# Patient Record
Sex: MALE | Race: WHITE | Employment: UNEMPLOYED | URBAN - METROPOLITAN AREA
[De-identification: names, ages, dates, MRNs, and addresses within clinical notes are randomized per-mention and may not be internally consistent; named-entity substitution may affect disease eponyms.]

---

## 2017-01-02 ENCOUNTER — HOSPITAL ENCOUNTER (OUTPATIENT)
Dept: OTHER | Age: 47
Discharge: HOME | End: 2017-01-02
Attending: FAMILY MEDICINE | Admitting: FAMILY MEDICINE

## 2017-01-02 DIAGNOSIS — R29.898 BILATERAL LEG WEAKNESS: ICD-10-CM

## 2017-01-04 DIAGNOSIS — R29.898 BILATERAL LEG WEAKNESS: Primary | ICD-10-CM

## 2017-01-17 ENCOUNTER — HOSPITAL ENCOUNTER (OUTPATIENT)
Dept: MRI IMAGING | Age: 47
Discharge: HOME | End: 2017-01-17
Attending: FAMILY MEDICINE | Admitting: FAMILY MEDICINE

## 2017-01-17 DIAGNOSIS — M62.81 MUSCLE WEAKNESS (GENERALIZED): ICD-10-CM

## 2017-01-17 DIAGNOSIS — R29.898 BILATERAL LEG WEAKNESS: ICD-10-CM

## 2017-01-17 DIAGNOSIS — R29.898 BILATERAL LEG WEAKNESS: Primary | ICD-10-CM

## 2017-02-06 ENCOUNTER — INITIAL CONSULT (OUTPATIENT)
Dept: NEUROLOGY | Age: 47
End: 2017-02-06

## 2017-02-06 VITALS
SYSTOLIC BLOOD PRESSURE: 114 MMHG | DIASTOLIC BLOOD PRESSURE: 77 MMHG | WEIGHT: 143 LBS | HEIGHT: 67 IN | BODY MASS INDEX: 22.44 KG/M2 | HEART RATE: 67 BPM | OXYGEN SATURATION: 98 %

## 2017-02-06 DIAGNOSIS — R20.0 NUMBNESS AND TINGLING OF BOTH LEGS BELOW KNEES: ICD-10-CM

## 2017-02-06 DIAGNOSIS — G62.9 POLYNEUROPATHY: Primary | ICD-10-CM

## 2017-02-06 DIAGNOSIS — F17.200 SMOKING: ICD-10-CM

## 2017-02-06 DIAGNOSIS — R20.2 NUMBNESS AND TINGLING OF BOTH LEGS BELOW KNEES: ICD-10-CM

## 2017-02-06 DIAGNOSIS — R27.0 ATAXIA: ICD-10-CM

## 2017-02-06 PROCEDURE — 99244 OFF/OP CNSLTJ NEW/EST MOD 40: CPT | Performed by: PSYCHIATRY & NEUROLOGY

## 2017-02-13 ENCOUNTER — HOSPITAL ENCOUNTER (OUTPATIENT)
Dept: OTHER | Age: 47
Discharge: HOME | End: 2017-02-13
Attending: PSYCHIATRY & NEUROLOGY | Admitting: PSYCHIATRY & NEUROLOGY

## 2017-02-13 LAB
C-REACTIVE PROTEIN: 1.3 MG/L (ref 0–5.1)
FOLATE: 2.93 NG/ML (ref 4.78–24.2)
RHEUMATOID FACTOR: <10 IU/ML
SEDIMENTATION RATE, ERYTHROCYTE: 5 MM/HR (ref 0–15)
TSH SERPL DL<=0.05 MIU/L-ACNC: 0.73 UIU/ML (ref 0.27–4.2)
VITAMIN B-12: 313 PG/ML (ref 211–911)

## 2017-02-14 LAB
ANA INTERPRETATION: NORMAL
ANTI-NUCLEAR ANTIBODY (ANA): NEGATIVE

## 2017-02-15 LAB
ALBUMIN SERPL-MCNC: 3.6 G/DL (ref 3.1–4.9)
ALPHA-1-GLOBULIN: 0.2 G/DL (ref 0.2–0.4)
ALPHA-2-GLOBULIN: 0.7 G/DL (ref 0.4–1.1)
BETA GLOBULIN: 0.9 G/DL (ref 0.9–1.6)
GAMMA GLOBULIN: 1 G/DL (ref 0.6–1.8)
SPE/IFE INTERPRETATION: NORMAL
TOTAL PROTEIN: 6.5 G/DL (ref 6.4–8.2)

## 2017-03-02 ENCOUNTER — OFFICE VISIT (OUTPATIENT)
Dept: NEUROLOGY | Age: 47
End: 2017-03-02

## 2017-03-02 DIAGNOSIS — R27.0 ATAXIA: ICD-10-CM

## 2017-03-02 DIAGNOSIS — G62.9 POLYNEUROPATHY: Primary | ICD-10-CM

## 2017-03-02 PROCEDURE — 95886 MUSC TEST DONE W/N TEST COMP: CPT | Performed by: PSYCHIATRY & NEUROLOGY

## 2017-03-02 PROCEDURE — 95909 NRV CNDJ TST 5-6 STUDIES: CPT | Performed by: PSYCHIATRY & NEUROLOGY

## 2017-03-15 ENCOUNTER — HOSPITAL ENCOUNTER (OUTPATIENT)
Dept: MRI IMAGING | Age: 47
Discharge: HOME | End: 2017-03-15
Attending: PSYCHIATRY & NEUROLOGY | Admitting: PSYCHIATRY & NEUROLOGY

## 2017-03-15 DIAGNOSIS — R27.0 ATAXIA: ICD-10-CM

## 2017-03-17 ENCOUNTER — TELEPHONE (OUTPATIENT)
Dept: NEUROLOGY | Age: 47
End: 2017-03-17

## 2017-03-17 DIAGNOSIS — R27.0 ATAXIA: ICD-10-CM

## 2017-03-17 DIAGNOSIS — R20.2 NUMBNESS AND TINGLING OF BOTH LEGS BELOW KNEES: Primary | ICD-10-CM

## 2017-03-17 DIAGNOSIS — R20.0 NUMBNESS AND TINGLING OF BOTH LEGS BELOW KNEES: Primary | ICD-10-CM

## 2017-03-31 ENCOUNTER — HOSPITAL ENCOUNTER (OUTPATIENT)
Dept: MRI IMAGING | Age: 47
Discharge: HOME | End: 2017-03-31
Attending: PSYCHIATRY & NEUROLOGY | Admitting: PSYCHIATRY & NEUROLOGY

## 2017-03-31 DIAGNOSIS — R20.0 ANESTHESIA OF SKIN: ICD-10-CM

## 2017-03-31 DIAGNOSIS — R27.0 ATAXIA: ICD-10-CM

## 2017-03-31 DIAGNOSIS — R20.0 NUMBNESS AND TINGLING OF BOTH LEGS BELOW KNEES: ICD-10-CM

## 2017-03-31 DIAGNOSIS — R20.2 NUMBNESS AND TINGLING OF BOTH LEGS BELOW KNEES: ICD-10-CM

## 2017-04-13 ENCOUNTER — TELEPHONE (OUTPATIENT)
Dept: NEUROLOGY | Age: 47
End: 2017-04-13

## 2017-04-25 ENCOUNTER — OFFICE VISIT (OUTPATIENT)
Dept: FAMILY MEDICINE CLINIC | Age: 47
End: 2017-04-25

## 2017-04-25 VITALS
WEIGHT: 146 LBS | BODY MASS INDEX: 22.91 KG/M2 | TEMPERATURE: 98.2 F | RESPIRATION RATE: 16 BRPM | HEART RATE: 84 BPM | HEIGHT: 67 IN | SYSTOLIC BLOOD PRESSURE: 104 MMHG | DIASTOLIC BLOOD PRESSURE: 78 MMHG

## 2017-04-25 DIAGNOSIS — F32.1 MODERATE SINGLE CURRENT EPISODE OF MAJOR DEPRESSIVE DISORDER (HCC): Primary | ICD-10-CM

## 2017-04-25 PROCEDURE — 99214 OFFICE O/P EST MOD 30 MIN: CPT | Performed by: FAMILY MEDICINE

## 2017-04-25 RX ORDER — GABAPENTIN 600 MG/1
600 TABLET ORAL 3 TIMES DAILY
Qty: 90 TABLET | Refills: 5 | Status: SHIPPED | OUTPATIENT
Start: 2017-04-25 | End: 2017-09-11 | Stop reason: SDUPTHER

## 2017-04-25 RX ORDER — LORAZEPAM 0.5 MG/1
0.5 TABLET ORAL EVERY 8 HOURS PRN
Qty: 30 TABLET | Refills: 0 | Status: SHIPPED | OUTPATIENT
Start: 2017-04-25 | End: 2017-05-15 | Stop reason: DRUGHIGH

## 2017-04-25 RX ORDER — DICYCLOMINE HYDROCHLORIDE 10 MG/1
10 CAPSULE ORAL
Qty: 120 CAPSULE | Refills: 3 | Status: SHIPPED | OUTPATIENT
Start: 2017-04-25 | End: 2019-09-16 | Stop reason: SDUPTHER

## 2017-05-15 ENCOUNTER — OFFICE VISIT (OUTPATIENT)
Dept: FAMILY MEDICINE CLINIC | Age: 47
End: 2017-05-15

## 2017-05-15 VITALS
SYSTOLIC BLOOD PRESSURE: 102 MMHG | BODY MASS INDEX: 22.44 KG/M2 | WEIGHT: 143 LBS | OXYGEN SATURATION: 97 % | HEART RATE: 85 BPM | HEIGHT: 67 IN | DIASTOLIC BLOOD PRESSURE: 72 MMHG

## 2017-05-15 DIAGNOSIS — F32.1 MODERATE SINGLE CURRENT EPISODE OF MAJOR DEPRESSIVE DISORDER (HCC): Primary | ICD-10-CM

## 2017-05-15 DIAGNOSIS — R29.898 BILATERAL LEG WEAKNESS: ICD-10-CM

## 2017-05-15 DIAGNOSIS — F17.200 NICOTINE USE DISORDER: ICD-10-CM

## 2017-05-15 PROCEDURE — 99213 OFFICE O/P EST LOW 20 MIN: CPT | Performed by: FAMILY MEDICINE

## 2017-05-15 RX ORDER — NICOTINE 21 MG/24HR
1 PATCH, TRANSDERMAL 24 HOURS TRANSDERMAL EVERY 24 HOURS
Qty: 30 PATCH | Refills: 5 | Status: CANCELLED | OUTPATIENT
Start: 2017-05-15

## 2017-05-15 RX ORDER — NICOTINE 21 MG/24HR
1 PATCH, TRANSDERMAL 24 HOURS TRANSDERMAL EVERY 24 HOURS
Qty: 30 PATCH | Refills: 0 | Status: SHIPPED | OUTPATIENT
Start: 2017-05-15 | End: 2017-09-11 | Stop reason: ALTCHOICE

## 2017-05-15 RX ORDER — NICOTINE 21 MG/24HR
1 PATCH, TRANSDERMAL 24 HOURS TRANSDERMAL EVERY 24 HOURS
COMMUNITY
End: 2017-09-11 | Stop reason: ALTCHOICE

## 2017-05-15 RX ORDER — LORAZEPAM 1 MG/1
1 TABLET ORAL EVERY 8 HOURS PRN
Qty: 30 TABLET | Refills: 2 | Status: SHIPPED | OUTPATIENT
Start: 2017-05-15 | End: 2017-07-10 | Stop reason: SDUPTHER

## 2017-07-10 ENCOUNTER — OFFICE VISIT (OUTPATIENT)
Dept: FAMILY MEDICINE CLINIC | Age: 47
End: 2017-07-10

## 2017-07-10 VITALS
BODY MASS INDEX: 22.55 KG/M2 | HEART RATE: 67 BPM | WEIGHT: 144 LBS | DIASTOLIC BLOOD PRESSURE: 72 MMHG | OXYGEN SATURATION: 98 % | RESPIRATION RATE: 15 BRPM | SYSTOLIC BLOOD PRESSURE: 102 MMHG

## 2017-07-10 DIAGNOSIS — F32.1 MODERATE SINGLE CURRENT EPISODE OF MAJOR DEPRESSIVE DISORDER (HCC): ICD-10-CM

## 2017-07-10 PROCEDURE — 99213 OFFICE O/P EST LOW 20 MIN: CPT | Performed by: FAMILY MEDICINE

## 2017-07-10 RX ORDER — LORAZEPAM 1 MG/1
1 TABLET ORAL EVERY 8 HOURS PRN
Qty: 30 TABLET | Refills: 2 | Status: SHIPPED | OUTPATIENT
Start: 2017-07-10 | End: 2017-09-11 | Stop reason: SDUPTHER

## 2017-07-10 RX ORDER — PAROXETINE HYDROCHLORIDE 40 MG/1
40 TABLET, FILM COATED ORAL NIGHTLY
Qty: 30 TABLET | Refills: 2 | Status: SHIPPED | OUTPATIENT
Start: 2017-07-10 | End: 2017-09-11 | Stop reason: SDUPTHER

## 2017-09-11 ENCOUNTER — OFFICE VISIT (OUTPATIENT)
Dept: FAMILY MEDICINE CLINIC | Age: 47
End: 2017-09-11

## 2017-09-11 VITALS
OXYGEN SATURATION: 93 % | RESPIRATION RATE: 15 BRPM | HEART RATE: 62 BPM | DIASTOLIC BLOOD PRESSURE: 78 MMHG | TEMPERATURE: 97.5 F | BODY MASS INDEX: 22.71 KG/M2 | SYSTOLIC BLOOD PRESSURE: 112 MMHG | WEIGHT: 145 LBS

## 2017-09-11 DIAGNOSIS — Z23 NEED FOR INFLUENZA VACCINATION: Primary | ICD-10-CM

## 2017-09-11 DIAGNOSIS — F32.1 MODERATE SINGLE CURRENT EPISODE OF MAJOR DEPRESSIVE DISORDER (HCC): ICD-10-CM

## 2017-09-11 PROCEDURE — 99213 OFFICE O/P EST LOW 20 MIN: CPT | Performed by: FAMILY MEDICINE

## 2017-09-11 PROCEDURE — 90471 IMMUNIZATION ADMIN: CPT | Performed by: FAMILY MEDICINE

## 2017-09-11 PROCEDURE — 90688 IIV4 VACCINE SPLT 0.5 ML IM: CPT | Performed by: FAMILY MEDICINE

## 2017-09-11 RX ORDER — GABAPENTIN 600 MG/1
600 TABLET ORAL 3 TIMES DAILY
Qty: 90 TABLET | Refills: 5 | Status: SHIPPED | OUTPATIENT
Start: 2017-09-11 | End: 2018-02-26 | Stop reason: SDUPTHER

## 2017-09-11 RX ORDER — PAROXETINE HYDROCHLORIDE 40 MG/1
40 TABLET, FILM COATED ORAL NIGHTLY
Qty: 30 TABLET | Refills: 5 | Status: SHIPPED | OUTPATIENT
Start: 2017-09-11 | End: 2018-02-26 | Stop reason: SDUPTHER

## 2017-09-11 RX ORDER — LORAZEPAM 1 MG/1
1 TABLET ORAL EVERY 8 HOURS PRN
Qty: 30 TABLET | Refills: 5 | Status: SHIPPED | OUTPATIENT
Start: 2017-09-11 | End: 2018-02-26 | Stop reason: SDUPTHER

## 2017-09-11 RX ORDER — ALBUTEROL SULFATE 90 UG/1
2 AEROSOL, METERED RESPIRATORY (INHALATION) 4 TIMES DAILY
Qty: 1 INHALER | Refills: 5 | Status: SHIPPED | OUTPATIENT
Start: 2017-09-11 | End: 2018-02-26 | Stop reason: SDUPTHER

## 2017-10-03 DIAGNOSIS — F32.1 MODERATE SINGLE CURRENT EPISODE OF MAJOR DEPRESSIVE DISORDER (HCC): ICD-10-CM

## 2017-10-03 RX ORDER — PAROXETINE HYDROCHLORIDE 40 MG/1
TABLET, FILM COATED ORAL
Qty: 30 TABLET | Refills: 0 | OUTPATIENT
Start: 2017-10-03

## 2017-12-22 ENCOUNTER — OFFICE VISIT (OUTPATIENT)
Dept: FAMILY MEDICINE CLINIC | Age: 47
End: 2017-12-22

## 2017-12-22 VITALS
BODY MASS INDEX: 23.7 KG/M2 | HEIGHT: 67 IN | DIASTOLIC BLOOD PRESSURE: 60 MMHG | HEART RATE: 61 BPM | WEIGHT: 151 LBS | SYSTOLIC BLOOD PRESSURE: 108 MMHG | OXYGEN SATURATION: 97 %

## 2017-12-22 DIAGNOSIS — Z71.6 ENCOUNTER FOR SMOKING CESSATION COUNSELING: ICD-10-CM

## 2017-12-22 DIAGNOSIS — B07.0 PLANTAR WART OF RIGHT FOOT: Primary | ICD-10-CM

## 2017-12-22 PROCEDURE — 99213 OFFICE O/P EST LOW 20 MIN: CPT | Performed by: FAMILY MEDICINE

## 2017-12-22 PROCEDURE — G8484 FLU IMMUNIZE NO ADMIN: HCPCS | Performed by: FAMILY MEDICINE

## 2017-12-22 PROCEDURE — 11055 PARING/CUTG B9 HYPRKER LES 1: CPT | Performed by: FAMILY MEDICINE

## 2017-12-22 PROCEDURE — G8420 CALC BMI NORM PARAMETERS: HCPCS | Performed by: FAMILY MEDICINE

## 2017-12-22 PROCEDURE — G8427 DOCREV CUR MEDS BY ELIG CLIN: HCPCS | Performed by: FAMILY MEDICINE

## 2017-12-22 PROCEDURE — 4004F PT TOBACCO SCREEN RCVD TLK: CPT | Performed by: FAMILY MEDICINE

## 2017-12-22 RX ORDER — VARENICLINE TARTRATE 1 MG/1
1 TABLET, FILM COATED ORAL 2 TIMES DAILY
Qty: 60 TABLET | Refills: 3 | Status: SHIPPED | OUTPATIENT
Start: 2017-12-22 | End: 2018-02-26 | Stop reason: ALTCHOICE

## 2017-12-22 RX ORDER — VARENICLINE TARTRATE 25 MG
KIT ORAL
Qty: 53 TABLET | Refills: 0 | Status: SHIPPED | OUTPATIENT
Start: 2017-12-22 | End: 2018-02-26 | Stop reason: ALTCHOICE

## 2018-02-26 ENCOUNTER — OFFICE VISIT (OUTPATIENT)
Dept: FAMILY MEDICINE CLINIC | Age: 48
End: 2018-02-26

## 2018-02-26 VITALS
HEART RATE: 78 BPM | WEIGHT: 154 LBS | DIASTOLIC BLOOD PRESSURE: 72 MMHG | SYSTOLIC BLOOD PRESSURE: 102 MMHG | OXYGEN SATURATION: 97 % | BODY MASS INDEX: 24.17 KG/M2 | HEIGHT: 67 IN

## 2018-02-26 DIAGNOSIS — M19.90 ARTHRITIS: Primary | ICD-10-CM

## 2018-02-26 DIAGNOSIS — B07.0 PLANTAR WART OF RIGHT FOOT: ICD-10-CM

## 2018-02-26 DIAGNOSIS — F32.1 MODERATE SINGLE CURRENT EPISODE OF MAJOR DEPRESSIVE DISORDER (HCC): ICD-10-CM

## 2018-02-26 PROCEDURE — G8427 DOCREV CUR MEDS BY ELIG CLIN: HCPCS | Performed by: FAMILY MEDICINE

## 2018-02-26 PROCEDURE — 99213 OFFICE O/P EST LOW 20 MIN: CPT | Performed by: FAMILY MEDICINE

## 2018-02-26 PROCEDURE — G8484 FLU IMMUNIZE NO ADMIN: HCPCS | Performed by: FAMILY MEDICINE

## 2018-02-26 PROCEDURE — G8420 CALC BMI NORM PARAMETERS: HCPCS | Performed by: FAMILY MEDICINE

## 2018-02-26 PROCEDURE — 1036F TOBACCO NON-USER: CPT | Performed by: FAMILY MEDICINE

## 2018-02-26 PROCEDURE — 11055 PARING/CUTG B9 HYPRKER LES 1: CPT | Performed by: FAMILY MEDICINE

## 2018-02-26 RX ORDER — ALBUTEROL SULFATE 90 UG/1
2 AEROSOL, METERED RESPIRATORY (INHALATION) 4 TIMES DAILY
Qty: 1 INHALER | Refills: 5 | Status: SHIPPED | OUTPATIENT
Start: 2018-02-26 | End: 2018-11-07 | Stop reason: DRUGHIGH

## 2018-02-26 RX ORDER — PAROXETINE HYDROCHLORIDE 40 MG/1
40 TABLET, FILM COATED ORAL NIGHTLY
Qty: 30 TABLET | Refills: 5 | Status: SHIPPED | OUTPATIENT
Start: 2018-02-26 | End: 2018-08-20 | Stop reason: SDUPTHER

## 2018-02-26 RX ORDER — LORAZEPAM 1 MG/1
1 TABLET ORAL EVERY 8 HOURS PRN
Qty: 30 TABLET | Refills: 5 | Status: SHIPPED | OUTPATIENT
Start: 2018-02-26 | End: 2018-03-28

## 2018-02-26 RX ORDER — GABAPENTIN 600 MG/1
600 TABLET ORAL 3 TIMES DAILY
Qty: 90 TABLET | Refills: 5 | Status: SHIPPED | OUTPATIENT
Start: 2018-02-26 | End: 2018-08-20 | Stop reason: SDUPTHER

## 2018-02-26 NOTE — PROGRESS NOTES
Subjective:      Patient ID: Genaro Killian is a 56 y.o. male. HPI  Chief Complaint   Patient presents with   Marcos ran 12/21/17;loraz one a day;    Arthritis     uses kami 3 times a day for back andknees pain;    Tingling     takes the gabapentin for this     Depression     controlled;    Nicotine Dependence     hasnt smoked since 1/1/2018-was 3 ppd;     Chief complaint present illness: 78-year-old white male presents unaccompanied for routine follow-up. Feels he is doing really quite well. Is most happy with the fact that he hasn't smoked since January 1. Review of Systems   All other systems reviewed and are negative. Objective:   Physical Exam   Constitutional: He appears well-developed and well-nourished. Neck: Neck supple. Cardiovascular: Normal rate, regular rhythm and normal heart sounds. Pulmonary/Chest: Effort normal and breath sounds normal. He has no wheezes. He has no rales. Musculoskeletal: He exhibits no edema. Skin: Skin is warm. Psychiatric: He has a normal mood and affect. His behavior is normal.   Nursing note and vitals reviewed. /72   Pulse 78   Ht 5' 7\" (1.702 m)   Wt 154 lb (69.9 kg)   SpO2 97%   BMI 24.12 kg/m²     Controlled Substances Monitoring: The Prescription Monitoring Report for this patient was reviewed today. Nichelle Alexandre MD)    Possible medication side effects, risk of tolerance/dependence & alternative treatments discussed., No signs of potential drug abuse or diversion identified. Nichelle Alexandre MD)         Functional status reviewed - continues with improved or maintaining ADL's. Nichelle Alexandre MD)          Assessment:      Mari Toscano was seen today for anxiety, arthritis, tingling, depression and nicotine dependence. Diagnoses and all orders for this visit:    Arthritis    Moderate single current episode of major depressive disorder (HCC)  -     LORazepam (ATIVAN) 1 MG tablet;  Take 1 tablet by

## 2018-05-11 RX ORDER — VARENICLINE TARTRATE 1 MG/1
TABLET, FILM COATED ORAL
Qty: 56 TABLET | Refills: 0 | Status: SHIPPED | OUTPATIENT
Start: 2018-05-11 | End: 2018-07-25

## 2018-07-10 ENCOUNTER — TELEPHONE (OUTPATIENT)
Dept: PULMONOLOGY | Age: 48
End: 2018-07-10

## 2018-07-11 NOTE — TELEPHONE ENCOUNTER
Pt was called and scheduled for 11/7/18.  Pt advised to continue to f/u with PCP until appt with Dr. Migel Brand

## 2018-07-25 ENCOUNTER — HOSPITAL ENCOUNTER (EMERGENCY)
Age: 48
Discharge: HOME | End: 2018-07-26
Attending: EMERGENCY MEDICINE
Payer: MEDICARE

## 2018-07-25 DIAGNOSIS — R19.7 DIARRHEA, UNSPECIFIED TYPE: Primary | ICD-10-CM

## 2018-07-25 LAB
A/G RATIO: 1.5 (ref 1.1–2.2)
ALBUMIN SERPL-MCNC: 4.9 G/DL (ref 3.4–5)
ALP BLD-CCNC: 112 U/L (ref 40–129)
ALT SERPL-CCNC: 13 U/L (ref 10–40)
ANION GAP SERPL CALCULATED.3IONS-SCNC: 14 MMOL/L (ref 3–16)
AST SERPL-CCNC: 17 U/L (ref 15–37)
BASOPHILS ABSOLUTE: 0.1 K/UL (ref 0–0.2)
BASOPHILS RELATIVE PERCENT: 0.6 %
BILIRUB SERPL-MCNC: 1.1 MG/DL (ref 0–1)
BUN BLDV-MCNC: 11 MG/DL (ref 7–20)
C DIFFICILE TOXIN, EIA: NORMAL
CALCIUM SERPL-MCNC: 10.3 MG/DL (ref 8.3–10.6)
CHLORIDE BLD-SCNC: 100 MMOL/L (ref 99–110)
CO2: 28 MMOL/L (ref 21–32)
CREAT SERPL-MCNC: 1.1 MG/DL (ref 0.9–1.3)
EOSINOPHILS ABSOLUTE: 0.8 K/UL (ref 0–0.6)
EOSINOPHILS RELATIVE PERCENT: 7.1 %
GFR AFRICAN AMERICAN: >60
GFR NON-AFRICAN AMERICAN: >60
GLOBULIN: 3.3 G/DL
GLUCOSE BLD-MCNC: 107 MG/DL (ref 70–99)
HCT VFR BLD CALC: 45.4 % (ref 40.5–52.5)
HEMOGLOBIN: 15.4 G/DL (ref 13.5–17.5)
LYMPHOCYTES ABSOLUTE: 2.9 K/UL (ref 1–5.1)
LYMPHOCYTES RELATIVE PERCENT: 24 %
MCH RBC QN AUTO: 29.5 PG (ref 26–34)
MCHC RBC AUTO-ENTMCNC: 34 G/DL (ref 31–36)
MCV RBC AUTO: 86.7 FL (ref 80–100)
MONOCYTES ABSOLUTE: 1 K/UL (ref 0–1.3)
MONOCYTES RELATIVE PERCENT: 8.6 %
NEUTROPHILS ABSOLUTE: 7.2 K/UL (ref 1.7–7.7)
NEUTROPHILS RELATIVE PERCENT: 59.7 %
PDW BLD-RTO: 13.3 % (ref 12.4–15.4)
PLATELET # BLD: 289 K/UL (ref 135–450)
PMV BLD AUTO: 8.3 FL (ref 5–10.5)
POTASSIUM SERPL-SCNC: 4.1 MMOL/L (ref 3.5–5.1)
RBC # BLD: 5.24 M/UL (ref 4.2–5.9)
SODIUM BLD-SCNC: 142 MMOL/L (ref 136–145)
TOTAL PROTEIN: 8.2 G/DL (ref 6.4–8.2)
WBC # BLD: 12 K/UL (ref 4–11)

## 2018-07-25 PROCEDURE — 80053 COMPREHEN METABOLIC PANEL: CPT

## 2018-07-25 PROCEDURE — 99284 EMERGENCY DEPT VISIT MOD MDM: CPT

## 2018-07-25 PROCEDURE — 87324 CLOSTRIDIUM AG IA: CPT

## 2018-07-25 PROCEDURE — 96374 THER/PROPH/DIAG INJ IV PUSH: CPT

## 2018-07-25 PROCEDURE — 85025 COMPLETE CBC W/AUTO DIFF WBC: CPT

## 2018-07-25 PROCEDURE — 96361 HYDRATE IV INFUSION ADD-ON: CPT

## 2018-07-25 PROCEDURE — 36415 COLL VENOUS BLD VENIPUNCTURE: CPT

## 2018-07-25 PROCEDURE — 87449 NOS EACH ORGANISM AG IA: CPT

## 2018-07-25 PROCEDURE — 2580000003 HC RX 258: Performed by: EMERGENCY MEDICINE

## 2018-07-25 PROCEDURE — 6360000002 HC RX W HCPCS: Performed by: EMERGENCY MEDICINE

## 2018-07-25 RX ORDER — LORAZEPAM 1 MG/1
1 TABLET ORAL ONCE
COMMUNITY
End: 2018-08-20 | Stop reason: SDUPTHER

## 2018-07-25 RX ORDER — ONDANSETRON 2 MG/ML
4 INJECTION INTRAMUSCULAR; INTRAVENOUS ONCE
Status: COMPLETED | OUTPATIENT
Start: 2018-07-25 | End: 2018-07-25

## 2018-07-25 RX ORDER — 0.9 % SODIUM CHLORIDE 0.9 %
1000 INTRAVENOUS SOLUTION INTRAVENOUS ONCE
Status: COMPLETED | OUTPATIENT
Start: 2018-07-25 | End: 2018-07-26

## 2018-07-25 RX ADMIN — ONDANSETRON 4 MG: 2 INJECTION INTRAMUSCULAR; INTRAVENOUS at 22:50

## 2018-07-25 RX ADMIN — SODIUM CHLORIDE 1000 ML: 9 INJECTION, SOLUTION INTRAVENOUS at 22:50

## 2018-07-25 ASSESSMENT — ENCOUNTER SYMPTOMS
BLOOD IN STOOL: 0
WHEEZING: 0
BACK PAIN: 0
DIARRHEA: 1
SHORTNESS OF BREATH: 0
NAUSEA: 0
COLOR CHANGE: 0
STRIDOR: 0
FACIAL SWELLING: 0
PHOTOPHOBIA: 0
TROUBLE SWALLOWING: 0
ABDOMINAL PAIN: 0
VOMITING: 0
VOICE CHANGE: 0

## 2018-07-26 VITALS
HEART RATE: 88 BPM | RESPIRATION RATE: 16 BRPM | SYSTOLIC BLOOD PRESSURE: 148 MMHG | BODY MASS INDEX: 25.9 KG/M2 | HEIGHT: 67 IN | DIASTOLIC BLOOD PRESSURE: 73 MMHG | OXYGEN SATURATION: 99 % | TEMPERATURE: 97.9 F | WEIGHT: 165 LBS

## 2018-07-26 NOTE — ED NOTES
PATIENT IV ESTABLISHED AND MEDICATED AS ORDERED. WARM BLANKET GIVEN. WIFE OUT TO THE NURSES STATION WANTING TO KNOW IF THE PATIENT CAN EAT NOW. ADVISED NOT TO GIVE HIM FOOD YET.      Marshal Kirby RN  07/25/18 [id number]

## 2018-07-26 NOTE — ED PROVIDER NOTES
[facility]  eMERGENCY dEPARTMENT eNCOUnter      Pt Name: Wendy Larson  MRN: [id number]  Charlinegfaraseli 1970  Date of evaluation: 7/25/2018  Provider: Niurka Shahid MD    CHIEF COMPLAINT       Chief Complaint   Patient presents with    Diarrhea     pt states he has had diarrhea x2 months         HISTORY OF PRESENT ILLNESS   (Location/Symptom, Timing/Onset, Context/Setting, Quality, Duration, Modifying Factors, Severity)  Note limiting factors. The history is provided by the patient. Diarrhea   Quality:  Watery (no blood per patient)  Severity:  Moderate  Onset quality:  Gradual  Duration:  2 months  Timing:  Constant  Progression:  Unchanged  Relieved by:  None tried  Worsened by:  Nothing  Ineffective treatments:  None tried  Associated symptoms: no abdominal pain, no fever and no vomiting    Risk factors: no recent antibiotic use  Sick contacts: denies any antibiotic use or hospitalizations recently. Nursing Notes were reviewed. REVIEW OF SYSTEMS    (2-9 systems for level 4, 10 or more for level 5)     Review of Systems   Constitutional: Negative for appetite change, fever and unexpected weight change. HENT: Negative for drooling, facial swelling, trouble swallowing and voice change. Eyes: Negative for photophobia and visual disturbance. Respiratory: Negative for shortness of breath, wheezing and stridor. Cardiovascular: Negative for chest pain and palpitations. Gastrointestinal: Positive for diarrhea. Negative for abdominal pain, blood in stool, nausea and vomiting. Genitourinary: Negative for difficulty urinating and dysuria. Musculoskeletal: Negative for back pain, gait problem and neck pain. Skin: Negative for color change and wound. Neurological: Negative for seizures, syncope and speech difficulty. Psychiatric/Behavioral: Negative for self-injury and suicidal ideas.        Except as noted above the remainder of the review of systems was 5)     ED Triage Vitals [07/25/18 2125]   BP Temp Temp Source Pulse Resp SpO2 Height Weight   120/83 97.9 °F (36.6 °C) Oral 81 16 99 % 5' 7\" (1.702 m) 165 lb (74.8 kg)       Physical Exam   Constitutional: He is oriented to person, place, and time. He appears well-developed and well-nourished. No distress. HENT:   Head: Normocephalic and atraumatic. Right Ear: External ear normal.   Left Ear: External ear normal.   Eyes: Conjunctivae and EOM are normal.   Neck: Neck supple. No JVD present. No tracheal deviation present. Cardiovascular: Normal rate and intact distal pulses. Pulmonary/Chest: Effort normal and breath sounds normal. No respiratory distress. He has no wheezes. Abdominal: Soft. Bowel sounds are normal. He exhibits no distension. There is no tenderness (no abdominal tenderness). There is no rebound and no guarding. Musculoskeletal: Normal range of motion. He exhibits no tenderness. Neurological: He is alert and oriented to person, place, and time. No cranial nerve deficit. 5/5 strength in all 4 extremities. Normal gait. No focal neurological symptoms. Skin: Skin is warm and dry. Nursing note and vitals reviewed. DIAGNOSTIC RESULTS       LABS:  Labs Reviewed   CBC WITH AUTO DIFFERENTIAL - Abnormal; Notable for the following:        Result Value    WBC 12.0 (*)     Eosinophils # 0.8 (*)     All other components within normal limits    Narrative:     Performed at:  [facility]. [facility]  [address] [place] [address]. [place], [address]   Phone [phone]   COMPREHENSIVE METABOLIC PANEL - Abnormal; Notable for the following:     Glucose 107 (*)     Total Bilirubin 1.1 (*)     All other components within normal limits    Narrative:     Performed at:  [facility]. [facility]  [address] [place] [address].  [place], [address]   Phone [phone]   C DIFF TOXIN/ANTIGEN       All other labs were within normal range or not returned Jair Albright, [address]  [facility] ([facility]) [address] [place] [address]  [phone]    In 2 days      [place]. [facility]  [address]  [phone]    If symptoms worsen      (Please note that portions of this note were completed with a voice recognition program.  Efforts were made to edit the dictations but occasionally words are mis-transcribed. )    César Ledezma MD (electronically signed)  Attending Emergency Physician           César Ledezma MD  07/25/18 [phone]

## 2018-08-20 ENCOUNTER — OFFICE VISIT (OUTPATIENT)
Dept: FAMILY MEDICINE CLINIC | Age: 48
End: 2018-08-20

## 2018-08-20 VITALS
HEART RATE: 57 BPM | HEIGHT: 67 IN | SYSTOLIC BLOOD PRESSURE: 102 MMHG | DIASTOLIC BLOOD PRESSURE: 70 MMHG | OXYGEN SATURATION: 98 % | BODY MASS INDEX: 24.17 KG/M2 | WEIGHT: 154 LBS

## 2018-08-20 DIAGNOSIS — F32.1 MODERATE SINGLE CURRENT EPISODE OF MAJOR DEPRESSIVE DISORDER (HCC): ICD-10-CM

## 2018-08-20 DIAGNOSIS — R20.2 NUMBNESS AND TINGLING OF BOTH LEGS BELOW KNEES: ICD-10-CM

## 2018-08-20 DIAGNOSIS — R20.0 NUMBNESS AND TINGLING OF BOTH LEGS BELOW KNEES: ICD-10-CM

## 2018-08-20 DIAGNOSIS — K58.0 IRRITABLE BOWEL SYNDROME WITH DIARRHEA: Primary | ICD-10-CM

## 2018-08-20 LAB
C-REACTIVE PROTEIN: 0.8 MG/L (ref 0–5.1)
SEDIMENTATION RATE, ERYTHROCYTE: 5 MM/HR (ref 0–15)

## 2018-08-20 PROCEDURE — G8427 DOCREV CUR MEDS BY ELIG CLIN: HCPCS | Performed by: FAMILY MEDICINE

## 2018-08-20 PROCEDURE — 36415 COLL VENOUS BLD VENIPUNCTURE: CPT | Performed by: FAMILY MEDICINE

## 2018-08-20 PROCEDURE — 1036F TOBACCO NON-USER: CPT | Performed by: FAMILY MEDICINE

## 2018-08-20 PROCEDURE — 99214 OFFICE O/P EST MOD 30 MIN: CPT | Performed by: FAMILY MEDICINE

## 2018-08-20 PROCEDURE — G8420 CALC BMI NORM PARAMETERS: HCPCS | Performed by: FAMILY MEDICINE

## 2018-08-20 RX ORDER — PAROXETINE HYDROCHLORIDE 40 MG/1
40 TABLET, FILM COATED ORAL NIGHTLY
Qty: 90 TABLET | Refills: 1 | Status: SHIPPED | OUTPATIENT
Start: 2018-08-20 | End: 2019-03-08 | Stop reason: SDUPTHER

## 2018-08-20 RX ORDER — LORAZEPAM 1 MG/1
1 TABLET ORAL PRN
Qty: 30 TABLET | Refills: 5 | Status: SHIPPED | OUTPATIENT
Start: 2018-08-20 | End: 2019-02-16

## 2018-08-20 RX ORDER — GABAPENTIN 600 MG/1
600 TABLET ORAL 3 TIMES DAILY
Qty: 270 TABLET | Refills: 1 | Status: SHIPPED | OUTPATIENT
Start: 2018-08-20 | End: 2019-03-08 | Stop reason: SDUPTHER

## 2018-10-18 ENCOUNTER — NURSE ONLY (OUTPATIENT)
Dept: FAMILY MEDICINE CLINIC | Age: 48
End: 2018-10-18

## 2018-10-18 ENCOUNTER — APPOINTMENT (OUTPATIENT)
Dept: CT IMAGING | Age: 48
End: 2018-10-18
Payer: MEDICARE

## 2018-10-18 ENCOUNTER — HOSPITAL ENCOUNTER (EMERGENCY)
Age: 48
Discharge: HOME | End: 2018-10-18
Attending: EMERGENCY MEDICINE
Payer: MEDICARE

## 2018-10-18 ENCOUNTER — APPOINTMENT (OUTPATIENT)
Dept: GENERAL RADIOLOGY | Age: 48
End: 2018-10-18
Payer: MEDICARE

## 2018-10-18 VITALS
DIASTOLIC BLOOD PRESSURE: 85 MMHG | RESPIRATION RATE: 16 BRPM | HEIGHT: 68 IN | WEIGHT: 165 LBS | HEART RATE: 65 BPM | OXYGEN SATURATION: 100 % | BODY MASS INDEX: 25.01 KG/M2 | TEMPERATURE: 97.8 F | SYSTOLIC BLOOD PRESSURE: 146 MMHG

## 2018-10-18 VITALS — HEART RATE: 54 BPM | OXYGEN SATURATION: 95 % | DIASTOLIC BLOOD PRESSURE: 89 MMHG | SYSTOLIC BLOOD PRESSURE: 130 MMHG

## 2018-10-18 DIAGNOSIS — R53.1 GENERAL WEAKNESS: Primary | ICD-10-CM

## 2018-10-18 DIAGNOSIS — J32.9 CHRONIC SINUSITIS, UNSPECIFIED LOCATION: ICD-10-CM

## 2018-10-18 DIAGNOSIS — R00.2 PALPITATIONS: ICD-10-CM

## 2018-10-18 LAB
A/G RATIO: 1.7 (ref 1.1–2.2)
ALBUMIN SERPL-MCNC: 4.5 G/DL (ref 3.4–5)
ALP BLD-CCNC: 72 U/L (ref 40–129)
ALT SERPL-CCNC: 7 U/L (ref 10–40)
ANION GAP SERPL CALCULATED.3IONS-SCNC: 11 MMOL/L (ref 3–16)
AST SERPL-CCNC: 12 U/L (ref 15–37)
BASOPHILS ABSOLUTE: 0.1 K/UL (ref 0–0.2)
BASOPHILS RELATIVE PERCENT: 1 %
BILIRUB SERPL-MCNC: 0.9 MG/DL (ref 0–1)
BUN BLDV-MCNC: 7 MG/DL (ref 7–20)
CALCIUM SERPL-MCNC: 9.5 MG/DL (ref 8.3–10.6)
CHLORIDE BLD-SCNC: 105 MMOL/L (ref 99–110)
CO2: 29 MMOL/L (ref 21–32)
CREAT SERPL-MCNC: 1 MG/DL (ref 0.9–1.3)
EOSINOPHILS ABSOLUTE: 0.4 K/UL (ref 0–0.6)
EOSINOPHILS RELATIVE PERCENT: 4.6 %
GFR AFRICAN AMERICAN: >60
GFR NON-AFRICAN AMERICAN: >60
GLOBULIN: 2.7 G/DL
GLUCOSE BLD-MCNC: 113 MG/DL (ref 70–99)
GLUCOSE BLD-MCNC: 115 MG/DL (ref 70–99)
HCT VFR BLD CALC: 46.5 % (ref 40.5–52.5)
HEMOGLOBIN: 15.6 G/DL (ref 13.5–17.5)
LYMPHOCYTES ABSOLUTE: 2.2 K/UL (ref 1–5.1)
LYMPHOCYTES RELATIVE PERCENT: 26.5 %
MCH RBC QN AUTO: 30 PG (ref 26–34)
MCHC RBC AUTO-ENTMCNC: 33.6 G/DL (ref 31–36)
MCV RBC AUTO: 89.2 FL (ref 80–100)
MONOCYTES ABSOLUTE: 0.6 K/UL (ref 0–1.3)
MONOCYTES RELATIVE PERCENT: 7.4 %
NEUTROPHILS ABSOLUTE: 5 K/UL (ref 1.7–7.7)
NEUTROPHILS RELATIVE PERCENT: 60.5 %
PDW BLD-RTO: 12.9 % (ref 12.4–15.4)
PERFORMED ON: ABNORMAL
PLATELET # BLD: 274 K/UL (ref 135–450)
PMV BLD AUTO: 8.2 FL (ref 5–10.5)
POTASSIUM REFLEX MAGNESIUM: 3.7 MMOL/L (ref 3.5–5.1)
RBC # BLD: 5.21 M/UL (ref 4.2–5.9)
SODIUM BLD-SCNC: 145 MMOL/L (ref 136–145)
TOTAL PROTEIN: 7.2 G/DL (ref 6.4–8.2)
TROPONIN: <0.01 NG/ML
TSH SERPL DL<=0.05 MIU/L-ACNC: 0.66 UIU/ML (ref 0.27–4.2)
WBC # BLD: 8.2 K/UL (ref 4–11)

## 2018-10-18 PROCEDURE — 84443 ASSAY THYROID STIM HORMONE: CPT

## 2018-10-18 PROCEDURE — 36415 COLL VENOUS BLD VENIPUNCTURE: CPT

## 2018-10-18 PROCEDURE — 85025 COMPLETE CBC W/AUTO DIFF WBC: CPT

## 2018-10-18 PROCEDURE — 71045 X-RAY EXAM CHEST 1 VIEW: CPT

## 2018-10-18 PROCEDURE — 70450 CT HEAD/BRAIN W/O DYE: CPT

## 2018-10-18 PROCEDURE — 80053 COMPREHEN METABOLIC PANEL: CPT

## 2018-10-18 PROCEDURE — 93010 ELECTROCARDIOGRAM REPORT: CPT | Performed by: INTERNAL MEDICINE

## 2018-10-18 PROCEDURE — 6370000000 HC RX 637 (ALT 250 FOR IP): Performed by: EMERGENCY MEDICINE

## 2018-10-18 PROCEDURE — 93005 ELECTROCARDIOGRAM TRACING: CPT | Performed by: EMERGENCY MEDICINE

## 2018-10-18 PROCEDURE — 4500000025 HC ED LEVEL 5 PROCEDURE

## 2018-10-18 PROCEDURE — 2580000003 HC RX 258: Performed by: EMERGENCY MEDICINE

## 2018-10-18 PROCEDURE — 99285 EMERGENCY DEPT VISIT HI MDM: CPT

## 2018-10-18 PROCEDURE — 84484 ASSAY OF TROPONIN QUANT: CPT

## 2018-10-18 RX ORDER — FLUTICASONE PROPIONATE 50 MCG
1 SPRAY, SUSPENSION (ML) NASAL DAILY
Qty: 1 BOTTLE | Refills: 3 | Status: SHIPPED | OUTPATIENT
Start: 2018-10-18 | End: 2020-12-21 | Stop reason: SDUPTHER

## 2018-10-18 RX ORDER — PREDNISONE 20 MG/1
40 TABLET ORAL ONCE
Status: COMPLETED | OUTPATIENT
Start: 2018-10-18 | End: 2018-10-18

## 2018-10-18 RX ORDER — PREDNISONE 20 MG/1
20 TABLET ORAL DAILY
Qty: 7 TABLET | Refills: 0 | Status: SHIPPED | OUTPATIENT
Start: 2018-10-19 | End: 2018-10-23 | Stop reason: ALTCHOICE

## 2018-10-18 RX ADMIN — SODIUM CHLORIDE 1000 ML: 9 INJECTION, SOLUTION INTRAVENOUS at 15:22

## 2018-10-18 RX ADMIN — PREDNISONE 40 MG: 20 TABLET ORAL at 17:56

## 2018-10-19 LAB
EKG ATRIAL RATE: 92 BPM
EKG DIAGNOSIS: NORMAL
EKG P AXIS: 40 DEGREES
EKG P-R INTERVAL: 128 MS
EKG Q-T INTERVAL: 368 MS
EKG QRS DURATION: 98 MS
EKG QTC CALCULATION (BAZETT): 455 MS
EKG R AXIS: -45 DEGREES
EKG T AXIS: 44 DEGREES
EKG VENTRICULAR RATE: 92 BPM

## 2018-10-23 ENCOUNTER — APPOINTMENT (OUTPATIENT)
Dept: GENERAL RADIOLOGY | Age: 48
End: 2018-10-23
Payer: MEDICARE

## 2018-10-23 ENCOUNTER — HOSPITAL ENCOUNTER (EMERGENCY)
Age: 48
Discharge: HOME | End: 2018-10-23
Attending: EMERGENCY MEDICINE
Payer: MEDICARE

## 2018-10-23 ENCOUNTER — NURSE ONLY (OUTPATIENT)
Dept: FAMILY MEDICINE CLINIC | Age: 48
End: 2018-10-23

## 2018-10-23 VITALS
OXYGEN SATURATION: 98 % | HEIGHT: 68 IN | DIASTOLIC BLOOD PRESSURE: 101 MMHG | WEIGHT: 165 LBS | SYSTOLIC BLOOD PRESSURE: 126 MMHG | HEART RATE: 85 BPM | RESPIRATION RATE: 18 BRPM | TEMPERATURE: 98.4 F | BODY MASS INDEX: 25.01 KG/M2

## 2018-10-23 VITALS
TEMPERATURE: 97.1 F | SYSTOLIC BLOOD PRESSURE: 129 MMHG | HEART RATE: 84 BPM | OXYGEN SATURATION: 96 % | DIASTOLIC BLOOD PRESSURE: 85 MMHG

## 2018-10-23 DIAGNOSIS — J18.9 PNEUMONIA DUE TO ORGANISM: Primary | ICD-10-CM

## 2018-10-23 LAB
A/G RATIO: 1.8 (ref 1.1–2.2)
ALBUMIN SERPL-MCNC: 4.5 G/DL (ref 3.4–5)
ALP BLD-CCNC: 72 U/L (ref 40–129)
ALT SERPL-CCNC: 10 U/L (ref 10–40)
ANION GAP SERPL CALCULATED.3IONS-SCNC: 9 MMOL/L (ref 3–16)
AST SERPL-CCNC: 13 U/L (ref 15–37)
BASOPHILS ABSOLUTE: 0.1 K/UL (ref 0–0.2)
BASOPHILS RELATIVE PERCENT: 0.9 %
BILIRUB SERPL-MCNC: 1.1 MG/DL (ref 0–1)
BILIRUBIN URINE: NEGATIVE
BLOOD, URINE: NEGATIVE
BUN BLDV-MCNC: 12 MG/DL (ref 7–20)
CALCIUM SERPL-MCNC: 9.4 MG/DL (ref 8.3–10.6)
CHLORIDE BLD-SCNC: 99 MMOL/L (ref 99–110)
CLARITY: CLEAR
CO2: 28 MMOL/L (ref 21–32)
COLOR: YELLOW
CREAT SERPL-MCNC: 0.9 MG/DL (ref 0.9–1.3)
EOSINOPHILS ABSOLUTE: 0 K/UL (ref 0–0.6)
EOSINOPHILS RELATIVE PERCENT: 0.1 %
GFR AFRICAN AMERICAN: >60
GFR NON-AFRICAN AMERICAN: >60
GLOBULIN: 2.5 G/DL
GLUCOSE BLD-MCNC: 168 MG/DL (ref 70–99)
GLUCOSE URINE: NEGATIVE MG/DL
HCT VFR BLD CALC: 45.8 % (ref 40.5–52.5)
HEMOGLOBIN: 15.6 G/DL (ref 13.5–17.5)
KETONES, URINE: NEGATIVE MG/DL
LEUKOCYTE ESTERASE, URINE: NEGATIVE
LYMPHOCYTES ABSOLUTE: 0.8 K/UL (ref 1–5.1)
LYMPHOCYTES RELATIVE PERCENT: 5.9 %
MCH RBC QN AUTO: 30.4 PG (ref 26–34)
MCHC RBC AUTO-ENTMCNC: 34.1 G/DL (ref 31–36)
MCV RBC AUTO: 89.1 FL (ref 80–100)
MICROSCOPIC EXAMINATION: NORMAL
MONOCYTES ABSOLUTE: 0.2 K/UL (ref 0–1.3)
MONOCYTES RELATIVE PERCENT: 1.9 %
NEUTROPHILS ABSOLUTE: 11.6 K/UL (ref 1.7–7.7)
NEUTROPHILS RELATIVE PERCENT: 91.2 %
NITRITE, URINE: NEGATIVE
PDW BLD-RTO: 13.1 % (ref 12.4–15.4)
PH UA: 7.5
PLATELET # BLD: 295 K/UL (ref 135–450)
PMV BLD AUTO: 8.7 FL (ref 5–10.5)
POTASSIUM SERPL-SCNC: 4.2 MMOL/L (ref 3.5–5.1)
PRO-BNP: 32 PG/ML (ref 0–124)
PROTEIN UA: NEGATIVE MG/DL
RBC # BLD: 5.14 M/UL (ref 4.2–5.9)
SODIUM BLD-SCNC: 136 MMOL/L (ref 136–145)
SPECIFIC GRAVITY UA: 1.01
TOTAL PROTEIN: 7 G/DL (ref 6.4–8.2)
TROPONIN: <0.01 NG/ML
URINE REFLEX TO CULTURE: NORMAL
URINE TYPE: NORMAL
UROBILINOGEN, URINE: 0.2 E.U./DL
WBC # BLD: 12.8 K/UL (ref 4–11)

## 2018-10-23 PROCEDURE — 81003 URINALYSIS AUTO W/O SCOPE: CPT

## 2018-10-23 PROCEDURE — 83880 ASSAY OF NATRIURETIC PEPTIDE: CPT

## 2018-10-23 PROCEDURE — 96374 THER/PROPH/DIAG INJ IV PUSH: CPT

## 2018-10-23 PROCEDURE — 6370000000 HC RX 637 (ALT 250 FOR IP): Performed by: PHYSICIAN ASSISTANT

## 2018-10-23 PROCEDURE — 80053 COMPREHEN METABOLIC PANEL: CPT

## 2018-10-23 PROCEDURE — 6360000002 HC RX W HCPCS: Performed by: PHYSICIAN ASSISTANT

## 2018-10-23 PROCEDURE — 85025 COMPLETE CBC W/AUTO DIFF WBC: CPT

## 2018-10-23 PROCEDURE — 71046 X-RAY EXAM CHEST 2 VIEWS: CPT

## 2018-10-23 PROCEDURE — 99285 EMERGENCY DEPT VISIT HI MDM: CPT

## 2018-10-23 PROCEDURE — 93010 ELECTROCARDIOGRAM REPORT: CPT | Performed by: INTERNAL MEDICINE

## 2018-10-23 PROCEDURE — 93005 ELECTROCARDIOGRAM TRACING: CPT | Performed by: PHYSICIAN ASSISTANT

## 2018-10-23 PROCEDURE — 84484 ASSAY OF TROPONIN QUANT: CPT

## 2018-10-23 RX ORDER — DOXYCYCLINE 100 MG/1
100 TABLET ORAL 2 TIMES DAILY
Qty: 20 TABLET | Refills: 0 | Status: SHIPPED | OUTPATIENT
Start: 2018-10-23 | End: 2018-11-02

## 2018-10-23 RX ORDER — METHYLPREDNISOLONE SODIUM SUCCINATE 125 MG/2ML
125 INJECTION, POWDER, LYOPHILIZED, FOR SOLUTION INTRAMUSCULAR; INTRAVENOUS ONCE
Status: COMPLETED | OUTPATIENT
Start: 2018-10-23 | End: 2018-10-23

## 2018-10-23 RX ORDER — PREDNISONE 10 MG/1
TABLET ORAL
Qty: 30 TABLET | Refills: 0 | Status: SHIPPED | OUTPATIENT
Start: 2018-10-23 | End: 2018-11-02

## 2018-10-23 RX ORDER — DOXYCYCLINE HYCLATE 100 MG
100 TABLET ORAL ONCE
Status: COMPLETED | OUTPATIENT
Start: 2018-10-23 | End: 2018-10-23

## 2018-10-23 RX ORDER — IPRATROPIUM BROMIDE AND ALBUTEROL SULFATE 2.5; .5 MG/3ML; MG/3ML
1 SOLUTION RESPIRATORY (INHALATION) ONCE
Status: COMPLETED | OUTPATIENT
Start: 2018-10-23 | End: 2018-10-23

## 2018-10-23 RX ADMIN — IPRATROPIUM BROMIDE AND ALBUTEROL SULFATE 1 AMPULE: .5; 3 SOLUTION RESPIRATORY (INHALATION) at 15:49

## 2018-10-23 RX ADMIN — DOXYCYCLINE HYCLATE 100 MG: 100 TABLET, FILM COATED ORAL at 17:09

## 2018-10-23 RX ADMIN — METHYLPREDNISOLONE SODIUM SUCCINATE 125 MG: 125 INJECTION, POWDER, FOR SOLUTION INTRAMUSCULAR; INTRAVENOUS at 15:49

## 2018-10-23 NOTE — ED PROVIDER NOTES
 Histoplasmosis     Pneumonia     Pulmonary nodule          SURGICAL HISTORY     Past Surgical History:   Procedure Laterality Date    BRONCHOSCOPY  6/05/2013    DIAGNOSTIC CARDIAC CATH LAB PROCEDURE  1998    LYMPH NODE DISSECTION      neck    VASECTOMY           CURRENTMEDICATIONS       Previous Medications    ALBUTEROL SULFATE HFA (PROAIR HFA) 108 (90 BASE) MCG/ACT INHALER    Inhale 2 puffs into the lungs 4 times daily    DICYCLOMINE (BENTYL) 10 MG CAPSULE    Take 1 capsule by mouth 4 times daily (before meals and nightly)    FLUTICASONE (FLONASE) 50 MCG/ACT NASAL SPRAY    1 spray by Each Nare route daily    GABAPENTIN (NEURONTIN) 600 MG TABLET    Take 1 tablet by mouth 3 times daily for 180 days. Ana Siejacobo LORAZEPAM (ATIVAN) 1 MG TABLET    Take 1 tablet by mouth as needed for Anxiety for up to 180 days. Ana Siejacobo     PAROXETINE (PAXIL) 40 MG TABLET    Take 1 tablet by mouth nightly         ALLERGIES     Codeine and Tramadol    FAMILYHISTORY       Family History   Problem Relation Age of Onset    Cancer Mother         ovarian    Heart Failure Mother     Cancer Paternal Uncle         leukemia    Cancer Paternal Grandfather         leukemia    Asthma Neg Hx     Diabetes Neg Hx     Emphysema Neg Hx     Hypertension Neg Hx           SOCIAL HISTORY       Social History     Social History    Marital status:      Spouse name: N/A    Number of children: N/A    Years of education: N/A     Social History Main Topics    Smoking status: Former Smoker     Packs/day: 0.50     Years: 20.00     Types: Cigarettes     Quit date: 9/1/2017    Smokeless tobacco: Never Used      Comment: placed in AVS  04/25/2017    Alcohol use No    Drug use: No    Sexual activity: Yes     Partners: Female     Other Topics Concern    None     Social History Narrative    10/2012 lives by self with 2 dogs;works in [employer]       SCREENINGS             PHYSICAL EXAM    (up to 7 for level 4, 8 or more for level 5)     ED Triage Vitals

## 2018-10-24 ENCOUNTER — OFFICE VISIT (OUTPATIENT)
Dept: FAMILY MEDICINE CLINIC | Age: 48
End: 2018-10-24
Payer: MEDICARE

## 2018-10-24 ENCOUNTER — TELEPHONE (OUTPATIENT)
Dept: FAMILY MEDICINE CLINIC | Age: 48
End: 2018-10-24

## 2018-10-24 VITALS
SYSTOLIC BLOOD PRESSURE: 117 MMHG | OXYGEN SATURATION: 100 % | BODY MASS INDEX: 24.38 KG/M2 | HEART RATE: 86 BPM | WEIGHT: 158 LBS | TEMPERATURE: 97.7 F | DIASTOLIC BLOOD PRESSURE: 73 MMHG

## 2018-10-24 DIAGNOSIS — R07.89 CHEST TIGHTNESS OR PRESSURE: Primary | ICD-10-CM

## 2018-10-24 DIAGNOSIS — R20.0 BILATERAL LEG NUMBNESS: ICD-10-CM

## 2018-10-24 PROCEDURE — G8420 CALC BMI NORM PARAMETERS: HCPCS | Performed by: NURSE PRACTITIONER

## 2018-10-24 PROCEDURE — G8427 DOCREV CUR MEDS BY ELIG CLIN: HCPCS | Performed by: NURSE PRACTITIONER

## 2018-10-24 PROCEDURE — 1036F TOBACCO NON-USER: CPT | Performed by: NURSE PRACTITIONER

## 2018-10-24 PROCEDURE — 99214 OFFICE O/P EST MOD 30 MIN: CPT | Performed by: NURSE PRACTITIONER

## 2018-10-24 PROCEDURE — G8484 FLU IMMUNIZE NO ADMIN: HCPCS | Performed by: NURSE PRACTITIONER

## 2018-10-24 ASSESSMENT — ENCOUNTER SYMPTOMS
SHORTNESS OF BREATH: 1
NAUSEA: 0
WHEEZING: 0
CHEST TIGHTNESS: 1
SORE THROAT: 0
COUGH: 1
SWOLLEN GLANDS: 0

## 2018-10-24 NOTE — PROGRESS NOTES
[facility]  [facility] FAMILY [place] [address]  [address] [address]  Dept: [phone]  Dept Fax: [phone]    Shwetha Schofield is a 56 y.o. male who presents today for hismedical conditions/complaints as noted below. Shwetha Schofield is c/o of Dizziness; Blood Pressure Check; and Follow-Up from Hospital (seen 10/23/18 DX PNE )    Chief Complaint   Patient presents with    Dizziness    Blood Pressure Check    Follow-Up from Hospital     seen 10/23/18 DX PNE      HPI:     Fatigue   This is a new problem. The current episode started more than 1 month ago (When he was diagnosed with histoplasmosis). The problem occurs constantly. The problem has been gradually worsening. Associated symptoms include arthralgias, chest pain (Pain/pressure), coughing, fatigue, numbness (BL legs) and weakness. Pertinent negatives include no congestion, diaphoresis, fever, joint swelling, nausea, neck pain, sore throat or swollen glands. Associated symptoms comments: Shortness of breath. Exacerbated by: Activity. He has tried rest for the symptoms. The treatment provided no relief. Subjective:     Review of Systems   Constitutional: Positive for activity change and fatigue. Negative for diaphoresis, fever and unexpected weight change. HENT: Negative for congestion and sore throat. Respiratory: Positive for cough, chest tightness and shortness of breath. Negative for wheezing. Cardiovascular: Positive for chest pain (Pain/pressure), palpitations and syncope. Negative for leg swelling. Right and left arm pain     Gastrointestinal: Negative for nausea. Musculoskeletal: Positive for arthralgias. Negative for joint swelling and neck pain. Neurological: Positive for weakness, light-headedness and numbness (BL legs). Negative for syncope, facial asymmetry and speech difficulty. Psychiatric/Behavioral: Positive for sleep disturbance. The patient is nervous/anxious.       Objective: elevation  VIII: Hearing: Intact to finger rub bilaterally  IX: Palate elevation is symmetric  XI: Shoulder shrug is intact  XII: Tongue movements are normal  Musculoskeletal: 5/5 in all 4 extremities. Normal tone. Reflexes: Bilateral biceps 2/4, triceps 2/4, brachial radialis 2/4, knee 2/4 and ankle 2/4. Planters: flexor bilaterally. Coordination: no pronator drift, no dysmetria. Finger nose finger testing within normal limits. Sensation: normal to all modalities. Gait/Posture: steady     Skin: Skin is warm, dry and intact. Psychiatric: He has a normal mood and affect. His speech is normal.     Assessment & Plan: The following diagnoses and conditions are stable with no further orders unlessindicated:    1. Chest tightness or pressure    2. Bilateral leg numbness      Saeed Bryan was seen today for dizziness, blood pressure check and follow-up from hospital.    Diagnoses and all orders for this visit:    Chest tightness or pressure  Bilateral leg numbness  -     Gus Escobedo MD    Given his strong family history for early MI, I would like him to see cards for testing/treatment recommendations. I feel he would benefit from stress echo. Consider anxiety as a primary cause of his symptoms as he is having problems at home (relationship issues) as well. Continue paxil/ativan as well for primary benefit of anxiety. Return if symptoms worsen or fail to improve. Patient should call the office immediately with new or ongoing signs or symptoms or worsening, or proceed to the emergency room. If you are onmedications which could impair your senses, you are at risk of weakness, falls, dizziness, or drowsiness. You should be careful during activities which could place you at risk of harm, such as climbing, using stairs,operating machinery, or driving vehicles. If you feel you cannot safely do these activities, you should request others to help you, or avoid the activities altogether.  If you are drowsy

## 2018-10-29 ENCOUNTER — OFFICE VISIT (OUTPATIENT)
Dept: CARDIOLOGY CLINIC | Age: 48
End: 2018-10-29
Payer: MEDICARE

## 2018-10-29 VITALS
DIASTOLIC BLOOD PRESSURE: 72 MMHG | WEIGHT: 155 LBS | HEIGHT: 67 IN | BODY MASS INDEX: 24.33 KG/M2 | SYSTOLIC BLOOD PRESSURE: 110 MMHG | OXYGEN SATURATION: 99 % | HEART RATE: 64 BPM

## 2018-10-29 DIAGNOSIS — R06.02 SHORTNESS OF BREATH: ICD-10-CM

## 2018-10-29 DIAGNOSIS — R42 DIZZINESS: ICD-10-CM

## 2018-10-29 DIAGNOSIS — R07.89 CHEST TIGHTNESS: ICD-10-CM

## 2018-10-29 PROCEDURE — 99244 OFF/OP CNSLTJ NEW/EST MOD 40: CPT | Performed by: INTERNAL MEDICINE

## 2018-10-29 PROCEDURE — G8427 DOCREV CUR MEDS BY ELIG CLIN: HCPCS | Performed by: INTERNAL MEDICINE

## 2018-10-29 PROCEDURE — G8484 FLU IMMUNIZE NO ADMIN: HCPCS | Performed by: INTERNAL MEDICINE

## 2018-10-29 PROCEDURE — G8420 CALC BMI NORM PARAMETERS: HCPCS | Performed by: INTERNAL MEDICINE

## 2018-10-29 NOTE — COMMUNICATION BODY
[address]   Cardiovascular Evaluation     PATIENT: Carlos Rosales  DATE: 10/29/2018  MRN: [id number]  CSN: [id number]  : 1970     Primary Care Doctor: Alma Bobo MD     Reason for evaluation/Chief complaint:   Chest Pain (chest tighness); Dizziness (constantly); Tachycardia; and Other (, pt has fallen and was ent home from work on Friday 10/26/18)        Subjective:     History of present illness on initial date of evaluation:              Carlos Rosales is a 53 y.o. patient who presents for evaluation dizziness and shortness of breath and chest pain. Today he reports he has been struggling with dizziness, lightheadedness, fatigue and weakness. These symptoms have been ongoing for the last 6 months. When he feels dizziness, he gets associated chest pressure, difficulty breathing. He has attempted to use his inhaler which does not improved his symptoms. He recently presented to the emergency room 10/23/2018 for chest tightness, dizziness and near syncope. He was told he had a slight pneumonia. He was given antibiotic and does not feel this is working. He feels better since starting Prednisone. He quit smoking in January. Assessment:  53 y.o. patient with:      Problem List Items Addressed This Visit      Dizziness     Shortness of breath     Chest tightness             Plan:  1. An echocardiogram will be ordered. This will allow review of the patient's left ventricular function and determine any valve abnormalities. The pericardium and other structures will also be evaluated. 2. Carotid doppler to evaluate dizziness. 3. Follow up with Pulmonology as planned. 4. Follow up with me after testing.      This note was scribed in the presence of Jennifer Fish MD,by Ruby Alvarez RN.       I, Dr. Jennifer Fish, personally performed the services described in this documentation, as scribed by the above signed scribe in my presence.  It is both accurate and complete to

## 2018-10-31 LAB
EKG ATRIAL RATE: 73 BPM
EKG DIAGNOSIS: NORMAL
EKG P AXIS: 41 DEGREES
EKG P-R INTERVAL: 124 MS
EKG Q-T INTERVAL: 392 MS
EKG QRS DURATION: 94 MS
EKG QTC CALCULATION (BAZETT): 431 MS
EKG R AXIS: -52 DEGREES
EKG T AXIS: 47 DEGREES
EKG VENTRICULAR RATE: 73 BPM

## 2018-11-01 ENCOUNTER — HOSPITAL ENCOUNTER (OUTPATIENT)
Dept: VASCULAR LAB | Age: 48
Discharge: HOME | End: 2018-11-01
Payer: MEDICARE

## 2018-11-01 ENCOUNTER — TELEPHONE (OUTPATIENT)
Dept: CARDIOLOGY CLINIC | Age: 48
End: 2018-11-01

## 2018-11-01 ENCOUNTER — HOSPITAL ENCOUNTER (OUTPATIENT)
Dept: CARDIOLOGY | Age: 48
Discharge: HOME | End: 2018-11-01
Payer: MEDICARE

## 2018-11-01 DIAGNOSIS — R06.02 SHORTNESS OF BREATH: ICD-10-CM

## 2018-11-01 DIAGNOSIS — R07.89 CHEST TIGHTNESS: ICD-10-CM

## 2018-11-01 DIAGNOSIS — R42 DIZZINESS: ICD-10-CM

## 2018-11-01 LAB
LV EF: 55 %
LVEF MODALITY: NORMAL

## 2018-11-01 PROCEDURE — 93880 EXTRACRANIAL BILAT STUDY: CPT

## 2018-11-01 PROCEDURE — 93306 TTE W/DOPPLER COMPLETE: CPT

## 2018-11-01 NOTE — TELEPHONE ENCOUNTER
----- Message from Katy Danielle MD sent at 11/1/2018 11:08 AM EDT -----  There are minor findings that are not clinically relevant at this time. The overall findings suggest normal results. Please call patient with results.

## 2018-11-02 ENCOUNTER — TELEPHONE (OUTPATIENT)
Dept: CARDIOLOGY CLINIC | Age: 48
End: 2018-11-02

## 2018-11-02 DIAGNOSIS — R06.02 SOB (SHORTNESS OF BREATH): Primary | ICD-10-CM

## 2018-11-02 NOTE — TELEPHONE ENCOUNTER
Spoke with Yulissa Dear, relayed echo results per [facility]. Pt verbalized understanding of results. Pt would like to know what to do next. He states both his legs go out from under him. Please advise.

## 2018-11-02 NOTE — TELEPHONE ENCOUNTER
----- Message from Maurice Seth MD sent at 11/1/2018  5:05 PM EDT -----  The test is normal. Please call the patient and inform them of the normal result.

## 2018-11-05 ENCOUNTER — OFFICE VISIT (OUTPATIENT)
Dept: FAMILY MEDICINE CLINIC | Age: 48
End: 2018-11-05
Payer: MEDICARE

## 2018-11-05 VITALS
HEIGHT: 67 IN | OXYGEN SATURATION: 99 % | WEIGHT: 156 LBS | DIASTOLIC BLOOD PRESSURE: 69 MMHG | HEART RATE: 65 BPM | TEMPERATURE: 97.4 F | BODY MASS INDEX: 24.48 KG/M2 | SYSTOLIC BLOOD PRESSURE: 100 MMHG

## 2018-11-05 DIAGNOSIS — Z23 NEED FOR DIPHTHERIA-TETANUS-PERTUSSIS (TDAP) VACCINE: ICD-10-CM

## 2018-11-05 DIAGNOSIS — Z00.00 ENCOUNTER FOR WELL ADULT EXAM WITHOUT ABNORMAL FINDINGS: Primary | ICD-10-CM

## 2018-11-05 DIAGNOSIS — R73.9 HYPERGLYCEMIA: ICD-10-CM

## 2018-11-05 DIAGNOSIS — Z23 NEED FOR INFLUENZA VACCINATION: ICD-10-CM

## 2018-11-05 PROCEDURE — G8482 FLU IMMUNIZE ORDER/ADMIN: HCPCS | Performed by: FAMILY MEDICINE

## 2018-11-05 PROCEDURE — 90472 IMMUNIZATION ADMIN EACH ADD: CPT | Performed by: FAMILY MEDICINE

## 2018-11-05 PROCEDURE — 90688 IIV4 VACCINE SPLT 0.5 ML IM: CPT | Performed by: FAMILY MEDICINE

## 2018-11-05 PROCEDURE — 90471 IMMUNIZATION ADMIN: CPT | Performed by: FAMILY MEDICINE

## 2018-11-05 PROCEDURE — 99396 PREV VISIT EST AGE 40-64: CPT | Performed by: FAMILY MEDICINE

## 2018-11-05 PROCEDURE — 90715 TDAP VACCINE 7 YRS/> IM: CPT | Performed by: FAMILY MEDICINE

## 2018-11-05 PROCEDURE — 36415 COLL VENOUS BLD VENIPUNCTURE: CPT | Performed by: FAMILY MEDICINE

## 2018-11-05 ASSESSMENT — ENCOUNTER SYMPTOMS
APNEA: 0
GASTROINTESTINAL NEGATIVE: 1
COUGH: 0
CHEST TIGHTNESS: 0
STRIDOR: 0
SHORTNESS OF BREATH: 1
CHOKING: 0
EYES NEGATIVE: 1

## 2018-11-06 LAB
ESTIMATED AVERAGE GLUCOSE: 105.4 MG/DL
HBA1C MFR BLD: 5.3 %

## 2018-11-07 ENCOUNTER — HOSPITAL ENCOUNTER (OUTPATIENT)
Age: 48
Discharge: HOME | End: 2018-11-07
Payer: MEDICARE

## 2018-11-07 ENCOUNTER — TELEPHONE (OUTPATIENT)
Dept: PULMONOLOGY | Age: 48
End: 2018-11-07

## 2018-11-07 ENCOUNTER — OFFICE VISIT (OUTPATIENT)
Dept: PULMONOLOGY | Age: 48
End: 2018-11-07
Payer: MEDICARE

## 2018-11-07 ENCOUNTER — HOSPITAL ENCOUNTER (OUTPATIENT)
Dept: CT IMAGING | Age: 48
Discharge: HOME | End: 2018-11-07
Payer: MEDICARE

## 2018-11-07 VITALS
DIASTOLIC BLOOD PRESSURE: 64 MMHG | SYSTOLIC BLOOD PRESSURE: 112 MMHG | OXYGEN SATURATION: 99 % | WEIGHT: 156 LBS | TEMPERATURE: 98.3 F | BODY MASS INDEX: 24.48 KG/M2 | HEIGHT: 67 IN | RESPIRATION RATE: 18 BRPM | HEART RATE: 82 BPM

## 2018-11-07 DIAGNOSIS — R06.00 DYSPNEA, UNSPECIFIED TYPE: ICD-10-CM

## 2018-11-07 DIAGNOSIS — J43.9 PULMONARY EMPHYSEMA, UNSPECIFIED EMPHYSEMA TYPE (HCC): ICD-10-CM

## 2018-11-07 DIAGNOSIS — R79.89 ELEVATED D-DIMER: ICD-10-CM

## 2018-11-07 DIAGNOSIS — R79.89 ELEVATED D-DIMER: Primary | ICD-10-CM

## 2018-11-07 DIAGNOSIS — Z87.891 PERSONAL HISTORY OF SMOKING: ICD-10-CM

## 2018-11-07 DIAGNOSIS — R06.00 DYSPNEA, UNSPECIFIED TYPE: Primary | ICD-10-CM

## 2018-11-07 LAB — D DIMER: 305 NG/ML DDU (ref 0–229)

## 2018-11-07 PROCEDURE — 1036F TOBACCO NON-USER: CPT | Performed by: INTERNAL MEDICINE

## 2018-11-07 PROCEDURE — 36415 COLL VENOUS BLD VENIPUNCTURE: CPT

## 2018-11-07 PROCEDURE — 6360000004 HC RX CONTRAST MEDICATION: Performed by: INTERNAL MEDICINE

## 2018-11-07 PROCEDURE — 99204 OFFICE O/P NEW MOD 45 MIN: CPT | Performed by: INTERNAL MEDICINE

## 2018-11-07 PROCEDURE — 85379 FIBRIN DEGRADATION QUANT: CPT

## 2018-11-07 PROCEDURE — G8482 FLU IMMUNIZE ORDER/ADMIN: HCPCS | Performed by: INTERNAL MEDICINE

## 2018-11-07 PROCEDURE — 3023F SPIROM DOC REV: CPT | Performed by: INTERNAL MEDICINE

## 2018-11-07 PROCEDURE — 71275 CT ANGIOGRAPHY CHEST: CPT

## 2018-11-07 PROCEDURE — G8420 CALC BMI NORM PARAMETERS: HCPCS | Performed by: INTERNAL MEDICINE

## 2018-11-07 PROCEDURE — G8926 SPIRO NO PERF OR DOC: HCPCS | Performed by: INTERNAL MEDICINE

## 2018-11-07 PROCEDURE — G8427 DOCREV CUR MEDS BY ELIG CLIN: HCPCS | Performed by: INTERNAL MEDICINE

## 2018-11-07 RX ORDER — ALBUTEROL SULFATE 90 UG/1
2 AEROSOL, METERED RESPIRATORY (INHALATION) EVERY 6 HOURS PRN
Qty: 1 INHALER | Refills: 6 | Status: SHIPPED | OUTPATIENT
Start: 2018-11-07 | End: 2019-04-08 | Stop reason: SDUPTHER

## 2018-11-07 RX ADMIN — IOPAMIDOL 75 ML: 755 INJECTION, SOLUTION INTRAVENOUS at 16:49

## 2018-11-13 ENCOUNTER — TELEPHONE (OUTPATIENT)
Dept: PULMONOLOGY | Age: 48
End: 2018-11-13

## 2018-11-13 ENCOUNTER — OFFICE VISIT (OUTPATIENT)
Dept: PULMONOLOGY | Age: 48
End: 2018-11-13
Payer: MEDICARE

## 2018-11-13 ENCOUNTER — HOSPITAL ENCOUNTER (OUTPATIENT)
Dept: PULMONOLOGY | Age: 48
Discharge: HOME | End: 2018-11-13
Payer: MEDICARE

## 2018-11-13 ENCOUNTER — HOSPITAL ENCOUNTER (INPATIENT)
Age: 48
LOS: 1 days | Discharge: HOME | DRG: 192 | End: 2018-11-14
Attending: INTERNAL MEDICINE | Admitting: INTERNAL MEDICINE
Payer: MEDICARE

## 2018-11-13 ENCOUNTER — APPOINTMENT (OUTPATIENT)
Dept: GENERAL RADIOLOGY | Age: 48
DRG: 192 | End: 2018-11-13
Attending: INTERNAL MEDICINE
Payer: MEDICARE

## 2018-11-13 VITALS
RESPIRATION RATE: 20 BRPM | TEMPERATURE: 97.5 F | WEIGHT: 161 LBS | BODY MASS INDEX: 25.27 KG/M2 | DIASTOLIC BLOOD PRESSURE: 62 MMHG | SYSTOLIC BLOOD PRESSURE: 102 MMHG | OXYGEN SATURATION: 98 % | HEART RATE: 62 BPM | HEIGHT: 67 IN

## 2018-11-13 DIAGNOSIS — R07.9 CHEST PAIN, UNSPECIFIED TYPE: ICD-10-CM

## 2018-11-13 DIAGNOSIS — J43.9 PULMONARY EMPHYSEMA, UNSPECIFIED EMPHYSEMA TYPE (HCC): ICD-10-CM

## 2018-11-13 DIAGNOSIS — R06.00 DYSPNEA, UNSPECIFIED TYPE: Primary | ICD-10-CM

## 2018-11-13 LAB
ALBUMIN SERPL-MCNC: 4.1 G/DL (ref 3.4–5)
ALP BLD-CCNC: 61 U/L (ref 40–129)
ALT SERPL-CCNC: 11 U/L (ref 10–40)
ANION GAP SERPL CALCULATED.3IONS-SCNC: 10 MMOL/L (ref 3–16)
AST SERPL-CCNC: 11 U/L (ref 15–37)
BILIRUB SERPL-MCNC: 0.8 MG/DL (ref 0–1)
BILIRUBIN DIRECT: <0.2 MG/DL (ref 0–0.3)
BILIRUBIN, INDIRECT: ABNORMAL MG/DL (ref 0–1)
BUN BLDV-MCNC: 8 MG/DL (ref 7–20)
CALCIUM SERPL-MCNC: 9 MG/DL (ref 8.3–10.6)
CHLORIDE BLD-SCNC: 102 MMOL/L (ref 99–110)
CO2: 29 MMOL/L (ref 21–32)
CREAT SERPL-MCNC: 0.9 MG/DL (ref 0.9–1.3)
GFR AFRICAN AMERICAN: >60
GFR NON-AFRICAN AMERICAN: >60
GLUCOSE BLD-MCNC: 125 MG/DL (ref 70–99)
HCT VFR BLD CALC: 45.9 % (ref 40.5–52.5)
HEMOGLOBIN: 15.7 G/DL (ref 13.5–17.5)
INR BLD: 1.03 (ref 0.86–1.14)
MAGNESIUM: 2.2 MG/DL (ref 1.8–2.4)
MCH RBC QN AUTO: 30.2 PG (ref 26–34)
MCHC RBC AUTO-ENTMCNC: 34.2 G/DL (ref 31–36)
MCV RBC AUTO: 88.5 FL (ref 80–100)
PDW BLD-RTO: 13.2 % (ref 12.4–15.4)
PLATELET # BLD: 283 K/UL (ref 135–450)
PMV BLD AUTO: 7.8 FL (ref 5–10.5)
POTASSIUM SERPL-SCNC: 3.7 MMOL/L (ref 3.5–5.1)
PROTHROMBIN TIME: 11.7 SEC (ref 9.8–13)
RBC # BLD: 5.19 M/UL (ref 4.2–5.9)
SODIUM BLD-SCNC: 141 MMOL/L (ref 136–145)
TOTAL PROTEIN: 6.6 G/DL (ref 6.4–8.2)
TROPONIN: <0.01 NG/ML
TROPONIN: <0.01 NG/ML
WBC # BLD: 12.4 K/UL (ref 4–11)

## 2018-11-13 PROCEDURE — 96372 THER/PROPH/DIAG INJ SC/IM: CPT

## 2018-11-13 PROCEDURE — 85610 PROTHROMBIN TIME: CPT

## 2018-11-13 PROCEDURE — 6360000002 HC RX W HCPCS: Performed by: INTERNAL MEDICINE

## 2018-11-13 PROCEDURE — 84484 ASSAY OF TROPONIN QUANT: CPT

## 2018-11-13 PROCEDURE — G8427 DOCREV CUR MEDS BY ELIG CLIN: HCPCS | Performed by: INTERNAL MEDICINE

## 2018-11-13 PROCEDURE — 2060000000 HC ICU INTERMEDIATE R&B

## 2018-11-13 PROCEDURE — G0378 HOSPITAL OBSERVATION PER HR: HCPCS

## 2018-11-13 PROCEDURE — 93005 ELECTROCARDIOGRAM TRACING: CPT | Performed by: INTERNAL MEDICINE

## 2018-11-13 PROCEDURE — 2580000003 HC RX 258: Performed by: INTERNAL MEDICINE

## 2018-11-13 PROCEDURE — G8482 FLU IMMUNIZE ORDER/ADMIN: HCPCS | Performed by: INTERNAL MEDICINE

## 2018-11-13 PROCEDURE — 80076 HEPATIC FUNCTION PANEL: CPT

## 2018-11-13 PROCEDURE — 6370000000 HC RX 637 (ALT 250 FOR IP): Performed by: INTERNAL MEDICINE

## 2018-11-13 PROCEDURE — 85027 COMPLETE CBC AUTOMATED: CPT

## 2018-11-13 PROCEDURE — 93010 ELECTROCARDIOGRAM REPORT: CPT | Performed by: INTERNAL MEDICINE

## 2018-11-13 PROCEDURE — 36415 COLL VENOUS BLD VENIPUNCTURE: CPT

## 2018-11-13 PROCEDURE — G8419 CALC BMI OUT NRM PARAM NOF/U: HCPCS | Performed by: INTERNAL MEDICINE

## 2018-11-13 PROCEDURE — 71045 X-RAY EXAM CHEST 1 VIEW: CPT

## 2018-11-13 PROCEDURE — 3023F SPIROM DOC REV: CPT | Performed by: INTERNAL MEDICINE

## 2018-11-13 PROCEDURE — G8926 SPIRO NO PERF OR DOC: HCPCS | Performed by: INTERNAL MEDICINE

## 2018-11-13 PROCEDURE — 83735 ASSAY OF MAGNESIUM: CPT

## 2018-11-13 PROCEDURE — 1036F TOBACCO NON-USER: CPT | Performed by: INTERNAL MEDICINE

## 2018-11-13 PROCEDURE — G0379 DIRECT REFER HOSPITAL OBSERV: HCPCS

## 2018-11-13 PROCEDURE — 99215 OFFICE O/P EST HI 40 MIN: CPT | Performed by: INTERNAL MEDICINE

## 2018-11-13 PROCEDURE — 80048 BASIC METABOLIC PNL TOTAL CA: CPT

## 2018-11-13 RX ORDER — GABAPENTIN 300 MG/1
600 CAPSULE ORAL 3 TIMES DAILY
Status: DISCONTINUED | OUTPATIENT
Start: 2018-11-13 | End: 2018-11-14 | Stop reason: HOSPADM

## 2018-11-13 RX ORDER — PREDNISONE 10 MG/1
TABLET ORAL
Qty: 30 TABLET | Refills: 0 | Status: ON HOLD | OUTPATIENT
Start: 2018-11-13 | End: 2018-11-14 | Stop reason: HOSPADM

## 2018-11-13 RX ORDER — DICYCLOMINE HYDROCHLORIDE 10 MG/1
10 CAPSULE ORAL
Status: DISCONTINUED | OUTPATIENT
Start: 2018-11-13 | End: 2018-11-14 | Stop reason: HOSPADM

## 2018-11-13 RX ORDER — SODIUM CHLORIDE 0.9 % (FLUSH) 0.9 %
10 SYRINGE (ML) INJECTION EVERY 12 HOURS SCHEDULED
Status: DISCONTINUED | OUTPATIENT
Start: 2018-11-13 | End: 2018-11-14 | Stop reason: HOSPADM

## 2018-11-13 RX ORDER — ALBUTEROL SULFATE 90 UG/1
2 AEROSOL, METERED RESPIRATORY (INHALATION) EVERY 6 HOURS PRN
Status: DISCONTINUED | OUTPATIENT
Start: 2018-11-13 | End: 2018-11-14 | Stop reason: HOSPADM

## 2018-11-13 RX ORDER — PAROXETINE HYDROCHLORIDE 20 MG/1
40 TABLET, FILM COATED ORAL NIGHTLY
Status: DISCONTINUED | OUTPATIENT
Start: 2018-11-13 | End: 2018-11-14 | Stop reason: HOSPADM

## 2018-11-13 RX ORDER — SODIUM CHLORIDE 0.9 % (FLUSH) 0.9 %
10 SYRINGE (ML) INJECTION PRN
Status: DISCONTINUED | OUTPATIENT
Start: 2018-11-13 | End: 2018-11-14 | Stop reason: HOSPADM

## 2018-11-13 RX ORDER — ALBUTEROL SULFATE 2.5 MG/3ML
2.5 SOLUTION RESPIRATORY (INHALATION) ONCE
Status: DISCONTINUED | OUTPATIENT
Start: 2018-11-13 | End: 2018-11-13

## 2018-11-13 RX ORDER — LORAZEPAM 1 MG/1
1 TABLET ORAL DAILY PRN
Status: DISCONTINUED | OUTPATIENT
Start: 2018-11-13 | End: 2018-11-14 | Stop reason: HOSPADM

## 2018-11-13 RX ORDER — LABETALOL HYDROCHLORIDE 5 MG/ML
5 INJECTION, SOLUTION INTRAVENOUS EVERY 4 HOURS PRN
Status: DISCONTINUED | OUTPATIENT
Start: 2018-11-13 | End: 2018-11-14 | Stop reason: HOSPADM

## 2018-11-13 RX ORDER — FLUTICASONE PROPIONATE 50 MCG
1 SPRAY, SUSPENSION (ML) NASAL DAILY
Status: DISCONTINUED | OUTPATIENT
Start: 2018-11-13 | End: 2018-11-14 | Stop reason: HOSPADM

## 2018-11-13 RX ADMIN — SODIUM CHLORIDE, PRESERVATIVE FREE 10 ML: 5 INJECTION INTRAVENOUS at 21:17

## 2018-11-13 RX ADMIN — GABAPENTIN 600 MG: 300 CAPSULE ORAL at 15:35

## 2018-11-13 RX ADMIN — SODIUM CHLORIDE, PRESERVATIVE FREE 10 ML: 5 INJECTION INTRAVENOUS at 15:38

## 2018-11-13 RX ADMIN — PAROXETINE HYDROCHLORIDE 40 MG: 20 TABLET, FILM COATED ORAL at 21:13

## 2018-11-13 RX ADMIN — GABAPENTIN 600 MG: 300 CAPSULE ORAL at 21:13

## 2018-11-13 RX ADMIN — ENOXAPARIN SODIUM 40 MG: 40 INJECTION SUBCUTANEOUS at 15:37

## 2018-11-13 ASSESSMENT — PAIN SCALES - GENERAL
PAINLEVEL_OUTOF10: 5
PAINLEVEL_OUTOF10: 5

## 2018-11-13 ASSESSMENT — PAIN DESCRIPTION - PAIN TYPE: TYPE: ACUTE PAIN

## 2018-11-13 ASSESSMENT — PAIN DESCRIPTION - LOCATION: LOCATION: CHEST

## 2018-11-13 ASSESSMENT — PAIN DESCRIPTION - ORIENTATION: ORIENTATION: MID

## 2018-11-13 ASSESSMENT — PAIN DESCRIPTION - ONSET: ONSET: ON-GOING

## 2018-11-13 ASSESSMENT — PAIN DESCRIPTION - FREQUENCY: FREQUENCY: CONTINUOUS

## 2018-11-13 ASSESSMENT — PAIN DESCRIPTION - DESCRIPTORS: DESCRIPTORS: ACHING

## 2018-11-13 NOTE — PROGRESS NOTES
[facility]                                                                    CHIEF COMPLAINT: Follow up CTPA        HPI:   CT chest reviewed by me and noted below. Results were dicussed with patient and multiple good questions were answered. Feels about the same. At times hypoxia with to 60s/70s and bradycardia/tachycrdia 40s-140s per his pulse oximeter. Feels at times dizzy and lightheaded    Office today saturation 98% with a heart rate of 62  Uses Spiriva daily and Albuterol 2 times/day - does not feel big difference   L sided chest pain for 2 days, on and off, lasts for about 10 min and today is constant. No sputum  Some cough   No smoking   Denies drug abuse         From prior visit:   53 y.o. with SOB for 1 year. Progressive over past 6 months. Dyspnea worse with exertion and better with resting. Able to walk 200-300 feet. Associated with chest pain for 6 months. Not much cough. No sputum. No hemoptysis. Quit 1/2018- smoked for 30 years 2 ppd. Uses 2-3 times/day Albuterol with not much help. 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 Past Medical History:   Diagnosis Date    Arthritis     Chest pain     [facility] 9/2012 sent for records;    Colitis     Histoplasmosis     Pneumonia     Pulmonary nodule        Past Surgical History:        Procedure Laterality Date    BRONCHOSCOPY  6/05/2013    DIAGNOSTIC CARDIAC CATH LAB PROCEDURE  1998    LYMPH NODE DISSECTION      neck    VASECTOMY         Allergies:  is allergic to codeine and tramadol. Social History:    TOBACCO:   reports that he quit smoking about 10 months ago. His smoking use included Cigarettes. He has a 10.00 pack-year smoking history. He has never used smokeless tobacco.  ETOH:   reports that he does not drink alcohol.       Family History:   Family History   Problem Relation Age of Onset    Cancer Mother         ovarian    Heart Failure Mother  Heart Attack Mother 62    Other Father          1948;ashd;per neurop;    Other Brother         pt knows very little    Cancer Paternal Uncle         leukemia    Cancer Paternal Grandfather         leukemia    Heart Attack Maternal Uncle 60        x4     No Known Problems Son     No Known Problems Son     Asthma Neg Hx     Diabetes Neg Hx     Emphysema Neg Hx     Hypertension Neg Hx        Current Medications:    Current Outpatient Prescriptions:     albuterol sulfate HFA (PROAIR HFA) 108 (90 Base) MCG/ACT inhaler, Inhale 2 puffs into the lungs every 6 hours as needed for Wheezing or Shortness of Breath, Disp: 1 Inhaler, Rfl: 6    tiotropium (SPIRIVA RESPIMAT) 2.5 MCG/ACT AERS inhaler, Inhale 2 puffs into the lungs daily, Disp: 1 Inhaler, Rfl: 6    fluticasone (FLONASE) 50 MCG/ACT nasal spray, 1 spray by Each Nare route daily, Disp: 1 Bottle, Rfl: 3    gabapentin (NEURONTIN) 600 MG tablet, Take 1 tablet by mouth 3 times daily for 180 days. ., Disp: 270 tablet, Rfl: 1    PARoxetine (PAXIL) 40 MG tablet, Take 1 tablet by mouth nightly, Disp: 90 tablet, Rfl: 1    LORazepam (ATIVAN) 1 MG tablet, Take 1 tablet by mouth as needed for Anxiety for up to 180 days. ., Disp: 30 tablet, Rfl: 5    dicyclomine (BENTYL) 10 MG capsule, Take 1 capsule by mouth 4 times daily (before meals and nightly), Disp: 120 capsule, Rfl: 3      Objective:   PHYSICAL EXAM:    Blood pressure 102/62, pulse 62, temperature 97.5 °F (36.4 °C), temperature source Oral, resp. rate 20, height 5' 7\" (1.702 m), weight 161 lb (73 kg), SpO2 98 %.' on RA  Gen: No distress. Eyes: PERRL. No sclera icterus. No conjunctival injection. ENT: No discharge. Pharynx clear. Neck: Trachea midline. No obvious mass. Resp: No accessory muscle use. No crackles. No wheezes. No rhonchi. No dullness on percussion. Good air entry. CV: Regular rate. Regular rhythm. No murmur or rub. No edema. GI: Non-tender. Non-distended. No hernia.    Skin: Warm and dry. No nodule on exposed extremities. Lymph: No cervical LAD. No supraclavicular LAD. M/S: No cyanosis. No joint deformity. No clubbing. Neuro: Awake. Alert. Moves all four extremities. Psych: Oriented x 3. No anxiety. DATA reviewed by me:   CTPA 11/7/2018  images reviewed by me and showed:   Pulmonary Arteries: No obvious filling defect identified within the pulmonary  arterial vasculature that meets the criteria for pulmonary embolus. Mediastinum: Visualized thyroid gland grossly unremarkable in appearance. No  axillary, mediastinal, or hilar lymphadenopathy. No dissection flap within  the visualized aorta. No periaortic or mediastinal hemorrhage. Mild  coronary artery disease. No pericardial or pleural effusions. Lungs/pleura: Trachea and proximal central airways appear patent. Mild dependent atelectasis within both lungs. Mild emphysema. 5 mm pulmonary nodule in the right upper lobe on image 69, series 3. Scattered calcified granulomata throughout both lungs. Upper Abdomen: Scattered calcifications throughout the spleen consistent with  old granulomatous disease. Partial visualization of the upper abdomen. Soft Tissues/Bones: Mild diffuse degenerative changes throughout the spine. IMPRESSION:   1. No clear evidence for pulmonary embolus. 2. Mild dependent atelectasis in both lungs. 3. 5 mm pulmonary nodule in the right upper lobe  4. Old granulomatous disease. 5. Mild coronary artery disease. Echo 11/1/2018   Normal left ventricle systolic function with an estimated ejection fraction of 55%. No regional wall motion abnormalities are seen. Normal left ventricular diastolic filling pressure. Mild mitral and tricuspid regurgitation. Systolic pulmonary artery pressure (SPAP) is normal and estimated at 23 mmHg   (right atrial pressure 3 mmHg).          Assessment:       · Dyspnea. Unremarkable CTPA. Doubt of pulmonary origin.   Rule out arrhythmias and ACS  · Intermittent hypoxia, tachycardia and bradycardia   · Left-sided chest pain-not reproducible with palpation   · Pulmonary emphysema-does not appear an exacerbation. No significant improvement with inhalers   · Bilateral pulmonary nodules with mediastinal and hilar adenopathy 5/2013. Decreased in size in RLL PNs and 4R LN with enlargement of GOPAL PN on repeated CT chest 8/16/23013. Stable on CT 05/27/2014. Positive histoplasma M bands and Histoplasma Ab yeast CF (1-64). Itraconazole 1/7/9808-9/6/0738  · Hypermetabolic right hilar lymph nodes. 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 Improved on repeated CT. · Arthritis. 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    · 60 pack year smoking - quit 1/2018     Plan:       · Discussed with cardiology Dr. Mitch Camacho and plan to admit for telemetry and angiogram   · A call was placed to [facility] hospitalist for admission  · PFTs and 6MW post discharge   · Will also consider limited Echo with bubble study   · Supplemental oxygen to maintain SaO2 >92%; wean as tolerated  · Spiriva and Albuterol 2 puffs Q4-6 hrs PRN  · Advised to continue with smoking cessation

## 2018-11-13 NOTE — PROGRESS NOTES
unilaterally = 1    Sputum   ,  ,    Cough: Strong, spontaneous, non-productive = 0    Vital Signs   BP (!) 139/97   Pulse 72   Temp 97.8 °F (36.6 °C) (Oral)   Resp 16   Wt 155 lb 12.8 oz (70.7 kg)   SpO2 97%   BMI 24.40 kg/m²   SPO2 (COPD values may differ): Greater than or equal to 92% on room air = 0    Peak Flow (asthma only): not applicable = 0    RSI: 0-4 = See once and convert to home regimen or discontinue        Plan       Goals: medication delivery, mobilize retained secretions, volume expansion and improve oxygenation    Patient/caregiver was educated on the proper method of use for Respiratory Care Devices:  Yes      Level of patient/caregiver understanding able to:   [] Verbalize understanding   [] Demonstrate understanding       [] Teach back        [] Needs reinforcement       []  No available caregiver               []  Other:     Response to education:  Good     Is patient being placed on Home Treatment Regimen? Yes     Does the patient have everything they need prior to discharge? NA     Comments: Pt and chart reviewed. Plan of Care: Continue as ordered. Electronically signed by Raul Jackson RCP on 11/13/2018 at 4:28 PM    Respiratory Protocol Guidelines     1. Assessment and treatment by Respiratory Therapy will be initiated for medication and therapeutic interventions upon initiation of aerosolized medication. 2. Physician will be contacted for respiratory rate (RR) greater than 35 breaths per minute. Therapy will be held for heart rate (HR) greater than 140 beats per minute, pending direction from physician. 3. Bronchodilators will be administered via Metered Dose Inhaler (MDI) with spacer when the following criteria are met:  a. Alert and cooperative     b. HR < 140 bpm  c. RR < 30 bpm                d. Can demonstrate a 2-3 second inspiratory hold  4.  Bronchodilators will be administered via Hand Held Nebulizer [facility]) to patients when ANY of the following criteria are

## 2018-11-14 ENCOUNTER — APPOINTMENT (OUTPATIENT)
Dept: CARDIAC CATH/INVASIVE PROCEDURES | Age: 48
DRG: 192 | End: 2018-11-14
Attending: INTERNAL MEDICINE
Payer: MEDICARE

## 2018-11-14 VITALS
SYSTOLIC BLOOD PRESSURE: 100 MMHG | DIASTOLIC BLOOD PRESSURE: 68 MMHG | HEART RATE: 73 BPM | RESPIRATION RATE: 18 BRPM | OXYGEN SATURATION: 96 % | TEMPERATURE: 98.3 F | WEIGHT: 155.6 LBS | HEIGHT: 67 IN | BODY MASS INDEX: 24.42 KG/M2

## 2018-11-14 LAB
ANION GAP SERPL CALCULATED.3IONS-SCNC: 10 MMOL/L (ref 3–16)
BUN BLDV-MCNC: 9 MG/DL (ref 7–20)
CALCIUM SERPL-MCNC: 9 MG/DL (ref 8.3–10.6)
CHLORIDE BLD-SCNC: 102 MMOL/L (ref 99–110)
CO2: 28 MMOL/L (ref 21–32)
CREAT SERPL-MCNC: 0.9 MG/DL (ref 0.9–1.3)
GFR AFRICAN AMERICAN: >60
GFR NON-AFRICAN AMERICAN: >60
GLUCOSE BLD-MCNC: 88 MG/DL (ref 70–99)
HCT VFR BLD CALC: 44.9 % (ref 40.5–52.5)
HEMOGLOBIN: 15.3 G/DL (ref 13.5–17.5)
MCH RBC QN AUTO: 30 PG (ref 26–34)
MCHC RBC AUTO-ENTMCNC: 34.2 G/DL (ref 31–36)
MCV RBC AUTO: 87.7 FL (ref 80–100)
PDW BLD-RTO: 13.1 % (ref 12.4–15.4)
PLATELET # BLD: 279 K/UL (ref 135–450)
PMV BLD AUTO: 7.9 FL (ref 5–10.5)
POTASSIUM REFLEX MAGNESIUM: 3.7 MMOL/L (ref 3.5–5.1)
RBC # BLD: 5.12 M/UL (ref 4.2–5.9)
SODIUM BLD-SCNC: 140 MMOL/L (ref 136–145)
TROPONIN: <0.01 NG/ML
WBC # BLD: 14.3 K/UL (ref 4–11)

## 2018-11-14 PROCEDURE — 96372 THER/PROPH/DIAG INJ SC/IM: CPT

## 2018-11-14 PROCEDURE — 97530 THERAPEUTIC ACTIVITIES: CPT

## 2018-11-14 PROCEDURE — 99254 IP/OBS CNSLTJ NEW/EST MOD 60: CPT | Performed by: INTERNAL MEDICINE

## 2018-11-14 PROCEDURE — G8989 SELF CARE D/C STATUS: HCPCS

## 2018-11-14 PROCEDURE — G8979 MOBILITY GOAL STATUS: HCPCS

## 2018-11-14 PROCEDURE — 6360000004 HC RX CONTRAST MEDICATION: Performed by: INTERNAL MEDICINE

## 2018-11-14 PROCEDURE — 93458 L HRT ARTERY/VENTRICLE ANGIO: CPT | Performed by: INTERNAL MEDICINE

## 2018-11-14 PROCEDURE — C1769 GUIDE WIRE: HCPCS

## 2018-11-14 PROCEDURE — G0378 HOSPITAL OBSERVATION PER HR: HCPCS

## 2018-11-14 PROCEDURE — 2500000003 HC RX 250 WO HCPCS

## 2018-11-14 PROCEDURE — G8988 SELF CARE GOAL STATUS: HCPCS

## 2018-11-14 PROCEDURE — 97165 OT EVAL LOW COMPLEX 30 MIN: CPT

## 2018-11-14 PROCEDURE — 2580000003 HC RX 258: Performed by: INTERNAL MEDICINE

## 2018-11-14 PROCEDURE — B2111ZZ FLUOROSCOPY OF MULTIPLE CORONARY ARTERIES USING LOW OSMOLAR CONTRAST: ICD-10-PCS | Performed by: INTERNAL MEDICINE

## 2018-11-14 PROCEDURE — 97116 GAIT TRAINING THERAPY: CPT

## 2018-11-14 PROCEDURE — C1894 INTRO/SHEATH, NON-LASER: HCPCS

## 2018-11-14 PROCEDURE — 80048 BASIC METABOLIC PNL TOTAL CA: CPT

## 2018-11-14 PROCEDURE — 4A023N7 MEASUREMENT OF CARDIAC SAMPLING AND PRESSURE, LEFT HEART, PERCUTANEOUS APPROACH: ICD-10-PCS | Performed by: INTERNAL MEDICINE

## 2018-11-14 PROCEDURE — 85027 COMPLETE CBC AUTOMATED: CPT

## 2018-11-14 PROCEDURE — 6360000002 HC RX W HCPCS

## 2018-11-14 PROCEDURE — 2580000003 HC RX 258

## 2018-11-14 PROCEDURE — G8978 MOBILITY CURRENT STATUS: HCPCS

## 2018-11-14 PROCEDURE — 99153 MOD SED SAME PHYS/QHP EA: CPT | Performed by: INTERNAL MEDICINE

## 2018-11-14 PROCEDURE — 99152 MOD SED SAME PHYS/QHP 5/>YRS: CPT | Performed by: INTERNAL MEDICINE

## 2018-11-14 PROCEDURE — 97161 PT EVAL LOW COMPLEX 20 MIN: CPT

## 2018-11-14 PROCEDURE — 6370000000 HC RX 637 (ALT 250 FOR IP)

## 2018-11-14 PROCEDURE — 84484 ASSAY OF TROPONIN QUANT: CPT

## 2018-11-14 PROCEDURE — B2151ZZ FLUOROSCOPY OF LEFT HEART USING LOW OSMOLAR CONTRAST: ICD-10-PCS | Performed by: INTERNAL MEDICINE

## 2018-11-14 PROCEDURE — 6360000002 HC RX W HCPCS: Performed by: INTERNAL MEDICINE

## 2018-11-14 PROCEDURE — 6370000000 HC RX 637 (ALT 250 FOR IP): Performed by: INTERNAL MEDICINE

## 2018-11-14 PROCEDURE — G8980 MOBILITY D/C STATUS: HCPCS

## 2018-11-14 PROCEDURE — C1887 CATHETER, GUIDING: HCPCS

## 2018-11-14 PROCEDURE — 36415 COLL VENOUS BLD VENIPUNCTURE: CPT

## 2018-11-14 PROCEDURE — 2709999900 HC NON-CHARGEABLE SUPPLY

## 2018-11-14 PROCEDURE — G8987 SELF CARE CURRENT STATUS: HCPCS

## 2018-11-14 RX ORDER — AMLODIPINE BESYLATE 5 MG/1
2.5 TABLET ORAL DAILY
Status: DISCONTINUED | OUTPATIENT
Start: 2018-11-14 | End: 2018-11-14 | Stop reason: HOSPADM

## 2018-11-14 RX ORDER — SODIUM CHLORIDE 9 MG/ML
1000 INJECTION, SOLUTION INTRAVENOUS CONTINUOUS
Status: DISCONTINUED | OUTPATIENT
Start: 2018-11-14 | End: 2018-11-14

## 2018-11-14 RX ORDER — HYDRALAZINE HYDROCHLORIDE 20 MG/ML
10 INJECTION INTRAMUSCULAR; INTRAVENOUS EVERY 10 MIN PRN
Status: DISCONTINUED | OUTPATIENT
Start: 2018-11-14 | End: 2018-11-14 | Stop reason: HOSPADM

## 2018-11-14 RX ORDER — AMLODIPINE BESYLATE 2.5 MG/1
2.5 TABLET ORAL DAILY
Qty: 30 TABLET | Refills: 3 | Status: SHIPPED | OUTPATIENT
Start: 2018-11-14 | End: 2018-12-03 | Stop reason: SDUPTHER

## 2018-11-14 RX ORDER — ASPIRIN 325 MG
325 TABLET ORAL ONCE
Status: COMPLETED | OUTPATIENT
Start: 2018-11-14 | End: 2018-11-14

## 2018-11-14 RX ADMIN — GABAPENTIN 600 MG: 300 CAPSULE ORAL at 08:58

## 2018-11-14 RX ADMIN — ENOXAPARIN SODIUM 40 MG: 40 INJECTION SUBCUTANEOUS at 08:58

## 2018-11-14 RX ADMIN — SODIUM CHLORIDE 1000 ML: 9 INJECTION, SOLUTION INTRAVENOUS at 10:45

## 2018-11-14 RX ADMIN — LORAZEPAM 1 MG: 1 TABLET ORAL at 08:58

## 2018-11-14 RX ADMIN — FLUTICASONE PROPIONATE 1 SPRAY: 50 SPRAY, METERED NASAL at 08:58

## 2018-11-14 RX ADMIN — IOPAMIDOL 50 ML: 755 INJECTION, SOLUTION INTRAVENOUS at 11:37

## 2018-11-14 RX ADMIN — SODIUM CHLORIDE, PRESERVATIVE FREE 10 ML: 5 INJECTION INTRAVENOUS at 08:58

## 2018-11-14 RX ADMIN — Medication 325 MG: at 10:43

## 2018-11-14 ASSESSMENT — PAIN DESCRIPTION - FREQUENCY: FREQUENCY: CONTINUOUS

## 2018-11-14 ASSESSMENT — PAIN SCALES - GENERAL
PAINLEVEL_OUTOF10: 5
PAINLEVEL_OUTOF10: 5

## 2018-11-14 ASSESSMENT — PAIN DESCRIPTION - PAIN TYPE
TYPE: CHRONIC PAIN
TYPE: CHRONIC PAIN

## 2018-11-14 ASSESSMENT — PAIN DESCRIPTION - DESCRIPTORS: DESCRIPTORS: ACHING

## 2018-11-14 ASSESSMENT — PAIN DESCRIPTION - PROGRESSION: CLINICAL_PROGRESSION: NOT CHANGED

## 2018-11-14 ASSESSMENT — PAIN DESCRIPTION - LOCATION
LOCATION: CHEST
LOCATION: CHEST

## 2018-11-14 ASSESSMENT — PAIN DESCRIPTION - ORIENTATION
ORIENTATION: MID
ORIENTATION: MID

## 2018-11-14 NOTE — PROGRESS NOTES
home       Therapy Time   Individual Concurrent Group Co-treatment   Time In 0814         Time Out 0836         Minutes 22         Timed Code Treatment Minutes: 12 Minutes (10 min eval)       Allison Núñez OTR/L

## 2018-11-14 NOTE — CARE COORDINATION
Chart review completed . Patient is insured through [employer] , post heart cath. He is from home , anticipate no needs for discharge. Home independently. If any barriers to discharge arise please notify discharge planner.

## 2018-11-14 NOTE — DISCHARGE SUMMARY
Hospital Medicine Discharge Summary    Patient ID: Sal Arnold      Patient's PCP: Herson Kevin MD    Admit Date: 11/13/2018     Discharge Date:   11/14/18     Admitting Physician: Elmer Ortega MD     Discharge Physician: Shahrzad Eubanks MD     Discharge Diagnoses: Active Hospital Problems    Diagnosis Date Noted    Chest pain [R07.9] 11/13/2018       The patient was seen and examined on day of discharge and this discharge summary is in conjunction with any daily progress note from day of discharge. Hospital Course: The patient is a 53 Y M with a h/o former smoking and histoplasmosis treated in 2013. He has been having fatigue, dyspnea, chest pain, lightheadedness, and spells of his legs giving out for about 2 years now. Symptoms have been worsening for the last few months, especially in the last couple weeks. The chest pain is left-sided, sharp/stabbing/pressure, constant, no radiation, worse when lying back, not associated with exertion. The collapsing spells used to only happen 1-2x/week, but for the last couple weeks they have been happening about every day. He gets lightheaded for a few seconds beforehand, feels the chest pain and dyspnea, then his legs go weak and he crumples to the ground. Family says he sometimes is mentally groggy for a couple minutes afterward, but has definitely never lost consciousness, and the patient agrees with this. The patient bought a pulse oximeter and has put is on the last few times during these spells, and once his HR was 40, another time is was 140. Once his O2 saturation was in the 50's. The patient says these spells occur at random - he can sometimes be just standing at work, lying in bed at home, or even driving his car. He most recently presented to the ED on 10/23 with these symptoms, was diagnosed with a possible pneumonia, and discharged home.   He followed up with this PCP, again mentioned the chest pain, and was discharge is more than 30 minutes in the examination, evaluation, counseling and review of medications and discharge plan. Signed:    Jyothi Olmos MD   11/14/2018      Thank you Patrica Schuster MD for the opportunity to be involved in this patient's care. If you have any questions or concerns please feel free to contact me at [phone].

## 2018-11-14 NOTE — PROGRESS NOTES
Physical Therapy    Facility/Department: [facility] C4 PCU  Initial Assessment, treatment, DC summary    NAME: Gina Sanchez  : 1970  MRN: [id number]    Date of Service: 2018    Discharge Recommendations:  Home with assist PRN   PT Equipment Recommendations  Equipment Needed: No    Patient Diagnosis(es): There were no encounter diagnoses. has a past medical history of Arthritis; Chest pain; Colitis; COPD (chronic obstructive pulmonary disease) ([address] Histoplasmosis; History of blood transfusion; Hx of blood clots; Pneumonia; and Pulmonary nodule. has a past surgical history that includes Vasectomy; lymph node dissection; Diagnostic Cardiac Cath Lab Procedure (); and bronchoscopy (2013). Restrictions  Restrictions/Precautions  Restrictions/Precautions: General Precautions, Fall Risk  Position Activity Restriction  Other position/activity restrictions: up as tolerated  Vision/Hearing  Vision: Impaired  Vision Exceptions: Wears glasses at all times  Hearing: Within functional limits     Subjective  General  Chart Reviewed: Yes  Patient assessed for rehabilitation services?: Yes  Response To Previous Treatment: Not applicable  Family / Caregiver Present: Yes (wife)  Referring Practitioner: Emanuel  Referral Date : 18  Diagnosis: chest pain  Follows Commands: Within Functional Limits  General Comment  Comments: Pt sitting EOB upon entry, RN cleared pt for therapy  Subjective  Subjective: Pt agreeable to PT  Pain Screening  Patient Currently in Pain: Yes  Pain Assessment  Pain Assessment: 0-10  Pain Level: 5  Pain Type: Chronic pain  Pain Location: Chest  Pain Orientation: Mid  Pain Intervention(s): Ambulation/Increased activity;Repositioned;Distraction; Therapeutic presence  Vital Signs  Patient Currently in Pain: Yes  Pre Treatment Pain Screening  Intervention List: Patient able to continue with treatment    Orientation  Orientation  Overall Orientation Status: Within Normal acute PT servivces at this time. Recommend home with PRN assist at DC from acute setting. Prognosis: Good  Decision Making: Low Complexity  Patient Education: role of PT  Barriers to Learning: no  No Skilled PT: Independent with functional mobility   REQUIRES PT FOLLOW UP: No  Activity Tolerance  Activity Tolerance: Patient Tolerated treatment well         Plan   Plan  Times per week: n/a  Safety Devices  Type of devices:  All fall risk precautions in place, Bed alarm in place, Call light within reach, Nurse notified, Gait belt, Left in bed    G-Code  PT G-Codes  Functional Assessment Tool Used: [facility]  Score: 24  Functional Limitation: Mobility: Walking and moving around  Mobility: Walking and Moving Around Current Status (): 0 percent impaired, limited or restricted  Mobility: Walking and Moving Around Goal Status (): 0 percent impaired, limited or restricted  Mobility: Walking and Moving Around Discharge Status (): 0 percent impaired, limited or restricted    AM-PAC Score  AM-PAC Inpatient Mobility Raw Score : 24  AM-PAC Inpatient T-Scale Score : 61.14  Mobility Inpatient CMS 0-100% Score: 0  Mobility Inpatient CMS G-Code Modifier : [address]          Goals  Short term goals  Time Frame for Short term goals: 11/14/18  Short term goal 1: Pt will ambulate 150 ft and negotiate 3 stairs with LRAD and mod I; goal met 11/14/18  Patient Goals   Patient goals : Chris Ray find out what's going on\"       Therapy Time   Individual Concurrent Group Co-treatment   Time In 0814         Time Out 0836         Minutes 22         Timed Code Treatment Minutes: [address] PT

## 2018-11-14 NOTE — CONSULTS
10 months ago. His smoking use included Cigarettes. He has a 10.00 pack-year smoking history. He has never used smokeless tobacco. He reports that he does not drink alcohol or use drugs. Family History:  No evidence for sudden cardiac death or premature CAD    Medications:  Reviewed and are listed in nursing record. and/or listed below  Outpatient Medications:  Prior to Admission medications    Medication Sig Start Date End Date Taking? Authorizing Provider   albuterol sulfate HFA (PROAIR HFA) 108 (90 Base) MCG/ACT inhaler Inhale 2 puffs into the lungs every 6 hours as needed for Wheezing or Shortness of Breath 11/7/18  Yes Arnav Hinton MD   tiotropium (SPIRIVA RESPIMAT) 2.5 MCG/ACT AERS inhaler Inhale 2 puffs into the lungs daily 11/7/18  Yes Arnav Hinton MD   fluticasone (FLONASE) 50 MCG/ACT nasal spray 1 spray by Each Nare route daily 10/18/18  Yes Dusty Feng DO   gabapentin (NEURONTIN) 600 MG tablet Take 1 tablet by mouth 3 times daily for 180 days. . 8/20/18 2/16/19 Yes Ila Duffy MD   PARoxetine (PAXIL) 40 MG tablet Take 1 tablet by mouth nightly 8/20/18  Yes Ila Duffy MD   LORazepam (ATIVAN) 1 MG tablet Take 1 tablet by mouth as needed for Anxiety for up to 180 days. . 8/20/18 2/16/19 Yes Ila Duffy MD   dicyclomine (BENTYL) 10 MG capsule Take 1 capsule by mouth 4 times daily (before meals and nightly) 4/25/17   Ila Duffy MD       In-patient schedule medications:        Infusion Medications: Allergies:  Codeine and Tramadol     Review of Systems:   All 14 point review of symptoms completed. Pertinent positives identified in the HPI, all other review of symptoms findings as below.      Review of Systems - History obtained from the patient  General ROS: negative for - chills, fever or night sweats  Psychological ROS: negative for - disorientation or hallucinations  Ophthalmic ROS: negative for - dry eyes, eye pain or loss of vision  ENT ROS: negative for - nasal discharge or sore throat  Allergy and Immunology ROS: negative for - hives or itchy/watery eyes  Hematological and Lymphatic ROS: negative for - jaundice or night sweats  Endocrine ROS: negative for - mood swings or temperature intolerance  Breast ROS: deferred  Respiratory ROS: negative for - hemoptysis or stridor  Cardiovascular ROS: negative for - chest pain, dyspnea on exertion or palpitations  Gastrointestinal ROS: no abdominal pain, change in bowel habits, or black or bloody stools  Genito-Urinary ROS: no dysuria, trouble voiding, or hematuria  Musculoskeletal ROS: negative for - gait disturbance, joint pain or joint stiffness  Neurological ROS: negative for - seizures or speech problems  Dermatological ROS: negative for - rash or skin lesion changes      Physical Examination:    /72   Pulse 64   Ht 5' 7\" (1.702 m)   Wt 155 lb (70.3 kg)   SpO2 99%   BMI 24.28 kg/m²   BP (!) 141/90   Pulse 68   Temp 98.3 °F (36.8 °C) (Oral)   Resp 18   Ht 5' 7\" (1.702 m)   Wt 155 lb 9.6 oz (70.6 kg)   SpO2 96%   BMI 24.37 kg/m²    Weight: 155 lb 9.6 oz (70.6 kg)     Wt Readings from Last 3 Encounters:   11/14/18 155 lb 9.6 oz (70.6 kg)   11/13/18 161 lb (73 kg)   11/07/18 156 lb (70.8 kg)       Intake/Output Summary (Last 24 hours) at 11/14/18 1104  Last data filed at 11/14/18 0550   Gross per 24 hour   Intake              240 ml   Output              885 ml   Net             -645 ml       General Appearance:  Alert, cooperative, no distress, appears stated age   Head:  Normocephalic, without obvious abnormality, atraumatic   Eyes:  PERRL, conjunctiva/corneas clear       Nose: Nares normal, no drainage or sinus tenderness   Throat: Lips, mucosa, and tongue normal   Neck: Supple, symmetrical, trachea midline, no adenopathy, thyroid: not enlarged, symmetric, no tenderness/mass/nodules, no carotid bruit or JVD       Lungs:   Diminished to auscultation bilaterally, respirations unlabored   Chest Wall:  No possible need for emergent surgery. 2. Serial troponin levels will be ordered and reviewed  3. The patient has been given instructions on addressing diet, regular exercise, weight control, smoking abstention, medication compliance, and stress minimization. 4. The patient should be treated with these therapies unless contraindicated:   ~asa daily   ~beta-blockers   ~statin therapy   ~ACE/ARB   ~repeat troponin until down trending   ~EKG as clinically needed  5. Keep NPO   6. Pre-cath orders will be placed. All questions and concerns were addressed to the patient/family. Alternatives to my treatment were discussed. The note was completed using EMR. Every effort was made to ensure accuracy; however, inadvertent computerized transcription errors may be present.     Tadeo Munoz MD, [address], [place], [place]  [phone] Meghana Sheridan [facility]  [phone] [facility]  11/14/2018  11:04 AM

## 2018-11-14 NOTE — PRE SEDATION
Brief Pre-Op Note/Sedation Assessment      Rashi Pickard  1970  Cath Pool Rm/NONE  [id number]  11:05 AM    Planned Procedure: Cardiac Catheterization Procedure    Post Procedure Plan: Return to same level of care    Consent: I have discussed with the patient and/or the patient representative the indication, alternatives, and the possible risks and/or complications of the planned procedure and the anesthesia methods. The patient and/or patient representative appear to understand and agree to proceed. Chief Complaint: Chest Pain/Pressure      Indications for the Procedure:   CAD Presentation:  New Onset Angina <= 2 months and Suspected CAD  Anginal Classification within 2 weeks:  CCS IV - Inability to perform any activity without angina or angina at rest, i.e., severe limitation  NYHA Heart Failure Class within 2 weeks: No symptoms      Anti- Anginal Meds within 2 weeks:   ANTI-ANGINAL MEDS: Yes: Aspirin      Stress or Imaging Studies Performed:  None    Vital Signs:  BP (!) 141/90   Pulse 68   Temp 98.3 °F (36.8 °C) (Oral)   Resp 18   Ht 5' 7\" (1.702 m)   Wt 155 lb 9.6 oz (70.6 kg)   SpO2 96%   BMI 24.37 kg/m²     Allergies: Allergies   Allergen Reactions    Codeine Itching    Tramadol      Patient states he doesn't feel right on tramadol.        Past Medical History:  Past Medical History:   Diagnosis Date    Arthritis     Chest pain     [facility] 9/2012 sent for records;    Colitis     COPD (chronic obstructive pulmonary disease) [address] [address]     Histoplasmosis     History of blood transfusion     Hx of blood clots     Pneumonia     Pulmonary nodule          Surgical History:  Past Surgical History:   Procedure Laterality Date    BRONCHOSCOPY  6/05/2013    DIAGNOSTIC CARDIAC CATH LAB PROCEDURE  1998    LYMPH NODE DISSECTION      neck    VASECTOMY           Medications:  Current Facility-Administered Medications   Medication Dose Route Frequency Provider Last Rate Last Dose    0.9 % sodium chloride infusion  1,000 mL Intravenous Continuous Jordi Núñez MD 30 mL/hr at 11/14/18 1045 1,000 mL at 11/14/18 1045    labetalol (NORMODYNE;TRANDATE) injection 5 mg  5 mg Intravenous Q4H PRN Marcelina Leonard MD        sodium chloride flush 0.9 % injection 10 mL  10 mL Intravenous 2 times per day Marcelina Leonard MD   10 mL at 11/14/18 0858    sodium chloride flush 0.9 % injection 10 mL  10 mL Intravenous PRN Marcelina Leonard MD   10 mL at 11/13/18 1538    magnesium hydroxide (MILK OF MAGNESIA) 400 MG/5ML suspension 30 mL  30 mL Oral Daily PRN Marcelina Leonard MD        enoxaparin (LOVENOX) injection 40 mg  40 mg Subcutaneous Daily Marcelina Leonard MD   40 mg at 11/14/18 0858    prochlorperazine (COMPAZINE) injection 10 mg  10 mg Intravenous Q6H PRN Marcelina Leonard MD        albuterol sulfate  (90 Base) MCG/ACT inhaler 2 puff  2 puff Inhalation Q6H PRN Marcelina Leonard MD        dicyclomine (BENTYL) capsule 10 mg  10 mg Oral 4x Daily AC & HS Marcelina Leonard MD        fluticasone (FLONASE) 50 MCG/ACT nasal spray 1 spray  1 spray Each Nare Daily Marcelina Leonard MD   1 spray at 11/14/18 0858    gabapentin (NEURONTIN) capsule 600 mg  600 mg Oral TID Marcelina Leonard MD   600 mg at 11/14/18 0858    LORazepam (ATIVAN) tablet 1 mg  1 mg Oral Daily PRN Marcelina Leonard MD   1 mg at 11/14/18 0858    PARoxetine (PAXIL) tablet 40 mg  40 mg Oral Nightly Marcelina Leonard MD   40 mg at 11/13/18 2113    tiotropium (SPIRIVA) inhalation capsule 18 mcg  18 mcg Inhalation Daily Marcelina Leonard MD               Pre-Sedation:    Pre-Sedation Documentation and Exam:  I have personally completed a history, physical exam & review of systems for this patient (see notes).     Prior History of Anesthesia Complications:   none    Modified Mallampati:  I (soft palate, uvula, fauces, tonsillar pillars visible)    ASA Classification:  Class 2 -- A normal healthy patient with mild systemic disease    Daija Scale: Activity:  2 - Able to move 4 extremities voluntarily on command  Respiration:  2 - Able to breathe deeply and cough freely  Circulation:  2 - BP+/- 20mmHg of normal  Consciousness:  2 - Fully awake  Oxygen Saturation (color):  2 - Able to maintain oxygen saturation >92% on room air    Sedation/Anesthesia Plan:  Guard the patient's safety and welfare. Minimize physical discomfort and pain. Minimize negative psychological responses to treatment by providing sedation and analgesia and maximize the potential amnesia. Patient to meet pre-procedure discharge plan.     Medication Planned:  midazolam intravenously, fentanyl intravenously    Patient is an appropriate candidate for plan of sedation: yes      Electronically signed by Brittney Camacho MD on 11/14/2018 at 11:05 AM

## 2018-11-15 ENCOUNTER — TELEPHONE (OUTPATIENT)
Dept: FAMILY MEDICINE CLINIC | Age: 48
End: 2018-11-15

## 2018-11-15 LAB
EKG ATRIAL RATE: 86 BPM
EKG DIAGNOSIS: NORMAL
EKG P AXIS: 42 DEGREES
EKG P-R INTERVAL: 132 MS
EKG Q-T INTERVAL: 366 MS
EKG QRS DURATION: 102 MS
EKG QTC CALCULATION (BAZETT): 437 MS
EKG R AXIS: -46 DEGREES
EKG T AXIS: 24 DEGREES
EKG VENTRICULAR RATE: 86 BPM

## 2018-11-19 ENCOUNTER — OFFICE VISIT (OUTPATIENT)
Dept: FAMILY MEDICINE CLINIC | Age: 48
End: 2018-11-19
Payer: COMMERCIAL

## 2018-11-19 VITALS
SYSTOLIC BLOOD PRESSURE: 131 MMHG | HEART RATE: 57 BPM | OXYGEN SATURATION: 98 % | WEIGHT: 158 LBS | DIASTOLIC BLOOD PRESSURE: 79 MMHG | BODY MASS INDEX: 24.75 KG/M2

## 2018-11-19 DIAGNOSIS — R55 PRE-SYNCOPE: Primary | ICD-10-CM

## 2018-11-19 PROCEDURE — 1111F DSCHRG MED/CURRENT MED MERGE: CPT | Performed by: FAMILY MEDICINE

## 2018-11-19 PROCEDURE — 99496 TRANSJ CARE MGMT HIGH F2F 7D: CPT | Performed by: FAMILY MEDICINE

## 2018-11-19 NOTE — PROGRESS NOTES
Subjective:      Patient ID: Ceci Patel is a 53 y.o. male.     HPI  Chief Complaint   Patient presents with    Follow-Up from [facility] 11/13/2018 - 11/14/2018          Review of Systems    Objective:   Physical Exam  /79   Pulse 57   Wt 158 lb (71.7 kg)   SpO2 98%   BMI 24.75 kg/m²     Assessment:            Plan:           Halina Palomo MA
mouth nightly             tiotropium (SPIRIVA RESPIMAT) 2.5 MCG/ACT AERS inhaler  Inhale 2 puffs into the lungs daily                   Medications marked \"taking\" at this time  Outpatient Prescriptions Marked as Taking for the 11/19/18 encounter (Office Visit) with Maryellen Gan MD   Medication Sig Dispense Refill    amLODIPine (NORVASC) 2.5 MG tablet Take 1 tablet by mouth daily 30 tablet 3    albuterol sulfate HFA (PROAIR HFA) 108 (90 Base) MCG/ACT inhaler Inhale 2 puffs into the lungs every 6 hours as needed for Wheezing or Shortness of Breath 1 Inhaler 6    tiotropium (SPIRIVA RESPIMAT) 2.5 MCG/ACT AERS inhaler Inhale 2 puffs into the lungs daily 1 Inhaler 6    fluticasone (FLONASE) 50 MCG/ACT nasal spray 1 spray by Each Nare route daily 1 Bottle 3    gabapentin (NEURONTIN) 600 MG tablet Take 1 tablet by mouth 3 times daily for 180 days. . 270 tablet 1    PARoxetine (PAXIL) 40 MG tablet Take 1 tablet by mouth nightly 90 tablet 1    LORazepam (ATIVAN) 1 MG tablet Take 1 tablet by mouth as needed for Anxiety for up to 180 days. . 30 tablet 5    dicyclomine (BENTYL) 10 MG capsule Take 1 capsule by mouth 4 times daily (before meals and nightly) 120 capsule 3        Medications patient taking as of now reconciled against medications ordered at time of hospital discharge: Yes    Chief Complaint   Patient presents with   [address] from [facility] 11/13/2018 - 11/14/2018        HPI    Inpatient course: Discharge summary reviewed- see chart. Interval history/Current status: Has not had another presyncopal episode characterized by lightheadedness followed by legs going weak and falling if can't support self. No loss of consciousness. Occurs 4-5 times per week. Review of Systems    Vitals:    11/19/18 0751   BP: 131/79   Pulse: 57   SpO2: 98%   Weight: 158 lb (71.7 kg)     Body mass index is 24.75 kg/m².    Wt Readings from Last 3 Encounters:   11/19/18 158 lb (71.7 kg)   11/14/18 155 lb 9.6 oz

## 2018-11-20 ENCOUNTER — OFFICE VISIT (OUTPATIENT)
Dept: PULMONOLOGY | Age: 48
End: 2018-11-20
Payer: MEDICARE

## 2018-11-20 VITALS
RESPIRATION RATE: 18 BRPM | SYSTOLIC BLOOD PRESSURE: 110 MMHG | BODY MASS INDEX: 25.15 KG/M2 | HEIGHT: 67 IN | HEART RATE: 74 BPM | OXYGEN SATURATION: 97 % | WEIGHT: 160.2 LBS | TEMPERATURE: 97.7 F | DIASTOLIC BLOOD PRESSURE: 64 MMHG

## 2018-11-20 DIAGNOSIS — R09.02 HYPOXIA: ICD-10-CM

## 2018-11-20 DIAGNOSIS — R06.81 WITNESSED APNEIC SPELLS: ICD-10-CM

## 2018-11-20 DIAGNOSIS — G47.10 HYPERSOMNIA: ICD-10-CM

## 2018-11-20 DIAGNOSIS — R06.00 DYSPNEA, UNSPECIFIED TYPE: Primary | ICD-10-CM

## 2018-11-20 DIAGNOSIS — R06.83 SNORING: ICD-10-CM

## 2018-11-20 PROCEDURE — G8419 CALC BMI OUT NRM PARAM NOF/U: HCPCS | Performed by: INTERNAL MEDICINE

## 2018-11-20 PROCEDURE — G8427 DOCREV CUR MEDS BY ELIG CLIN: HCPCS | Performed by: INTERNAL MEDICINE

## 2018-11-20 PROCEDURE — 1036F TOBACCO NON-USER: CPT | Performed by: INTERNAL MEDICINE

## 2018-11-20 PROCEDURE — G8482 FLU IMMUNIZE ORDER/ADMIN: HCPCS | Performed by: INTERNAL MEDICINE

## 2018-11-20 PROCEDURE — 99214 OFFICE O/P EST MOD 30 MIN: CPT | Performed by: INTERNAL MEDICINE

## 2018-11-20 PROCEDURE — 1111F DSCHRG MED/CURRENT MED MERGE: CPT | Performed by: INTERNAL MEDICINE

## 2018-11-20 RX ORDER — PREDNISONE 10 MG/1
TABLET ORAL
Qty: 30 TABLET | Refills: 0 | Status: SHIPPED | OUTPATIENT
Start: 2018-11-20 | End: 2018-11-30

## 2018-11-20 ASSESSMENT — SLEEP AND FATIGUE QUESTIONNAIRES
HOW LIKELY ARE YOU TO NOD OFF OR FALL ASLEEP WHEN YOU ARE A PASSENGER IN A CAR FOR AN HOUR WITHOUT A BREAK: 3
HOW LIKELY ARE YOU TO NOD OFF OR FALL ASLEEP WHILE SITTING INACTIVE IN A PUBLIC PLACE: 3
HOW LIKELY ARE YOU TO NOD OFF OR FALL ASLEEP WHILE SITTING QUIETLY AFTER LUNCH WITHOUT ALCOHOL: 3
NECK CIRCUMFERENCE (INCHES): 14.5
HOW LIKELY ARE YOU TO NOD OFF OR FALL ASLEEP WHILE LYING DOWN TO REST IN THE AFTERNOON WHEN CIRCUMSTANCES PERMIT: 3
HOW LIKELY ARE YOU TO NOD OFF OR FALL ASLEEP WHILE SITTING AND TALKING TO SOMEONE: 1
HOW LIKELY ARE YOU TO NOD OFF OR FALL ASLEEP WHILE WATCHING TV: 3
HOW LIKELY ARE YOU TO NOD OFF OR FALL ASLEEP IN A CAR, WHILE STOPPED FOR A FEW MINUTES IN TRAFFIC: 0
HOW LIKELY ARE YOU TO NOD OFF OR FALL ASLEEP WHILE SITTING AND READING: 3
ESS TOTAL SCORE: 19

## 2018-11-21 ENCOUNTER — HOSPITAL ENCOUNTER (OUTPATIENT)
Dept: PULMONOLOGY | Age: 48
Discharge: HOME | End: 2018-11-21
Payer: MEDICARE

## 2018-11-21 VITALS — OXYGEN SATURATION: 98 %

## 2018-11-21 PROCEDURE — 95810 POLYSOM 6/> YRS 4/> PARAM: CPT

## 2018-11-21 PROCEDURE — 6360000002 HC RX W HCPCS: Performed by: INTERNAL MEDICINE

## 2018-11-21 PROCEDURE — 94760 N-INVAS EAR/PLS OXIMETRY 1: CPT

## 2018-11-21 PROCEDURE — 94060 EVALUATION OF WHEEZING: CPT

## 2018-11-21 PROCEDURE — 94664 DEMO&/EVAL PT USE INHALER: CPT

## 2018-11-21 PROCEDURE — 94729 DIFFUSING CAPACITY: CPT

## 2018-11-21 PROCEDURE — 94726 PLETHYSMOGRAPHY LUNG VOLUMES: CPT

## 2018-11-21 PROCEDURE — 94640 AIRWAY INHALATION TREATMENT: CPT

## 2018-11-21 RX ORDER — ALBUTEROL SULFATE 2.5 MG/3ML
2.5 SOLUTION RESPIRATORY (INHALATION) ONCE
Status: COMPLETED | OUTPATIENT
Start: 2018-11-21 | End: 2018-11-21

## 2018-11-21 RX ADMIN — ALBUTEROL SULFATE 2.5 MG: 2.5 SOLUTION RESPIRATORY (INHALATION) at 10:28

## 2018-11-30 ENCOUNTER — HOSPITAL ENCOUNTER (OUTPATIENT)
Dept: CARDIOLOGY | Age: 48
Discharge: HOME | End: 2018-11-30
Payer: COMMERCIAL

## 2018-11-30 PROCEDURE — 93308 TTE F-UP OR LMTD: CPT

## 2018-12-03 ENCOUNTER — OFFICE VISIT (OUTPATIENT)
Dept: CARDIOLOGY CLINIC | Age: 48
End: 2018-12-03
Payer: MEDICARE

## 2018-12-03 VITALS
HEIGHT: 67 IN | SYSTOLIC BLOOD PRESSURE: 110 MMHG | OXYGEN SATURATION: 98 % | WEIGHT: 159.4 LBS | BODY MASS INDEX: 25.02 KG/M2 | DIASTOLIC BLOOD PRESSURE: 80 MMHG | HEART RATE: 84 BPM

## 2018-12-03 DIAGNOSIS — R42 DIZZINESS: ICD-10-CM

## 2018-12-03 DIAGNOSIS — R06.02 SHORTNESS OF BREATH: ICD-10-CM

## 2018-12-03 DIAGNOSIS — R07.2 PRECORDIAL PAIN: Primary | ICD-10-CM

## 2018-12-03 PROCEDURE — 99213 OFFICE O/P EST LOW 20 MIN: CPT | Performed by: NURSE PRACTITIONER

## 2018-12-03 RX ORDER — AMLODIPINE BESYLATE 2.5 MG/1
2.5 TABLET ORAL DAILY
Qty: 90 TABLET | Refills: 3 | Status: SHIPPED | OUTPATIENT
Start: 2018-12-03 | End: 2019-03-08 | Stop reason: ALTCHOICE

## 2018-12-03 ASSESSMENT — ENCOUNTER SYMPTOMS
VOMITING: 0
SHORTNESS OF BREATH: 1
DIARRHEA: 0
WHEEZING: 0
NAUSEA: 0
COUGH: 0

## 2018-12-03 NOTE — LETTER
Coronary angiogram 11/14/2018:  PROCEDURE PERFORMED:  1. Selective coronary angiography. 2.  Left heart catheterization. 3.  Left ventriculography. ANGIOGRAPHY FINDINGS:  1. Normal coronary angiography. 2.  Normal left ventricular function. 3.  Normal hemodynamics. The patient is a 43-year-old gentleman. Right radial artery was  evaluated, prepped and draped in sterile fashion. TIG catheter was  utilized after Seldinger technique and a sheath was placed. The left  ventricular end-diastolic pressure was 10, aortic pressure was 100/67  and left ventricular pressure 100/10. No gradient across pullback. Wall motion normal with EF of 60%. Coronary angiography demonstrates a  left main with a large vessel and no disease. It bifurcates into the  large circ and LAD. The left coronary artery is dominant. There is a  large obtuse marginal branch, large LAD and diagonal.  There is no  disease noted in the circ and/or left coronary artery circulation. The  right coronary artery is a small nondominant vessel with no disease. CONCLUSION:  No epicardial coronary artery disease. Normal left  ventricular function. At this point, the patient's symptoms may be attributed to _____. We  will start a low-dose calcium channel blocker to assist with his  possible symptom approach. Alternatively, the patient may not have  ischemic heart disease or coronary artery disease as a cause for the  patient's symptoms and recommend noncardiac evaluation if his symptoms  does not improve after amlodipine utilization. CTPA 11/7/2018  1. No clear evidence for pulmonary embolus. 2. Mild dependent atelectasis in both lungs. 3. 5 mm pulmonary nodule in the right upper lobe.  Please see pulmonary   nodule follow-up guidelines provided below. 4. Old granulomatous disease. 5. Mild coronary artery disease.    The findings were sent to the Radiology Results Communication Center at 5:09

## 2018-12-03 NOTE — PROGRESS NOTES
was 100/67  and left ventricular pressure 100/10. No gradient across pullback. Wall motion normal with EF of 60%. Coronary angiography demonstrates a  left main with a large vessel and no disease. It bifurcates into the  large circ and LAD. The left coronary artery is dominant. There is a  large obtuse marginal branch, large LAD and diagonal.  There is no  disease noted in the circ and/or left coronary artery circulation. The  right coronary artery is a small nondominant vessel with no disease. CONCLUSION:  No epicardial coronary artery disease. Normal left  ventricular function. At this point, the patient's symptoms may be attributed to _____. We  will start a low-dose calcium channel blocker to assist with his  possible symptom approach. Alternatively, the patient may not have  ischemic heart disease or coronary artery disease as a cause for the  patient's symptoms and recommend noncardiac evaluation if his symptoms  does not improve after amlodipine utilization. CTPA 11/7/2018  1. No clear evidence for pulmonary embolus. 2. Mild dependent atelectasis in both lungs. 3. 5 mm pulmonary nodule in the right upper lobe.  Please see pulmonary   nodule follow-up guidelines provided below. 4. Old granulomatous disease. 5. Mild coronary artery disease. The findings were sent to the Radiology Results [id number] at 5:09   pm on 11/7/2018to be communicated to a licensed caregiver. CARDIOLOGY LABS:   CBC: No results for input(s): WBC, HGB, HCT, PLT in the last 72 hours. BMP:No results for input(s): NA, K, CO2, BUN, CREATININE, LABGLOM, GLUCOSE in the last 72 hours. PT/INR: No results for input(s): PROTIME, INR in the last 72 hours. APTT:No results for input(s): APTT in the last 72 hours.   FASTING LIPID PANEL:  Lab Results   Component Value Date    HDL 37 06/18/2016    LDLCALC 90 06/18/2016    TRIG 77 06/18/2016     LIVER PROFILE:No results for input(s): AST, ALT, ALB in the last 72

## 2018-12-03 NOTE — COMMUNICATION BODY
Start Date End Date Taking? Authorizing Provider   amLODIPine (NORVASC) 2.5 MG tablet Take 1 tablet by mouth daily 11/14/18   Elliott Jose MD   albuterol sulfate HFA (PROAIR HFA) 108 (90 Base) MCG/ACT inhaler Inhale 2 puffs into the lungs every 6 hours as needed for Wheezing or Shortness of Breath 11/7/18   Kassi Woodruff, MD   tiotropium (SPIRIVA RESPIMAT) 2.5 MCG/ACT AERS inhaler Inhale 2 puffs into the lungs daily 11/7/18   Kassi Woodruff, MD   fluticasone (FLONASE) 50 MCG/ACT nasal spray 1 spray by Each Nare route daily 10/18/18   Dusty Feng DO   gabapentin (NEURONTIN) 600 MG tablet Take 1 tablet by mouth 3 times daily for 180 days. . 8/20/18 2/16/19  Chante Milligan MD   PARoxetine (PAXIL) 40 MG tablet Take 1 tablet by mouth nightly 8/20/18   Chante Milligan MD   LORazepam (ATIVAN) 1 MG tablet Take 1 tablet by mouth as needed for Anxiety for up to 180 days. . 8/20/18 2/16/19  Chante Milligan MD   dicyclomine (BENTYL) 10 MG capsule Take 1 capsule by mouth 4 times daily (before meals and nightly) 4/25/17   Chante Milligan MD       Allergies:  Codeine and Tramadol    Social History:   reports that he quit smoking about 11 months ago. His smoking use included Cigarettes. He has a 10.00 pack-year smoking history. He has never used smokeless tobacco. He reports that he does not drink alcohol or use drugs. Family History: family history includes Cancer in his mother, paternal grandfather, and paternal uncle; Heart Attack (age of onset: 62) in his mother; Heart Attack (age of onset: 61) in his maternal uncle; Heart Disease in his mother; Heart Failure in his mother; No Known Problems in his son and son; Other in his brother and father. Review of Systems   Review of Systems   Constitutional: Negative for chills, fatigue and fever. Respiratory: Positive for shortness of breath. Negative for cough and wheezing. Cardiovascular: Positive for chest pain. Negative for leg swelling. was 100/67  and left ventricular pressure 100/10. No gradient across pullback. Wall motion normal with EF of 60%. Coronary angiography demonstrates a  left main with a large vessel and no disease. It bifurcates into the  large circ and LAD. The left coronary artery is dominant. There is a  large obtuse marginal branch, large LAD and diagonal.  There is no  disease noted in the circ and/or left coronary artery circulation. The  right coronary artery is a small nondominant vessel with no disease. CONCLUSION:  No epicardial coronary artery disease. Normal left  ventricular function. At this point, the patient's symptoms may be attributed to _____. We  will start a low-dose calcium channel blocker to assist with his  possible symptom approach. Alternatively, the patient may not have  ischemic heart disease or coronary artery disease as a cause for the  patient's symptoms and recommend noncardiac evaluation if his symptoms  does not improve after amlodipine utilization. CTPA 11/7/2018  1. No clear evidence for pulmonary embolus. 2. Mild dependent atelectasis in both lungs. 3. 5 mm pulmonary nodule in the right upper lobe.  Please see pulmonary   nodule follow-up guidelines provided below. 4. Old granulomatous disease. 5. Mild coronary artery disease. The findings were sent to the Radiology Results [id number] at 5:09   pm on 11/7/2018to be communicated to a licensed caregiver. CARDIOLOGY LABS:   CBC: No results for input(s): WBC, HGB, HCT, PLT in the last 72 hours. BMP:No results for input(s): NA, K, CO2, BUN, CREATININE, LABGLOM, GLUCOSE in the last 72 hours. PT/INR: No results for input(s): PROTIME, INR in the last 72 hours. APTT:No results for input(s): APTT in the last 72 hours.   FASTING LIPID PANEL:  Lab Results   Component Value Date    HDL 37 06/18/2016    LDLCALC 90 06/18/2016    TRIG 77 06/18/2016     LIVER PROFILE:No results for input(s): AST, ALT, ALB in the last 72 hours.  BNP:   Lab Results   Component Value Date    PROBNP 32 10/23/2018     Assessment:   Chest pain: unchanged  Dizziness: unchanged  Shortness of breath: unchanged  Pulmonary Emphysema  Pulmonary nodules    Plan:   1. He still has chest tightness, dizziness, and shortness of breath despite normal cardiac testing and antianginal therapy. It is unlikely his symptoms are cardiac. 2. Continue with event monitor from PCP  3. Evaluate non cardiac causes of symptoms  4.  Follow up in 8 weeks or as needed    MICHELLE Glover-CNP  [facility]  [phone]   \

## 2018-12-06 ENCOUNTER — TELEPHONE (OUTPATIENT)
Dept: FAMILY MEDICINE CLINIC | Age: 48
End: 2018-12-06

## 2018-12-06 DIAGNOSIS — K92.1 BLOODY STOOL: Primary | ICD-10-CM

## 2018-12-06 PROBLEM — I73.00 RAYNAUD'S PHENOMENON WITHOUT GANGRENE: Status: ACTIVE | Noted: 2017-10-06

## 2018-12-06 PROBLEM — G82.20 PARAPARESIS OF BOTH LOWER LIMBS (HCC): Status: ACTIVE | Noted: 2017-06-30

## 2018-12-06 PROBLEM — R29.6 FALLS FREQUENTLY: Status: ACTIVE | Noted: 2017-06-30

## 2018-12-06 PROBLEM — I73.9 INTERMITTENT CLAUDICATION (HCC): Status: ACTIVE | Noted: 2017-07-06

## 2018-12-06 PROBLEM — R23.1 SKIN PALLOR: Status: ACTIVE | Noted: 2017-06-30

## 2018-12-06 PROBLEM — R29.2 ABNORMAL REFLEXES: Status: ACTIVE | Noted: 2017-06-30

## 2018-12-06 PROBLEM — R20.8 DYSESTHESIA: Status: ACTIVE | Noted: 2017-06-30

## 2018-12-06 PROBLEM — R20.0 NUMBNESS: Status: ACTIVE | Noted: 2017-06-30

## 2018-12-10 ENCOUNTER — OFFICE VISIT (OUTPATIENT)
Dept: PULMONOLOGY | Age: 48
End: 2018-12-10
Payer: MEDICARE

## 2018-12-10 VITALS
TEMPERATURE: 97.2 F | BODY MASS INDEX: 25.43 KG/M2 | OXYGEN SATURATION: 93 % | WEIGHT: 162 LBS | RESPIRATION RATE: 20 BRPM | HEIGHT: 67 IN | DIASTOLIC BLOOD PRESSURE: 64 MMHG | HEART RATE: 67 BPM | SYSTOLIC BLOOD PRESSURE: 122 MMHG

## 2018-12-10 DIAGNOSIS — J43.9 PULMONARY EMPHYSEMA, UNSPECIFIED EMPHYSEMA TYPE (HCC): ICD-10-CM

## 2018-12-10 DIAGNOSIS — R06.00 DYSPNEA, UNSPECIFIED TYPE: Primary | ICD-10-CM

## 2018-12-10 DIAGNOSIS — R09.02 HYPOXIA: ICD-10-CM

## 2018-12-10 DIAGNOSIS — R91.8 PULMONARY NODULES: ICD-10-CM

## 2018-12-10 DIAGNOSIS — Z87.891 PERSONAL HISTORY OF SMOKING: ICD-10-CM

## 2018-12-10 PROCEDURE — 1111F DSCHRG MED/CURRENT MED MERGE: CPT | Performed by: INTERNAL MEDICINE

## 2018-12-10 PROCEDURE — 99214 OFFICE O/P EST MOD 30 MIN: CPT | Performed by: INTERNAL MEDICINE

## 2018-12-10 PROCEDURE — G8482 FLU IMMUNIZE ORDER/ADMIN: HCPCS | Performed by: INTERNAL MEDICINE

## 2018-12-10 PROCEDURE — 3023F SPIROM DOC REV: CPT | Performed by: INTERNAL MEDICINE

## 2018-12-10 PROCEDURE — 1036F TOBACCO NON-USER: CPT | Performed by: INTERNAL MEDICINE

## 2018-12-10 PROCEDURE — G8427 DOCREV CUR MEDS BY ELIG CLIN: HCPCS | Performed by: INTERNAL MEDICINE

## 2018-12-10 PROCEDURE — G8419 CALC BMI OUT NRM PARAM NOF/U: HCPCS | Performed by: INTERNAL MEDICINE

## 2018-12-10 PROCEDURE — G8926 SPIRO NO PERF OR DOC: HCPCS | Performed by: INTERNAL MEDICINE

## 2018-12-10 ASSESSMENT — SLEEP AND FATIGUE QUESTIONNAIRES
HOW LIKELY ARE YOU TO NOD OFF OR FALL ASLEEP WHILE SITTING INACTIVE IN A PUBLIC PLACE: 2
HOW LIKELY ARE YOU TO NOD OFF OR FALL ASLEEP WHILE LYING DOWN TO REST IN THE AFTERNOON WHEN CIRCUMSTANCES PERMIT: 0
HOW LIKELY ARE YOU TO NOD OFF OR FALL ASLEEP WHILE WATCHING TV: 3
HOW LIKELY ARE YOU TO NOD OFF OR FALL ASLEEP WHILE SITTING QUIETLY AFTER LUNCH WITHOUT ALCOHOL: 2
NECK CIRCUMFERENCE (INCHES): 15
HOW LIKELY ARE YOU TO NOD OFF OR FALL ASLEEP WHILE SITTING AND TALKING TO SOMEONE: 0
HOW LIKELY ARE YOU TO NOD OFF OR FALL ASLEEP IN A CAR, WHILE STOPPED FOR A FEW MINUTES IN TRAFFIC: 0
ESS TOTAL SCORE: 13
HOW LIKELY ARE YOU TO NOD OFF OR FALL ASLEEP WHILE SITTING AND READING: 3
HOW LIKELY ARE YOU TO NOD OFF OR FALL ASLEEP WHEN YOU ARE A PASSENGER IN A CAR FOR AN HOUR WITHOUT A BREAK: 3

## 2018-12-10 NOTE — PROGRESS NOTES
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    · 60 pack year smoking - quit 1/2018     Plan:       · PFTs in 3 months   · Spiriva and Albuterol 2 puffs Q4-6 hrs PRN  · Advised to continue with smoking cessation  · CPET if negative Holter monitor and patient still symptomatic

## 2018-12-17 ENCOUNTER — HOSPITAL ENCOUNTER (OUTPATIENT)
Age: 48
Setting detail: OUTPATIENT SURGERY
Discharge: HOME | End: 2018-12-17
Attending: INTERNAL MEDICINE | Admitting: INTERNAL MEDICINE
Payer: MEDICARE

## 2018-12-17 VITALS
SYSTOLIC BLOOD PRESSURE: 126 MMHG | RESPIRATION RATE: 16 BRPM | OXYGEN SATURATION: 95 % | HEART RATE: 76 BPM | WEIGHT: 163 LBS | DIASTOLIC BLOOD PRESSURE: 87 MMHG | TEMPERATURE: 97.8 F | BODY MASS INDEX: 25.58 KG/M2 | HEIGHT: 67 IN

## 2018-12-17 PROCEDURE — 3609027000 HC COLONOSCOPY: Performed by: INTERNAL MEDICINE

## 2018-12-17 PROCEDURE — 6360000002 HC RX W HCPCS: Performed by: INTERNAL MEDICINE

## 2018-12-17 PROCEDURE — 2709999900 HC NON-CHARGEABLE SUPPLY: Performed by: INTERNAL MEDICINE

## 2018-12-17 PROCEDURE — 99152 MOD SED SAME PHYS/QHP 5/>YRS: CPT | Performed by: INTERNAL MEDICINE

## 2018-12-17 PROCEDURE — 7100000011 HC PHASE II RECOVERY - ADDTL 15 MIN: Performed by: INTERNAL MEDICINE

## 2018-12-17 PROCEDURE — 2580000003 HC RX 258: Performed by: INTERNAL MEDICINE

## 2018-12-17 PROCEDURE — 7100000010 HC PHASE II RECOVERY - FIRST 15 MIN: Performed by: INTERNAL MEDICINE

## 2018-12-17 RX ORDER — FENTANYL CITRATE 50 UG/ML
INJECTION, SOLUTION INTRAMUSCULAR; INTRAVENOUS PRN
Status: DISCONTINUED | OUTPATIENT
Start: 2018-12-17 | End: 2018-12-17 | Stop reason: HOSPADM

## 2018-12-17 RX ORDER — MIDAZOLAM HYDROCHLORIDE 5 MG/ML
INJECTION INTRAMUSCULAR; INTRAVENOUS PRN
Status: DISCONTINUED | OUTPATIENT
Start: 2018-12-17 | End: 2018-12-17 | Stop reason: HOSPADM

## 2018-12-17 RX ORDER — SODIUM CHLORIDE, SODIUM LACTATE, POTASSIUM CHLORIDE, CALCIUM CHLORIDE 600; 310; 30; 20 MG/100ML; MG/100ML; MG/100ML; MG/100ML
INJECTION, SOLUTION INTRAVENOUS CONTINUOUS
Status: DISCONTINUED | OUTPATIENT
Start: 2018-12-17 | End: 2018-12-17 | Stop reason: HOSPADM

## 2018-12-17 RX ADMIN — SODIUM CHLORIDE, POTASSIUM CHLORIDE, SODIUM LACTATE AND CALCIUM CHLORIDE: 600; 310; 30; 20 INJECTION, SOLUTION INTRAVENOUS at 10:52

## 2018-12-17 ASSESSMENT — PAIN - FUNCTIONAL ASSESSMENT: PAIN_FUNCTIONAL_ASSESSMENT: 0-10

## 2018-12-18 ENCOUNTER — HOSPITAL ENCOUNTER (OUTPATIENT)
Dept: SLEEP CENTER | Age: 48
Discharge: HOME | End: 2018-12-20
Payer: MEDICARE

## 2018-12-18 DIAGNOSIS — G47.10 HYPERSOMNIA: ICD-10-CM

## 2018-12-18 DIAGNOSIS — R06.81 WITNESSED APNEIC SPELLS: ICD-10-CM

## 2018-12-18 DIAGNOSIS — R06.83 SNORING: ICD-10-CM

## 2018-12-20 ENCOUNTER — TELEPHONE (OUTPATIENT)
Dept: PULMONOLOGY | Age: 48
End: 2018-12-20

## 2018-12-20 NOTE — H&P
History and Physical / Pre-Sedation Assessment    Patient:  Luz Lakhani   :   1970     Intended Procedure: colonoscopy    HPI: family history of polyps    Nurses notes reviewed and agreed. Medications reviewed  Allergies: Allergies   Allergen Reactions    Codeine Itching    Tramadol      Patient states he doesn't feel right on tramadol. Physical Exam:  Vital Signs: /87   Pulse 76   Temp 97.8 °F (36.6 °C) (Temporal)   Resp 16   Ht 5' 7\" (1.702 m)   Wt 163 lb (73.9 kg)   SpO2 95%   BMI 25.53 kg/m²    Airway: Mallampati: II (soft palate, uvula, fauces visible)  Pulmonary:Normal  Cardiac:Normal  Abdomen:Normal    Pre-Procedure Assessment / Plan:  ASA: Class 2 - A normal healthy patient with mild systemic disease  Level of Sedation Plan: Moderate sedation  Post Procedure plan: Return to same level of care    I assessed the patient and find that the patient is in satisfactory condition to proceed with the planned procedure and sedation plan. I have explained the risk, benefits, and alternatives to the procedure; the patient understands and agrees to proceed.        Deisy Singleton  2018

## 2019-01-21 ENCOUNTER — OFFICE VISIT (OUTPATIENT)
Dept: CARDIOLOGY CLINIC | Age: 49
End: 2019-01-21
Payer: MEDICARE

## 2019-01-21 VITALS
SYSTOLIC BLOOD PRESSURE: 112 MMHG | DIASTOLIC BLOOD PRESSURE: 84 MMHG | WEIGHT: 163 LBS | BODY MASS INDEX: 25.58 KG/M2 | HEIGHT: 67 IN | OXYGEN SATURATION: 98 % | HEART RATE: 76 BPM

## 2019-01-21 DIAGNOSIS — R07.2 PRECORDIAL PAIN: Primary | ICD-10-CM

## 2019-01-21 DIAGNOSIS — R06.02 SHORTNESS OF BREATH: ICD-10-CM

## 2019-01-21 DIAGNOSIS — R91.8 PULMONARY NODULES: ICD-10-CM

## 2019-01-21 DIAGNOSIS — R00.2 PALPITATIONS: ICD-10-CM

## 2019-01-21 PROCEDURE — 1036F TOBACCO NON-USER: CPT | Performed by: NURSE PRACTITIONER

## 2019-01-21 PROCEDURE — 99214 OFFICE O/P EST MOD 30 MIN: CPT | Performed by: NURSE PRACTITIONER

## 2019-01-21 PROCEDURE — G8482 FLU IMMUNIZE ORDER/ADMIN: HCPCS | Performed by: NURSE PRACTITIONER

## 2019-01-21 PROCEDURE — G8427 DOCREV CUR MEDS BY ELIG CLIN: HCPCS | Performed by: NURSE PRACTITIONER

## 2019-01-21 PROCEDURE — G8419 CALC BMI OUT NRM PARAM NOF/U: HCPCS | Performed by: NURSE PRACTITIONER

## 2019-01-21 ASSESSMENT — ENCOUNTER SYMPTOMS
VOMITING: 0
SHORTNESS OF BREATH: 1
DIARRHEA: 0
COUGH: 0
WHEEZING: 0
NAUSEA: 0

## 2019-01-24 ENCOUNTER — TELEPHONE (OUTPATIENT)
Dept: FAMILY MEDICINE CLINIC | Age: 49
End: 2019-01-24

## 2019-01-28 ENCOUNTER — OFFICE VISIT (OUTPATIENT)
Dept: FAMILY MEDICINE CLINIC | Age: 49
End: 2019-01-28
Payer: MEDICARE

## 2019-01-28 VITALS
DIASTOLIC BLOOD PRESSURE: 60 MMHG | WEIGHT: 164 LBS | HEART RATE: 80 BPM | BODY MASS INDEX: 25.74 KG/M2 | SYSTOLIC BLOOD PRESSURE: 100 MMHG | OXYGEN SATURATION: 98 % | HEIGHT: 67 IN

## 2019-01-28 DIAGNOSIS — R00.2 RAPID PALPITATIONS: ICD-10-CM

## 2019-01-28 DIAGNOSIS — R55 PRE-SYNCOPE: Primary | ICD-10-CM

## 2019-01-28 PROCEDURE — 1036F TOBACCO NON-USER: CPT | Performed by: FAMILY MEDICINE

## 2019-01-28 PROCEDURE — G8419 CALC BMI OUT NRM PARAM NOF/U: HCPCS | Performed by: FAMILY MEDICINE

## 2019-01-28 PROCEDURE — G8427 DOCREV CUR MEDS BY ELIG CLIN: HCPCS | Performed by: FAMILY MEDICINE

## 2019-01-28 PROCEDURE — G8482 FLU IMMUNIZE ORDER/ADMIN: HCPCS | Performed by: FAMILY MEDICINE

## 2019-01-28 PROCEDURE — 99213 OFFICE O/P EST LOW 20 MIN: CPT | Performed by: FAMILY MEDICINE

## 2019-01-28 RX ORDER — METOPROLOL SUCCINATE 25 MG/1
25 TABLET, EXTENDED RELEASE ORAL DAILY
Qty: 30 TABLET | Refills: 2 | Status: SHIPPED | OUTPATIENT
Start: 2019-01-28 | End: 2019-03-08 | Stop reason: SDUPTHER

## 2019-01-28 ASSESSMENT — ENCOUNTER SYMPTOMS
BACK PAIN: 1
RESPIRATORY NEGATIVE: 1

## 2019-01-29 ENCOUNTER — OFFICE VISIT (OUTPATIENT)
Dept: FAMILY MEDICINE CLINIC | Age: 49
End: 2019-01-29
Payer: MEDICARE

## 2019-01-29 VITALS
DIASTOLIC BLOOD PRESSURE: 68 MMHG | BODY MASS INDEX: 25.69 KG/M2 | HEART RATE: 68 BPM | SYSTOLIC BLOOD PRESSURE: 104 MMHG | WEIGHT: 164 LBS | OXYGEN SATURATION: 97 %

## 2019-01-29 DIAGNOSIS — R07.9 CHEST PAIN IN ADULT: ICD-10-CM

## 2019-01-29 DIAGNOSIS — M94.0 COSTOCHONDRITIS, ACUTE: Primary | ICD-10-CM

## 2019-01-29 DIAGNOSIS — R07.9 CHEST PAIN AT REST: ICD-10-CM

## 2019-01-29 PROCEDURE — 99214 OFFICE O/P EST MOD 30 MIN: CPT | Performed by: FAMILY MEDICINE

## 2019-01-29 PROCEDURE — G8419 CALC BMI OUT NRM PARAM NOF/U: HCPCS | Performed by: FAMILY MEDICINE

## 2019-01-29 PROCEDURE — 93000 ELECTROCARDIOGRAM COMPLETE: CPT | Performed by: FAMILY MEDICINE

## 2019-01-29 PROCEDURE — G8427 DOCREV CUR MEDS BY ELIG CLIN: HCPCS | Performed by: FAMILY MEDICINE

## 2019-01-29 PROCEDURE — G8482 FLU IMMUNIZE ORDER/ADMIN: HCPCS | Performed by: FAMILY MEDICINE

## 2019-01-29 PROCEDURE — 1036F TOBACCO NON-USER: CPT | Performed by: FAMILY MEDICINE

## 2019-01-29 RX ORDER — NAPROXEN 500 MG/1
500 TABLET ORAL 2 TIMES DAILY WITH MEALS
Qty: 14 TABLET | Refills: 0 | Status: SHIPPED | OUTPATIENT
Start: 2019-01-29 | End: 2019-03-08 | Stop reason: ALTCHOICE

## 2019-01-30 ASSESSMENT — ENCOUNTER SYMPTOMS
GASTROINTESTINAL NEGATIVE: 1
SHORTNESS OF BREATH: 0
WHEEZING: 0
COUGH: 0
CHEST TIGHTNESS: 0

## 2019-03-08 ENCOUNTER — OFFICE VISIT (OUTPATIENT)
Dept: FAMILY MEDICINE CLINIC | Age: 49
End: 2019-03-08
Payer: MEDICARE

## 2019-03-08 VITALS
WEIGHT: 165 LBS | DIASTOLIC BLOOD PRESSURE: 78 MMHG | BODY MASS INDEX: 25.84 KG/M2 | OXYGEN SATURATION: 98 % | SYSTOLIC BLOOD PRESSURE: 113 MMHG | HEART RATE: 88 BPM

## 2019-03-08 DIAGNOSIS — R20.0 NUMBNESS AND TINGLING OF BOTH LEGS BELOW KNEES: ICD-10-CM

## 2019-03-08 DIAGNOSIS — F32.1 MODERATE SINGLE CURRENT EPISODE OF MAJOR DEPRESSIVE DISORDER (HCC): ICD-10-CM

## 2019-03-08 DIAGNOSIS — R20.2 NUMBNESS AND TINGLING OF BOTH LEGS BELOW KNEES: ICD-10-CM

## 2019-03-08 DIAGNOSIS — R00.2 RAPID PALPITATIONS: ICD-10-CM

## 2019-03-08 DIAGNOSIS — R55 PRE-SYNCOPE: ICD-10-CM

## 2019-03-08 DIAGNOSIS — F41.9 CHRONIC ANXIETY: ICD-10-CM

## 2019-03-08 PROCEDURE — G8482 FLU IMMUNIZE ORDER/ADMIN: HCPCS | Performed by: FAMILY MEDICINE

## 2019-03-08 PROCEDURE — G8419 CALC BMI OUT NRM PARAM NOF/U: HCPCS | Performed by: FAMILY MEDICINE

## 2019-03-08 PROCEDURE — 1036F TOBACCO NON-USER: CPT | Performed by: FAMILY MEDICINE

## 2019-03-08 PROCEDURE — 99214 OFFICE O/P EST MOD 30 MIN: CPT | Performed by: FAMILY MEDICINE

## 2019-03-08 PROCEDURE — G8427 DOCREV CUR MEDS BY ELIG CLIN: HCPCS | Performed by: FAMILY MEDICINE

## 2019-03-08 RX ORDER — GABAPENTIN 600 MG/1
600 TABLET ORAL 3 TIMES DAILY
Qty: 270 TABLET | Refills: 1 | Status: SHIPPED | OUTPATIENT
Start: 2019-03-08 | End: 2019-09-16 | Stop reason: SDUPTHER

## 2019-03-08 RX ORDER — PAROXETINE HYDROCHLORIDE 40 MG/1
40 TABLET, FILM COATED ORAL NIGHTLY
Qty: 90 TABLET | Refills: 1 | Status: SHIPPED | OUTPATIENT
Start: 2019-03-08 | End: 2019-09-16 | Stop reason: SDUPTHER

## 2019-03-08 RX ORDER — LORAZEPAM 1 MG/1
1 TABLET ORAL
Qty: 30 TABLET | Refills: 5 | Status: SHIPPED | OUTPATIENT
Start: 2019-03-08 | End: 2019-09-16 | Stop reason: SDUPTHER

## 2019-03-08 RX ORDER — METOPROLOL SUCCINATE 25 MG/1
25 TABLET, EXTENDED RELEASE ORAL DAILY
Qty: 90 TABLET | Refills: 1 | Status: SHIPPED | OUTPATIENT
Start: 2019-03-08 | End: 2019-09-16 | Stop reason: SDUPTHER

## 2019-03-08 ASSESSMENT — PATIENT HEALTH QUESTIONNAIRE - PHQ9
SUM OF ALL RESPONSES TO PHQ9 QUESTIONS 1 & 2: 0
SUM OF ALL RESPONSES TO PHQ QUESTIONS 1-9: 0
SUM OF ALL RESPONSES TO PHQ QUESTIONS 1-9: 0
1. LITTLE INTEREST OR PLEASURE IN DOING THINGS: 0
2. FEELING DOWN, DEPRESSED OR HOPELESS: 0

## 2019-03-08 ASSESSMENT — ENCOUNTER SYMPTOMS: RESPIRATORY NEGATIVE: 1

## 2019-04-02 ENCOUNTER — HOSPITAL ENCOUNTER (EMERGENCY)
Age: 49
Discharge: HOME | End: 2019-04-02
Attending: EMERGENCY MEDICINE
Payer: MEDICARE

## 2019-04-02 ENCOUNTER — APPOINTMENT (OUTPATIENT)
Dept: GENERAL RADIOLOGY | Age: 49
End: 2019-04-02
Payer: MEDICARE

## 2019-04-02 VITALS
OXYGEN SATURATION: 99 % | SYSTOLIC BLOOD PRESSURE: 105 MMHG | TEMPERATURE: 98 F | BODY MASS INDEX: 26.68 KG/M2 | HEIGHT: 67 IN | HEART RATE: 80 BPM | WEIGHT: 170 LBS | DIASTOLIC BLOOD PRESSURE: 69 MMHG

## 2019-04-02 DIAGNOSIS — R42 DIZZINESS: Primary | ICD-10-CM

## 2019-04-02 DIAGNOSIS — R07.89 CHEST TIGHTNESS: ICD-10-CM

## 2019-04-02 LAB
A/G RATIO: 1.7 (ref 1.1–2.2)
ALBUMIN SERPL-MCNC: 4.5 G/DL (ref 3.4–5)
ALP BLD-CCNC: 66 U/L (ref 40–129)
ALT SERPL-CCNC: 10 U/L (ref 10–40)
ANION GAP SERPL CALCULATED.3IONS-SCNC: 10 MMOL/L (ref 3–16)
AST SERPL-CCNC: 12 U/L (ref 15–37)
BASOPHILS ABSOLUTE: 0.1 K/UL (ref 0–0.2)
BASOPHILS RELATIVE PERCENT: 1 %
BILIRUB SERPL-MCNC: 0.8 MG/DL (ref 0–1)
BUN BLDV-MCNC: 6 MG/DL (ref 7–20)
CALCIUM SERPL-MCNC: 9.2 MG/DL (ref 8.3–10.6)
CHLORIDE BLD-SCNC: 105 MMOL/L (ref 99–110)
CO2: 28 MMOL/L (ref 21–32)
CREAT SERPL-MCNC: 1 MG/DL (ref 0.9–1.3)
EKG ATRIAL RATE: 81 BPM
EKG DIAGNOSIS: NORMAL
EKG P AXIS: 48 DEGREES
EKG P-R INTERVAL: 138 MS
EKG Q-T INTERVAL: 376 MS
EKG QRS DURATION: 96 MS
EKG QTC CALCULATION (BAZETT): 436 MS
EKG R AXIS: -43 DEGREES
EKG T AXIS: 28 DEGREES
EKG VENTRICULAR RATE: 81 BPM
EOSINOPHILS ABSOLUTE: 0.5 K/UL (ref 0–0.6)
EOSINOPHILS RELATIVE PERCENT: 5.2 %
GFR AFRICAN AMERICAN: >60
GFR NON-AFRICAN AMERICAN: >60
GLOBULIN: 2.7 G/DL
GLUCOSE BLD-MCNC: 92 MG/DL (ref 70–99)
HCT VFR BLD CALC: 47.2 % (ref 40.5–52.5)
HEMOGLOBIN: 15.9 G/DL (ref 13.5–17.5)
LYMPHOCYTES ABSOLUTE: 1.7 K/UL (ref 1–5.1)
LYMPHOCYTES RELATIVE PERCENT: 16.3 %
MCH RBC QN AUTO: 29.8 PG (ref 26–34)
MCHC RBC AUTO-ENTMCNC: 33.8 G/DL (ref 31–36)
MCV RBC AUTO: 88.2 FL (ref 80–100)
MONOCYTES ABSOLUTE: 0.7 K/UL (ref 0–1.3)
MONOCYTES RELATIVE PERCENT: 6.3 %
NEUTROPHILS ABSOLUTE: 7.4 K/UL (ref 1.7–7.7)
NEUTROPHILS RELATIVE PERCENT: 71.2 %
PDW BLD-RTO: 12.7 % (ref 12.4–15.4)
PLATELET # BLD: 302 K/UL (ref 135–450)
PMV BLD AUTO: 7.8 FL (ref 5–10.5)
POTASSIUM REFLEX MAGNESIUM: 3.7 MMOL/L (ref 3.5–5.1)
RBC # BLD: 5.35 M/UL (ref 4.2–5.9)
SODIUM BLD-SCNC: 143 MMOL/L (ref 136–145)
TOTAL PROTEIN: 7.2 G/DL (ref 6.4–8.2)
TROPONIN: <0.01 NG/ML
WBC # BLD: 10.5 K/UL (ref 4–11)

## 2019-04-02 PROCEDURE — 80053 COMPREHEN METABOLIC PANEL: CPT

## 2019-04-02 PROCEDURE — 96360 HYDRATION IV INFUSION INIT: CPT

## 2019-04-02 PROCEDURE — 93005 ELECTROCARDIOGRAM TRACING: CPT | Performed by: PHYSICIAN ASSISTANT

## 2019-04-02 PROCEDURE — 2580000003 HC RX 258: Performed by: PHYSICIAN ASSISTANT

## 2019-04-02 PROCEDURE — 36415 COLL VENOUS BLD VENIPUNCTURE: CPT

## 2019-04-02 PROCEDURE — 71045 X-RAY EXAM CHEST 1 VIEW: CPT

## 2019-04-02 PROCEDURE — 93010 ELECTROCARDIOGRAM REPORT: CPT | Performed by: INTERNAL MEDICINE

## 2019-04-02 PROCEDURE — 84484 ASSAY OF TROPONIN QUANT: CPT

## 2019-04-02 PROCEDURE — 85025 COMPLETE CBC W/AUTO DIFF WBC: CPT

## 2019-04-02 PROCEDURE — 96361 HYDRATE IV INFUSION ADD-ON: CPT

## 2019-04-02 PROCEDURE — 99284 EMERGENCY DEPT VISIT MOD MDM: CPT

## 2019-04-02 RX ORDER — 0.9 % SODIUM CHLORIDE 0.9 %
1000 INTRAVENOUS SOLUTION INTRAVENOUS ONCE
Status: COMPLETED | OUTPATIENT
Start: 2019-04-02 | End: 2019-04-02

## 2019-04-02 RX ADMIN — SODIUM CHLORIDE 1000 ML: 900 INJECTION, SOLUTION INTRAVENOUS at 16:26

## 2019-04-02 ASSESSMENT — ENCOUNTER SYMPTOMS
VISUAL CHANGE: 0
VOMITING: 0
SHORTNESS OF BREATH: 0

## 2019-04-02 NOTE — ED PROVIDER NOTES
The patient was seen by me in conjunction with a mid-level provider (nurse practitioner or physician assistant). I have personally performed and/or participated in the history, exam and medical decision making and agree with all pertinent clinical information. I have also reviewed and agree with the past medical, family and social history unless otherwise noted. All lab results, EKG tracings, and radiographic studies or interpretations were reviewed by me. I have personally performed a face-to-face diagnostic evaluation on the patient. My findings are as follows:    History: This patient presents emergency pertinent history complaining of some dizziness was spells for over 2 years  He has seen a cardiologist and has had apparently a stress test but an angiogram which were normal he also was seen his family doctor several times has had a normal CAT scan was referred to a neurologist but did not have time yet to see the neurologist.      Exam shows:   Patient Vitals for the past 24 hrs:   BP Temp Temp src Pulse SpO2 Height Weight   04/02/19 1615 105/69 -- -- 80 -- -- --   04/02/19 1536 129/86 98 °F (36.7 °C) Oral 77 99 % 5' 7\" (1.702 m) 170 lb (77.1 kg)     Patient's pupils PERRLA there is no nystagmus no signs of head trauma no carotid bruits heart rate is normal abdomen is totally soft EKG was reviewed normal sinus rhythm  Patient has no other abnormality seen no cranial nerve asymmetry gait was normal  Finger to nose performed adequately      Lab      Radiology      EKG         Emergency Department Course:       At this point I felt the patient had dizzy spells unclear etiology he points more to possibly being, he used the word by legs feel like to become wet noodles but that he can still uses upper extremity I think that he may need to have an MRI done of his lumbosacral spine I did talk to carry about this we will mention this to him and we'll arrange close follow up is primary care doctor since he is kind of overseen much of his dizzy workup over these last 2 years        Voice Recognition Dictation disclaimer:  Please note that portions of this chart were generated using Dragon dictation software. Although every effort was made to ensure the accuracy of this automated transcription, some errors in transcription may have occurred.           Katya Roy MD  04/02/19 [id number]

## 2019-04-02 NOTE — ED PROVIDER NOTES
asymmetry, speech difficulty, weakness, numbness and headaches. Psychiatric/Behavioral: Negative for confusion. All other systems reviewed and are negative. PAST MEDICAL HISTORY   has a past medical history of Arthritis, Chest pain, Colitis, COPD (chronic obstructive pulmonary disease) ([facility] [address] Histoplasmosis, History of blood transfusion, blood clots, Metabolic syndrome X (86/96/0813), Pneumonia, and Pulmonary nodule. PAST SURGICAL HISTORY   has a past surgical history that includes Vasectomy; lymph node dissection; Diagnostic Cardiac Cath Lab Procedure (1998); bronchoscopy (6/05/2013); Colonoscopy (12/17/2018); and Colonoscopy (N/A, 12/17/2018). FAMILY HISTORY  family history includes Cancer in his mother, paternal grandfather, and paternal uncle; Heart Attack (age of onset: 62) in his mother; Heart Attack (age of onset: 61) in his maternal uncle; Heart Disease in his mother; Heart Failure in his mother; No Known Problems in his son and son; Other in his brother and father. SOCIAL HISTORY   reports that he quit smoking about 15 months ago. His smoking use included cigarettes. He has a 10.00 pack-year smoking history. He has never used smokeless tobacco. He reports that he does not drink alcohol or use drugs. HOME MEDICATIONS     Prior to Admission medications    Medication Sig Start Date End Date Taking? Authorizing Provider   gabapentin (NEURONTIN) 600 MG tablet Take 1 tablet by mouth 3 times daily for 180 days. 3/8/19 9/4/19  Elaine Prescott MD   PARoxetine (PAXIL) 40 MG tablet Take 1 tablet by mouth nightly 3/8/19   Elaine Prescott MD   metoprolol succinate (TOPROL XL) 25 MG extended release tablet Take 1 tablet by mouth daily 3/8/19   Elaine Prescott MD   LORazepam (ATIVAN) 1 MG tablet Take 1 tablet by mouth every morning (before breakfast) for 180 days.  3/8/19 9/4/19  Elaine Prescott MD   albuterol sulfate HFA (PROAIR HFA) 108 (90 Base) MCG/ACT inhaler Inhale 2 puffs into has intact finger to nose. Intact sensation. Equal strength 5 out of 5 upper and lower extremities. Patient holds each extremity up off the bed white count of 10. Holds arms are extended and supinated without any pronation or drift. Normal gait, no ataxia. Skin: Skin is warm and dry. Psychiatric: He has a normal mood and affect. His speech is normal and behavior is normal. Thought content normal. Cognition and memory are normal.   Vitals reviewed.   respirations are normal to my exam    Procedures    MDM  Number of Diagnoses or Management Options  Chest tightness:   Dizziness:      Amount and/or Complexity of Data Reviewed  Clinical lab tests: ordered and reviewed  Tests in the radiology section of CPT®: ordered and reviewed  Review and summarize past medical records: yes  Discuss the patient with other providers: yes  Independent visualization of images, tracings, or specimens: yes    Patient Progress  Patient progress: stable    Results for orders placed or performed during the hospital encounter of 04/02/19   CBC Auto Differential   Result Value Ref Range    WBC 10.5 4.0 - 11.0 K/uL    RBC 5.35 4.20 - 5.90 M/uL    Hemoglobin 15.9 13.5 - 17.5 g/dL    Hematocrit 47.2 40.5 - 52.5 %    MCV 88.2 80.0 - 100.0 fL    MCH 29.8 26.0 - 34.0 pg    MCHC 33.8 31.0 - 36.0 g/dL    RDW 12.7 12.4 - 15.4 %    Platelets 380 974 - 695 K/uL    MPV 7.8 5.0 - 10.5 fL    Neutrophils % 71.2 %    Lymphocytes % 16.3 %    Monocytes % 6.3 %    Eosinophils % 5.2 %    Basophils % 1.0 %    Neutrophils # 7.4 1.7 - 7.7 K/uL    Lymphocytes # 1.7 1.0 - 5.1 K/uL    Monocytes # 0.7 0.0 - 1.3 K/uL    Eosinophils # 0.5 0.0 - 0.6 K/uL    Basophils # 0.1 0.0 - 0.2 K/uL   Comprehensive Metabolic Panel w/ Reflex to MG   Result Value Ref Range    Sodium 143 136 - 145 mmol/L    Potassium reflex Magnesium 3.7 3.5 - 5.1 mmol/L    Chloride 105 99 - 110 mmol/L    CO2 28 21 - 32 mmol/L    Anion Gap 10 3 - 16    Glucose 92 70 - 99 mg/dL    BUN 6 (L) 7 - 20 mg/dL    CREATININE 1.0 0.9 - 1.3 mg/dL    GFR Non-African American >60 >60    GFR African American >60 >60    Calcium 9.2 8.3 - 10.6 mg/dL    Total Protein 7.2 6.4 - 8.2 g/dL    Alb 4.5 3.4 - 5.0 g/dL    Albumin/Globulin Ratio 1.7 1.1 - 2.2    Total Bilirubin 0.8 0.0 - 1.0 mg/dL    Alkaline Phosphatase 66 40 - 129 U/L    ALT 10 10 - 40 U/L    AST 12 (L) 15 - 37 U/L    Globulin 2.7 g/dL   Troponin   Result Value Ref Range    Troponin <0.01 <0.01 ng/mL   EKG 12 Lead   Result Value Ref Range    Ventricular Rate 81 BPM    Atrial Rate 81 BPM    P-R Interval 138 ms    QRS Duration 96 ms    Q-T Interval 376 ms    QTc Calculation (Bazett) 436 ms    P Axis 48 degrees    R Axis -43 degrees    T Axis 28 degrees    Diagnosis       Normal sinus rhythmLeft axis deviationIncomplete right bundle branch blockAbnormal ECGNo previous ECGs available     Xr Chest Portable    Result Date: 4/2/2019  EXAMINATION: SINGLE XRAY VIEW OF THE CHEST 4/2/2019 3:55 pm COMPARISON: Chest radiograph November 13, 2018 and priors HISTORY: ORDERING SYSTEM PROVIDED HISTORY: chest pain TECHNOLOGIST PROVIDED HISTORY: Reason for exam:->chest pain Ordering Physician Provided Reason for Exam: DIZZINESS AND LIGHTHEADEDNESS X 2 YEARS, INCREASING TODAY, FELL X 3 TODAY AT HOME, CHEST TIGHTNESS Acuity: Chronic Type of Exam: Ongoing FINDINGS: The patient's known right lower lobe pulmonary nodule is again seen, not as well visualized on today's study due to overlap with the posterior rib. Partially calcified nodule is again seen in the left upper lung. Findings overall similar in appearance dating back to CT from June 2016. No focal acute process identified within the lungs. No pneumothorax or pleural effusion. Cardiac and mediastinal contours are without acute process. No acute osseous abnormality. No acute process.         EKG interpreted by myself and Dr Lyndsey Dugan:    Rhythm: normal sinus   Rate: 81  Axis: left  Ectopy: none  Conduction: right bundle branch

## 2019-04-04 RX ORDER — TIOTROPIUM BROMIDE INHALATION SPRAY 3.12 UG/1
SPRAY, METERED RESPIRATORY (INHALATION)
Qty: 1 INHALER | Refills: 5 | Status: SHIPPED | OUTPATIENT
Start: 2019-04-04 | End: 2020-08-19 | Stop reason: SDUPTHER

## 2019-04-08 ENCOUNTER — OFFICE VISIT (OUTPATIENT)
Dept: PULMONOLOGY | Age: 49
End: 2019-04-08
Payer: MEDICARE

## 2019-04-08 ENCOUNTER — HOSPITAL ENCOUNTER (OUTPATIENT)
Dept: PULMONOLOGY | Age: 49
Discharge: HOME | End: 2019-04-08
Payer: MEDICARE

## 2019-04-08 VITALS
WEIGHT: 160 LBS | DIASTOLIC BLOOD PRESSURE: 66 MMHG | BODY MASS INDEX: 25.11 KG/M2 | HEIGHT: 67 IN | OXYGEN SATURATION: 98 % | RESPIRATION RATE: 20 BRPM | SYSTOLIC BLOOD PRESSURE: 120 MMHG | TEMPERATURE: 97.6 F | HEART RATE: 68 BPM

## 2019-04-08 DIAGNOSIS — J43.9 PULMONARY EMPHYSEMA, UNSPECIFIED EMPHYSEMA TYPE (HCC): ICD-10-CM

## 2019-04-08 DIAGNOSIS — R06.00 DYSPNEA, UNSPECIFIED TYPE: ICD-10-CM

## 2019-04-08 DIAGNOSIS — J43.9 ACUTE EXACERBATION OF EMPHYSEMA (HCC): ICD-10-CM

## 2019-04-08 DIAGNOSIS — R06.00 DYSPNEA, UNSPECIFIED TYPE: Primary | ICD-10-CM

## 2019-04-08 DIAGNOSIS — R09.02 HYPOXIA: ICD-10-CM

## 2019-04-08 DIAGNOSIS — Z87.891 PERSONAL HISTORY OF SMOKING: ICD-10-CM

## 2019-04-08 LAB
DLCO %PRED: 77 %
DLCO PRED: NORMAL ML/MIN/MMHG
DLCO/VA %PRED: NORMAL %
DLCO/VA PRED: NORMAL ML/MIN/MMHG
DLCO/VA: NORMAL ML/MIN/MMHG
DLCO: NORMAL ML/MIN/MMHG
EXPIRATORY TIME-POST: NORMAL SEC
EXPIRATORY TIME: NORMAL SEC
FEF 25-75% %CHNG: NORMAL
FEF 25-75% %PRED-POST: NORMAL %
FEF 25-75% %PRED-PRE: NORMAL L/SEC
FEF 25-75% PRED: NORMAL L/SEC
FEF 25-75%-POST: NORMAL L/SEC
FEF 25-75%-PRE: NORMAL L/SEC
FEV1 %PRED-POST: 78 %
FEV1 %PRED-PRE: 48 %
FEV1 PRED: NORMAL L
FEV1-POST: NORMAL L
FEV1-PRE: NORMAL L
FEV1/FVC %PRED-POST: NORMAL %
FEV1/FVC %PRED-PRE: NORMAL %
FEV1/FVC PRED: NORMAL %
FEV1/FVC-POST: 80 %
FEV1/FVC-PRE: 49 %
FVC %PRED-POST: NORMAL L
FVC %PRED-PRE: NORMAL %
FVC PRED: NORMAL L
FVC-POST: NORMAL L
FVC-PRE: NORMAL L
GAW %PRED: NORMAL %
GAW PRED: NORMAL L/S/CMH2O
GAW: NORMAL L/S/CMH2O
IC %PRED: NORMAL %
IC PRED: NORMAL L
IC: NORMAL L
MEP: NORMAL
MIP: NORMAL
MVV %PRED-PRE: NORMAL %
MVV PRED: NORMAL L/MIN
MVV-PRE: NORMAL L/MIN
PEF %PRED-POST: NORMAL %
PEF %PRED-PRE: NORMAL L/SEC
PEF PRED: NORMAL L/SEC
PEF%CHNG: NORMAL
PEF-POST: NORMAL L/SEC
PEF-PRE: NORMAL L/SEC
RAW %PRED: NORMAL %
RAW PRED: NORMAL CMH2O/L/S
RAW: NORMAL CMH2O/L/S
RV %PRED: NORMAL %
RV PRED: NORMAL L
RV: NORMAL L
SVC %PRED: NORMAL %
SVC PRED: NORMAL L
SVC: NORMAL L
TLC %PRED: 75 %
TLC PRED: NORMAL L
TLC: NORMAL L
VA %PRED: NORMAL %
VA PRED: NORMAL L
VA: NORMAL L
VTG %PRED: NORMAL %
VTG PRED: NORMAL L
VTG: NORMAL L

## 2019-04-08 PROCEDURE — 3023F SPIROM DOC REV: CPT | Performed by: INTERNAL MEDICINE

## 2019-04-08 PROCEDURE — G8427 DOCREV CUR MEDS BY ELIG CLIN: HCPCS | Performed by: INTERNAL MEDICINE

## 2019-04-08 PROCEDURE — 94618 PULMONARY STRESS TESTING: CPT

## 2019-04-08 PROCEDURE — 1036F TOBACCO NON-USER: CPT | Performed by: INTERNAL MEDICINE

## 2019-04-08 PROCEDURE — 94060 EVALUATION OF WHEEZING: CPT

## 2019-04-08 PROCEDURE — 6360000002 HC RX W HCPCS: Performed by: INTERNAL MEDICINE

## 2019-04-08 PROCEDURE — 94726 PLETHYSMOGRAPHY LUNG VOLUMES: CPT

## 2019-04-08 PROCEDURE — 99214 OFFICE O/P EST MOD 30 MIN: CPT | Performed by: INTERNAL MEDICINE

## 2019-04-08 PROCEDURE — G8419 CALC BMI OUT NRM PARAM NOF/U: HCPCS | Performed by: INTERNAL MEDICINE

## 2019-04-08 PROCEDURE — 94640 AIRWAY INHALATION TREATMENT: CPT

## 2019-04-08 PROCEDURE — G8925 SPIR FEV1/FVC>=60% & NO COPD: HCPCS | Performed by: INTERNAL MEDICINE

## 2019-04-08 PROCEDURE — 94729 DIFFUSING CAPACITY: CPT

## 2019-04-08 RX ORDER — ALBUTEROL SULFATE 90 UG/1
2 AEROSOL, METERED RESPIRATORY (INHALATION) EVERY 6 HOURS PRN
Qty: 1 INHALER | Refills: 6 | Status: SHIPPED | OUTPATIENT
Start: 2019-04-08 | End: 2020-08-19 | Stop reason: SDUPTHER

## 2019-04-08 RX ORDER — AZITHROMYCIN 250 MG/1
250 TABLET, FILM COATED ORAL SEE ADMIN INSTRUCTIONS
Qty: 6 TABLET | Refills: 0 | Status: SHIPPED | OUTPATIENT
Start: 2019-04-08 | End: 2019-04-13

## 2019-04-08 RX ORDER — ALBUTEROL SULFATE 2.5 MG/3ML
2.5 SOLUTION RESPIRATORY (INHALATION) ONCE
Status: COMPLETED | OUTPATIENT
Start: 2019-04-08 | End: 2019-04-08

## 2019-04-08 RX ORDER — PREDNISONE 10 MG/1
TABLET ORAL
Qty: 30 TABLET | Refills: 0 | Status: SHIPPED | OUTPATIENT
Start: 2019-04-08 | End: 2019-04-18

## 2019-04-08 RX ORDER — BUDESONIDE AND FORMOTEROL FUMARATE DIHYDRATE 160; 4.5 UG/1; UG/1
2 AEROSOL RESPIRATORY (INHALATION) 2 TIMES DAILY
Qty: 1 INHALER | Refills: 6 | Status: SHIPPED | OUTPATIENT
Start: 2019-04-08 | End: 2019-08-05

## 2019-04-08 RX ADMIN — ALBUTEROL SULFATE 2.5 MG: 2.5 SOLUTION RESPIRATORY (INHALATION) at 10:51

## 2019-04-08 ASSESSMENT — SLEEP AND FATIGUE QUESTIONNAIRES
HOW LIKELY ARE YOU TO NOD OFF OR FALL ASLEEP WHILE SITTING AND READING: 3
HOW LIKELY ARE YOU TO NOD OFF OR FALL ASLEEP WHILE SITTING QUIETLY AFTER LUNCH WITHOUT ALCOHOL: 3
HOW LIKELY ARE YOU TO NOD OFF OR FALL ASLEEP WHILE LYING DOWN TO REST IN THE AFTERNOON WHEN CIRCUMSTANCES PERMIT: 3
HOW LIKELY ARE YOU TO NOD OFF OR FALL ASLEEP WHILE SITTING AND TALKING TO SOMEONE: 2
NECK CIRCUMFERENCE (INCHES): 14.25
HOW LIKELY ARE YOU TO NOD OFF OR FALL ASLEEP WHILE WATCHING TV: 3
HOW LIKELY ARE YOU TO NOD OFF OR FALL ASLEEP IN A CAR, WHILE STOPPED FOR A FEW MINUTES IN TRAFFIC: 2
HOW LIKELY ARE YOU TO NOD OFF OR FALL ASLEEP WHILE SITTING INACTIVE IN A PUBLIC PLACE: 2
HOW LIKELY ARE YOU TO NOD OFF OR FALL ASLEEP WHEN YOU ARE A PASSENGER IN A CAR FOR AN HOUR WITHOUT A BREAK: 3
ESS TOTAL SCORE: 21

## 2019-04-08 ASSESSMENT — PULMONARY FUNCTION TESTS
FEV1/FVC_PRE: 49
FEV1_PERCENT_PREDICTED_PRE: 48
FEV1_PERCENT_PREDICTED_POST: 78
FEV1/FVC_POST: 80

## 2019-04-08 NOTE — PROGRESS NOTES
[facility]                                                                    CHIEF COMPLAINT: follow up PFTs         HPI:   PFTs were reviewed by me and noted. Felt quality of test not optimal  Worse SOB over past 6 weeks. Associate with cough   Prednisone helps a lot   Uses Albuterol 2 times/day with help   Cough with green sputum in am  No hemoptysis   No smoking       Past Medical History:   Diagnosis Date    Arthritis     Chest pain     [facility] 2012 sent for records;    Colitis     COPD (chronic obstructive pulmonary disease) ([facility] [address]     Histoplasmosis     History of blood transfusion     Hx of blood clots     Metabolic syndrome X     Pneumonia     Pulmonary nodule        Past Surgical History:        Procedure Laterality Date    BRONCHOSCOPY  2013    COLONOSCOPY  2018    COLONOSCOPY N/A 2018    COLORECTAL CANCER SCREENING, NOT HIGH RISK performed by Fitz Godwin DO at [facility] [place] CATH LAB PROCEDURE      LYMPH NODE DISSECTION      neck    VASECTOMY         Allergies:  is allergic to codeine and tramadol. Social History:    TOBACCO:   reports that he quit smoking about 15 months ago. His smoking use included cigarettes. He has a 10.00 pack-year smoking history. He has never used smokeless tobacco.  ETOH:   reports that he does not drink alcohol.       Family History:       Problem Relation Age of Onset    Cancer Mother         ovarian    Heart Failure Mother     Heart Attack Mother 62    Heart Disease Mother     Other Father          1948;ashd;per neurop;    Other Brother         pt knows very little    Cancer Paternal Uncle         leukemia    Cancer Paternal Grandfather         leukemia    Heart Attack Maternal Uncle 60        x4     No Known Problems Son     No Known Problems Son     Asthma Neg Hx     Diabetes Neg Hx     Emphysema Neg Hx  Hypertension Neg Hx        Current Medications:    Current Outpatient Medications:     SPIRIVA RESPIMAT 2.5 MCG/ACT AERS inhaler, INHALE TWO (2) PUFFS INTO THE LUNGS DAILY, Disp: 1 Inhaler, Rfl: 5    gabapentin (NEURONTIN) 600 MG tablet, Take 1 tablet by mouth 3 times daily for 180 days. , Disp: 270 tablet, Rfl: 1    PARoxetine (PAXIL) 40 MG tablet, Take 1 tablet by mouth nightly, Disp: 90 tablet, Rfl: 1    metoprolol succinate (TOPROL XL) 25 MG extended release tablet, Take 1 tablet by mouth daily, Disp: 90 tablet, Rfl: 1    LORazepam (ATIVAN) 1 MG tablet, Take 1 tablet by mouth every morning (before breakfast) for 180 days. , Disp: 30 tablet, Rfl: 5    albuterol sulfate HFA (PROAIR HFA) 108 (90 Base) MCG/ACT inhaler, Inhale 2 puffs into the lungs every 6 hours as needed for Wheezing or Shortness of Breath, Disp: 1 Inhaler, Rfl: 6    fluticasone (FLONASE) 50 MCG/ACT nasal spray, 1 spray by Each Nare route daily, Disp: 1 Bottle, Rfl: 3    dicyclomine (BENTYL) 10 MG capsule, Take 1 capsule by mouth 4 times daily (before meals and nightly), Disp: 120 capsule, Rfl: 3      Objective:   PHYSICAL EXAM:    Blood pressure 120/66, pulse 68, temperature 97.6 °F (36.4 °C), temperature source Oral, resp. rate 20, height 5' 7\" (1.702 m), weight 160 lb (72.6 kg), SpO2 98 %.' on RA  Gen: No distress. BMI 25.06  Eyes: PERRL. No sclera icterus. No conjunctival injection. ENT: No discharge. Pharynx clear. Mallampati class IV  Neck: Trachea midline. No obvious mass. Resp: No accessory muscle use. No crackles. Few wheezes. No rhonchi. No dullness on percussion. Good air entry. CV: Regular rate. Regular rhythm. No murmur or rub. No edema. GI: Non-tender. Non-distended. No hernia. Skin: Warm and dry. No nodule on exposed extremities. Lymph: No cervical LAD. No supraclavicular LAD. M/S: No cyanosis. No joint deformity. No clubbing. Neuro: Awake. Alert. Moves all four extremities. Psych: Oriented x 3.  No anxiety. DATA reviewed by me:   PFTs 04/08/2019 FVC 3.61(78%) FEV1 1.75(48%) FEV1/FVC 49% TLC 5.01(75%) DLCO 24.42(77%) 6MW 1000 LO2 94%   PFTs 11/21/2018 FVC 3.63(78%) FEV1 2.91(80%) FEV1/FVC 80% TLC 5.28(79%) DLCO 23.76(75%)    CTPA 11/7/2018    1. No clear evidence for pulmonary embolus. 2. Mild dependent atelectasis in both lungs. 3. 5 mm pulmonary nodule in the right upper lobe  4. Old granulomatous disease. 5. Mild coronary artery disease. CXR 4/2/2019  images reviewed by me and showed: no acute cardiopulmonary disease     Echo 11/1/2018   Normal left ventricle systolic function with an estimated ejection fraction of 55%. No regional wall motion abnormalities are seen. Normal left ventricular diastolic filling pressure. Mild mitral and tricuspid regurgitation. Systolic pulmonary artery pressure (SPAP) is normal and estimated at 23 mmHg   (right atrial pressure 3 mmHg).      6 minute walk today  96% RA rest  98% 1 min exertion on RA  98% 2 min exertion on RA  98% 3 min exertion on RA  98% 4 min exertion on RA   99% 5 min exertion on RA  98% 6 min exertion on RA      Echo 11/30/2018   Limited echo for bubble study to r/o PFO/ASD. Normal left ventricle systolic function with an estimated ejection fraction of 55%. A bubble study was performed and fails to show evidence of shunting.     PSG 11/20/2018 AHI 2.6 and Lowest O2 sat 90% no arrythmias       Assessment:       · Pulmonary emphysema/COPD with acute exacerbation   · Dyspnea. Doubt of pulmonary origin. Unremarkable CTPA. Negative Echo with bubble study. Unremarkable LHC. · Snoring- negative PSG   · Intermittent hypoxia, tachycardia and bradycardia- PSG and 6 minute walk negative for hypoxemia   · Mild restrictive defect -CT does not suggest ILD  · Bilateral pulmonary nodules with mediastinal and hilar adenopathy 5/2013. Decreased in size in RLL PNs and 4R LN with enlargement of GOPAL PN on repeated CT chest 8/16/23013.  Stable on CT

## 2019-08-05 ENCOUNTER — OFFICE VISIT (OUTPATIENT)
Dept: PULMONOLOGY | Age: 49
End: 2019-08-05
Payer: MEDICARE

## 2019-08-05 ENCOUNTER — HOSPITAL ENCOUNTER (OUTPATIENT)
Dept: PULMONOLOGY | Age: 49
Discharge: HOME | End: 2019-08-05
Payer: MEDICARE

## 2019-08-05 VITALS
DIASTOLIC BLOOD PRESSURE: 72 MMHG | OXYGEN SATURATION: 97 % | SYSTOLIC BLOOD PRESSURE: 104 MMHG | HEIGHT: 67 IN | WEIGHT: 157.2 LBS | RESPIRATION RATE: 20 BRPM | BODY MASS INDEX: 24.67 KG/M2 | HEART RATE: 58 BPM

## 2019-08-05 VITALS — OXYGEN SATURATION: 99 %

## 2019-08-05 DIAGNOSIS — J43.9 PULMONARY EMPHYSEMA, UNSPECIFIED EMPHYSEMA TYPE (HCC): ICD-10-CM

## 2019-08-05 DIAGNOSIS — R06.83 SNORING: ICD-10-CM

## 2019-08-05 DIAGNOSIS — J43.9 PULMONARY EMPHYSEMA, UNSPECIFIED EMPHYSEMA TYPE (HCC): Primary | ICD-10-CM

## 2019-08-05 DIAGNOSIS — R06.00 DYSPNEA, UNSPECIFIED TYPE: ICD-10-CM

## 2019-08-05 LAB
DLCO %PRED: 84 %
DLCO PRED: NORMAL ML/MIN/MMHG
DLCO/VA %PRED: NORMAL %
DLCO/VA PRED: NORMAL ML/MIN/MMHG
DLCO/VA: NORMAL ML/MIN/MMHG
DLCO: NORMAL ML/MIN/MMHG
EXPIRATORY TIME-POST: NORMAL SEC
EXPIRATORY TIME: NORMAL SEC
FEF 25-75% %CHNG: NORMAL
FEF 25-75% %PRED-POST: NORMAL %
FEF 25-75% %PRED-PRE: NORMAL L/SEC
FEF 25-75% PRED: NORMAL L/SEC
FEF 25-75%-POST: NORMAL L/SEC
FEF 25-75%-PRE: NORMAL L/SEC
FEV1 %PRED-POST: 83 %
FEV1 %PRED-PRE: 80 %
FEV1 PRED: NORMAL L
FEV1-POST: NORMAL L
FEV1-PRE: NORMAL L
FEV1/FVC %PRED-POST: NORMAL %
FEV1/FVC %PRED-PRE: NORMAL %
FEV1/FVC PRED: NORMAL %
FEV1/FVC-POST: 76 %
FEV1/FVC-PRE: 73 %
FVC %PRED-POST: NORMAL L
FVC %PRED-PRE: NORMAL %
FVC PRED: NORMAL L
FVC-POST: NORMAL L
FVC-PRE: NORMAL L
GAW %PRED: NORMAL %
GAW PRED: NORMAL L/S/CMH2O
GAW: NORMAL L/S/CMH2O
IC %PRED: NORMAL %
IC PRED: NORMAL L
IC: NORMAL L
MEP: NORMAL
MIP: NORMAL
MVV %PRED-PRE: NORMAL %
MVV PRED: NORMAL L/MIN
MVV-PRE: NORMAL L/MIN
PEF %PRED-POST: NORMAL %
PEF %PRED-PRE: NORMAL L/SEC
PEF PRED: NORMAL L/SEC
PEF%CHNG: NORMAL
PEF-POST: NORMAL L/SEC
PEF-PRE: NORMAL L/SEC
RAW %PRED: NORMAL %
RAW PRED: NORMAL CMH2O/L/S
RAW: NORMAL CMH2O/L/S
RV %PRED: NORMAL %
RV PRED: NORMAL L
RV: NORMAL L
SVC %PRED: NORMAL %
SVC PRED: NORMAL L
SVC: NORMAL L
TLC %PRED: 83 %
TLC PRED: NORMAL L
TLC: NORMAL L
VA %PRED: NORMAL %
VA PRED: NORMAL L
VA: NORMAL L
VTG %PRED: NORMAL %
VTG PRED: NORMAL L
VTG: NORMAL L

## 2019-08-05 PROCEDURE — 6360000002 HC RX W HCPCS: Performed by: INTERNAL MEDICINE

## 2019-08-05 PROCEDURE — 94726 PLETHYSMOGRAPHY LUNG VOLUMES: CPT

## 2019-08-05 PROCEDURE — 94760 N-INVAS EAR/PLS OXIMETRY 1: CPT

## 2019-08-05 PROCEDURE — G8925 SPIR FEV1/FVC>=60% & NO COPD: HCPCS | Performed by: INTERNAL MEDICINE

## 2019-08-05 PROCEDURE — 1036F TOBACCO NON-USER: CPT | Performed by: INTERNAL MEDICINE

## 2019-08-05 PROCEDURE — 94729 DIFFUSING CAPACITY: CPT

## 2019-08-05 PROCEDURE — 3023F SPIROM DOC REV: CPT | Performed by: INTERNAL MEDICINE

## 2019-08-05 PROCEDURE — G8427 DOCREV CUR MEDS BY ELIG CLIN: HCPCS | Performed by: INTERNAL MEDICINE

## 2019-08-05 PROCEDURE — 94060 EVALUATION OF WHEEZING: CPT

## 2019-08-05 PROCEDURE — 99213 OFFICE O/P EST LOW 20 MIN: CPT | Performed by: INTERNAL MEDICINE

## 2019-08-05 PROCEDURE — 94640 AIRWAY INHALATION TREATMENT: CPT

## 2019-08-05 PROCEDURE — G8420 CALC BMI NORM PARAMETERS: HCPCS | Performed by: INTERNAL MEDICINE

## 2019-08-05 RX ORDER — ALBUTEROL SULFATE 2.5 MG/3ML
2.5 SOLUTION RESPIRATORY (INHALATION) ONCE
Status: COMPLETED | OUTPATIENT
Start: 2019-08-05 | End: 2019-08-05

## 2019-08-05 RX ADMIN — ALBUTEROL SULFATE 2.5 MG: 2.5 SOLUTION RESPIRATORY (INHALATION) at 09:11

## 2019-08-05 ASSESSMENT — PULMONARY FUNCTION TESTS
FEV1/FVC_PRE: 73
FEV1_PERCENT_PREDICTED_POST: 83
FEV1_PERCENT_PREDICTED_PRE: 80
FEV1/FVC_POST: 76

## 2019-08-05 NOTE — PROGRESS NOTES
[facility]                                                                    CHIEF COMPLAINT: follow up PFTs         HPI:   PFTs were reviewed by me and noted. Results were dicussed with patient and multiple good questions were answered. Breathing is good overall   Some cough with clear sputum  No hemoptysis   No smoking   Uses Albuterol 1 times/day        Past Medical History:   Diagnosis Date    Arthritis     Chest pain     [facility] 2012 sent for records;    Colitis     COPD [facility] obstructive [facility] disease) ([facility] [address]     Histoplasmosis     History of blood transfusion     Hx of blood clots     Metabolic syndrome X     Pneumonia     Pulmonary nodule        Past Surgical History:        Procedure Laterality Date    BRONCHOSCOPY  2013    COLONOSCOPY  2018    COLONOSCOPY N/A 2018    COLORECTAL CANCER SCREENING, NOT HIGH RISK performed by Ela Crews DO at [facility] CATH LAB PROCEDURE      LYMPH NODE DISSECTION      neck    VASECTOMY         Allergies:  is allergic to codeine and tramadol. Social History:    TOBACCO:   reports that he quit smoking about 19 months ago. His smoking use included cigarettes. He has a 10.00 pack-year smoking history. He has never used smokeless tobacco.  ETOH:   reports that he does not drink alcohol.       Family History:       Problem Relation Age of Onset    Cancer Mother         ovarian    Heart Failure Mother     Heart Attack Mother 62    Heart Disease Mother     Other Father          1948;ashd;per neurop;    Other Brother         pt knows very little    Cancer Paternal Uncle         leukemia    Cancer Paternal Grandfather         leukemia    Heart Attack Maternal Uncle 60        x4     No Known Problems Son     No Known Problems Son     Asthma Neg Hx     Diabetes Neg Hx     Emphysema Neg Hx    

## 2019-08-27 ENCOUNTER — OFFICE VISIT (OUTPATIENT)
Dept: FAMILY MEDICINE CLINIC | Age: 49
End: 2019-08-27
Payer: MEDICARE

## 2019-08-27 ENCOUNTER — TELEPHONE (OUTPATIENT)
Dept: FAMILY MEDICINE CLINIC | Age: 49
End: 2019-08-27

## 2019-08-27 VITALS
OXYGEN SATURATION: 97 % | BODY MASS INDEX: 24.48 KG/M2 | WEIGHT: 156 LBS | HEIGHT: 67 IN | DIASTOLIC BLOOD PRESSURE: 62 MMHG | SYSTOLIC BLOOD PRESSURE: 106 MMHG | HEART RATE: 70 BPM

## 2019-08-27 DIAGNOSIS — R79.89 ABNORMAL CBC: Primary | ICD-10-CM

## 2019-08-27 DIAGNOSIS — M25.561 PAIN AND SWELLING OF RIGHT KNEE: ICD-10-CM

## 2019-08-27 DIAGNOSIS — M25.461 PAIN AND SWELLING OF RIGHT KNEE: ICD-10-CM

## 2019-08-27 DIAGNOSIS — M79.661 RIGHT CALF PAIN: Primary | ICD-10-CM

## 2019-08-27 PROCEDURE — G8427 DOCREV CUR MEDS BY ELIG CLIN: HCPCS | Performed by: NURSE PRACTITIONER

## 2019-08-27 PROCEDURE — G8420 CALC BMI NORM PARAMETERS: HCPCS | Performed by: NURSE PRACTITIONER

## 2019-08-27 PROCEDURE — 1036F TOBACCO NON-USER: CPT | Performed by: NURSE PRACTITIONER

## 2019-08-27 PROCEDURE — 99213 OFFICE O/P EST LOW 20 MIN: CPT | Performed by: NURSE PRACTITIONER

## 2019-08-27 ASSESSMENT — ENCOUNTER SYMPTOMS
COUGH: 0
SHORTNESS OF BREATH: 0

## 2019-08-27 NOTE — PROGRESS NOTES
2019     Rohan Wilde (:  1970) is a 53 y.o. male, here for evaluation of the following medical concerns:    Néstor c.o right calf pain and knee swelling, x 2 mo progressively getting worse. Pain ranges from 6/10- \"12/10\", stating has been difficult to sleep and work. Pain is worse with bending, and standing long periods. Pt is is having a numbness feeling at times from his knee down into his toes. Denies warmth, noted some redness. Pt reports fx of DVT/PE and PMH of PE. He has tried rest, Ibuprofen, Bio freeze with no avail. Pt is requesting something for pain today. Review of Systems   Constitutional: Positive for activity change. Negative for appetite change, chills and fever. Respiratory: Negative for cough and shortness of breath. Cardiovascular: Negative for chest pain, palpitations and leg swelling. Musculoskeletal:        Posterior knee pain   Skin:        Mild redness and swelling posterior knee/calf, painful          Prior to Visit Medications    Medication Sig Taking? Authorizing Provider   albuterol sulfate HFA (PROAIR HFA) 108 (90 Base) MCG/ACT inhaler Inhale 2 puffs into the lungs every 6 hours as needed for Wheezing or Shortness of Breath Yes Betsy Saldaña MD   SPIRIVA RESPIMAT 2.5 MCG/ACT AERS inhaler INHALE TWO (2) PUFFS INTO THE LUNGS DAILY Yes Betsy Saldaña MD   gabapentin (NEURONTIN) 600 MG tablet Take 1 tablet by mouth 3 times daily for 180 days. Yes Nakia Goff MD   PARoxetine (PAXIL) 40 MG tablet Take 1 tablet by mouth nightly Yes Nakia Goff MD   metoprolol succinate (TOPROL XL) 25 MG extended release tablet Take 1 tablet by mouth daily Yes Nakia Goff MD   LORazepam (ATIVAN) 1 MG tablet Take 1 tablet by mouth every morning (before breakfast) for 180 days.  Yes Nakia Goff MD   fluticasone [facility]) 50 MCG/ACT nasal spray 1 spray by Each Nare route daily Yes Dusty Feng DO   dicyclomine (BENTYL) 10 MG capsule Take 1 capsule by mouth 4 times daily (before meals and nightly) Yes Fuad Rojo MD        Social History     Tobacco Use    Smoking status: Former Smoker     Packs/day: 0.50     Years: 20.00     Pack years: 10.00     Types: Cigarettes     Last attempt to quit: 2018     Years since quittin.6    Smokeless tobacco: Never Used   Substance Use Topics    Alcohol use: No     Alcohol/week: 1.0 standard drinks     Types: 1 Cans of beer per week        Vitals:    19 1131   BP: 106/62   Pulse: 70   SpO2: 97%   Weight: 156 lb (70.8 kg)   Height: 5' 7\" (1.702 m)     Estimated body mass index is 24.43 kg/m² as calculated from the following:    Height as of this encounter: 5' 7\" (1.702 m). Weight as of this encounter: 156 lb (70.8 kg). Physical Exam   Constitutional: He is oriented to person, place, and time. Vital signs are normal. He appears well-developed and well-nourished. He does not appear ill. HENT:   Head: Normocephalic and atraumatic. Cardiovascular: Normal rate, regular rhythm, normal heart sounds, intact distal pulses and normal pulses. Pulmonary/Chest: Effort normal and breath sounds normal.   Musculoskeletal:        Right knee: He exhibits decreased range of motion, swelling and erythema. Tenderness found. Legs:  Lymphadenopathy:     He has no cervical adenopathy. Neurological: He is alert and oriented to person, place, and time. Skin: Skin is warm and dry. Capillary refill takes less than 2 seconds. Mild redness and swelling posterior knee/calf, painful     Psychiatric: He has a normal mood and affect. His behavior is normal.   Vitals reviewed. ASSESSMENT/PLAN: s/s x two months, however d/t history will work up for DVT, orders as indicated below. If wnl, will refer to [facility] for further evaluation and tx. Will not rx pain meds. I will call with results and POC    1. Right calf pain  - CBC WITH AUTO DIFFERENTIAL; Future  - Creatinine, serum;  Future  - D-dimer,

## 2019-08-28 ENCOUNTER — TELEPHONE (OUTPATIENT)
Dept: FAMILY MEDICINE CLINIC | Age: 49
End: 2019-08-28

## 2019-09-03 ENCOUNTER — NURSE ONLY (OUTPATIENT)
Dept: FAMILY MEDICINE CLINIC | Age: 49
End: 2019-09-03
Payer: MEDICARE

## 2019-09-03 DIAGNOSIS — R79.89 ABNORMAL CBC: ICD-10-CM

## 2019-09-03 LAB
HCT VFR BLD CALC: 47.7 % (ref 40.5–52.5)
HEMOGLOBIN: 16.1 G/DL (ref 13.5–17.5)
MCH RBC QN AUTO: 30.2 PG (ref 26–34)
MCHC RBC AUTO-ENTMCNC: 33.7 G/DL (ref 31–36)
MCV RBC AUTO: 89.5 FL (ref 80–100)
PDW BLD-RTO: 13.6 % (ref 12.4–15.4)
PLATELET # BLD: 280 K/UL (ref 135–450)
PMV BLD AUTO: 8.1 FL (ref 5–10.5)
RBC # BLD: 5.33 M/UL (ref 4.2–5.9)
WBC # BLD: 7.5 K/UL (ref 4–11)

## 2019-09-03 PROCEDURE — 36415 COLL VENOUS BLD VENIPUNCTURE: CPT | Performed by: NURSE PRACTITIONER

## 2019-09-04 ENCOUNTER — OFFICE VISIT (OUTPATIENT)
Dept: ORTHOPEDIC SURGERY | Age: 49
End: 2019-09-04
Payer: MEDICARE

## 2019-09-04 VITALS — BODY MASS INDEX: 24.5 KG/M2 | HEIGHT: 67 IN | WEIGHT: 156.09 LBS

## 2019-09-04 DIAGNOSIS — R52 PAIN: ICD-10-CM

## 2019-09-04 DIAGNOSIS — M23.203 DEGENERATIVE TEAR OF MEDIAL MENISCUS OF RIGHT KNEE: ICD-10-CM

## 2019-09-04 DIAGNOSIS — M25.561 ACUTE PAIN OF RIGHT KNEE: Primary | ICD-10-CM

## 2019-09-04 PROCEDURE — G8428 CUR MEDS NOT DOCUMENT: HCPCS | Performed by: ORTHOPAEDIC SURGERY

## 2019-09-04 PROCEDURE — G8420 CALC BMI NORM PARAMETERS: HCPCS | Performed by: ORTHOPAEDIC SURGERY

## 2019-09-04 PROCEDURE — 20610 DRAIN/INJ JOINT/BURSA W/O US: CPT | Performed by: ORTHOPAEDIC SURGERY

## 2019-09-04 PROCEDURE — 99243 OFF/OP CNSLTJ NEW/EST LOW 30: CPT | Performed by: ORTHOPAEDIC SURGERY

## 2019-09-04 RX ORDER — MELOXICAM 15 MG/1
15 TABLET ORAL DAILY
Qty: 30 TABLET | Refills: 3 | Status: SHIPPED | OUTPATIENT
Start: 2019-09-04 | End: 2020-02-24 | Stop reason: CLARIF

## 2019-09-04 NOTE — PROGRESS NOTES
Site: R knee      BUPIVACAINE/SENSORCAINE  NDC# 9866-5074-07  LOT NUMBER:  [id number]      DEPO 40mg/METHYLPREDNISOLONE/KENALOG    NDC#  6115-0117-92  LOT NUMBER: [id number]

## 2019-09-11 ENCOUNTER — TELEPHONE (OUTPATIENT)
Dept: FAMILY MEDICINE CLINIC | Age: 49
End: 2019-09-11

## 2019-09-11 NOTE — TELEPHONE ENCOUNTER
----- Message from Walt Boo MD sent at 9/11/2019  3:38 PM EDT -----  Regarding: Missed appointments  Fire him if we can may be he is missed 3 appointments now

## 2019-09-16 ENCOUNTER — OFFICE VISIT (OUTPATIENT)
Dept: FAMILY MEDICINE CLINIC | Age: 49
End: 2019-09-16
Payer: MEDICARE

## 2019-09-16 VITALS
TEMPERATURE: 97.4 F | WEIGHT: 153 LBS | DIASTOLIC BLOOD PRESSURE: 84 MMHG | OXYGEN SATURATION: 98 % | SYSTOLIC BLOOD PRESSURE: 118 MMHG | HEART RATE: 82 BPM | BODY MASS INDEX: 23.96 KG/M2

## 2019-09-16 DIAGNOSIS — Z23 NEED FOR INFLUENZA VACCINATION: ICD-10-CM

## 2019-09-16 DIAGNOSIS — F41.9 CHRONIC ANXIETY: Primary | ICD-10-CM

## 2019-09-16 DIAGNOSIS — F32.1 MODERATE SINGLE CURRENT EPISODE OF MAJOR DEPRESSIVE DISORDER (HCC): ICD-10-CM

## 2019-09-16 DIAGNOSIS — R20.0 NUMBNESS AND TINGLING OF BOTH LEGS BELOW KNEES: ICD-10-CM

## 2019-09-16 DIAGNOSIS — R55 PRE-SYNCOPE: ICD-10-CM

## 2019-09-16 DIAGNOSIS — R00.2 RAPID PALPITATIONS: ICD-10-CM

## 2019-09-16 DIAGNOSIS — R20.2 NUMBNESS AND TINGLING OF BOTH LEGS BELOW KNEES: ICD-10-CM

## 2019-09-16 PROCEDURE — 99214 OFFICE O/P EST MOD 30 MIN: CPT | Performed by: FAMILY MEDICINE

## 2019-09-16 PROCEDURE — 90471 IMMUNIZATION ADMIN: CPT | Performed by: FAMILY MEDICINE

## 2019-09-16 PROCEDURE — 90688 IIV4 VACCINE SPLT 0.5 ML IM: CPT | Performed by: FAMILY MEDICINE

## 2019-09-16 PROCEDURE — G8427 DOCREV CUR MEDS BY ELIG CLIN: HCPCS | Performed by: FAMILY MEDICINE

## 2019-09-16 PROCEDURE — G8420 CALC BMI NORM PARAMETERS: HCPCS | Performed by: FAMILY MEDICINE

## 2019-09-16 PROCEDURE — 1036F TOBACCO NON-USER: CPT | Performed by: FAMILY MEDICINE

## 2019-09-16 RX ORDER — LORAZEPAM 1 MG/1
1 TABLET ORAL
Qty: 30 TABLET | Refills: 5 | Status: SHIPPED | OUTPATIENT
Start: 2019-09-16 | End: 2020-03-09 | Stop reason: SDUPTHER

## 2019-09-16 RX ORDER — GABAPENTIN 600 MG/1
600 TABLET ORAL 3 TIMES DAILY
Qty: 270 TABLET | Refills: 1 | Status: SHIPPED | OUTPATIENT
Start: 2019-09-16 | End: 2020-03-09 | Stop reason: SDUPTHER

## 2019-09-16 RX ORDER — METOPROLOL SUCCINATE 25 MG/1
25 TABLET, EXTENDED RELEASE ORAL DAILY
Qty: 90 TABLET | Refills: 1 | Status: SHIPPED | OUTPATIENT
Start: 2019-09-16 | End: 2020-03-09 | Stop reason: SDUPTHER

## 2019-09-16 RX ORDER — DICYCLOMINE HYDROCHLORIDE 10 MG/1
10 CAPSULE ORAL 4 TIMES DAILY PRN
Qty: 90 CAPSULE | Refills: 1 | Status: SHIPPED | OUTPATIENT
Start: 2019-09-16 | End: 2020-03-09 | Stop reason: SDUPTHER

## 2019-09-16 RX ORDER — PAROXETINE HYDROCHLORIDE 40 MG/1
40 TABLET, FILM COATED ORAL NIGHTLY
Qty: 90 TABLET | Refills: 1 | Status: SHIPPED | OUTPATIENT
Start: 2019-09-16 | End: 2020-03-09 | Stop reason: SDUPTHER

## 2019-09-16 ASSESSMENT — ENCOUNTER SYMPTOMS: RESPIRATORY NEGATIVE: 1

## 2019-10-04 PROBLEM — R52 PAIN: Status: RESOLVED | Noted: 2019-09-04 | Resolved: 2019-10-04

## 2019-10-10 ENCOUNTER — OFFICE VISIT (OUTPATIENT)
Dept: ORTHOPEDIC SURGERY | Age: 49
End: 2019-10-10
Payer: MEDICARE

## 2019-10-10 ENCOUNTER — TELEPHONE (OUTPATIENT)
Dept: ORTHOPEDIC SURGERY | Age: 49
End: 2019-10-10

## 2019-10-10 VITALS — WEIGHT: 153 LBS | RESPIRATION RATE: 11 BRPM | HEIGHT: 67 IN | BODY MASS INDEX: 24.01 KG/M2

## 2019-10-10 DIAGNOSIS — M23.203 DEGENERATIVE TEAR OF MEDIAL MENISCUS OF RIGHT KNEE: Primary | ICD-10-CM

## 2019-10-10 PROCEDURE — G8427 DOCREV CUR MEDS BY ELIG CLIN: HCPCS | Performed by: ORTHOPAEDIC SURGERY

## 2019-10-10 PROCEDURE — 99213 OFFICE O/P EST LOW 20 MIN: CPT | Performed by: ORTHOPAEDIC SURGERY

## 2019-10-10 PROCEDURE — G8420 CALC BMI NORM PARAMETERS: HCPCS | Performed by: ORTHOPAEDIC SURGERY

## 2019-10-10 PROCEDURE — 1036F TOBACCO NON-USER: CPT | Performed by: ORTHOPAEDIC SURGERY

## 2019-10-10 PROCEDURE — L1810 KO ELASTIC WITH JOINTS: HCPCS | Performed by: ORTHOPAEDIC SURGERY

## 2019-10-10 PROCEDURE — G8482 FLU IMMUNIZE ORDER/ADMIN: HCPCS | Performed by: ORTHOPAEDIC SURGERY

## 2019-10-22 ENCOUNTER — TELEPHONE (OUTPATIENT)
Dept: ORTHOPEDIC SURGERY | Age: 49
End: 2019-10-22

## 2019-10-28 ENCOUNTER — HOSPITAL ENCOUNTER (OUTPATIENT)
Dept: MRI IMAGING | Age: 49
Discharge: HOME | End: 2019-10-28
Payer: MEDICARE

## 2019-10-28 DIAGNOSIS — M23.203 DEGENERATIVE TEAR OF MEDIAL MENISCUS OF RIGHT KNEE: ICD-10-CM

## 2019-10-28 PROCEDURE — 73721 MRI JNT OF LWR EXTRE W/O DYE: CPT

## 2019-11-14 ENCOUNTER — OFFICE VISIT (OUTPATIENT)
Dept: ORTHOPEDIC SURGERY | Age: 49
End: 2019-11-14
Payer: MEDICARE

## 2019-11-14 VITALS — WEIGHT: 153 LBS | RESPIRATION RATE: 12 BRPM | BODY MASS INDEX: 24.01 KG/M2 | HEIGHT: 67 IN

## 2019-11-14 DIAGNOSIS — M23.203 DEGENERATIVE TEAR OF MEDIAL MENISCUS OF RIGHT KNEE: Primary | ICD-10-CM

## 2019-11-14 PROCEDURE — G8482 FLU IMMUNIZE ORDER/ADMIN: HCPCS | Performed by: ORTHOPAEDIC SURGERY

## 2019-11-14 PROCEDURE — 99213 OFFICE O/P EST LOW 20 MIN: CPT | Performed by: ORTHOPAEDIC SURGERY

## 2019-11-14 PROCEDURE — G8427 DOCREV CUR MEDS BY ELIG CLIN: HCPCS | Performed by: ORTHOPAEDIC SURGERY

## 2019-11-14 PROCEDURE — G8420 CALC BMI NORM PARAMETERS: HCPCS | Performed by: ORTHOPAEDIC SURGERY

## 2019-11-14 PROCEDURE — 1036F TOBACCO NON-USER: CPT | Performed by: ORTHOPAEDIC SURGERY

## 2019-11-27 ENCOUNTER — OFFICE VISIT (OUTPATIENT)
Dept: FAMILY MEDICINE CLINIC | Age: 49
End: 2019-11-27
Payer: MEDICARE

## 2019-11-27 VITALS
RESPIRATION RATE: 14 BRPM | OXYGEN SATURATION: 95 % | WEIGHT: 153 LBS | DIASTOLIC BLOOD PRESSURE: 78 MMHG | HEART RATE: 76 BPM | HEIGHT: 67 IN | TEMPERATURE: 97.3 F | SYSTOLIC BLOOD PRESSURE: 116 MMHG | BODY MASS INDEX: 24.01 KG/M2

## 2019-11-27 DIAGNOSIS — J44.9 CHRONIC OBSTRUCTIVE PULMONARY DISEASE, UNSPECIFIED COPD TYPE (HCC): ICD-10-CM

## 2019-11-27 DIAGNOSIS — M23.203 DEGENERATIVE TEAR OF MEDIAL MENISCUS OF RIGHT KNEE: Primary | ICD-10-CM

## 2019-11-27 PROCEDURE — G8926 SPIRO NO PERF OR DOC: HCPCS | Performed by: FAMILY MEDICINE

## 2019-11-27 PROCEDURE — G8420 CALC BMI NORM PARAMETERS: HCPCS | Performed by: FAMILY MEDICINE

## 2019-11-27 PROCEDURE — 1036F TOBACCO NON-USER: CPT | Performed by: FAMILY MEDICINE

## 2019-11-27 PROCEDURE — G8482 FLU IMMUNIZE ORDER/ADMIN: HCPCS | Performed by: FAMILY MEDICINE

## 2019-11-27 PROCEDURE — G8427 DOCREV CUR MEDS BY ELIG CLIN: HCPCS | Performed by: FAMILY MEDICINE

## 2019-11-27 PROCEDURE — 3023F SPIROM DOC REV: CPT | Performed by: FAMILY MEDICINE

## 2019-11-27 PROCEDURE — 99213 OFFICE O/P EST LOW 20 MIN: CPT | Performed by: FAMILY MEDICINE

## 2019-12-06 ENCOUNTER — TELEPHONE (OUTPATIENT)
Dept: ORTHOPEDIC SURGERY | Age: 49
End: 2019-12-06

## 2019-12-09 ENCOUNTER — ANESTHESIA EVENT (OUTPATIENT)
Dept: OPERATING ROOM | Age: 49
End: 2019-12-09
Payer: MEDICARE

## 2019-12-13 ENCOUNTER — ANESTHESIA (OUTPATIENT)
Dept: OPERATING ROOM | Age: 49
End: 2019-12-13
Payer: MEDICARE

## 2019-12-13 ENCOUNTER — HOSPITAL ENCOUNTER (OUTPATIENT)
Age: 49
Setting detail: OUTPATIENT SURGERY
Discharge: HOME | End: 2019-12-13
Attending: ORTHOPAEDIC SURGERY | Admitting: ORTHOPAEDIC SURGERY
Payer: MEDICARE

## 2019-12-13 VITALS
BODY MASS INDEX: 25.9 KG/M2 | HEIGHT: 67 IN | WEIGHT: 165 LBS | TEMPERATURE: 98.5 F | HEART RATE: 54 BPM | SYSTOLIC BLOOD PRESSURE: 108 MMHG | RESPIRATION RATE: 17 BRPM | DIASTOLIC BLOOD PRESSURE: 79 MMHG | OXYGEN SATURATION: 97 %

## 2019-12-13 VITALS
DIASTOLIC BLOOD PRESSURE: 59 MMHG | RESPIRATION RATE: 6 BRPM | SYSTOLIC BLOOD PRESSURE: 102 MMHG | OXYGEN SATURATION: 100 %

## 2019-12-13 DIAGNOSIS — M23.203 DEGENERATIVE TEAR OF MEDIAL MENISCUS OF RIGHT KNEE: ICD-10-CM

## 2019-12-13 DIAGNOSIS — M25.561 ACUTE PAIN OF RIGHT KNEE: Primary | ICD-10-CM

## 2019-12-13 PROCEDURE — 6360000002 HC RX W HCPCS: Performed by: NURSE ANESTHETIST, CERTIFIED REGISTERED

## 2019-12-13 PROCEDURE — 6360000002 HC RX W HCPCS: Performed by: ORTHOPAEDIC SURGERY

## 2019-12-13 PROCEDURE — 2709999900 HC NON-CHARGEABLE SUPPLY: Performed by: ORTHOPAEDIC SURGERY

## 2019-12-13 PROCEDURE — 2580000003 HC RX 258: Performed by: ANESTHESIOLOGY

## 2019-12-13 PROCEDURE — 6370000000 HC RX 637 (ALT 250 FOR IP)

## 2019-12-13 PROCEDURE — 3700000001 HC ADD 15 MINUTES (ANESTHESIA): Performed by: ORTHOPAEDIC SURGERY

## 2019-12-13 PROCEDURE — 2500000003 HC RX 250 WO HCPCS

## 2019-12-13 PROCEDURE — 7100000011 HC PHASE II RECOVERY - ADDTL 15 MIN: Performed by: ORTHOPAEDIC SURGERY

## 2019-12-13 PROCEDURE — 7100000000 HC PACU RECOVERY - FIRST 15 MIN: Performed by: ORTHOPAEDIC SURGERY

## 2019-12-13 PROCEDURE — 2500000003 HC RX 250 WO HCPCS: Performed by: ANESTHESIOLOGY

## 2019-12-13 PROCEDURE — 7100000001 HC PACU RECOVERY - ADDTL 15 MIN: Performed by: ORTHOPAEDIC SURGERY

## 2019-12-13 PROCEDURE — 3600000004 HC SURGERY LEVEL 4 BASE: Performed by: ORTHOPAEDIC SURGERY

## 2019-12-13 PROCEDURE — 3700000000 HC ANESTHESIA ATTENDED CARE: Performed by: ORTHOPAEDIC SURGERY

## 2019-12-13 PROCEDURE — 2500000003 HC RX 250 WO HCPCS: Performed by: ORTHOPAEDIC SURGERY

## 2019-12-13 PROCEDURE — 3600000014 HC SURGERY LEVEL 4 ADDTL 15MIN: Performed by: ORTHOPAEDIC SURGERY

## 2019-12-13 PROCEDURE — 7100000010 HC PHASE II RECOVERY - FIRST 15 MIN: Performed by: ORTHOPAEDIC SURGERY

## 2019-12-13 RX ORDER — BUPIVACAINE HYDROCHLORIDE 2.5 MG/ML
INJECTION, SOLUTION EPIDURAL; INFILTRATION; INTRACAUDAL
Status: DISCONTINUED
Start: 2019-12-13 | End: 2019-12-13 | Stop reason: HOSPADM

## 2019-12-13 RX ORDER — SODIUM CHLORIDE 0.9 % (FLUSH) 0.9 %
10 SYRINGE (ML) INJECTION PRN
Status: DISCONTINUED | OUTPATIENT
Start: 2019-12-13 | End: 2019-12-13 | Stop reason: HOSPADM

## 2019-12-13 RX ORDER — OXYCODONE HYDROCHLORIDE 5 MG/1
10 TABLET ORAL PRN
Status: DISCONTINUED | OUTPATIENT
Start: 2019-12-13 | End: 2019-12-13 | Stop reason: HOSPADM

## 2019-12-13 RX ORDER — ACETAMINOPHEN 10 MG/ML
1000 INJECTION, SOLUTION INTRAVENOUS ONCE
Status: COMPLETED | OUTPATIENT
Start: 2019-12-13 | End: 2019-12-13

## 2019-12-13 RX ORDER — FENTANYL CITRATE 50 UG/ML
INJECTION, SOLUTION INTRAMUSCULAR; INTRAVENOUS PRN
Status: DISCONTINUED | OUTPATIENT
Start: 2019-12-13 | End: 2019-12-13 | Stop reason: SDUPTHER

## 2019-12-13 RX ORDER — SODIUM CHLORIDE, SODIUM LACTATE, POTASSIUM CHLORIDE, CALCIUM CHLORIDE 600; 310; 30; 20 MG/100ML; MG/100ML; MG/100ML; MG/100ML
INJECTION, SOLUTION INTRAVENOUS CONTINUOUS
Status: DISCONTINUED | OUTPATIENT
Start: 2019-12-13 | End: 2019-12-13 | Stop reason: HOSPADM

## 2019-12-13 RX ORDER — LABETALOL HYDROCHLORIDE 5 MG/ML
5 INJECTION, SOLUTION INTRAVENOUS EVERY 10 MIN PRN
Status: DISCONTINUED | OUTPATIENT
Start: 2019-12-13 | End: 2019-12-13 | Stop reason: HOSPADM

## 2019-12-13 RX ORDER — MORPHINE SULFATE 10 MG/ML
1 INJECTION, SOLUTION INTRAMUSCULAR; INTRAVENOUS EVERY 5 MIN PRN
Status: DISCONTINUED | OUTPATIENT
Start: 2019-12-13 | End: 2019-12-13 | Stop reason: HOSPADM

## 2019-12-13 RX ORDER — LIDOCAINE HYDROCHLORIDE 10 MG/ML
INJECTION, SOLUTION EPIDURAL; INFILTRATION; INTRACAUDAL; PERINEURAL
Status: COMPLETED
Start: 2019-12-13 | End: 2019-12-13

## 2019-12-13 RX ORDER — KETOROLAC TROMETHAMINE 30 MG/ML
INJECTION, SOLUTION INTRAMUSCULAR; INTRAVENOUS PRN
Status: DISCONTINUED | OUTPATIENT
Start: 2019-12-13 | End: 2019-12-13 | Stop reason: SDUPTHER

## 2019-12-13 RX ORDER — METOCLOPRAMIDE HYDROCHLORIDE 5 MG/ML
10 INJECTION INTRAMUSCULAR; INTRAVENOUS
Status: DISCONTINUED | OUTPATIENT
Start: 2019-12-13 | End: 2019-12-13 | Stop reason: HOSPADM

## 2019-12-13 RX ORDER — DEXAMETHASONE SODIUM PHOSPHATE 4 MG/ML
INJECTION, SOLUTION INTRA-ARTICULAR; INTRALESIONAL; INTRAMUSCULAR; INTRAVENOUS; SOFT TISSUE PRN
Status: DISCONTINUED | OUTPATIENT
Start: 2019-12-13 | End: 2019-12-13 | Stop reason: SDUPTHER

## 2019-12-13 RX ORDER — SODIUM CHLORIDE 0.9 % (FLUSH) 0.9 %
10 SYRINGE (ML) INJECTION EVERY 12 HOURS SCHEDULED
Status: DISCONTINUED | OUTPATIENT
Start: 2019-12-13 | End: 2019-12-13 | Stop reason: HOSPADM

## 2019-12-13 RX ORDER — ONDANSETRON 2 MG/ML
INJECTION INTRAMUSCULAR; INTRAVENOUS PRN
Status: DISCONTINUED | OUTPATIENT
Start: 2019-12-13 | End: 2019-12-13 | Stop reason: SDUPTHER

## 2019-12-13 RX ORDER — PROMETHAZINE HYDROCHLORIDE 25 MG/ML
6.25 INJECTION, SOLUTION INTRAMUSCULAR; INTRAVENOUS
Status: DISCONTINUED | OUTPATIENT
Start: 2019-12-13 | End: 2019-12-13 | Stop reason: HOSPADM

## 2019-12-13 RX ORDER — PROPOFOL 10 MG/ML
INJECTION, EMULSION INTRAVENOUS PRN
Status: DISCONTINUED | OUTPATIENT
Start: 2019-12-13 | End: 2019-12-13 | Stop reason: SDUPTHER

## 2019-12-13 RX ORDER — OXYCODONE HYDROCHLORIDE 5 MG/1
5 TABLET ORAL PRN
Status: DISCONTINUED | OUTPATIENT
Start: 2019-12-13 | End: 2019-12-13 | Stop reason: HOSPADM

## 2019-12-13 RX ORDER — HYDRALAZINE HYDROCHLORIDE 20 MG/ML
5 INJECTION INTRAMUSCULAR; INTRAVENOUS EVERY 10 MIN PRN
Status: DISCONTINUED | OUTPATIENT
Start: 2019-12-13 | End: 2019-12-13 | Stop reason: HOSPADM

## 2019-12-13 RX ORDER — DIPHENHYDRAMINE HYDROCHLORIDE 50 MG/ML
12.5 INJECTION INTRAMUSCULAR; INTRAVENOUS
Status: DISCONTINUED | OUTPATIENT
Start: 2019-12-13 | End: 2019-12-13 | Stop reason: HOSPADM

## 2019-12-13 RX ORDER — OXYCODONE HYDROCHLORIDE AND ACETAMINOPHEN 5; 325 MG/1; MG/1
1 TABLET ORAL EVERY 6 HOURS PRN
Qty: 28 TABLET | Refills: 0 | Status: SHIPPED | OUTPATIENT
Start: 2019-12-13 | End: 2019-12-20

## 2019-12-13 RX ORDER — BUPIVACAINE HYDROCHLORIDE 2.5 MG/ML
INJECTION, SOLUTION INFILTRATION; PERINEURAL PRN
Status: DISCONTINUED | OUTPATIENT
Start: 2019-12-13 | End: 2019-12-13 | Stop reason: ALTCHOICE

## 2019-12-13 RX ADMIN — FENTANYL CITRATE 50 MCG: 50 INJECTION INTRAMUSCULAR; INTRAVENOUS at 09:41

## 2019-12-13 RX ADMIN — KETOROLAC TROMETHAMINE 30 MG: 30 INJECTION, SOLUTION INTRAMUSCULAR at 09:37

## 2019-12-13 RX ADMIN — Medication 25 MG: at 08:29

## 2019-12-13 RX ADMIN — PROPOFOL 200 MG: 10 INJECTION, EMULSION INTRAVENOUS at 09:32

## 2019-12-13 RX ADMIN — ONDANSETRON 4 MG: 2 INJECTION, SOLUTION INTRAMUSCULAR; INTRAVENOUS at 09:32

## 2019-12-13 RX ADMIN — FAMOTIDINE 20 MG: 10 INJECTION, SOLUTION INTRAVENOUS at 08:16

## 2019-12-13 RX ADMIN — METOPROLOL TARTRATE 25 MG: 25 TABLET ORAL at 08:29

## 2019-12-13 RX ADMIN — Medication 2 G: at 09:32

## 2019-12-13 RX ADMIN — FENTANYL CITRATE 50 MCG: 50 INJECTION INTRAMUSCULAR; INTRAVENOUS at 09:32

## 2019-12-13 RX ADMIN — DEXAMETHASONE SODIUM PHOSPHATE 10 MG: 4 INJECTION, SOLUTION INTRAMUSCULAR; INTRAVENOUS at 09:32

## 2019-12-13 RX ADMIN — LIDOCAINE HYDROCHLORIDE 0.1 ML: 10 INJECTION, SOLUTION EPIDURAL; INFILTRATION; INTRACAUDAL; PERINEURAL at 08:16

## 2019-12-13 RX ADMIN — ACETAMINOPHEN 1000 MG: 10 INJECTION, SOLUTION INTRAVENOUS at 08:17

## 2019-12-13 RX ADMIN — SODIUM CHLORIDE, POTASSIUM CHLORIDE, SODIUM LACTATE AND CALCIUM CHLORIDE: 600; 310; 30; 20 INJECTION, SOLUTION INTRAVENOUS at 08:16

## 2019-12-13 ASSESSMENT — PULMONARY FUNCTION TESTS
PIF_VALUE: 12
PIF_VALUE: 21
PIF_VALUE: 26
PIF_VALUE: 1
PIF_VALUE: 22
PIF_VALUE: 0
PIF_VALUE: 1
PIF_VALUE: 18
PIF_VALUE: 21
PIF_VALUE: 2
PIF_VALUE: 2
PIF_VALUE: 22
PIF_VALUE: 2
PIF_VALUE: 0
PIF_VALUE: 2
PIF_VALUE: 18
PIF_VALUE: 25
PIF_VALUE: 2
PIF_VALUE: 2
PIF_VALUE: 1
PIF_VALUE: 0
PIF_VALUE: 2
PIF_VALUE: 17
PIF_VALUE: 0

## 2019-12-13 ASSESSMENT — PAIN - FUNCTIONAL ASSESSMENT
PAIN_FUNCTIONAL_ASSESSMENT: PREVENTS OR INTERFERES SOME ACTIVE ACTIVITIES AND ADLS
PAIN_FUNCTIONAL_ASSESSMENT: 0-10

## 2019-12-13 ASSESSMENT — PAIN SCALES - GENERAL
PAINLEVEL_OUTOF10: 0
PAINLEVEL_OUTOF10: 0

## 2019-12-13 ASSESSMENT — PAIN DESCRIPTION - DESCRIPTORS: DESCRIPTORS: ACHING;CONSTANT

## 2019-12-13 ASSESSMENT — ENCOUNTER SYMPTOMS: SHORTNESS OF BREATH: 1

## 2019-12-18 ENCOUNTER — OFFICE VISIT (OUTPATIENT)
Dept: ORTHOPEDIC SURGERY | Age: 49
End: 2019-12-18

## 2019-12-18 VITALS — WEIGHT: 164.9 LBS | HEIGHT: 67 IN | RESPIRATION RATE: 12 BRPM | BODY MASS INDEX: 25.88 KG/M2

## 2019-12-18 DIAGNOSIS — M23.203 DEGENERATIVE TEAR OF MEDIAL MENISCUS OF RIGHT KNEE: Primary | ICD-10-CM

## 2019-12-18 PROCEDURE — 99024 POSTOP FOLLOW-UP VISIT: CPT | Performed by: ORTHOPAEDIC SURGERY

## 2020-01-08 ENCOUNTER — OFFICE VISIT (OUTPATIENT)
Dept: ORTHOPEDIC SURGERY | Age: 50
End: 2020-01-08
Payer: MEDICARE

## 2020-01-08 VITALS — HEIGHT: 67 IN | WEIGHT: 164.9 LBS | BODY MASS INDEX: 25.88 KG/M2 | RESPIRATION RATE: 12 BRPM

## 2020-01-08 PROCEDURE — 99024 POSTOP FOLLOW-UP VISIT: CPT | Performed by: ORTHOPAEDIC SURGERY

## 2020-01-08 PROCEDURE — 20610 DRAIN/INJ JOINT/BURSA W/O US: CPT | Performed by: ORTHOPAEDIC SURGERY

## 2020-01-08 RX ORDER — METHYLPREDNISOLONE ACETATE 40 MG/ML
40 INJECTION, SUSPENSION INTRA-ARTICULAR; INTRALESIONAL; INTRAMUSCULAR; SOFT TISSUE ONCE
Status: COMPLETED | OUTPATIENT
Start: 2020-01-08 | End: 2020-01-08

## 2020-01-08 RX ORDER — MELOXICAM 15 MG/1
15 TABLET ORAL DAILY
Qty: 30 TABLET | Refills: 3 | Status: SHIPPED | OUTPATIENT
Start: 2020-01-08 | End: 2020-08-19 | Stop reason: CLARIF

## 2020-01-08 RX ADMIN — METHYLPREDNISOLONE ACETATE 40 MG: 40 INJECTION, SUSPENSION INTRA-ARTICULAR; INTRALESIONAL; INTRAMUSCULAR; SOFT TISSUE at 14:15

## 2020-01-08 NOTE — PROGRESS NOTES
FOLLOW-UP VISIT      The patient returns for follow-up s/p right knee video arthroscopy with partial medial meniscectomy    Date of Surgery    12/13/2019    Wound Status    Incisions are healing well with no surrounding erythema, and minimal ecchymosis. Exam    No signs of infection DVT and minimal swelling and walking without ambulatory aids. He has had some increased soreness with motion and still seems somewhat inflamed. Plan    Cortisone injection right knee and observation. Also give him some meloxicam.    Follow-up Appointment    4 weeks PRN    Recommendation is for a cortisone injection into the right knee. After informed consent was received from the patient, the right knee was injected with 1 mL(40mg)Depo-Medrol and 4 mL of 0.25% Marcaine  in the syringe from an anterolateral joint line approach, using a 25-gauge needle, under sterile Betadine prep, using ethyl chloride as a topical refrigerant, for a diagnosis of osteoarthritis. The patient appeared to tolerate it well. The patient should return here periodically as needed. Encounter Diagnoses   Name Primary?     Degenerative tear of medial meniscus of right knee Yes    Acute pain of right knee         Orders Placed This Encounter   Procedures    MN ARTHROCENTESIS ASPIR&/INJ MAJOR JT/BURSA W/O US

## 2020-02-24 ENCOUNTER — OFFICE VISIT (OUTPATIENT)
Dept: FAMILY MEDICINE CLINIC | Age: 50
End: 2020-02-24
Payer: MEDICARE

## 2020-02-24 VITALS
SYSTOLIC BLOOD PRESSURE: 132 MMHG | DIASTOLIC BLOOD PRESSURE: 82 MMHG | HEART RATE: 71 BPM | WEIGHT: 163 LBS | HEIGHT: 67 IN | OXYGEN SATURATION: 98 % | BODY MASS INDEX: 25.58 KG/M2 | TEMPERATURE: 99.2 F

## 2020-02-24 PROCEDURE — G8482 FLU IMMUNIZE ORDER/ADMIN: HCPCS | Performed by: FAMILY MEDICINE

## 2020-02-24 PROCEDURE — G8419 CALC BMI OUT NRM PARAM NOF/U: HCPCS | Performed by: FAMILY MEDICINE

## 2020-02-24 PROCEDURE — G8427 DOCREV CUR MEDS BY ELIG CLIN: HCPCS | Performed by: FAMILY MEDICINE

## 2020-02-24 PROCEDURE — 1036F TOBACCO NON-USER: CPT | Performed by: FAMILY MEDICINE

## 2020-02-24 PROCEDURE — 99214 OFFICE O/P EST MOD 30 MIN: CPT | Performed by: FAMILY MEDICINE

## 2020-02-24 RX ORDER — METRONIDAZOLE 500 MG/1
TABLET ORAL
Qty: 4 TABLET | Refills: 0 | Status: SHIPPED | OUTPATIENT
Start: 2020-02-24 | End: 2020-03-09 | Stop reason: ALTCHOICE

## 2020-02-24 NOTE — PROGRESS NOTES
Subjective:      Patient ID: Dulce Felty is a 51 y.o. male. HPI   Chief Complaint   Patient presents with    Exposure to STD     his girlfriend has been dx with trichomonas and HPV he found out today      Chief complaint and present illness: 56-year-old white male presents unaccompanied with concerns about possible STD. Patient has no active symptoms-lesions on the penis, dysuria, discharge. Patient has no prior history of STDs. Patient has been with his current live-in girlfriend for 6 months and they have been sexually active without practicing safe sex. Patient's significant other recently saw the gynecologist and she was diagnosed with trichomonas vaginalis as well as abnormal Pap smear with presence of HPV. This is all by history from the patient. Patient believes his significant other has had no prior history of abnormal Pap smears. And she had not been to the gynecologist in over 5 to 10 years. She did not have any other complaints. Patient's significant other has a history of intravenous drug abuse multiple years ago but is hep C negative according to patient. Patient significant other recently had some type of cervical procedure done consistent with her diagnosis of the eighth abnormal Pap smear with HPV exposure. Discussion held about the difference between safe sex-barriers, and not getting pregnant which is what he answer the question with initially. Both he and his significant other have had surgical procedures to render them infertile    Review of Systems   Constitutional: Negative. Genitourinary: Negative. Musculoskeletal: Negative. Skin: Negative. background/entire past medical,social and family history obtained and reviewed/updated today   Objective:   Physical Exam  Vitals signs and nursing note reviewed. Constitutional:       Appearance: Normal appearance. He is not ill-appearing. Abdominal:      General: Abdomen is flat. Palpations: Abdomen is soft. There is no mass. Genitourinary:     Penis: Normal.       Scrotum/Testes: Normal.   Musculoskeletal:         General: No swelling or tenderness. Lymphadenopathy:      Lower Body: No right inguinal adenopathy. No left inguinal adenopathy. Skin:     General: Skin is warm. Findings: No erythema or rash. Neurological:      Mental Status: He is alert. Mental status is at baseline. Psychiatric:         Mood and Affect: Mood normal.       /82   Pulse 71   Temp 99.2 °F (37.3 °C) (Oral)   Ht 5' 7\" (1.702 m)   Wt 163 lb (73.9 kg)   SpO2 98%   BMI 25.53 kg/m²   >25minutes with patient, all one on one or with accompanying family member re illness, possible etiologies,w/u and treatment with greater than 50% of time in counseling for: Safe sex, STD symptomatology, treatment  Assessment:      Patrice Live was seen today for exposure to std. Diagnoses and all orders for this visit:    Trichomoniasis, urogenital  -     metroNIDAZOLE (FLAGYL) 500 MG tablet; 4 tablets at once  -     C.trachomatis N.gonorrhoeae DNA, Urine    Screening for STD (sexually transmitted disease)  -     Cancel: C.trachomatis N.gonorrhoeae DNA, Urine;  Future  -     Cancel: C.trachomatis N.gonorrhoeae DNA, Urine  -     C.trachomatis N.gonorrhoeae DNA, Urine           Plan:      Patient Instructions   Return if any symptoms of STDs present themselves as discussed             Wyatt Bahena MA

## 2020-02-26 LAB
C. TRACHOMATIS DNA ,URINE: NEGATIVE
N. GONORRHOEAE DNA, URINE: NEGATIVE

## 2020-03-09 ENCOUNTER — OFFICE VISIT (OUTPATIENT)
Dept: FAMILY MEDICINE CLINIC | Age: 50
End: 2020-03-09
Payer: MEDICARE

## 2020-03-09 VITALS
OXYGEN SATURATION: 98 % | WEIGHT: 162 LBS | SYSTOLIC BLOOD PRESSURE: 116 MMHG | DIASTOLIC BLOOD PRESSURE: 80 MMHG | HEART RATE: 52 BPM | HEIGHT: 67 IN | BODY MASS INDEX: 25.43 KG/M2

## 2020-03-09 PROCEDURE — 99214 OFFICE O/P EST MOD 30 MIN: CPT | Performed by: FAMILY MEDICINE

## 2020-03-09 PROCEDURE — G8482 FLU IMMUNIZE ORDER/ADMIN: HCPCS | Performed by: FAMILY MEDICINE

## 2020-03-09 PROCEDURE — 36415 COLL VENOUS BLD VENIPUNCTURE: CPT | Performed by: FAMILY MEDICINE

## 2020-03-09 PROCEDURE — G8419 CALC BMI OUT NRM PARAM NOF/U: HCPCS | Performed by: FAMILY MEDICINE

## 2020-03-09 PROCEDURE — 1036F TOBACCO NON-USER: CPT | Performed by: FAMILY MEDICINE

## 2020-03-09 PROCEDURE — G8427 DOCREV CUR MEDS BY ELIG CLIN: HCPCS | Performed by: FAMILY MEDICINE

## 2020-03-09 RX ORDER — DICYCLOMINE HYDROCHLORIDE 10 MG/1
10 CAPSULE ORAL 4 TIMES DAILY PRN
Qty: 90 CAPSULE | Refills: 1 | Status: SHIPPED | OUTPATIENT
Start: 2020-03-09 | End: 2020-12-21 | Stop reason: SDUPTHER

## 2020-03-09 RX ORDER — GABAPENTIN 600 MG/1
600 TABLET ORAL 3 TIMES DAILY
Qty: 270 TABLET | Refills: 1 | Status: SHIPPED | OUTPATIENT
Start: 2020-03-09 | End: 2020-08-19 | Stop reason: SDUPTHER

## 2020-03-09 RX ORDER — LORAZEPAM 1 MG/1
1 TABLET ORAL
Qty: 30 TABLET | Refills: 5 | Status: SHIPPED | OUTPATIENT
Start: 2020-03-09 | End: 2020-08-19 | Stop reason: SDUPTHER

## 2020-03-09 RX ORDER — PAROXETINE HYDROCHLORIDE 40 MG/1
40 TABLET, FILM COATED ORAL NIGHTLY
Qty: 90 TABLET | Refills: 1 | Status: SHIPPED | OUTPATIENT
Start: 2020-03-09 | End: 2020-08-19 | Stop reason: SDUPTHER

## 2020-03-09 RX ORDER — METOPROLOL SUCCINATE 25 MG/1
25 TABLET, EXTENDED RELEASE ORAL DAILY
Qty: 90 TABLET | Refills: 1 | Status: SHIPPED | OUTPATIENT
Start: 2020-03-09 | End: 2020-08-19 | Stop reason: SDUPTHER

## 2020-03-09 NOTE — PROGRESS NOTES
Subjective:      Patient ID: Kelly Almodovar is a 51 y.o. male. HPI   Chief Complaint   Patient presents with   Fredda Bosch well on the paroxetine    Hypertension-without cardiac, CNS or peripheral vascular complaints. On metoprolol    Anxiety-1 lorazepam a day. No falls or accidents. For reasons that are unclear, unable to get any kind of oarrs document that he has had any prescriptions filled even though he gets it filled at [address] Numbness-under control; uses gabapentin for the aches and pains in his muscles and joints    Other     fam hx of pros can-father at age 71;pat uncle at age 49;pat grandr;noc times 5-over 2yrs;diurnal frequency;weak stream;no post void dribbles;no incont; Chief complaint present illness: 20-year-old white male presents unaccompanied for routine follow-up. Strong family history of prostate cancer-father diagnosed at age 79. Apparently \"bad\". Symptomatic as noted above but a lot of this is been going on for a few years at least    Review of Systems   All other systems reviewed and are negative. background/entire past medical,social and family history obtained and reviewed/updated today   Objective:   Physical Exam  Vitals signs and nursing note reviewed. Constitutional:       Appearance: Normal appearance. He is normal weight. Eyes:      Conjunctiva/sclera: Conjunctivae normal.   Neck:      Vascular: No carotid bruit. Cardiovascular:      Rate and Rhythm: Normal rate and regular rhythm. Heart sounds: No murmur. Pulmonary:      Effort: Pulmonary effort is normal.      Breath sounds: Normal breath sounds. No wheezing, rhonchi or rales. Genitourinary:     Prostate: Normal.   Neurological:      Mental Status: He is alert. /80   Pulse 52   Ht 5' 7\" (1.702 m)   Wt 162 lb (73.5 kg)   SpO2 98%   BMI 25.37 kg/m²       Assessment:      Brendon Clayton was seen today for depression, hypertension, anxiety, numbness and other.     Diagnoses

## 2020-03-10 RX ORDER — IBUPROFEN 800 MG/1
800 TABLET ORAL EVERY 6 HOURS PRN
Qty: 120 TABLET | Refills: 3 | Status: SHIPPED | OUTPATIENT
Start: 2020-03-10 | End: 2020-12-21 | Stop reason: SDUPTHER

## 2020-03-11 LAB
PROSTATE SPECIFIC ANTIGEN FREE: 0.5 UG/L
PROSTATE SPECIFIC ANTIGEN PERCENT FREE: 25 %
PROSTATE SPECIFIC ANTIGEN: 2 UG/L (ref 0–4)

## 2020-03-12 RX ORDER — TAMSULOSIN HYDROCHLORIDE 0.4 MG/1
0.4 CAPSULE ORAL DAILY
Qty: 90 CAPSULE | Refills: 1 | Status: SHIPPED | OUTPATIENT
Start: 2020-03-12 | End: 2020-08-19 | Stop reason: CLARIF

## 2020-03-25 ENCOUNTER — TELEPHONE (OUTPATIENT)
Dept: PULMONOLOGY | Age: 50
End: 2020-03-25

## 2020-03-25 NOTE — TELEPHONE ENCOUNTER
Patient has cancelled appointment based on the CDC recommended COVID-19 pandemic precautions. Pt is scheduled for 6 month appt on 3/30/2020. Appt is cancelled and needs to be r/s to n/a. Attempted to reach pt no answer no machine. 8/5/2019    Assessment:       · Pulmonary emphysema/COPD   · Dyspnea. Doubt of pulmonary origin. Unremarkable CTPA. Negative Echo with bubble study. Unremarkable LHC. Much improved  · Snoring- negative PSG   · Intermittent hypoxia, tachycardia and bradycardia- PSG and 6 minute walk negative for hypoxemia. Improved  · Mild restrictive defect -CT does not suggest ILD. Normalized on repeat   · Bilateral pulmonary nodules with mediastinal and hilar adenopathy 5/2013. Decreased in size in RLL PNs and 4R LN with enlargement of GOPAL PN on repeated CT chest 8/16/23013. Stable on CT 05/27/2014. Positive histoplasma M bands and Histoplasma Ab yeast CF (1-64). Itraconazole 0/4/9086-4/7/3220  · Hypermetabolic right hilar lymph nodes. 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 Improved on repeated CT. · Arthritis. 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    ·   · 60 pack year smoking - quit 1/2018      Plan:       · Trial off Symbicort   · Continue Spiriva and Albuterol 2 puffs Q4-6 hrs PRN  · Patient is up to date with Pneumococcal vaccine  · Advised to get influenza vaccine this year   · Advised to continue with smoking cessation

## 2020-04-14 RX ORDER — BUDESONIDE AND FORMOTEROL FUMARATE DIHYDRATE 160; 4.5 UG/1; UG/1
AEROSOL RESPIRATORY (INHALATION)
Qty: 10.2 G | Refills: 0 | OUTPATIENT
Start: 2020-04-14

## 2020-08-18 RX ORDER — LORAZEPAM 1 MG/1
1 TABLET ORAL
Qty: 30 TABLET | Refills: 0 | OUTPATIENT
Start: 2020-08-18 | End: 2021-02-14

## 2020-08-18 RX ORDER — METOPROLOL SUCCINATE 25 MG/1
25 TABLET, EXTENDED RELEASE ORAL DAILY
Qty: 90 TABLET | Refills: 0 | OUTPATIENT
Start: 2020-08-18

## 2020-08-18 RX ORDER — GABAPENTIN 600 MG/1
600 TABLET ORAL 3 TIMES DAILY
Qty: 270 TABLET | Refills: 0 | OUTPATIENT
Start: 2020-08-18 | End: 2021-02-14

## 2020-08-18 RX ORDER — ALBUTEROL SULFATE 90 UG/1
2 AEROSOL, METERED RESPIRATORY (INHALATION) EVERY 6 HOURS PRN
Qty: 1 INHALER | Refills: 0 | OUTPATIENT
Start: 2020-08-18

## 2020-08-18 RX ORDER — PAROXETINE HYDROCHLORIDE 40 MG/1
40 TABLET, FILM COATED ORAL NIGHTLY
Qty: 90 TABLET | Refills: 0 | OUTPATIENT
Start: 2020-08-18

## 2020-08-18 NOTE — TELEPHONE ENCOUNTER
Proair HFA, Gabapentin 600 MG, Lorazepam 1 MG, Metoprolol succinate 25 MG, Paroxetine 40 MG, [address]      Patient is scheduled with Ez Griffin DNP on 9/17/20, would like to be seen sooner due to numbness/tingling in head. Have patient on wait list and will be watching for openings.

## 2020-08-19 ENCOUNTER — OFFICE VISIT (OUTPATIENT)
Dept: FAMILY MEDICINE CLINIC | Age: 50
End: 2020-08-19
Payer: MEDICARE

## 2020-08-19 VITALS
BODY MASS INDEX: 25.06 KG/M2 | DIASTOLIC BLOOD PRESSURE: 77 MMHG | WEIGHT: 160 LBS | OXYGEN SATURATION: 98 % | HEART RATE: 67 BPM | SYSTOLIC BLOOD PRESSURE: 114 MMHG | TEMPERATURE: 98.8 F

## 2020-08-19 PROBLEM — J43.8 OTHER EMPHYSEMA (HCC): Status: ACTIVE | Noted: 2018-08-19

## 2020-08-19 PROCEDURE — 99214 OFFICE O/P EST MOD 30 MIN: CPT | Performed by: NURSE PRACTITIONER

## 2020-08-19 PROCEDURE — 36415 COLL VENOUS BLD VENIPUNCTURE: CPT | Performed by: NURSE PRACTITIONER

## 2020-08-19 RX ORDER — GABAPENTIN 600 MG/1
600 TABLET ORAL 3 TIMES DAILY
Qty: 270 TABLET | Refills: 1 | Status: SHIPPED | OUTPATIENT
Start: 2020-08-19 | End: 2020-12-21 | Stop reason: SDUPTHER

## 2020-08-19 RX ORDER — METOPROLOL SUCCINATE 25 MG/1
25 TABLET, EXTENDED RELEASE ORAL DAILY
Qty: 90 TABLET | Refills: 1 | Status: SHIPPED | OUTPATIENT
Start: 2020-08-19 | End: 2020-12-21 | Stop reason: SDUPTHER

## 2020-08-19 RX ORDER — PAROXETINE HYDROCHLORIDE 40 MG/1
40 TABLET, FILM COATED ORAL NIGHTLY
Qty: 90 TABLET | Refills: 1 | Status: SHIPPED | OUTPATIENT
Start: 2020-08-19 | End: 2020-12-21 | Stop reason: SDUPTHER

## 2020-08-19 RX ORDER — LORAZEPAM 1 MG/1
1 TABLET ORAL
Qty: 30 TABLET | Refills: 2 | Status: SHIPPED | OUTPATIENT
Start: 2020-08-19 | End: 2020-12-21 | Stop reason: SDUPTHER

## 2020-08-19 RX ORDER — ALBUTEROL SULFATE 90 UG/1
2 AEROSOL, METERED RESPIRATORY (INHALATION) EVERY 6 HOURS PRN
Qty: 1 INHALER | Refills: 6 | Status: SHIPPED | OUTPATIENT
Start: 2020-08-19 | End: 2020-12-21 | Stop reason: SDUPTHER

## 2020-08-19 ASSESSMENT — ENCOUNTER SYMPTOMS
COUGH: 0
CHEST TIGHTNESS: 0
CONSTIPATION: 0
BLOOD IN STOOL: 0
VOMITING: 0
FACIAL SWELLING: 0
NAUSEA: 0
VOICE CHANGE: 0
EYES NEGATIVE: 1
SHORTNESS OF BREATH: 1
DIARRHEA: 0

## 2020-08-19 NOTE — PROGRESS NOTES
8/19/2020    Chief Complaint   Patient presents with    Numbness     needs gabapentin refilled for hand and leg numbness    Shortness of Breath     needs his inhalers refilled     Anxiety     controlled with his paxil and lorazepam     Tingling     in his head and face just started recently        Reji Franks is a 51 y.o. male, presents today for:    HPI   Head Numbness/ Tingling  Occurring <48 hours, when touch your head \"feels odd\". Continues to have dizziness, feels worse than prior. No vision/ hearing changes, nausea/ vomiting. Continues to have issues with abnormal gait where he will walk \"crooked\" especially when he first gets up. Have baseline parasthesias in hands/ feet---dx as Raynauds in the past and is on Gabapentin for this. No trauma/ new activities. Not using any illicit substances, taking Klonopin and Gabapentin as prescribed. Long history of COPD, currently seeing Dr. Tamara Gold, last visit 1 year ago. Last PFTs at that time, did trial of stopping Symbicort. Continuing to use Spiriva and Albuterol inhalers TID. Has noticed increased SOB over the past month with dry mouth especially in the AM. Hx histoplasmosis in 2013 and pulmonary nodule 5 mm in 2018 (due to histo?). Stopped smoking 2018. No family hx of lung cancer but dad/ grandad/ great uncle had prostate cancer. Continues to have intermittent tachycardia. Has seen Cardio in the past- cardiac origin ruled out at that time. Multiple imaging studies done including [facility]. Review of Systems   Constitutional: Positive for activity change and fatigue. Negative for appetite change, chills, diaphoresis, fever and unexpected weight change. HENT: Negative for ear pain, facial swelling, hearing loss, tinnitus and voice change. Eyes: Negative. Respiratory: Positive for shortness of breath. Negative for cough and chest tightness. Cardiovascular: Negative.     Gastrointestinal: Negative for blood in stool, constipation, diarrhea, nausea and vomiting. Genitourinary: Negative. Musculoskeletal: Positive for arthralgias. Chronic   Skin: Negative. Neurological: Positive for dizziness, light-headedness, numbness and headaches. Negative for tremors, syncope, facial asymmetry, speech difficulty and weakness. Hematological: Negative. Psychiatric/Behavioral: Negative for dysphoric mood, self-injury, sleep disturbance and suicidal ideas. Current Outpatient Medications on File Prior to Visit   Medication Sig Dispense Refill    ibuprofen (ADVIL;MOTRIN) 800 MG tablet Take 1 tablet by mouth every 6 hours as needed for Pain 120 tablet 3    dicyclomine (BENTYL) 10 MG capsule Take 1 capsule by mouth 4 times daily as needed (abdominal cramps.) 90 capsule 1    fluticasone (FLONASE) 50 MCG/ACT nasal spray 1 spray by Each Nare route daily 1 Bottle 3     No current facility-administered medications on file prior to visit. Allergies   Allergen Reactions    Codeine Itching    Mobic [Meloxicam]      Irritates stomach    Tramadol      Patient states he doesn't feel right on tramadol.      Past Medical History:   Diagnosis Date    Arthritis     Chest pain     [facility] 9/2012 sent for records;    Colitis     [facility] [address]     Histoplasmosis     History of blood transfusion     Hx of blood clots     Metabolic syndrome X 38/42/9232    Other emphysema ([facility] [address] 8/19/2018    Pneumonia     Pulmonary nodule      Past Surgical History:   Procedure Laterality Date    BRONCHOSCOPY  6/05/2013    COLONOSCOPY  12/17/2018    COLONOSCOPY N/A 12/17/2018    COLORECTAL CANCER SCREENING, NOT HIGH RISK performed by Estephania Murphy DO at [facility] CATH LAB PROCEDURE  1998    KNEE ARTHROSCOPY Right     KNEE ARTHROSCOPY Right 12/13/2019    RIGHT KNEE VIDEO ARTHROSCOPY, MEDIAL MENISCECTOMY performed by Shaista Hale MD at [address] DISSECTION      neck    VASECTOMY        Social History     Tobacco Use    Smoking status: Former Smoker     Packs/day: 0.50     Years: 20.00     Pack years: 10.00     Types: Cigarettes     Last attempt to quit: 2018     Years since quittin.6    Smokeless tobacco: Never Used   Substance Use Topics    Alcohol use: No     Alcohol/week: 1.0 standard drinks     Types: 1 Cans of beer per week      Family History   Problem Relation Age of Onset    Cancer Mother         ovarian    Heart Failure Mother     Heart Attack Mother 62    Heart Disease Mother     Other Father          1948;ashd;per neurop;    Other Brother         pt knows very little    Cancer Paternal Uncle         leukemia    Cancer Paternal Grandfather         leukemia    Heart Attack Maternal Uncle 60        x4     No Known Problems Son     No Known Problems Son     Asthma Neg Hx     Diabetes Neg Hx     Emphysema Neg Hx     Hypertension Neg Hx         Vitals:    20 1357   BP: 114/77   Pulse: 67   Temp: 98.8 °F (37.1 °C)   TempSrc: Temporal   SpO2: 98%   Weight: 160 lb (72.6 kg)     Estimated body mass index is 25.06 kg/m² as calculated from the following:    Height as of 3/9/20: 5' 7\" (1.702 m). Weight as of this encounter: 160 lb (72.6 kg). Physical Exam  Vitals signs reviewed. Constitutional:       Appearance: Normal appearance. HENT:      Head: Normocephalic. Eyes:      Extraocular Movements: Extraocular movements intact. Pupils: Pupils are equal, round, and reactive to light. Neck:      Musculoskeletal: Normal range of motion. No neck rigidity or muscular tenderness. Vascular: No carotid bruit. Cardiovascular:      Rate and Rhythm: Normal rate and regular rhythm. Pulses: Normal pulses. Heart sounds: Normal heart sounds. No murmur. No gallop. Pulmonary:      Effort: Pulmonary effort is normal.      Breath sounds: Normal breath sounds. Abdominal:      General: Abdomen is flat. Palpations: Abdomen is soft. Musculoskeletal: Normal range of motion. Right wrist: Normal.      Left wrist: Normal.      Cervical back: Normal.      Thoracic back: Normal.      Lumbar back: Normal.      Right upper arm: Normal.      Left upper arm: Normal.      Right forearm: Normal.      Left forearm: Normal.      Right upper leg: Normal.      Left upper leg: Normal.      Right lower leg: Normal.      Left lower leg: Normal.   Lymphadenopathy:      Cervical: No cervical adenopathy. Skin:     General: Skin is warm and dry. Capillary Refill: Capillary refill takes less than 2 seconds. Comments: \"decreased sensation\" throughout hair   Neurological:      General: No focal deficit present. Mental Status: He is alert and oriented to person, place, and time. Cranial Nerves: Cranial nerves are intact. Sensory: Sensation is intact. Motor: Motor function is intact. Coordination: Coordination is intact. Gait: Gait is intact. Deep Tendon Reflexes: Reflexes are normal and symmetric. Psychiatric:         Mood and Affect: Mood normal.         Behavior: Behavior normal.         ASSESSMENT/PLAN:  1. Paresthesia  Labs today  Unclear etiology. If s/s do not resolve by next week consider referral to Neuro and imaging  - CBC WITH AUTO DIFFERENTIAL  - COMPREHENSIVE METABOLIC PANEL  - TSH with Reflex  - Urine Drug Screen    2. Incidental pulmonary nodule, > 3mm and < 8mm  Found 2018. Will reorder CT scan today due to worsening SOB, dizziness, hx of smoking  Asked pt to make appt with Pulmonary after CT scan  - CBC WITH AUTO DIFFERENTIAL  - COMPREHENSIVE METABOLIC PANEL    3. Numbness and tingling of both legs below knees  Continue Gabapentin, labs today  - gabapentin (NEURONTIN) 600 MG tablet; Take 1 tablet by mouth 3 times daily for 180 days. Dispense: 270 tablet; Refill: 1  - CBC WITH AUTO DIFFERENTIAL  - COMPREHENSIVE METABOLIC PANEL  - TSH with Reflex    4.  Chronic anxiety  Continue Ativan. Consider adding SNRI for anxiety  - LORazepam (ATIVAN) 1 MG tablet; Take 1 tablet by mouth every morning (before breakfast) for 90 days. Dispense: 30 tablet; Refill: 2  - Urine Drug Screen    5. Pre-syncope  Unclear etiology, has evaluated by Cardio and Pulmonary in the past.  Labs today  - metoprolol succinate (TOPROL XL) 25 MG extended release tablet; Take 1 tablet by mouth daily  Dispense: 90 tablet; Refill: 1  - CBC WITH AUTO DIFFERENTIAL  - COMPREHENSIVE METABOLIC PANEL  - TSH with Reflex    6. Rapid palpitations  Continue Metoprolol today  - metoprolol succinate (TOPROL XL) 25 MG extended release tablet; Take 1 tablet by mouth daily  Dispense: 90 tablet; Refill: 1    7. Moderate single current episode of major depressive disorder (HCC)  Continue Paxil/ Ativan  - PARoxetine (PAXIL) 40 MG tablet; Take 1 tablet by mouth nightly  Dispense: 90 tablet; Refill: 1    8. Shortness of breath  Recently worse, will order CT scan. See above    9. Other emphysema (HCC)  Continue Spiriva and Albuterol for now. Asked pt to make f/u with Pulmonary for further eval, appreciate their assistance  - tiotropium (SPIRIVA RESPIMAT) 2.5 MCG/ACT AERS inhaler; INHALE TWO (2) PUFFS INTO THE LUNGS DAILY  Dispense: 1 Inhaler; Refill: 5  - albuterol sulfate HFA (PROAIR HFA) 108 (90 Base) MCG/ACT inhaler; Inhale 2 puffs into the lungs every 6 hours as needed for Wheezing or Shortness of Breath  Dispense: 1 Inhaler; Refill: 6      Return in about 1 week (around 8/26/2020) for head numbness/ tingling. Patient's questions answered and concerns addressed. Patient agrees to plan of care.           Electronically signed by Cornelious Spurling, APRN on 8/19/2020 at 3:13 PM

## 2020-08-19 NOTE — PROGRESS NOTES
Blood drawn per order. Needle size: 23 g butterfly  Site: L Antecubital.  First attempt successful Yes    Second attempt na    Pressure applied until bleeding stopped. Cotton ball and band aid  applied. Patient informed to call office or return if bleeding reoccurs and unable to stop.     Tubes drawn: 1 purple     2 red      2 white tubes for urine drug screen

## 2020-08-20 ENCOUNTER — OFFICE VISIT (OUTPATIENT)
Dept: ORTHOPEDIC SURGERY | Age: 50
End: 2020-08-20
Payer: MEDICARE

## 2020-08-20 VITALS — WEIGHT: 160 LBS | HEIGHT: 67 IN | BODY MASS INDEX: 25.11 KG/M2 | RESPIRATION RATE: 15 BRPM

## 2020-08-20 PROBLEM — G89.29 CHRONIC PAIN OF RIGHT KNEE: Status: ACTIVE | Noted: 2019-09-04

## 2020-08-20 LAB
A/G RATIO: 1.9 (ref 1.1–2.2)
ALBUMIN SERPL-MCNC: 4.3 G/DL (ref 3.4–5)
ALP BLD-CCNC: 66 U/L (ref 40–129)
ALT SERPL-CCNC: 9 U/L (ref 10–40)
AMPHETAMINE SCREEN, URINE: NORMAL
ANION GAP SERPL CALCULATED.3IONS-SCNC: 8 MMOL/L (ref 3–16)
AST SERPL-CCNC: 15 U/L (ref 15–37)
BARBITURATE SCREEN URINE: NORMAL
BASOPHILS ABSOLUTE: 0.1 K/UL (ref 0–0.2)
BASOPHILS RELATIVE PERCENT: 1.1 %
BENZODIAZEPINE SCREEN, URINE: NORMAL
BILIRUB SERPL-MCNC: 0.8 MG/DL (ref 0–1)
BUN BLDV-MCNC: 14 MG/DL (ref 7–20)
CALCIUM SERPL-MCNC: 9.9 MG/DL (ref 8.3–10.6)
CANNABINOID SCREEN URINE: NORMAL
CHLORIDE BLD-SCNC: 104 MMOL/L (ref 99–110)
CO2: 29 MMOL/L (ref 21–32)
COCAINE METABOLITE SCREEN URINE: NORMAL
CREAT SERPL-MCNC: 1.1 MG/DL (ref 0.9–1.3)
EOSINOPHILS ABSOLUTE: 0.5 K/UL (ref 0–0.6)
EOSINOPHILS RELATIVE PERCENT: 6.4 %
GFR AFRICAN AMERICAN: >60
GFR NON-AFRICAN AMERICAN: >60
GLOBULIN: 2.3 G/DL
GLUCOSE BLD-MCNC: 72 MG/DL (ref 70–99)
HCT VFR BLD CALC: 41.9 % (ref 40.5–52.5)
HEMOGLOBIN: 14.3 G/DL (ref 13.5–17.5)
LYMPHOCYTES ABSOLUTE: 2.3 K/UL (ref 1–5.1)
LYMPHOCYTES RELATIVE PERCENT: 26.8 %
Lab: NORMAL
MCH RBC QN AUTO: 30.2 PG (ref 26–34)
MCHC RBC AUTO-ENTMCNC: 34.1 G/DL (ref 31–36)
MCV RBC AUTO: 88.5 FL (ref 80–100)
METHADONE SCREEN, URINE: NORMAL
MONOCYTES ABSOLUTE: 0.7 K/UL (ref 0–1.3)
MONOCYTES RELATIVE PERCENT: 7.8 %
NEUTROPHILS ABSOLUTE: 5 K/UL (ref 1.7–7.7)
NEUTROPHILS RELATIVE PERCENT: 57.9 %
OPIATE SCREEN URINE: NORMAL
OXYCODONE URINE: NORMAL
PDW BLD-RTO: 12.9 % (ref 12.4–15.4)
PH UA: 6
PHENCYCLIDINE SCREEN URINE: NORMAL
PLATELET # BLD: 283 K/UL (ref 135–450)
PMV BLD AUTO: 9 FL (ref 5–10.5)
POTASSIUM SERPL-SCNC: 4.6 MMOL/L (ref 3.5–5.1)
PROPOXYPHENE SCREEN: NORMAL
RBC # BLD: 4.73 M/UL (ref 4.2–5.9)
SODIUM BLD-SCNC: 141 MMOL/L (ref 136–145)
TOTAL PROTEIN: 6.6 G/DL (ref 6.4–8.2)
TSH REFLEX: 0.79 UIU/ML (ref 0.27–4.2)
WBC # BLD: 8.6 K/UL (ref 4–11)

## 2020-08-20 PROCEDURE — 1036F TOBACCO NON-USER: CPT | Performed by: ORTHOPAEDIC SURGERY

## 2020-08-20 PROCEDURE — G8419 CALC BMI OUT NRM PARAM NOF/U: HCPCS | Performed by: ORTHOPAEDIC SURGERY

## 2020-08-20 PROCEDURE — 99213 OFFICE O/P EST LOW 20 MIN: CPT | Performed by: ORTHOPAEDIC SURGERY

## 2020-08-20 PROCEDURE — 20610 DRAIN/INJ JOINT/BURSA W/O US: CPT | Performed by: ORTHOPAEDIC SURGERY

## 2020-08-20 PROCEDURE — G8428 CUR MEDS NOT DOCUMENT: HCPCS | Performed by: ORTHOPAEDIC SURGERY

## 2020-08-20 RX ORDER — BUPIVACAINE HYDROCHLORIDE 2.5 MG/ML
7 INJECTION, SOLUTION INFILTRATION; PERINEURAL ONCE
Status: COMPLETED | OUTPATIENT
Start: 2020-08-20 | End: 2020-08-20

## 2020-08-20 RX ORDER — IBUPROFEN 800 MG/1
800 TABLET ORAL EVERY 6 HOURS PRN
Qty: 90 TABLET | Refills: 3 | Status: SHIPPED | OUTPATIENT
Start: 2020-08-20 | End: 2020-12-21 | Stop reason: SDUPTHER

## 2020-08-20 RX ORDER — METHYLPREDNISOLONE ACETATE 40 MG/ML
40 INJECTION, SUSPENSION INTRA-ARTICULAR; INTRALESIONAL; INTRAMUSCULAR; SOFT TISSUE ONCE
Status: COMPLETED | OUTPATIENT
Start: 2020-08-20 | End: 2020-08-20

## 2020-08-20 RX ADMIN — BUPIVACAINE HYDROCHLORIDE 17.5 MG: 2.5 INJECTION, SOLUTION INFILTRATION; PERINEURAL at 13:41

## 2020-08-20 RX ADMIN — METHYLPREDNISOLONE ACETATE 40 MG: 40 INJECTION, SUSPENSION INTRA-ARTICULAR; INTRALESIONAL; INTRAMUSCULAR; SOFT TISSUE at 13:41

## 2020-08-20 NOTE — PROGRESS NOTES
KNEE VISIT      HISTORY OF PRESENT ILLNESS    Mercedes Salazar is a 51 y.o. male who presents for reevaluation of his right knee. He had surgery years ago. He says some pain and achiness when he squats down or when he is up on for too long most in the medial side of his knee. He had a shots of cortisone in January which helped substantially also takes Motrin which he is out of at this time which seems to have aggravated his pain also. He has had no interval trauma. ROS    Well-documented patient history form dated 9/4/2019  All other ROS negative except for above. Past Surgical history    Past Surgical History:   Procedure Laterality Date    BRONCHOSCOPY  6/05/2013    COLONOSCOPY  12/17/2018    COLONOSCOPY N/A 12/17/2018    COLORECTAL CANCER SCREENING, NOT HIGH RISK performed [facility] Stephany [facility] DO [facility]     KNEE ARTHROSCOPY Right 12/13/2019    RIGHT KNEE VIDEO ARTHROSCOPY, MEDIAL MENISCECTOMY performed by Zeke Elliott MD at [facility]      neck    VASECTOMY         PAST MEDICAL    Past Medical History:   Diagnosis Date    Arthritis     Chest pain     [facility] 9/2012 sent for records;    Colitis     COPD [facility] obstructive [facility]) [address] [address]     Histoplasmosis     History of blood transfusion     Hx of blood clots     Metabolic syndrome X 70/17/4086    Other emphysema ([facility] [address] 8/19/2018    Pneumonia     Pulmonary nodule        Allergies    Allergies   Allergen Reactions    Codeine Itching    Mobic [Meloxicam]      Irritates stomach    Tramadol      Patient states he doesn't feel right on tramadol.        Meds    Current Outpatient Medications   Medication Sig Dispense Refill    ibuprofen (ADVIL;MOTRIN) 800 MG tablet Take 1 tablet by mouth every 6 hours as needed for Pain 90 tablet 3    gabapentin (NEURONTIN) 600 MG tablet Take 1 tablet by mouth 3 times daily for 180 days. 270 tablet 1    LORazepam (ATIVAN) 1 MG tablet Take 1 tablet by mouth every morning (before breakfast) for 90 days. 30 tablet 2    metoprolol succinate (TOPROL XL) 25 MG extended release tablet Take 1 tablet by mouth daily 90 tablet 1    PARoxetine (PAXIL) 40 MG tablet Take 1 tablet by mouth nightly 90 tablet 1    tiotropium (SPIRIVA RESPIMAT) 2.5 MCG/ACT AERS inhaler INHALE TWO (2) PUFFS INTO THE LUNGS DAILY 1 Inhaler 5    albuterol sulfate HFA (PROAIR HFA) 108 (90 Base) MCG/ACT inhaler Inhale 2 puffs into the lungs every 6 hours as needed for Wheezing or Shortness of Breath 1 Inhaler 6    ibuprofen (ADVIL;MOTRIN) 800 MG tablet Take 1 tablet by mouth every 6 hours as needed for Pain 120 tablet 3    dicyclomine (BENTYL) 10 MG capsule Take 1 capsule by mouth 4 times daily as needed (abdominal cramps.) 90 capsule 1    fluticasone (FLONASE) 50 MCG/ACT nasal spray 1 spray by Each Nare route daily 1 Bottle 3     No current facility-administered medications for this visit.         Social    Social History     Socioeconomic History    Marital status:      Spouse name: Not on file    Number of children: Not on file    Years of education: Not on file    Highest education level: Not on file   Occupational History    Not on file   Social Needs    Financial resource strain: Not on file    Food insecurity     Worry: Not on file     Inability: Not on file    Transportation needs     Medical: Not on file     Non-medical: Not on file   Tobacco Use    Smoking status: Former Smoker     Packs/day: 0.50     Years: 20.00     Pack years: 10.00     Types: Cigarettes     Last attempt to quit: 2018     Years since quittin.6    Smokeless tobacco: Never Used   Substance and Sexual Activity    Alcohol use: No     Alcohol/week: 1.0 standard drinks     Types: 1 Cans of beer per week    Drug use: No    Sexual activity: Yes     Partners: Female   Lifestyle    Physical activity     Days per week: Not on file     Minutes per session: Not on file    Stress: Not on file   Relationships    Social connections     Talks on phone: Not on file     Gets together: Not on file     Attends [employer] service: Not on file     Active member of club or organization: Not on file     Attends meetings of clubs or organizations: Not on file     Relationship status: Not on file    Intimate partner violence     Fear of current or ex partner: Not on file     Emotionally abused: Not on file     Physically abused: Not on file     Forced sexual activity: Not on file   Other Topics Concern    Not on file   Social History Narrative    2018lives by self with 2 dogs;works in [employer]       Family HISTORY    Family History   Problem Relation Age of Onset    Cancer Mother         ovarian    Heart Failure Mother     Heart Attack Mother 62    Heart Disease Mother     Other Father          1948;ashd;per neurop;    Other Brother         pt knows very little    Cancer Paternal Uncle         leukemia    Cancer Paternal Grandfather         leukemia    Heart Attack Maternal Uncle 60        x4     No Known Problems Son     No Known Problems Son     Asthma Neg Hx     Diabetes Neg Hx     Emphysema Neg Hx     Hypertension Neg Hx        PHYSICAL EXAM    Vital Signs:  Resp 15   Ht 5' 7\" (1.702 m)   Wt 160 lb (72.6 kg)   BMI 25.06 kg/m²   General Appearance:  Normal body habitus. Alert and oriented to person, place, and time. Affect:  Normal.   Gait:  Normal. Good balance and coordination. Skin:  Intact. Sensation:  Intact. Strength:  Intact. Reflexes:  Intact. Pulses:  Intact.    Knee Exam:    Effusion: Negative    Range of Motion Right Left   Extension 0 0   Flexion 115 115     Provocative Test Right Left    Positive Negative Positive Negative   Anterior drawer [] [x] [] [x]   Lachman [] [x] [] [x]   Posterior drawer [] [x] [] [x]   Varus testing [] [x] [] [x]   Valgus testing [] [x] [] [x]   Joint line tenderness [x] [] [] [x]     Additional Exam Comments: His neurocirculatory lymphatic exam otherwise is normal symmetric to both lower extremities. He does have discomfort most in the medial compartment of his knee without crepitus or instability or effusion. IMAGING STUDIES    X-rays were not done today    IMPRESSION    Right knee pain status post arthroscopy with medial meniscectomy consistent with early arthritic change    PLAN      1. Conservative care options including physical therapy, NSAIDs, bracing, and activity modification were discussed. 2.  The indications for therapeutic injections were discussed. 3.  The indications for additional imaging studies were discussed. 4.  After considering the various options discussed, the patient elected to pursue a course that includes refills Motrin and a cortisone injection today. He should return periodically if needed. Recommendation is for a cortisone injection into the right knee. After informed consent was received from the patient, the right knee was injected with 1 mL(40mg)Depo-Medrol and 4 mL of 0.25% Marcaine  in the syringe from an anterolateral joint line approach, using a 25-gauge needle, under sterile Betadine prep, using ethyl chloride as a topical refrigerant, for a diagnosis of osteoarthritis. The patient appeared to tolerate it well. The patient should return here periodically as needed. Encounter Diagnoses   Name Primary?     Degenerative tear of medial meniscus of right knee     Chronic pain of right knee Yes        Orders Placed This Encounter   Procedures    RI ARTHROCENTESIS ASPIR&/INJ MAJOR JT/BURSA W/O US

## 2020-08-26 ENCOUNTER — HOSPITAL ENCOUNTER (OUTPATIENT)
Dept: GENERAL RADIOLOGY | Age: 50
Discharge: HOME | End: 2020-08-26
Payer: MEDICARE

## 2020-08-26 ENCOUNTER — OFFICE VISIT (OUTPATIENT)
Dept: FAMILY MEDICINE CLINIC | Age: 50
End: 2020-08-26
Payer: MEDICARE

## 2020-08-26 ENCOUNTER — HOSPITAL ENCOUNTER (OUTPATIENT)
Age: 50
Discharge: HOME | End: 2020-08-26
Payer: MEDICARE

## 2020-08-26 VITALS
OXYGEN SATURATION: 97 % | BODY MASS INDEX: 24.84 KG/M2 | SYSTOLIC BLOOD PRESSURE: 112 MMHG | DIASTOLIC BLOOD PRESSURE: 78 MMHG | WEIGHT: 158.6 LBS | TEMPERATURE: 98.8 F | HEART RATE: 74 BPM

## 2020-08-26 PROCEDURE — 99213 OFFICE O/P EST LOW 20 MIN: CPT | Performed by: NURSE PRACTITIONER

## 2020-08-26 PROCEDURE — 71046 X-RAY EXAM CHEST 2 VIEWS: CPT

## 2020-08-26 RX ORDER — METHYLPREDNISOLONE 4 MG/1
TABLET ORAL
Qty: 1 KIT | Refills: 0 | Status: SHIPPED | OUTPATIENT
Start: 2020-08-26 | End: 2020-09-01

## 2020-08-26 ASSESSMENT — ENCOUNTER SYMPTOMS
RHINORRHEA: 0
CHOKING: 0
VOMITING: 0
SORE THROAT: 0
SPUTUM PRODUCTION: 1
SHORTNESS OF BREATH: 1
ABDOMINAL PAIN: 0
APNEA: 0
COLOR CHANGE: 0
ORTHOPNEA: 0
COUGH: 1
HEMOPTYSIS: 0
SWOLLEN GLANDS: 0
CHEST TIGHTNESS: 0
WHEEZING: 0

## 2020-08-26 NOTE — PROGRESS NOTES
8/26/2020    Chief Complaint   Patient presents with    Numbness     1 wk fu on head numbness and tingling, this is the same as it was the last time he is in     Shortness of Breath     has been using his inhaler more the past few days he thinks it is the hot weather     Dizziness    Fatigue     very tired the past 3 to 4 days        Paramjit Graves is a 51 y.o. male, presents today for:    Head Parasthesia:  Worsened over the past week. Increasing tingling/ numbness for 10 days after eating hamburger in [place]. No vision, hearing, taste changes. Baseline gait abnormalities. No new leg/ arm weakness. Labs unremarkable. Would prefer to be referred to Neurology for w/u. Shortness of Breath   This is a new problem. The current episode started yesterday. The problem occurs constantly. The problem has been gradually worsening. Associated symptoms include sputum production. Pertinent negatives include no abdominal pain, chest pain, claudication, coryza, ear pain, fever, headaches, hemoptysis, leg pain, leg swelling, neck pain, orthopnea, PND, rash, rhinorrhea, sore throat, swollen glands, syncope, vomiting or wheezing. Associated symptoms comments: Green/ clear. He has tried beta agonist inhalers for the symptoms. The treatment provided mild relief. His past medical history is significant for COPD. Dizziness   Associated symptoms include coughing and fatigue. Pertinent negatives include no abdominal pain, chest pain, fever, headaches, neck pain, rash, sore throat, swollen glands or vomiting. Fatigue   Associated symptoms include coughing and fatigue. Pertinent negatives include no abdominal pain, chest pain, fever, headaches, neck pain, rash, sore throat, swollen glands or vomiting. Review of Systems   Constitutional: Positive for fatigue. Negative for fever. HENT: Negative for ear pain, rhinorrhea and sore throat. Eyes: Negative for visual disturbance.    Respiratory: Positive for cough, sputum production and shortness of breath. Negative for apnea, hemoptysis, choking, chest tightness and wheezing. Cardiovascular: Negative for chest pain, orthopnea, claudication, leg swelling, syncope and PND. Gastrointestinal: Negative for abdominal pain and vomiting. Musculoskeletal: Negative for neck pain. Skin: Negative for color change, pallor and rash. Neurological: Positive for dizziness. Negative for headaches. Current Outpatient Medications on File Prior to Visit   Medication Sig Dispense Refill    ibuprofen (ADVIL;MOTRIN) 800 MG tablet Take 1 tablet by mouth every 6 hours as needed for Pain 90 tablet 3    gabapentin (NEURONTIN) 600 MG tablet Take 1 tablet by mouth 3 times daily for 180 days. 270 tablet 1    LORazepam (ATIVAN) 1 MG tablet Take 1 tablet by mouth every morning (before breakfast) for 90 days. 30 tablet 2    metoprolol succinate (TOPROL XL) 25 MG extended release tablet Take 1 tablet by mouth daily 90 tablet 1    PARoxetine (PAXIL) 40 MG tablet Take 1 tablet by mouth nightly 90 tablet 1    tiotropium (SPIRIVA RESPIMAT) 2.5 MCG/ACT AERS inhaler INHALE TWO (2) PUFFS INTO THE LUNGS DAILY 1 Inhaler 5    albuterol sulfate HFA (PROAIR HFA) 108 (90 Base) MCG/ACT inhaler Inhale 2 puffs into the lungs every 6 hours as needed for Wheezing or Shortness of Breath 1 Inhaler 6    ibuprofen (ADVIL;MOTRIN) 800 MG tablet Take 1 tablet by mouth every 6 hours as needed for Pain 120 tablet 3    dicyclomine (BENTYL) 10 MG capsule Take 1 capsule by mouth 4 times daily as needed (abdominal cramps.) 90 capsule 1    fluticasone (FLONASE) 50 MCG/ACT nasal spray 1 spray by Each Nare route daily 1 Bottle 3     No current facility-administered medications on file prior to visit. Allergies   Allergen Reactions    Codeine Itching    Mobic [Meloxicam]      Irritates stomach    Tramadol      Patient states he doesn't feel right on tramadol.      Past Medical History:   Diagnosis Date  Arthritis     Chest pain     [facility] 2012 sent for records;    Colitis     [facility]) ([facility]     Histoplasmosis     History of blood transfusion     Hx of blood clots     Metabolic syndrome X     Other emphysema ([facility] 2018    Pneumonia     Pulmonary nodule      Past Surgical History:   Procedure Laterality Date    BRONCHOSCOPY  2013    COLONOSCOPY  2018    COLONOSCOPY N/A 2018    COLORECTAL CANCER SCREENING, NOT HIGH RISK performed by Keegan Quintanilla DO at [facility] CATH LAB PROCEDURE  1998    KNEE ARTHROSCOPY Right     KNEE ARTHROSCOPY Right 2019    RIGHT KNEE VIDEO ARTHROSCOPY, MEDIAL MENISCECTOMY performed by Ivonne Henderson MD at [facility] 39      neck    VASECTOMY        Social History     Tobacco Use    Smoking status: Former Smoker     Packs/day: 0.50     Years: 20.00     Pack years: 10.00     Types: Cigarettes     Last attempt to quit: 2018     Years since quittin.6    Smokeless tobacco: Never Used   Substance Use Topics    Alcohol use: No     Alcohol/week: 1.0 standard drinks     Types: 1 Cans of beer per week      Family History   Problem Relation Age of Onset    Cancer Mother         ovarian    Heart Failure Mother     Heart Attack Mother 62    Heart Disease Mother     Other Father          1948;ashd;per neurop;    Other Brother         pt knows very little    Cancer Paternal Uncle         leukemia    Cancer Paternal Grandfather         leukemia    Heart Attack Maternal Uncle 60        x4     No Known Problems Son     No Known Problems Son     Asthma Neg Hx     Diabetes Neg Hx     Emphysema Neg Hx     Hypertension Neg Hx         Vitals:    20 1144   BP: 112/78   Pulse: 74   Temp: 98.8 °F (37.1 °C)   TempSrc: Infrared   SpO2: 97%   Weight: 158 lb 9.6 oz (71.9 kg)     Estimated body mass index is 24.84 kg/m² as calculated from the following:    Height as of 8/20/20: 5' 7\" (1.702 m). Weight as of this encounter: 158 lb 9.6 oz (71.9 kg). Physical Exam  Constitutional:       Appearance: Normal appearance. HENT:      Mouth/Throat:      Mouth: Mucous membranes are moist.   Cardiovascular:      Rate and Rhythm: Normal rate and regular rhythm. Pulses: Normal pulses. Heart sounds: Normal heart sounds. Pulmonary:      Effort: Pulmonary effort is normal.      Breath sounds: Normal breath sounds. Abdominal:      Palpations: Abdomen is soft. Skin:     General: Skin is warm. Capillary Refill: Capillary refill takes less than 2 seconds. Neurological:      Mental Status: He is alert. ASSESSMENT/PLAN:  1. COPD with acute exacerbation ([address]  Medrol dose afshan today  CXR ordered, pt will obtain CT scan as well  Asked pt to call on Friday if he is not feeling better for abx  - methylPREDNISolone (MEDROL, AFSHAN,) 4 MG tablet; Take by mouth. Dispense: 1 kit; Refill: 0    2. Paresthesia  Trial Medrol dose afshan. If no improvement will refer to Neuro. Due to lack of significant Neuro changes from last exam, will not order MRI  - methylPREDNISolone (MEDROL, AFSHAN,) 4 MG tablet; Take by mouth. Dispense: 1 kit; Refill: 0      Return if symptoms worsen or fail to improve, for COPD exacerbation/ Paresthesia. Patient's questions answered and concerns addressed. Patient agrees to plan of care.           Electronically signed by MICHELLE Lambert on 8/26/2020 at 12:15 PM

## 2020-08-31 ENCOUNTER — HOSPITAL ENCOUNTER (OUTPATIENT)
Age: 50
Discharge: HOME | End: 2020-08-31
Payer: MEDICARE

## 2020-08-31 ENCOUNTER — HOSPITAL ENCOUNTER (OUTPATIENT)
Dept: CT IMAGING | Age: 50
Discharge: HOME | End: 2020-08-31
Payer: MEDICARE

## 2020-08-31 PROCEDURE — 71250 CT THORAX DX C-: CPT

## 2020-09-28 ENCOUNTER — TELEPHONE (OUTPATIENT)
Dept: PULMONOLOGY | Age: 50
End: 2020-09-28

## 2020-09-28 ENCOUNTER — OFFICE VISIT (OUTPATIENT)
Dept: FAMILY MEDICINE CLINIC | Age: 50
End: 2020-09-28
Payer: MEDICARE

## 2020-09-28 VITALS
DIASTOLIC BLOOD PRESSURE: 70 MMHG | BODY MASS INDEX: 24.75 KG/M2 | OXYGEN SATURATION: 98 % | WEIGHT: 158 LBS | TEMPERATURE: 97.9 F | HEART RATE: 70 BPM | SYSTOLIC BLOOD PRESSURE: 116 MMHG

## 2020-09-28 LAB
BILIRUBIN, POC: NORMAL
BLOOD URINE, POC: NORMAL
CLARITY, POC: CLEAR
COLOR, POC: YELLOW
GLUCOSE URINE, POC: NORMAL
KETONES, POC: NORMAL
LEUKOCYTE EST, POC: NORMAL
NITRITE, POC: NORMAL
PH, POC: 6
PROTEIN, POC: NORMAL
SPECIFIC GRAVITY, POC: 1.02
UROBILINOGEN, POC: 0.2

## 2020-09-28 PROCEDURE — 90471 IMMUNIZATION ADMIN: CPT | Performed by: NURSE PRACTITIONER

## 2020-09-28 PROCEDURE — 99213 OFFICE O/P EST LOW 20 MIN: CPT | Performed by: NURSE PRACTITIONER

## 2020-09-28 PROCEDURE — 90688 IIV4 VACCINE SPLT 0.5 ML IM: CPT | Performed by: NURSE PRACTITIONER

## 2020-09-28 ASSESSMENT — ENCOUNTER SYMPTOMS
WHEEZING: 0
ABDOMINAL PAIN: 0
VOMITING: 0
APNEA: 0
COLOR CHANGE: 0
CHEST TIGHTNESS: 0
CHOKING: 0
SHORTNESS OF BREATH: 1
SORE THROAT: 0
RHINORRHEA: 0
COUGH: 1

## 2020-09-28 NOTE — PROGRESS NOTES
9/28/2020    Chief Complaint   Patient presents with    Numbness     in his head     Tingling     in his head     Dysuria     burning when he urinates and he has not had a sexual relations in 4 months so it is not from that        Kings Dalton is a 51 y.o. male, presents today for:    Head Parasthesias  Appears to be slightly improving. Now having \"ice pick\" sensation over either left or right forehead and go behind the eyes. Will occur maybe 1-2x/ week lasting 2-3 minutes. Unable to see well with current glasses, needs new eye exams. No N/V. Pt does not know if symptoms are precipitated by other causes. No other new symptoms since last visit. Dysuria  X 3 days, no hematuria, frequency, urgency. Urine has distinct smell but that has not changed over the last several months. Pt will drink 1-2 glasses of water/ day otherwise will drink 2-3 cans Sprite/ day. No penile discharge. Last sexual activity 4 months ago with ex-girlfriend without condom. Pt does not know if she has been positive for STIs as he no longer has contact with her. Review of Systems   Constitutional: Positive for fatigue. Negative for fever. HENT: Negative for ear pain, rhinorrhea and sore throat. Eyes: Negative for visual disturbance. Respiratory: Positive for cough and shortness of breath. Negative for apnea, choking, chest tightness and wheezing. Cardiovascular: Negative for chest pain and leg swelling. Gastrointestinal: Negative for abdominal pain and vomiting. Musculoskeletal: Negative for neck pain. Skin: Negative for color change, pallor and rash. Neurological: Positive for dizziness. Negative for headaches.        Current Outpatient Medications on File Prior to Visit   Medication Sig Dispense Refill    ibuprofen (ADVIL;MOTRIN) 800 MG tablet Take 1 tablet by mouth every 6 hours as needed for Pain 90 tablet 3    gabapentin (NEURONTIN) 600 MG tablet Take 1 tablet by mouth 3 times daily for 180 performed by Shaista Hale MD at [facility]      neck    VASECTOMY        Social History     Tobacco Use    Smoking status: Former Smoker     Packs/day: 0.50     Years: 20.00     Pack years: 10.00     Types: Cigarettes     Last attempt to quit: 2018     Years since quittin.7    Smokeless tobacco: Never Used   Substance Use Topics    Alcohol use: No     Alcohol/week: 1.0 standard drinks     Types: 1 Cans of beer per week      Family History   Problem Relation Age of Onset    Cancer Mother         ovarian    Heart Failure Mother     Heart Attack Mother 62    Heart Disease Mother     Other Father          1948;ashd;per neurop;    Other Brother         pt knows very little    Cancer Paternal Uncle         leukemia    Cancer Paternal Grandfather         leukemia    Heart Attack Maternal Uncle 60        x4     No Known Problems Son     No Known Problems Son     Asthma Neg Hx     Diabetes Neg Hx     Emphysema Neg Hx     Hypertension Neg Hx         Vitals:    20 1626   BP: 116/70   Pulse: 70   Temp: 97.9 °F (36.6 °C)   TempSrc: Infrared   SpO2: 98%   Weight: 158 lb (71.7 kg)     Estimated body mass index is 24.75 kg/m² as calculated from the following:    Height as of 20: 5' 7\" (1.702 m). Weight as of this encounter: 158 lb (71.7 kg). Physical Exam  Constitutional:       Appearance: Normal appearance. HENT:      Mouth/Throat:      Mouth: Mucous membranes are moist.   Cardiovascular:      Rate and Rhythm: Normal rate and regular rhythm. Pulses: Normal pulses. Heart sounds: Normal heart sounds. Pulmonary:      Effort: Pulmonary effort is normal.      Breath sounds: Normal breath sounds. Abdominal:      Palpations: Abdomen is soft. Skin:     General: Skin is warm. Capillary Refill: Capillary refill takes less than 2 seconds. Neurological:      Mental Status: He is alert. ASSESSMENT/PLAN:  1.

## 2020-09-28 NOTE — TELEPHONE ENCOUNTER
Within this Telehealth Consent, the terms you and yours refer to the person using the Telehealth Service (Service), or in the case of a use of the Service by or on behalf of a minor, you and yours refer to and include (i) the parent or legal guardian who provides consent to the use of the Service by such minor or uses the Service on behalf of such minor, and (ii) the minor for whom consent is being provided or on whose behalf the Service is being utilized. When using Service, you will be consulting with your health care providers via the use of Telehealth.   Telehealth involves the delivery of healthcare services using electronic communications, information technology or other means between a healthcare provider and a patient who are not in the same physical location. Telehealth may be used for diagnosis, treatment, follow-up and/or patient education, and may include, but is not limited to, one or more of the following:    Electronic transmission of medical records, photo images, personal health information or other data between a patient and a healthcare provider    Interactions between a patient and healthcare provider via audio, video and/or data communications    Use of output data from medical devices, sound and video files    Anticipated Benefits   The use of Telehealth by your Provider(s) through the Service may have the following possible benefits:    Making it easier and more efficient for you to access medical care and treatment for the conditions treated by such Provider(s) utilizing the Service    Allowing you to obtain medical care and treatment by Provider(s) at times that are convenient for you    Enabling you to interact with Provider(s) without the necessity of an in-office appointment     Possible Risks   While the use of Telehealth can provide potential benefits for you, there are also potential risks associated with the use of Telehealth.  These risks include, but may not be limited to the following:    Your Provider(s) may not able to provide medical treatment for your particular condition and you may be required to seek alternative healthcare or emergency care services.  The electronic systems or other security protocols or safeguards used in the Service could fail, causing a breach of privacy of your medical or other information.  Given regulatory requirements in certain jurisdictions, your Provider(s) diagnosis and/or treatment options, especially pertaining to certain prescriptions, may be limited. Acceptance   1. You understand that Services will be provided via Telehealth. This process involves the use of HIPAA compliant and secure, real-time audio-visual interfacing with a qualified and appropriately trained provider located at [facility]. 2. You understand that, under no circumstances, will this session be recorded. 3. You understand that the Provider(s) at [facility] and other clinical participants will be party to the information obtained during the Telehealth session in accordance with best medical practices. 4. You understand that the information obtained during the Telehealth session will be used to help determine the most appropriate treatment options. 5. You understand that You have the right to revoke this consent at any point in time. 6. You understand that Telehealth is voluntary, and that continued treatment is not dependent upon consent. 7. You understand that, in the event of non-consent to Telehealth services and/or technical difficulties, you will obtain services as typically provided in the absence of Telehealth technology. 8. You understand that this consent will be kept in Your medical record. 9. No potential benefits from the use of Telehealth or specific results can be guaranteed. Your condition may not be cured or improved and, in some cases, may get worse.    10. There are limitations in the provision of medical care and treatment via Telehealth and the Service and you may not be able to receive diagnosis and/or treatment through the Service for every condition for which you seek diagnosis and/or treatment. 11. There are potential risks to the use of Telehealth, including but not limited to the risks described in this Telehealth Consent. 12. Your Provider(s) have discussed the use of Telehealth and the Service with you, including the benefits and risks of such and you have provided oral consent to your Provider(s) for the use of Telehealth and the Service. 15. You understand that it is your duty to provide your Provider(s) truthful, accurate and complete information, including all relevant information regarding care that you may have received or may be receiving from other healthcare providers outside of the Service. 14. You understand that each of your Provider(s) may determine in his or sole discretion that your condition is not suitable for diagnosis and/or treatment using the Service, and that you may need to seek medical care and treatment a specialist or other healthcare provider, outside of the Service. 15. You understand that you are fully responsible for payment for all services provided by Provider(s) or through use of the Service and that you may not be able to use third-party insurance. 16. You represent that (a) you have read this Telehealth Consent carefully, (b) you understand the risks and benefits of the Service and the use of Telehealth in the medical care and treatment provided to you by Provider(s) using the Service, and (c) you have the legal capacity and authority to provide this consent for yourself and/or the minor for which you are consenting under applicable federal and state laws, including laws relating to the age of [de-identified] and/or parental/guardian consent.    17. You give your informed consent to the use of Telehealth by Provider(s) using the Service under the terms described in the Terms of Service and this Telehealth Consent. The patient was read the following statement and has consented to the visit as of 9/28/20. The patient has been scheduled for their first telehealth visit on 10/5/2020 with Dr Dilia Puga.

## 2020-10-05 ENCOUNTER — VIRTUAL VISIT (OUTPATIENT)
Dept: PULMONOLOGY | Age: 50
End: 2020-10-05
Payer: MEDICARE

## 2020-10-05 PROCEDURE — 99214 OFFICE O/P EST MOD 30 MIN: CPT | Performed by: INTERNAL MEDICINE

## 2020-10-05 RX ORDER — DOXYCYCLINE HYCLATE 100 MG
100 TABLET ORAL 2 TIMES DAILY
Qty: 10 TABLET | Refills: 0 | Status: SHIPPED | OUTPATIENT
Start: 2020-10-05 | End: 2020-10-10

## 2020-10-05 RX ORDER — PREDNISONE 10 MG/1
TABLET ORAL
Qty: 30 TABLET | Refills: 0 | Status: SHIPPED | OUTPATIENT
Start: 2020-10-05 | End: 2020-10-15

## 2020-10-05 RX ORDER — BUDESONIDE AND FORMOTEROL FUMARATE DIHYDRATE 160; 4.5 UG/1; UG/1
2 AEROSOL RESPIRATORY (INHALATION) 2 TIMES DAILY
Qty: 1 INHALER | Refills: 6 | Status: SHIPPED | OUTPATIENT
Start: 2020-10-05 | End: 2020-12-21 | Stop reason: SDUPTHER

## 2020-10-05 NOTE — PROGRESS NOTES
[facility]                                                              TELEHEALTH EVALUATION: Service performed was Audio/Visual (During [facility]-15 public health emergency) and not a face-to-face visit         CHIEF COMPLAINT: follow up COPD        HPI:   CT chest reviewed by me and noted below. Results were dicussed with patient and multiple good questions were answered. Worse SOB for the past 6 months  Cough with yellow green for 3-4 months   No hemoptysis   + wheezes on and off   Uses Albuterol 4 times/day   Uses Spiriva daily   No smoking   Sat was 95-98 at the doctor last time       Past Medical History:   Diagnosis Date    Arthritis     Chest pain     [facility] 9[facility]2012 sent for records;    Colitis     COPD (chronic obstructive pulmonary disease) ([facility] [address]     Histoplasmosis     History of blood transfusion     Hx of blood clots     Metabolic syndrome X 85/54/1483    Other emphysema ([facility] [address] 8/19/2018    Pneumonia     Pulmonary nodule        Past Surgical History:        Procedure Laterality Date    BRONCHOSCOPY  6/05/2013    COLONOSCOPY  12/17/2018    COLONOSCOPY N/A 12/17/2018    COLORECTAL CANCER SCREENING, NOT HIGH RISK performed by Kelby Espino DO at [facility] ARTHROSCOPY Right     KNEE ARTHROSCOPY Right 12/13/2019    RIGHT KNEE VIDEO ARTHROSCOPY, MEDIAL MENISCECTOMY performed by Mikey Palacio MD at [facility] 39      neck    VASECTOMY         Allergies:  is allergic to codeine; mobic [meloxicam]; and tramadol. Social History:    TOBACCO:   reports that he quit smoking about 2 years ago. His smoking use included cigarettes. He has a 40.00 pack-year smoking history. He has never used smokeless tobacco.  ETOH:   reports no history of alcohol use.       Family History:       Problem Relation Age of Onset    Cancer Mother ovarian    Heart Failure Mother     Heart Attack Mother 62    Heart Disease Mother     Other Father          1948;ashd;per neurop;    Other Brother         pt knows very little    Cancer Paternal Uncle         leukemia    Cancer Paternal Grandfather         leukemia    Heart Attack Maternal Uncle 60        x4     No Known Problems Son     No Known Problems Son     Asthma Neg Hx     Diabetes Neg Hx     Emphysema Neg Hx     Hypertension Neg Hx        Current Medications:    Current Outpatient Medications:     ibuprofen (ADVIL;MOTRIN) 800 MG tablet, Take 1 tablet by mouth every 6 hours as needed for Pain, Disp: 90 tablet, Rfl: 3    gabapentin (NEURONTIN) 600 MG tablet, Take 1 tablet by mouth 3 times daily for 180 days. , Disp: 270 tablet, Rfl: 1    LORazepam (ATIVAN) 1 MG tablet, Take 1 tablet by mouth every morning (before breakfast) for 90 days. , Disp: 30 tablet, Rfl: 2    metoprolol succinate (TOPROL XL) 25 MG extended release tablet, Take 1 tablet by mouth daily, Disp: 90 tablet, Rfl: 1    PARoxetine (PAXIL) 40 MG tablet, Take 1 tablet by mouth nightly, Disp: 90 tablet, Rfl: 1    tiotropium (SPIRIVA RESPIMAT) 2.5 MCG/ACT AERS inhaler, INHALE TWO (2) PUFFS INTO THE LUNGS DAILY, Disp: 1 Inhaler, Rfl: 5    albuterol sulfate HFA (PROAIR HFA) 108 (90 Base) MCG/ACT inhaler, Inhale 2 puffs into the lungs every 6 hours as needed for Wheezing or Shortness of Breath, Disp: 1 Inhaler, Rfl: 6    ibuprofen (ADVIL;MOTRIN) 800 MG tablet, Take 1 tablet by mouth every 6 hours as needed for Pain, Disp: 120 tablet, Rfl: 3    dicyclomine (BENTYL) 10 MG capsule, Take 1 capsule by mouth 4 times daily as needed (abdominal cramps.), Disp: 90 capsule, Rfl: 1    fluticasone (FLONASE) 50 MCG/ACT nasal spray, 1 spray by Each Nare route daily, Disp: 1 Bottle, Rfl: 3      Objective:   PHYSICAL EXAM:    There were no vitals taken for this visit.' on RA  O2 Sat:  HR:  BP:  RR:  Temperature:  Neck size: Not able to Assessment:       · Pulmonary emphysema/COPD with AE  · Tracheobronchitis   · Dyspnea. Doubt of pulmonary origin. Unremarkable CTPA. Negative Echo with bubble study. Unremarkable LHC. Much improved  · Snoring- negative PSG   · Intermittent hypoxia, tachycardia and bradycardia- PSG and 6 minute walk negative for hypoxemia. Improved  · Mild restrictive defect -CT does not suggest ILD. Normalized on repeat   · Bilateral pulmonary nodules with mediastinal and hilar adenopathy 5/2013. Decreased in size in RLL PNs and 4R LN with enlargement of GOPAL PN on repeated CT chest 8/16/23013. Stable on CT 05/27/2014. Positive histoplasma M bands and Histoplasma Ab yeast CF (1-64). Itraconazole 8/7/5473-1/8/7384  · Hypermetabolic right hilar lymph nodes. 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 Improved on repeated CT. · Arthritis. 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 · 60 pack year smoking - quit 1/2018     Plan:       · Prednisone taper  · Doxycycline 100 mg PO BID for 5 days. · Trial of Symbicort 160/4.5 2 puffs BID   · Continue Spiriva and Albuterol 2 puffs Q4-6 hrs PRN  · Patient is up to date with Pneumococcal vaccine  · Advised to get influenza vaccine this year   · Advised to continue with smoking cessation  · Follow up in 3 months with PFTs          Elisabeth Coyle is a 51 y.o. male being evaluated by a Virtual Visit (video visit) encounter to address concerns as mentioned above. A caregiver was present when appropriate. Due to this being a TeleHealth encounter (During [facility]-68 public health emergency), evaluation of the following organ systems was limited: Vitals/Constitutional/EENT/Resp/CV/GI//MS/Neuro/Skin/Heme-Lymph-Imm.   Pursuant to the emergency declaration under the [facility]5 waiver authority and the [employer] and [employer] Act, this Virtual Visit was conducted with

## 2020-12-21 ENCOUNTER — OFFICE VISIT (OUTPATIENT)
Dept: FAMILY MEDICINE CLINIC | Age: 50
End: 2020-12-21
Payer: MEDICARE

## 2020-12-21 VITALS
OXYGEN SATURATION: 95 % | BODY MASS INDEX: 25.43 KG/M2 | HEIGHT: 67 IN | RESPIRATION RATE: 16 BRPM | DIASTOLIC BLOOD PRESSURE: 70 MMHG | TEMPERATURE: 97.9 F | HEART RATE: 86 BPM | WEIGHT: 162 LBS | SYSTOLIC BLOOD PRESSURE: 124 MMHG

## 2020-12-21 PROCEDURE — 90732 PPSV23 VACC 2 YRS+ SUBQ/IM: CPT | Performed by: NURSE PRACTITIONER

## 2020-12-21 PROCEDURE — 99214 OFFICE O/P EST MOD 30 MIN: CPT | Performed by: NURSE PRACTITIONER

## 2020-12-21 PROCEDURE — 90471 IMMUNIZATION ADMIN: CPT | Performed by: NURSE PRACTITIONER

## 2020-12-21 PROCEDURE — G8482 FLU IMMUNIZE ORDER/ADMIN: HCPCS | Performed by: NURSE PRACTITIONER

## 2020-12-21 PROCEDURE — G8926 SPIRO NO PERF OR DOC: HCPCS | Performed by: NURSE PRACTITIONER

## 2020-12-21 PROCEDURE — 3023F SPIROM DOC REV: CPT | Performed by: NURSE PRACTITIONER

## 2020-12-21 PROCEDURE — 1036F TOBACCO NON-USER: CPT | Performed by: NURSE PRACTITIONER

## 2020-12-21 PROCEDURE — G8419 CALC BMI OUT NRM PARAM NOF/U: HCPCS | Performed by: NURSE PRACTITIONER

## 2020-12-21 PROCEDURE — G8427 DOCREV CUR MEDS BY ELIG CLIN: HCPCS | Performed by: NURSE PRACTITIONER

## 2020-12-21 RX ORDER — METOPROLOL SUCCINATE 25 MG/1
25 TABLET, EXTENDED RELEASE ORAL DAILY
Qty: 90 TABLET | Refills: 1 | Status: SHIPPED | OUTPATIENT
Start: 2020-12-21 | End: 2021-04-12 | Stop reason: SDUPTHER

## 2020-12-21 RX ORDER — LORAZEPAM 1 MG/1
1 TABLET ORAL
Qty: 30 TABLET | Refills: 2 | Status: SHIPPED | OUTPATIENT
Start: 2020-12-21 | End: 2021-03-02 | Stop reason: SDUPTHER

## 2020-12-21 RX ORDER — QUETIAPINE FUMARATE 25 MG/1
25 TABLET, FILM COATED ORAL DAILY
Qty: 30 TABLET | Refills: 0 | Status: SHIPPED | OUTPATIENT
Start: 2020-12-21 | End: 2021-01-04 | Stop reason: SDUPTHER

## 2020-12-21 RX ORDER — ALBUTEROL SULFATE 90 UG/1
2 AEROSOL, METERED RESPIRATORY (INHALATION) EVERY 6 HOURS PRN
Qty: 1 INHALER | Refills: 6 | Status: SHIPPED | OUTPATIENT
Start: 2020-12-21 | End: 2021-04-12 | Stop reason: SDUPTHER

## 2020-12-21 RX ORDER — DICYCLOMINE HYDROCHLORIDE 10 MG/1
10 CAPSULE ORAL 4 TIMES DAILY PRN
Qty: 90 CAPSULE | Refills: 1 | Status: SHIPPED | OUTPATIENT
Start: 2020-12-21 | End: 2021-03-15 | Stop reason: SDUPTHER

## 2020-12-21 RX ORDER — IBUPROFEN 800 MG/1
800 TABLET ORAL EVERY 6 HOURS PRN
Qty: 120 TABLET | Refills: 3 | Status: SHIPPED | OUTPATIENT
Start: 2020-12-21 | End: 2021-04-12 | Stop reason: SDUPTHER

## 2020-12-21 RX ORDER — BUDESONIDE AND FORMOTEROL FUMARATE DIHYDRATE 160; 4.5 UG/1; UG/1
2 AEROSOL RESPIRATORY (INHALATION) 2 TIMES DAILY
Qty: 1 INHALER | Refills: 6 | Status: SHIPPED | OUTPATIENT
Start: 2020-12-21 | End: 2021-04-12 | Stop reason: SDUPTHER

## 2020-12-21 RX ORDER — PAROXETINE HYDROCHLORIDE 40 MG/1
40 TABLET, FILM COATED ORAL NIGHTLY
Qty: 90 TABLET | Refills: 1 | Status: SHIPPED | OUTPATIENT
Start: 2020-12-21 | End: 2021-01-04 | Stop reason: ALTCHOICE

## 2020-12-21 RX ORDER — FLUTICASONE PROPIONATE 50 MCG
1 SPRAY, SUSPENSION (ML) NASAL DAILY
Qty: 1 BOTTLE | Refills: 3 | Status: SHIPPED | OUTPATIENT
Start: 2020-12-21 | End: 2021-05-20

## 2020-12-21 RX ORDER — GABAPENTIN 600 MG/1
600 TABLET ORAL 3 TIMES DAILY
Qty: 270 TABLET | Refills: 1 | Status: SHIPPED | OUTPATIENT
Start: 2020-12-21 | End: 2021-07-12 | Stop reason: SDUPTHER

## 2020-12-21 ASSESSMENT — ENCOUNTER SYMPTOMS
CHOKING: 0
APNEA: 0
COLOR CHANGE: 0
WHEEZING: 0
ABDOMINAL PAIN: 0
COUGH: 1
CHEST TIGHTNESS: 0
VOMITING: 0
RHINORRHEA: 0
SHORTNESS OF BREATH: 1
SORE THROAT: 0

## 2020-12-21 NOTE — PROGRESS NOTES
12/21/2020    Chief Complaint   Patient presents with    3 Month Follow-Up    Hypertension       Elisabeth Coyle is a 53 y.o. male, presents today for:    HPI   HTN/ Palpitations/ Presyncopal episode: No CP, SOB, leg swelling, orthopnea, PND, palpitations. Continues to have intermittent dizziness/ presyncopal episodes. Will occur at random times- when sitting, when standing and working on cars. Does not appear to occur with position changes. No LOC during episodes. Tolerating meds well. Not checking BP at home. Not watchingdiet intake. Walking at work for physical activity. Is tolerating meds. Former smoker. Emphysema: Continues to have mild productive cough with green sputum. No increase SOB, chest pain, leg swelling, hemoptysis. Former tobacco user. Due for Pneumovax. Bilateral Leg/ numbness: No change since last visit. Tolerating Gabapentin. Ensures shoes fit well and will do periodic inspection of feet. Anxiety/ Depression: Recently worse. Patti Nagy Reunited with estranged wife about 2 months ago. However, he found her with her ex  and she has been taking money from his bank account. Planning to file divorce. Due to stress has been taking Ativan 1-2x/ day. Does not feel Paxil is helping- has been taking for the last several years. Would be willing to try to new med today. No SI/ HI. Not currently undergoing counseling. Chronic difficulty falling asleep due to anxiety/ stress. Review of Systems   Constitutional: Positive for fatigue. Negative for fever. HENT: Negative for ear pain, rhinorrhea and sore throat. Eyes: Negative for visual disturbance. Respiratory: Positive for cough and shortness of breath. Negative for apnea, choking, chest tightness and wheezing. Cardiovascular: Negative for chest pain and leg swelling. Gastrointestinal: Negative for abdominal pain and vomiting. Musculoskeletal: Negative for neck pain.    Skin: Negative for color change, pallor

## 2020-12-21 NOTE — PATIENT INSTRUCTIONS
Patient Education        quetiapine  Pronunciation:  merary lewis  Brand:  SEROquel, SEROquel XR  What is the most important information I should know about quetiapine? Some people have thoughts about suicide while taking quetiapine. Stay alert to changes in your mood or symptoms. Report any new or worsening symptoms to your doctor. Quetiapine is not approved for use in older adults with dementia-related psychosis. What is quetiapine? Quetiapine is an antipsychotic medicine that is used to treat schizophrenia in adults and children who are at least 15years old. Quetiapine is used to treat bipolar disorder (manic depression) in adults and children who are at least 8years old. Quetiapine is also used together with antidepressant medications to treat major depressive disorder in adults. Quetiapine may also be used for purposes not listed in this medication guide. What should I discuss with my healthcare provider before taking quetiapine? You should not use quetiapine if you are allergic to it. Quetiapine may increase the risk of death in older adults with dementia-related psychosis and is not approved for this use. Quetiapine is not approved for use by anyone younger than 8years old. Tell your doctor if you have ever had:  · liver disease;  · heart problems;  · high or low blood pressure;  · low white blood cell (WBC) counts;  · abnormal thyroid tests or prolactin levels;  · constipation or urination problems;  · an enlarged prostate;  · a seizure;  · glaucoma or cataracts;  · high cholesterol or triglycerides;  · diabetes (in you or a family member); or  · trouble swallowing. Some people have thoughts about suicide while taking quetiapine. Your doctor will need to check your progress at regular visits. Your family or other caregivers should also be alert to changes in your mood or symptoms.   Taking antipsychotic medicine in the last 3 months of pregnancy may cause withdrawal symptoms, breathing problems, feeding problems, fussiness, tremors, and limp or stiff muscles in the . If you get pregnant, tell your doctor right away. Do not stop taking quetiapine without your doctor's advice. This medicine may temporarily affect fertility (your ability to have children) in women. You should not breastfeed while you are using quetiapine. How should I take quetiapine? Follow all directions on your prescription label and read all medication guides or instruction sheets. Use the medicine exactly as directed. High doses or long-term use of quetiapine can cause a serious movement disorder that may not be reversible. The longer you use quetiapine, the more likely you are to develop this disorder, especially if you are an older adult. Symptoms of this disorder include tremors or other uncontrollable muscle movements. You may take Seroquel with or without food. You should take Seroquel XR without food or with a light meal.  Swallow the tablet whole and do not crush, chew, or break it. Quetiapine may cause you to have high blood sugar (hyperglycemia). If you are diabetic, check your blood sugar levels on a regular basis. Drink plenty of liquids while you are taking quetiapine. Blood pressure may need to be checked often in a child or teenager taking quetiapine. You should not stop using quetiapine suddenly. Stopping suddenly may make your condition worse. This medicine may affect a drug-screening urine test and you may have false results. Tell the laboratory staff that you use quetiapine. Store at room temperature away from moisture and heat. What happens if I miss a dose? Take the medicine as soon as you can, but skip the missed dose if it is almost time for your next dose. Do not take two doses at one time. What happens if I overdose? Seek emergency medical attention or call the Poison Help line at 1-781.609.4456.  An overdose of quetiapine can be fatal.  What should I avoid while taking quetiapine? Avoid drinking alcohol. Dangerous side effects could occur. Avoid driving or hazardous activity until you know how this medicine will affect you. Dizziness or drowsiness can cause falls, accidents, or severe injuries. Avoid getting up too fast from a sitting or lying position, or you may feel dizzy. Avoid becoming overheated or dehydrated during exercise and in hot weather. You may be more prone to heat stroke. What are the possible side effects of quetiapine? Get emergency medical help if you have signs of an allergic reaction (hives, difficult breathing, swelling in your face or throat) or a severe skin reaction (fever, sore throat, burning eyes, skin pain, red or purple skin rash with blistering and peeling). Report any new or worsening symptoms to your doctor, such as: mood or behavior changes, anxiety, panic attacks, trouble sleeping, or if you feel impulsive, irritable, agitated, hostile, aggressive, restless, hyperactive (mentally or physically), more depressed, or have thoughts about suicide or hurting yourself. Call your doctor at once if you have:  · uncontrolled muscle movements in your face (chewing, lip smacking, frowning, tongue movement, blinking or eye movement);  · mask-like appearance of the face, trouble swallowing, problems with speech;  · a light-headed feeling, like you might pass out;  · severe constipation;  · painful or difficult urination;  · blurred vision, tunnel vision, eye pain, or seeing halos around lights;  · severe nervous system reaction --very stiff (rigid) muscles, high fever, sweating, confusion, fast or uneven heartbeats, tremors, fainting;  · high blood sugar --increased thirst, increased urination, dry mouth, fruity breath odor; or  · low white blood cell counts --fever, chills, mouth sores, skin sores, sore throat, cough, trouble breathing, feeling light-headed.   Common side effects may include:  · speech problems;  · dizziness, drowsiness, tiredness;  · lack of energy;  · fast heartbeats;  · stuffy nose;  · increased appetite, weight gain;  · upset stomach, vomiting, constipation;  · dry mouth; or  · abnormal liver function tests. This is not a complete list of side effects and others may occur. Call your doctor for medical advice about side effects. You may report side effects to FDA at 8-008-WUU-3117. What other drugs will affect quetiapine? Sometimes it is not safe to use certain medications at the same time. Some drugs can affect your blood levels of other drugs you take, which may increase side effects or make the medications less effective. Quetiapine can cause a serious heart problem. Your risk may be higher if you also use certain other medicines for infections, asthma, heart problems, high blood pressure, depression, mental illness, cancer, malaria, or HIV. Many drugs can affect quetiapine. This includes prescription and over-the-counter medicines, vitamins, and herbal products. Not all possible interactions are listed here. Tell your doctor about all your current medicines and any medicine you start or stop using. Where can I get more information? Your pharmacist can provide more information about quetiapine. Remember, keep this and all other medicines out of the reach of children, never share your medicines with others, and use this medication only for the indication prescribed. Every effort has been made to ensure that the information provided by [address] is accurate, up-to-date, and complete, but no guarantee is made to that effect. Drug information contained herein may be time sensitive. [facility] information has been compiled for use by healthcare practitioners and consumers in the [place] and therefore [facility] does not warrant that uses outside of the [place] are appropriate, unless specifically indicated otherwise.  [facility]'s drug information does not endorse drugs, diagnose patients or recommend therapy. [facility]'s drug information is an informational resource designed to assist licensed healthcare practitioners in caring for their patients and/or to serve consumers viewing this service as a supplement to, and not a substitute for, the expertise, skill, knowledge and judgment of healthcare practitioners. The absence of a warning for a given drug or drug combination in no way should be construed to indicate that the drug or drug combination is safe, effective or appropriate for any given patient. [facility] does not assume any responsibility for any aspect of healthcare administered with the aid of information [facility] provides. The information contained herein is not intended to cover all possible uses, directions, precautions, warnings, drug interactions, allergic reactions, or adverse effects. If you have questions about the drugs you are taking, check with your doctor, nurse or pharmacist.  Copyright 4275-2071 [address] [place] [address] Dr EVANGELISTA. Version: 16.02. Revision date: 2/5/2020. Care instructions adapted under license by [facility] ([facility]). If you have questions about a medical condition or this instruction, always ask your healthcare professional. [place] [address] any warranty or liability for your use of this information.

## 2021-01-04 ENCOUNTER — VIRTUAL VISIT (OUTPATIENT)
Dept: FAMILY MEDICINE CLINIC | Age: 51
End: 2021-01-04
Payer: MEDICARE

## 2021-01-04 DIAGNOSIS — F32.1 MODERATE SINGLE CURRENT EPISODE OF MAJOR DEPRESSIVE DISORDER (HCC): ICD-10-CM

## 2021-01-04 DIAGNOSIS — F41.9 CHRONIC ANXIETY: ICD-10-CM

## 2021-01-04 PROCEDURE — 99213 OFFICE O/P EST LOW 20 MIN: CPT | Performed by: NURSE PRACTITIONER

## 2021-01-04 RX ORDER — QUETIAPINE FUMARATE 25 MG/1
12.5 TABLET, FILM COATED ORAL DAILY
Qty: 15 TABLET | Refills: 0 | Status: SHIPPED | OUTPATIENT
Start: 2021-01-04 | End: 2021-02-17

## 2021-01-04 ASSESSMENT — ENCOUNTER SYMPTOMS
WHEEZING: 0
VOMITING: 0
ABDOMINAL PAIN: 0
CHEST TIGHTNESS: 0
COUGH: 1
COLOR CHANGE: 0
RHINORRHEA: 0
SHORTNESS OF BREATH: 1
SORE THROAT: 0
APNEA: 0
CHOKING: 0

## 2021-01-04 NOTE — PROGRESS NOTES
2021    TELEHEALTH EVALUATION -- Audio/Visual (During [facility]-96 public health emergency)    HPI:    Yasmeen Talamantes (:  1970) has requested an audio/video evaluation for the following concern(s): Seroquel f/u, anxiety    Anxiety/ Depression: Feels Seroquel is helping, but feels he is oversedated. No longer taking Paroxetine, miscommunication regarding plan. Continuing to take Ativan daily. No SI/ HI. Continues to have significant life stress with wife. Planning to file paperwork for divorce soon. Review of Systems   Constitutional: Positive for fatigue. Negative for fever. HENT: Negative for ear pain, rhinorrhea and sore throat. Eyes: Negative for visual disturbance. Respiratory: Positive for cough and shortness of breath. Negative for apnea, choking, chest tightness and wheezing. Cardiovascular: Negative for chest pain and leg swelling. Gastrointestinal: Negative for abdominal pain and vomiting. Musculoskeletal: Negative for neck pain. Skin: Negative for color change, pallor and rash. Neurological: Positive for dizziness. Negative for headaches. Prior to Visit Medications    Medication Sig Taking? Authorizing Provider   QUEtiapine (SEROQUEL) 25 MG tablet Take 0.5 tablets by mouth daily Anxiety/ depression Yes Riley MICHELLE Bess CNP   albuterol sulfate HFA (PROAIR HFA) 108 (90 Base) MCG/ACT inhaler Inhale 2 puffs into the lungs every 6 hours as needed for Wheezing or Shortness of Breath  Riley DION BessN - CNP   budesonide-formoterol (SYMBICORT) 160-4.5 MCG/ACT AERO Inhale 2 puffs into the lungs 2 times daily  Riley DION BessN - CNP   fluticasone (FLONASE) 50 MCG/ACT nasal spray 1 spray by Each Nostril route daily  Riley DION BessN - CNP   gabapentin (NEURONTIN) 600 MG tablet Take 1 tablet by mouth 3 times daily for 180 days.   MICHELLE Patel - ALFREDITO dicyclomine (BENTYL) 10 MG capsule Take 1 capsule by mouth 4 times daily as needed (abdominal cramps.)  MICHELLE Almazan CNP   ibuprofen (ADVIL;MOTRIN) 800 MG tablet Take 1 tablet by mouth every 6 hours as needed for Pain  MICHELLE Almazan CNP   metoprolol succinate (TOPROL XL) 25 MG extended release tablet Take 1 tablet by mouth daily  MICHELLE Almazan CNP   tiotropium (SPIRIVA RESPIMAT) 2.5 MCG/ACT AERS inhaler INHALE TWO (2) PUFFS INTO THE LUNGS DAILY  MICHELLE Almazan CNP   LORazepam (ATIVAN) 1 MG tablet Take 1 tablet by mouth every morning (before breakfast) for 90 days.   MICHELLE Almazan CNP       Social History     Tobacco Use    Smoking status: Former Smoker     Packs/day: 2.00     Years: 20.00     Pack years: 40.00     Types: Cigarettes     Quit date: 1/1/2018     Years since quitting: 3.0    Smokeless tobacco: Never Used   Substance Use Topics    Alcohol use: No     Alcohol/week: 1.0 standard drinks     Types: 1 Cans of beer per week    Drug use: No        Allergies   Allergen Reactions    Codeine Itching    Mobic [Meloxicam]      Irritates stomach    Tramadol      Patient states he doesn't feel right on tramadol.   ,   Past Medical History:   Diagnosis Date    Arthritis     Chest pain     [facility] 9/2012 sent for records;    Colitis     COPD [facility] disease) [facility]     Histoplasmosis     History of blood transfusion     Hx of blood clots     Metabolic syndrome X 84/74/0179    Other emphysema ([facility] 8/19/2018    Pneumonia     Pulmonary nodule    ,   Past Surgical History:   Procedure Laterality Date    BRONCHOSCOPY  6/05/2013    COLONOSCOPY  12/17/2018    COLONOSCOPY N/A 12/17/2018    COLORECTAL CANCER SCREENING, NOT HIGH RISK performed by Naresh Rosa DO at [facility] CATH LAB PROCEDURE  1998    KNEE ARTHROSCOPY Right     KNEE ARTHROSCOPY Right 12/13/2019 RIGHT KNEE VIDEO ARTHROSCOPY, MEDIAL MENISCECTOMY performed by Margarita Dominguez MD at [facility]      neck    VASECTOMY     ,   Social History     Tobacco Use    Smoking status: Former Smoker     Packs/day: 2.00     Years: 20.00     Pack years: 40.00     Types: Cigarettes     Quit date: 2018     Years since quitting: 3.0    Smokeless tobacco: Never Used   Substance Use Topics    Alcohol use: No     Alcohol/week: 1.0 standard drinks     Types: 1 Cans of beer per week    Drug use: No   ,   Family History   Problem Relation Age of Onset    Cancer Mother         ovarian    Heart Failure Mother     Heart Attack Mother 62    Heart Disease Mother     Other Father          1948;ashd;per neurop;    Other Brother         pt knows very little    Cancer Paternal Uncle         leukemia    Cancer Paternal Grandfather         leukemia    Heart Attack Maternal Uncle 60        x4     No Known Problems Son     No Known Problems Son     Asthma Neg Hx     Diabetes Neg Hx     Emphysema Neg Hx     Hypertension Neg Hx    ,   Immunization History   Administered Date(s) Administered    Influenza Vaccine, unspecified formulation 10/23/2016, 2017, 2018, 2019    Influenza, Quadv, IM, (6 mo and older Fluzone, Flulaval, Fluarix and 3 yrs and older Afluria) 2017, 2018, 2019, 2020    Pneumococcal Conjugate 13-valent ([id number]) 10/23/2016    Pneumococcal Polysaccharide ([id number]) 2020    Tdap (Boostrix, Adacel) 2018   ,   Health Maintenance   Topic Date Due    Hepatitis C screen  1970    Lipid screen  2021    DTaP/Tdap/Td vaccine (2 - Td) 2028    Flu vaccine  Completed    Pneumococcal 0-64 years Vaccine  Completed    HIV screen  Completed    Hepatitis A vaccine  Aged Out    Hepatitis B vaccine  Aged Out    Hib vaccine  Aged Out    Meningococcal (ACWY) vaccine  Aged Out       PHYSICAL EXAMINATION: [ INSTRUCTIONS:  \"[x]\" Indicates a positive item  \"[]\" Indicates a negative item  -- DELETE ALL ITEMS NOT EXAMINED]  Vital Signs: (As obtained by patient/caregiver or practitioner observation)    Blood pressure-  Heart rate-    Respiratory rate-    Temperature-  Pulse oximetry-     Constitutional: [x] Appears well-developed and well-nourished [] No apparent distress      [] Abnormal-   Mental status  [x] Alert and awake  [] Oriented to person/place/time []Able to follow commands      Eyes:  EOM    [x]  Normal  [] Abnormal-  Sclera  [x]  Normal  [] Abnormal -         Discharge [x]  None visible  [] Abnormal -    HENT:   [x] Normocephalic, atraumatic. [] Abnormal   [] Mouth/Throat: Mucous membranes are moist.     External Ears [x] Normal  [] Abnormal-     Neck: [x] No visualized mass     Pulmonary/Chest: [x] Respiratory effort normal.  [] No visualized signs of difficulty breathing or respiratory distress        [] Abnormal-      Musculoskeletal:   [x] Normal gait with no signs of ataxia         [] Normal range of motion of neck        [] Abnormal-       Neurological:        [x] No Facial Asymmetry (Cranial nerve 7 motor function) (limited exam to video visit)          [x] No gaze palsy        [] Abnormal-         Skin:        [x] No significant exanthematous lesions or discoloration noted on facial skin         [] Abnormal-            Psychiatric:       [x] Normal Affect [] No Hallucinations        [] Abnormal-     Other pertinent observable physical exam findings-     ASSESSMENT/PLAN:  1. Moderate single current episode of major depressive disorder (HCC)  Decreased Seroquel to 12.5 mg daily due to oversedation  Stop Paxil (miscommunication but pt appears to be doing well without it)  - QUEtiapine (SEROQUEL) 25 MG tablet; Take 0.5 tablets by mouth daily Anxiety/ depression  Dispense: 15 tablet; Refill: 0    2.  Chronic anxiety Continue PRN Ativan, does not need refills today. Discussed we will eventually need to wean but will need to alternative long term med in place.    - QUEtiapine (SEROQUEL) 25 MG tablet; Take 0.5 tablets by mouth daily Anxiety/ depression  Dispense: 15 tablet; Refill: 0      Return in about 4 weeks (around 2/1/2021) for Anxiety/ Depression/ Seroquel. Chioma Sommer is a 53 y.o. male being evaluated by a Virtual Visit (video visit) encounter to address concerns as mentioned above. A caregiver was present when appropriate. Due to this being a TeleHealth encounter (During [facility]-83 public health emergency), evaluation of the following organ systems was limited: Vitals/Constitutional/EENT/Resp/CV/GI//MS/Neuro/Skin/Heme-Lymph-Imm. Pursuant to the emergency declaration under the [address] [place] [address], [address] [place] [address] and the [employer] and [employer] Act, this Virtual Visit was conducted with patient's (and/or legal guardian's) consent, to reduce the patient's risk of exposure to COVID-19 and provide necessary medical care. The patient (and/or legal guardian) has also been advised to contact this office for worsening conditions or problems, and seek emergency medical treatment and/or call 911 if deemed necessary. Patient identification was verified at the start of the visit: Yes    Total time spent on this encounter: 5    Services were provided through a video synchronous discussion virtually to substitute for in-person clinic visit. Patient and provider were located at their individual homes. --MICHELLE Copeland - CNP on 1/4/2021 at 10:53 AM    An electronic signature was used to authenticate this note.

## 2021-01-12 ENCOUNTER — TELEPHONE (OUTPATIENT)
Dept: PULMONOLOGY | Age: 51
End: 2021-01-12

## 2021-01-12 NOTE — TELEPHONE ENCOUNTER
Patient cancelled appointment on 1/25/21 with Dr. Sara Ayers for pft f/u. Reason: pt does not want to get tested for covid. Patient did not reschedule appointment. Appointment rescheduled for wants a CB once the covid testing is lifted. Will keep encounter open to follow up     Last OV 10/5/20      Assessment:       · Pulmonary emphysema/COPD with AE  · Tracheobronchitis   · Dyspnea. Doubt of pulmonary origin. Unremarkable CTPA. Negative Echo with bubble study. Unremarkable LHC. Much improved  · Snoring- negative PSG   · Intermittent hypoxia, tachycardia and bradycardia- PSG and 6 minute walk negative for hypoxemia. Improved  · Mild restrictive defect -CT does not suggest ILD. Normalized on repeat   · Bilateral pulmonary nodules with mediastinal and hilar adenopathy 5/2013. Decreased in size in RLL PNs and 4R LN with enlargement of GOPAL PN on repeated CT chest 8/16/23013. Stable on CT 05/27/2014. Positive histoplasma M bands and Histoplasma Ab yeast CF (1-64). Itraconazole 1/9/7887-6/7/4817  · Hypermetabolic right hilar lymph nodes. 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 Improved on repeated CT. · Arthritis. 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 · 60 pack year smoking - quit 1/2018      Plan:       · Prednisone taper  · Doxycycline 100 mg PO BID for 5 days.   · Trial of Symbicort 160/4.5 2 puffs BID   · Continue Spiriva and Albuterol 2 puffs Q4-6 hrs PRN  · Patient is up to date with Pneumococcal vaccine  · Advised to get influenza vaccine this year   · Advised to continue with smoking cessation  · Follow up in 3 months with PFTs

## 2021-02-01 ENCOUNTER — VIRTUAL VISIT (OUTPATIENT)
Dept: FAMILY MEDICINE CLINIC | Age: 51
End: 2021-02-01
Payer: MEDICARE

## 2021-02-01 DIAGNOSIS — F41.9 CHRONIC ANXIETY: ICD-10-CM

## 2021-02-01 DIAGNOSIS — F32.1 MODERATE SINGLE CURRENT EPISODE OF MAJOR DEPRESSIVE DISORDER (HCC): Primary | ICD-10-CM

## 2021-02-01 PROCEDURE — 99213 OFFICE O/P EST LOW 20 MIN: CPT | Performed by: NURSE PRACTITIONER

## 2021-02-01 ASSESSMENT — ENCOUNTER SYMPTOMS
WHEEZING: 0
RHINORRHEA: 0
CHEST TIGHTNESS: 0
VOMITING: 0
COLOR CHANGE: 0
CHOKING: 0
SHORTNESS OF BREATH: 1
SORE THROAT: 0
ABDOMINAL PAIN: 0
COUGH: 1
APNEA: 0
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
## 2021-02-17 DIAGNOSIS — F32.1 MODERATE SINGLE CURRENT EPISODE OF MAJOR DEPRESSIVE DISORDER (HCC): ICD-10-CM

## 2021-02-17 DIAGNOSIS — F41.9 CHRONIC ANXIETY: ICD-10-CM

## 2021-02-17 RX ORDER — QUETIAPINE FUMARATE 25 MG/1
12.5 TABLET, FILM COATED ORAL DAILY
Qty: 15 TABLET | Refills: 2 | Status: SHIPPED | OUTPATIENT
Start: 2021-02-17 | End: 2021-04-12 | Stop reason: SDUPTHER

## 2021-03-02 ENCOUNTER — TELEPHONE (OUTPATIENT)
Dept: FAMILY MEDICINE CLINIC | Age: 51
End: 2021-03-02

## 2021-03-02 DIAGNOSIS — F41.9 CHRONIC ANXIETY: ICD-10-CM

## 2021-03-02 RX ORDER — LORAZEPAM 1 MG/1
1 TABLET ORAL 3 TIMES DAILY PRN
Qty: 45 TABLET | Refills: 1 | Status: SHIPPED | OUTPATIENT
Start: 2021-03-02 | End: 2021-05-28 | Stop reason: SDUPTHER

## 2021-03-02 NOTE — TELEPHONE ENCOUNTER
He may increase to TID PRN. Prefer for him to still only take once daily but if he needs additional dose that would be acceptable. No change in pill strength. Please express our condolences.

## 2021-03-02 NOTE — TELEPHONE ENCOUNTER
Patient notified. Thankful for our condolences. Will need refill to accommodate for increase.      Lorazepam 1 MG, [address]

## 2021-03-02 NOTE — TELEPHONE ENCOUNTER
Patient calling, just lost his best friend and co-worker this weekend. Is having a really hard time. He wanted to know if it is possible that he increase his lorazepam for a few days to help with his anxiety during this time. Wants to know the safest amount to increase to or if he can just take it more often? Please advise.

## 2021-03-15 RX ORDER — DICYCLOMINE HYDROCHLORIDE 10 MG/1
10 CAPSULE ORAL 4 TIMES DAILY PRN
Qty: 90 CAPSULE | Refills: 1 | Status: SHIPPED | OUTPATIENT
Start: 2021-03-15 | End: 2021-05-20

## 2021-04-12 ENCOUNTER — VIRTUAL VISIT (OUTPATIENT)
Dept: FAMILY MEDICINE CLINIC | Age: 51
End: 2021-04-12
Payer: MEDICARE

## 2021-04-12 DIAGNOSIS — R20.2 NUMBNESS AND TINGLING OF BOTH LEGS BELOW KNEES: ICD-10-CM

## 2021-04-12 DIAGNOSIS — R00.2 RAPID PALPITATIONS: ICD-10-CM

## 2021-04-12 DIAGNOSIS — F41.9 CHRONIC ANXIETY: ICD-10-CM

## 2021-04-12 DIAGNOSIS — R20.0 NUMBNESS AND TINGLING OF BOTH LEGS BELOW KNEES: ICD-10-CM

## 2021-04-12 DIAGNOSIS — J43.8 OTHER EMPHYSEMA (HCC): ICD-10-CM

## 2021-04-12 DIAGNOSIS — R55 PRE-SYNCOPE: ICD-10-CM

## 2021-04-12 DIAGNOSIS — F32.1 MODERATE SINGLE CURRENT EPISODE OF MAJOR DEPRESSIVE DISORDER (HCC): ICD-10-CM

## 2021-04-12 PROCEDURE — G8427 DOCREV CUR MEDS BY ELIG CLIN: HCPCS | Performed by: NURSE PRACTITIONER

## 2021-04-12 PROCEDURE — 99214 OFFICE O/P EST MOD 30 MIN: CPT | Performed by: NURSE PRACTITIONER

## 2021-04-12 RX ORDER — METOPROLOL SUCCINATE 25 MG/1
25 TABLET, EXTENDED RELEASE ORAL DAILY
Qty: 30 TABLET | Refills: 3 | Status: SHIPPED | OUTPATIENT
Start: 2021-04-12 | End: 2021-07-12 | Stop reason: SDUPTHER

## 2021-04-12 RX ORDER — QUETIAPINE FUMARATE 25 MG/1
12.5 TABLET, FILM COATED ORAL DAILY
Qty: 15 TABLET | Refills: 3 | Status: SHIPPED | OUTPATIENT
Start: 2021-04-12 | End: 2021-07-12 | Stop reason: SDUPTHER

## 2021-04-12 RX ORDER — IBUPROFEN 800 MG/1
800 TABLET ORAL EVERY 6 HOURS PRN
Qty: 120 TABLET | Refills: 3 | Status: SHIPPED | OUTPATIENT
Start: 2021-04-12 | End: 2021-09-27

## 2021-04-12 RX ORDER — BUDESONIDE AND FORMOTEROL FUMARATE DIHYDRATE 160; 4.5 UG/1; UG/1
2 AEROSOL RESPIRATORY (INHALATION) 2 TIMES DAILY
Qty: 1 INHALER | Refills: 5 | Status: SHIPPED | OUTPATIENT
Start: 2021-04-12 | End: 2021-09-13 | Stop reason: SDUPTHER

## 2021-04-12 RX ORDER — ALBUTEROL SULFATE 90 UG/1
2 AEROSOL, METERED RESPIRATORY (INHALATION) EVERY 6 HOURS PRN
Qty: 1 INHALER | Refills: 5 | Status: SHIPPED | OUTPATIENT
Start: 2021-04-12 | End: 2021-09-13 | Stop reason: SDUPTHER

## 2021-04-12 ASSESSMENT — ENCOUNTER SYMPTOMS
CHOKING: 0
WHEEZING: 0
VOMITING: 0
ABDOMINAL PAIN: 0
SHORTNESS OF BREATH: 1
RHINORRHEA: 0
COUGH: 1
SORE THROAT: 0
APNEA: 0
CHEST TIGHTNESS: 0
COLOR CHANGE: 0

## 2021-04-12 NOTE — PROGRESS NOTES
Jada Guerrero (:  1970) is a 53 y.o. male,Established patient, here for evaluation of the following chief complaint(s): Anxiety, Depression, and COPD      ASSESSMENT/PLAN:  1. Other emphysema ([facility]  Monitor for now. Concern for COPD flare due to symptoms. If symptoms do not improve within next 2-3 days encouraged pt to come to office for formal eval and steroids/ abx. Continue Spiriva/ Breo/ PRN Albuterol  Encouraged antihistamine use/ nasal steroid  Declining COVID vaccination, PNA vaccination UTD. -     tiotropium (SPIRIVA RESPIMAT) 2.5 MCG/ACT AERS inhaler; INHALE TWO (2) PUFFS INTO THE LUNGS DAILY, Disp-1 Inhaler, R-5Normal  -     budesonide-formoterol (SYMBICORT) 160-4.5 MCG/ACT AERO; Inhale 2 puffs into the lungs 2 times daily, Disp-1 Inhaler, R-5Normal  -     albuterol sulfate HFA (PROAIR HFA) 108 (90 Base) MCG/ACT inhaler; Inhale 2 puffs into the lungs every 6 hours as needed for Wheezing or Shortness of Breath, Disp-1 Inhaler, R-5Normal  2. Moderate single current episode of major depressive disorder ([facility]  Improving today. Continue Seroquel  Prior meds: Paxi  -     QUEtiapine (SEROQUEL) 25 MG tablet; Take 0.5 tablets by mouth daily Anxiety/ depression, Disp-15 tablet, R-3Normal  3. Chronic anxiety  See #2  -     QUEtiapine (SEROQUEL) 25 MG tablet; Take 0.5 tablets by mouth daily Anxiety/ depression, Disp-15 tablet, R-3Normal  4. Pre-syncope  Stable today  Continue Metoprolol  -     metoprolol succinate (TOPROL XL) 25 MG extended release tablet; Take 1 tablet by mouth daily, Disp-30 tablet, R-3Normal  5. Rapid palpitations  Stable today  -     metoprolol succinate (TOPROL XL) 25 MG extended release tablet; Take 1 tablet by mouth daily, Disp-30 tablet, R-3Normal  6. Numbness and tingling of both legs below knees  Continue PRN Ibuprofen  -     ibuprofen (ADVIL;MOTRIN) 800 MG tablet;  Take 1 tablet by mouth every 6 hours as needed for Pain, Disp-120 tablet, R-3Normal      Return in about 3 months (around 7/12/2021), or if symptoms worsen or fail to improve breathing, for Anxiety, Emphysema. SUBJECTIVE/OBJECTIVE:    COPD  Recently having increased SOB, feeling of chest tightness. Attributes to seasonal allergies. Using Spiriva and Breo as prescribed, Albuterol inhaler 2-3x/ day. Prefers to watch for now and not use steroids/ antibiotics. No longer smoking. No recent hospitalizations, hemoptysis. Anxiety/ Depression: Slolwy improving. Recent death of friend causing worsening anxiety- took Ativan TID for a couple of days after obtaining permission, now returned to BID. Continuing to have issue with ex-wife, final divorce is now scheduled in 3 months. No longer groggy in AM after taking Seroquel, feels he could increase up to full dose. No SI/ HI.      Palpitations: Well controlled with Toprol XL. No longer having palpitations. Refusing COVID vaccination    Review of Systems   Constitutional: Positive for fatigue. Negative for fever. HENT: Negative for ear pain, rhinorrhea and sore throat. Eyes: Negative for visual disturbance. Respiratory: Positive for cough and shortness of breath. Negative for apnea, choking, chest tightness and wheezing. Cardiovascular: Negative for chest pain and leg swelling. Gastrointestinal: Negative for abdominal pain and vomiting. Musculoskeletal: Negative for neck pain. Skin: Negative for color change, pallor and rash. Neurological: Negative for headaches. Psychiatric/Behavioral: Positive for dysphoric mood. Negative for decreased concentration, self-injury and sleep disturbance. The patient is nervous/anxious.         Patient-Reported Vitals 10/5/2020   Patient-Reported Weight 163lb   Patient-Reported Height 5ft7in        [INSTRUCTIONS:  \"[x]\" Indicates a positive item  \"[]\" Indicates a negative item  -- DELETE ALL ITEMS NOT EXAMINED]    Constitutional: [x] Appears well-developed and well-nourished [x] No apparent distress      [] Abnormal -     Mental status: [x] Alert and awake  [x] Oriented to person/place/time [x] Able to follow commands    [] Abnormal -     Eyes:   EOM    [x]  Normal    [] Abnormal -   Sclera  [x]  Normal    [] Abnormal -          Discharge [x]  None visible   [] Abnormal -     HENT: [x] Normocephalic, atraumatic  [] Abnormal -   [x] Mouth/Throat: Mucous membranes are moist    External Ears [x] Normal  [] Abnormal -    Neck: [x] No visualized mass [] Abnormal -     Pulmonary/Chest: [x] Respiratory effort normal   [x] No visualized signs of difficulty breathing or respiratory distress        [] Abnormal -      Musculoskeletal:   [x] Normal gait with no signs of ataxia         [x] Normal range of motion of neck        [] Abnormal -     Neurological:        [x] No Facial Asymmetry (Cranial nerve 7 motor function) (limited exam due to video visit)          [x] No gaze palsy        [] Abnormal -          Skin:        [x] No significant exanthematous lesions or discoloration noted on facial skin         [] Abnormal -            Psychiatric:       [x] Normal Affect [] Abnormal -        [x] No Hallucinations    Other pertinent observable physical exam findings:-          On this date 4/12/2021 I have spent 5 minutes reviewing previous notes, test results and face to face (virtual) with the patient discussing the diagnosis and importance of compliance with the treatment plan as well as documenting on the day of the visit. Elle Hutchinson, was evaluated through a synchronous (real-time) audio-video encounter. The patient (or guardian if applicable) is aware that this is a billable service. Verbal consent to proceed has been obtained within the past 12 months. The visit was conducted pursuant to the emergency declaration under the [facility], [address] [place] [address] authority and the [employer] Act.   Patient identification was verified, and a caregiver was present when appropriate. The patient was located in a state where the provider was credentialed to provide care. An electronic signature was used to authenticate this note.     --MICHELLE Julien - CNP

## 2021-05-20 RX ORDER — DICYCLOMINE HYDROCHLORIDE 10 MG/1
10 CAPSULE ORAL 4 TIMES DAILY PRN
Qty: 90 CAPSULE | Refills: 1 | Status: SHIPPED | OUTPATIENT
Start: 2021-05-20

## 2021-05-20 RX ORDER — FLUTICASONE PROPIONATE 50 MCG
1 SPRAY, SUSPENSION (ML) NASAL DAILY
Qty: 16 G | Refills: 5 | Status: SHIPPED | OUTPATIENT
Start: 2021-05-20 | End: 2022-03-07 | Stop reason: SDUPTHER

## 2021-05-27 ENCOUNTER — TELEPHONE (OUTPATIENT)
Dept: FAMILY MEDICINE CLINIC | Age: 51
End: 2021-05-27

## 2021-05-27 ENCOUNTER — NURSE ONLY (OUTPATIENT)
Dept: FAMILY MEDICINE CLINIC | Age: 51
End: 2021-05-27

## 2021-05-27 VITALS
SYSTOLIC BLOOD PRESSURE: 118 MMHG | RESPIRATION RATE: 16 BRPM | OXYGEN SATURATION: 99 % | HEART RATE: 68 BPM | DIASTOLIC BLOOD PRESSURE: 76 MMHG

## 2021-05-27 DIAGNOSIS — Z01.30 BLOOD PRESSURE CHECK: Primary | ICD-10-CM

## 2021-05-27 DIAGNOSIS — R42 DIZZINESS: ICD-10-CM

## 2021-05-27 DIAGNOSIS — F41.9 CHRONIC ANXIETY: ICD-10-CM

## 2021-05-27 NOTE — TELEPHONE ENCOUNTER
Pt stopped in for BP check today. /76      Left upper arm       Sitting       Normal adult cuff  P 68  O2 99%    Pt states that he has been dizzy/lightheaded x 1 week. Began off and on, and is becoming constant/worse. Denies any other sxs. Pt states he is taking all of his medications as prescribed except his LORazepam (ATIVAN) 1 MG tablet, because he has been out of this medication for 2 days. Please Advise.  Thank you

## 2021-05-28 RX ORDER — LORAZEPAM 1 MG/1
1 TABLET ORAL 3 TIMES DAILY PRN
Qty: 45 TABLET | Refills: 0 | Status: SHIPPED | OUTPATIENT
Start: 2021-05-28 | End: 2021-07-12 | Stop reason: SDUPTHER

## 2021-05-28 NOTE — TELEPHONE ENCOUNTER
Is the dizziness similar to his past symptoms? I have refilled his Lorazepam for 1 month. No more without an OV. If the dizziness is worsening we should see him next week please.

## 2021-05-28 NOTE — TELEPHONE ENCOUNTER
Patient states the pain in his chest is bad to the point that it scares him. He said his legs are really weak.   I told him if the symptoms get worse to go to ER but I would put this message back to Deborah Merlos to see what she suggest

## 2021-07-12 ENCOUNTER — OFFICE VISIT (OUTPATIENT)
Dept: FAMILY MEDICINE CLINIC | Age: 51
End: 2021-07-12
Payer: MEDICARE

## 2021-07-12 VITALS
OXYGEN SATURATION: 98 % | HEART RATE: 62 BPM | SYSTOLIC BLOOD PRESSURE: 108 MMHG | DIASTOLIC BLOOD PRESSURE: 72 MMHG | BODY MASS INDEX: 25.53 KG/M2 | TEMPERATURE: 98.6 F | WEIGHT: 163 LBS

## 2021-07-12 DIAGNOSIS — J43.8 OTHER EMPHYSEMA (HCC): ICD-10-CM

## 2021-07-12 DIAGNOSIS — R00.2 RAPID PALPITATIONS: ICD-10-CM

## 2021-07-12 DIAGNOSIS — R20.0 NUMBNESS AND TINGLING OF BOTH LEGS BELOW KNEES: ICD-10-CM

## 2021-07-12 DIAGNOSIS — F41.9 CHRONIC ANXIETY: ICD-10-CM

## 2021-07-12 DIAGNOSIS — R20.2 NUMBNESS AND TINGLING OF BOTH LEGS BELOW KNEES: ICD-10-CM

## 2021-07-12 DIAGNOSIS — R07.9 CHEST PAIN, UNSPECIFIED TYPE: Primary | ICD-10-CM

## 2021-07-12 DIAGNOSIS — F32.1 MODERATE SINGLE CURRENT EPISODE OF MAJOR DEPRESSIVE DISORDER (HCC): ICD-10-CM

## 2021-07-12 LAB
HCT VFR BLD CALC: 45.1 % (ref 40.5–52.5)
HEMOGLOBIN: 15.6 G/DL (ref 13.5–17.5)
MCH RBC QN AUTO: 30.5 PG (ref 26–34)
MCHC RBC AUTO-ENTMCNC: 34.5 G/DL (ref 31–36)
MCV RBC AUTO: 88.3 FL (ref 80–100)
PDW BLD-RTO: 13.7 % (ref 12.4–15.4)
PLATELET # BLD: 315 K/UL (ref 135–450)
PMV BLD AUTO: 8.5 FL (ref 5–10.5)
RBC # BLD: 5.11 M/UL (ref 4.2–5.9)
WBC # BLD: 7.2 K/UL (ref 4–11)

## 2021-07-12 PROCEDURE — 93000 ELECTROCARDIOGRAM COMPLETE: CPT | Performed by: NURSE PRACTITIONER

## 2021-07-12 PROCEDURE — G8926 SPIRO NO PERF OR DOC: HCPCS | Performed by: NURSE PRACTITIONER

## 2021-07-12 PROCEDURE — G8427 DOCREV CUR MEDS BY ELIG CLIN: HCPCS | Performed by: NURSE PRACTITIONER

## 2021-07-12 PROCEDURE — 99214 OFFICE O/P EST MOD 30 MIN: CPT | Performed by: NURSE PRACTITIONER

## 2021-07-12 PROCEDURE — 36415 COLL VENOUS BLD VENIPUNCTURE: CPT | Performed by: NURSE PRACTITIONER

## 2021-07-12 PROCEDURE — 3023F SPIROM DOC REV: CPT | Performed by: NURSE PRACTITIONER

## 2021-07-12 PROCEDURE — 1036F TOBACCO NON-USER: CPT | Performed by: NURSE PRACTITIONER

## 2021-07-12 PROCEDURE — G8419 CALC BMI OUT NRM PARAM NOF/U: HCPCS | Performed by: NURSE PRACTITIONER

## 2021-07-12 RX ORDER — QUETIAPINE FUMARATE 25 MG/1
12.5 TABLET, FILM COATED ORAL DAILY
Qty: 15 TABLET | Refills: 3 | Status: SHIPPED | OUTPATIENT
Start: 2021-07-12 | End: 2021-12-13 | Stop reason: SDUPTHER

## 2021-07-12 RX ORDER — ASPIRIN 81 MG/1
81 TABLET, CHEWABLE ORAL DAILY
Qty: 30 TABLET | Refills: 3 | Status: CANCELLED | OUTPATIENT
Start: 2021-07-12

## 2021-07-12 RX ORDER — DICYCLOMINE HYDROCHLORIDE 10 MG/1
10 CAPSULE ORAL 4 TIMES DAILY PRN
Qty: 90 CAPSULE | Refills: 1 | Status: CANCELLED | OUTPATIENT
Start: 2021-07-12

## 2021-07-12 RX ORDER — METOPROLOL SUCCINATE 25 MG/1
25 TABLET, EXTENDED RELEASE ORAL DAILY
Qty: 30 TABLET | Refills: 3 | Status: SHIPPED | OUTPATIENT
Start: 2021-07-12 | End: 2021-11-12 | Stop reason: SDUPTHER

## 2021-07-12 RX ORDER — LORAZEPAM 1 MG/1
1 TABLET ORAL 3 TIMES DAILY PRN
Qty: 45 TABLET | Refills: 2 | Status: SHIPPED | OUTPATIENT
Start: 2021-07-12 | End: 2021-11-01

## 2021-07-12 RX ORDER — GABAPENTIN 600 MG/1
600 TABLET ORAL 3 TIMES DAILY
Qty: 180 TABLET | Refills: 3 | Status: SHIPPED | OUTPATIENT
Start: 2021-07-12 | End: 2021-12-13 | Stop reason: SDUPTHER

## 2021-07-12 ASSESSMENT — ENCOUNTER SYMPTOMS
SORE THROAT: 0
VOMITING: 0
SHORTNESS OF BREATH: 1
COUGH: 1
RHINORRHEA: 0
CHOKING: 0
WHEEZING: 0
CHEST TIGHTNESS: 0
ABDOMINAL PAIN: 0
APNEA: 0
COLOR CHANGE: 0

## 2021-07-12 NOTE — PROGRESS NOTES
7/12/2021    Chief Complaint   Patient presents with    Depression    Anxiety    Palpitations    Fatigue     states he is tired all the time and if he tries to do anything then he is wore out the next day     Chest Pain     states it is like an ache     [address] is a 53 y.o. male, presents today for:      ASSESSMENT/PLAN:  1. Chest pain, unspecified type  Due to recurrent chest discomfort, family history, and former tobacco use recommended for patient to schedule stress testing. Labs ordered today  ECG SR with left axis deviation, no S-ST changes, similar to prior. Will likely need referral to Cardio after stress testing  Continue Metoprolol/ baby ASA today    - EKG 12 Lead  - CBC  - COMPREHENSIVE METABOLIC PANEL  - NM MYOCARDIAL SPECT REST EXERCISE OR RX; Future  - TSH with Reflex    2. Other emphysema ([facility]  Stable today  Encouraged continued smoking cessation  Continue Albuterol/ Symbicort  Pneumovax UTD, encouraged COVID vaccinations  Last CT Chest done 8/2020 with no substantial interval change in noncalcified pulmonary nodules. 3. Chronic anxiety  Improving today  Continue PRN Lorazepam- will begin to wean next visit  Continue Seroquel  PDMP checked today, needs UDS and Medication Contract next visit. - LORazepam (ATIVAN) 1 MG tablet; Take 1 tablet by mouth 3 times daily as needed for Anxiety for up to 30 days. Dispense: 45 tablet; Refill: 2  - QUEtiapine (SEROQUEL) 25 MG tablet; Take 0.5 tablets by mouth daily Anxiety/ depression  Dispense: 15 tablet; Refill: 3    4. Rapid palpitations  See #1  - metoprolol succinate (TOPROL XL) 25 MG extended release tablet; Take 1 tablet by mouth daily  Dispense: 30 tablet; Refill: 3    5. Moderate single current episode of major depressive disorder ([facility]  See #3  - QUEtiapine (SEROQUEL) 25 MG tablet; Take 0.5 tablets by mouth daily Anxiety/ depression  Dispense: 15 tablet; Refill: 3    6.  Numbness and tingling of both legs below knees  Stable today  Continue Gabapentin  - gabapentin (NEURONTIN) 600 MG tablet; Take 1 tablet by mouth 3 times daily for 180 days. Dispense: 180 tablet; Refill: 3      Return in about 3 weeks (around 8/2/2021) for chest pain. Presenting today to discuss CP needing refills for chronic diseases (Anxiety, Empysema, Depression)    Chest Pain: Onset was 3 months ago. Symptoms have worsened since that time. The patient describes the pain as stabbing and does not radiate. Patient rates pain as a 6/10 in intensity. Associated symptoms are: chest pain, chest pressure/discomfort, dyspnea, exertional chest pressure/discomfort, fatigue and palpitations. No diaphoresis. Aggravating factors are: exercise and walking. Alleviating factors are: rest. Patient's cardiac risk factors are: family history of premature cardiovascular disease, hypertension, male gender and smoking/ tobacco exposure in the past. Patient's risk factors for DVT/PE: none. Previous cardiac testing: echocardiogram, electrocardiogram (ECG) and 2019. Currently taking Metoprolol, baby ASA. Anxiety: Better controlled today with Paxil and Seroquel. Sleeping around 6 hours/ night. Sometimes awakening with chest discomfort. Issue with estranged wife has resolved. Continues to intermittently take Lorazepam.    Emphysema: Continues to have mild productive cough with green sputum. No increase SOB, chest pain, leg swelling, hemoptysis. Former tobacco user. Continues to take Symbicort which is relieving symptoms. Using Albuterol inhaler once evvery other day. Bilateral Leg/ numbness: No change since last visit. Tolerating Gabapentin. Ensures shoes fit well and will do periodic inspection of feet. Review of Systems   Constitutional: Positive for fatigue. Negative for fever. HENT: Negative for ear pain, rhinorrhea and sore throat. Eyes: Negative for visual disturbance. Respiratory: Positive for cough and shortness of breath.  Negative for apnea, choking, chest tightness and wheezing. Cardiovascular: Positive for chest pain. Negative for palpitations and leg swelling. Gastrointestinal: Negative for abdominal pain and vomiting. Musculoskeletal: Negative for neck pain. Skin: Negative for color change, pallor and rash. Neurological: Negative for headaches. Psychiatric/Behavioral: Positive for dysphoric mood. Negative for decreased concentration, self-injury and sleep disturbance. The patient is nervous/anxious. Current Outpatient Medications on File Prior to Visit   Medication Sig Dispense Refill    fluticasone (FLONASE) 50 MCG/ACT nasal spray 1 spray by Each Nostril route daily 16 g 5    dicyclomine (BENTYL) 10 MG capsule Take 1 capsule by mouth 4 times daily as needed (abdominal cramps.) 90 capsule 1    tiotropium (SPIRIVA RESPIMAT) 2.5 MCG/ACT AERS inhaler INHALE TWO (2) PUFFS INTO THE LUNGS DAILY 1 Inhaler 5    ibuprofen (ADVIL;MOTRIN) 800 MG tablet Take 1 tablet by mouth every 6 hours as needed for Pain 120 tablet 3    budesonide-formoterol (SYMBICORT) 160-4.5 MCG/ACT AERO Inhale 2 puffs into the lungs 2 times daily 1 Inhaler 5    albuterol sulfate HFA (PROAIR HFA) 108 (90 Base) MCG/ACT inhaler Inhale 2 puffs into the lungs every 6 hours as needed for Wheezing or Shortness of Breath 1 Inhaler 5     No current facility-administered medications on file prior to visit. Allergies   Allergen Reactions    Codeine Itching    Mobic [Meloxicam]      Irritates stomach    Tramadol      Patient states he doesn't feel right on tramadol.      Past Medical History:   Diagnosis Date    Arthritis     Chest pain     [facility] 9/2012 sent for records;    Colitis     COPD (chronic obstructive pulmonary disease) ([facility] [address] [facility]     Histoplasmosis     History of blood transfusion     Hx of blood clots     Metabolic syndrome X 36/63/5987    Other emphysema ([facility] 8/19/2018    Pneumonia     Pulmonary nodule      Past Surgical History: Procedure Laterality Date    BRONCHOSCOPY  2013    COLONOSCOPY  2018    COLONOSCOPY N/A 2018    COLORECTAL CANCER SCREENING, NOT HIGH RISK performed by Babs Reece DO at [facility] CATH LAB PROCEDURE  1998    KNEE ARTHROSCOPY Right     KNEE ARTHROSCOPY Right 2019    RIGHT KNEE VIDEO ARTHROSCOPY, MEDIAL MENISCECTOMY performed by Charmayne Morita, MD at [facility]      neck    VASECTOMY        Social History     Tobacco Use    Smoking status: Former Smoker     Packs/day: 2.00     Years: 20.00     Pack years: 40.00     Types: Cigarettes     Quit date: 2018     Years since quitting: 3.5    Smokeless tobacco: Never Used   Substance Use Topics    Alcohol use: No     Alcohol/week: 1.0 standard drinks     Types: 1 Cans of beer per week      Family History   Problem Relation Age of Onset    Cancer Mother         ovarian    Heart Failure Mother     Heart Attack Mother 62    Heart Disease Mother     Other Father          1948;ashd;per neurop;    Other Brother         pt knows very little    Cancer Paternal Uncle         leukemia    Cancer Paternal Grandfather         leukemia    Heart Attack Maternal Uncle 60        x4     No Known Problems Son     No Known Problems Son     Asthma Neg Hx     Diabetes Neg Hx     Emphysema Neg Hx     Hypertension Neg Hx         Vitals:    21 0934   BP: 108/72   Pulse: 62   Temp: 98.6 °F (37 °C)   TempSrc: Tympanic   SpO2: 98%   Weight: 163 lb (73.9 kg)     Estimated body mass index is 25.53 kg/m² as calculated from the following:    Height as of 20: 5' 7\" (1.702 m). Weight as of this encounter: 163 lb (73.9 kg). Physical Exam  Constitutional:       Appearance: Normal appearance. HENT:      Mouth/Throat:      Mouth: Mucous membranes are moist.   Cardiovascular:      Rate and Rhythm: Normal rate and regular rhythm. Pulses: Normal pulses. Heart sounds: Normal heart sounds. Pulmonary:      Effort: Pulmonary effort is normal.      Breath sounds: Normal breath sounds. Abdominal:      Palpations: Abdomen is soft. Skin:     General: Skin is warm. Capillary Refill: Capillary refill takes less than 2 seconds. Neurological:      General: No focal deficit present. Mental Status: He is alert and oriented to person, place, and time. Psychiatric:         Mood and Affect: Mood normal.         Behavior: Behavior normal.           Patient's questions answered and concerns addressed. Patient agrees to plan of care.           Electronically signed by MICHELLE Freeman CNP on 7/12/2021 at 8:53 PM

## 2021-07-13 ENCOUNTER — TELEPHONE (OUTPATIENT)
Dept: FAMILY MEDICINE CLINIC | Age: 51
End: 2021-07-13

## 2021-07-13 DIAGNOSIS — Z13.220 SCREENING, LIPID: ICD-10-CM

## 2021-07-13 DIAGNOSIS — Z13.220 SCREENING, LIPID: Primary | ICD-10-CM

## 2021-07-13 LAB
A/G RATIO: 2.1 (ref 1.1–2.2)
ALBUMIN SERPL-MCNC: 4.5 G/DL (ref 3.4–5)
ALP BLD-CCNC: 67 U/L (ref 40–129)
ALT SERPL-CCNC: 11 U/L (ref 10–40)
ANION GAP SERPL CALCULATED.3IONS-SCNC: 9 MMOL/L (ref 3–16)
AST SERPL-CCNC: 14 U/L (ref 15–37)
BILIRUB SERPL-MCNC: 0.8 MG/DL (ref 0–1)
BUN BLDV-MCNC: 13 MG/DL (ref 7–20)
CALCIUM SERPL-MCNC: 9.2 MG/DL (ref 8.3–10.6)
CHLORIDE BLD-SCNC: 103 MMOL/L (ref 99–110)
CHOLESTEROL, TOTAL: 174 MG/DL (ref 0–199)
CO2: 27 MMOL/L (ref 21–32)
CREAT SERPL-MCNC: 1.2 MG/DL (ref 0.9–1.3)
GFR AFRICAN AMERICAN: >60
GFR NON-AFRICAN AMERICAN: >60
GLOBULIN: 2.1 G/DL
GLUCOSE BLD-MCNC: 78 MG/DL (ref 70–99)
HDLC SERPL-MCNC: 39 MG/DL (ref 40–60)
LDL CHOLESTEROL CALCULATED: 118 MG/DL
POTASSIUM SERPL-SCNC: 4.7 MMOL/L (ref 3.5–5.1)
SODIUM BLD-SCNC: 139 MMOL/L (ref 136–145)
TOTAL PROTEIN: 6.6 G/DL (ref 6.4–8.2)
TRIGL SERPL-MCNC: 85 MG/DL (ref 0–150)
TSH REFLEX: 0.53 UIU/ML (ref 0.27–4.2)
VLDLC SERPL CALC-MCNC: 17 MG/DL

## 2021-07-13 NOTE — RESULT ENCOUNTER NOTE
Labs are normal.  What we were hoping to see. Please schedule your stress test to determine potential causes for your chest discomfort. Please call if you have additional questions and/ or concerns. Please keep your next appt. Thank you.

## 2021-08-03 ENCOUNTER — HOSPITAL ENCOUNTER (OUTPATIENT)
Dept: CARDIOLOGY | Age: 51
Discharge: HOME | End: 2021-08-03

## 2021-08-03 DIAGNOSIS — R07.9 CHEST PAIN, UNSPECIFIED TYPE: ICD-10-CM

## 2021-08-03 LAB
LV EF: 61 %
LVEF MODALITY: NORMAL

## 2021-08-03 PROCEDURE — 3430000000 HC RX DIAGNOSTIC RADIOPHARMACEUTICAL: Performed by: NURSE PRACTITIONER

## 2021-08-03 PROCEDURE — 6360000002 HC RX W HCPCS: Performed by: NURSE PRACTITIONER

## 2021-08-03 PROCEDURE — 78452 HT MUSCLE IMAGE SPECT MULT: CPT

## 2021-08-03 PROCEDURE — 93017 CV STRESS TEST TRACING ONLY: CPT

## 2021-08-03 PROCEDURE — A9502 TC99M TETROFOSMIN: HCPCS | Performed by: NURSE PRACTITIONER

## 2021-08-03 RX ADMIN — TETROFOSMIN 32.1 MILLICURIE: 1.38 INJECTION, POWDER, LYOPHILIZED, FOR SOLUTION INTRAVENOUS at 09:05

## 2021-08-03 RX ADMIN — REGADENOSON 0.4 MG: 0.08 INJECTION, SOLUTION INTRAVENOUS at 09:05

## 2021-08-03 RX ADMIN — TETROFOSMIN 10.8 MILLICURIE: 1.38 INJECTION, POWDER, LYOPHILIZED, FOR SOLUTION INTRAVENOUS at 07:59

## 2021-08-04 ENCOUNTER — OFFICE VISIT (OUTPATIENT)
Dept: FAMILY MEDICINE CLINIC | Age: 51
End: 2021-08-04
Payer: MEDICARE

## 2021-08-04 VITALS
HEART RATE: 54 BPM | TEMPERATURE: 97 F | OXYGEN SATURATION: 100 % | WEIGHT: 164 LBS | BODY MASS INDEX: 25.69 KG/M2 | SYSTOLIC BLOOD PRESSURE: 102 MMHG | DIASTOLIC BLOOD PRESSURE: 62 MMHG

## 2021-08-04 DIAGNOSIS — J43.8 OTHER EMPHYSEMA (HCC): Primary | ICD-10-CM

## 2021-08-04 DIAGNOSIS — R07.9 CHEST PAIN, UNSPECIFIED TYPE: ICD-10-CM

## 2021-08-04 PROCEDURE — 99214 OFFICE O/P EST MOD 30 MIN: CPT | Performed by: NURSE PRACTITIONER

## 2021-08-04 PROCEDURE — 3023F SPIROM DOC REV: CPT | Performed by: NURSE PRACTITIONER

## 2021-08-04 PROCEDURE — G8926 SPIRO NO PERF OR DOC: HCPCS | Performed by: NURSE PRACTITIONER

## 2021-08-04 PROCEDURE — G8419 CALC BMI OUT NRM PARAM NOF/U: HCPCS | Performed by: NURSE PRACTITIONER

## 2021-08-04 PROCEDURE — 1036F TOBACCO NON-USER: CPT | Performed by: NURSE PRACTITIONER

## 2021-08-04 PROCEDURE — G8427 DOCREV CUR MEDS BY ELIG CLIN: HCPCS | Performed by: NURSE PRACTITIONER

## 2021-08-04 RX ORDER — OMEPRAZOLE 40 MG/1
40 CAPSULE, DELAYED RELEASE ORAL
Qty: 30 CAPSULE | Refills: 1 | Status: SHIPPED | OUTPATIENT
Start: 2021-08-04 | End: 2021-11-01

## 2021-08-04 ASSESSMENT — ENCOUNTER SYMPTOMS
APNEA: 0
RHINORRHEA: 0
SORE THROAT: 0
SHORTNESS OF BREATH: 1
COUGH: 1
WHEEZING: 0
VOMITING: 0
CHEST TIGHTNESS: 0
ABDOMINAL PAIN: 0
COLOR CHANGE: 0
CHOKING: 0

## 2021-08-04 NOTE — PROGRESS NOTES
8/4/2021    Chief Complaint   Patient presents with    Chest Pain     states this is really bad today, states since he had the stress test yesterday he feels terrible     Extremity Weakness     states his legs are very weak today        Bertha Barbosa is a 53 y.o. male, presents today for:      ASSESSMENT/PLAN:  1. Other emphysema ([address]  Stable today  Encouraged continued smoking cessation  Continue Albuterol/ Symbicort  Pneumovax UTD, encouraged COVID vaccinations  Last CT Chest done 8/2020 with no substantial interval change in noncalcified pulmonary nodules, will reorder today given worsening chest pain  PFT/ 6MW due to chest discomfort  - Full PFT Study With Bronchodilator; Future  - 6 Minute Walk Test; Future    2. Chest pain, unspecified type  Stress Testing Negative  Trial PPI  Decreased Metoprolol to 12.5 to see if chest pain caused by hypotension/ bradycardia  ECG SR with left axis deviation, no S-ST changes, similar to prior. Will likely need referral to Cardio after stress testing  Continue Metoprolol/ baby ASA today  Labs negateive  ? Vasospasm. Encouraged patient to attempt Magnesium 800 mg daily. If no improvement consider Amlodipine  - omeprazole (PRILOSEC) 40 MG delayed release capsule; Take 1 capsule by mouth every morning (before breakfast) Chest pain  Dispense: 30 capsule; Refill: 1    Return in about 4 weeks (around 9/1/2021). Presenting today to discuss CP needing refills for chronic diseases (Anxiety, Empysema, Depression)    Chest Pain Initial HPI    Onset was 3 months ago. Symptoms have worsened since that time. The patient describes the pain as stabbing and does not radiate. Patient rates pain as a 6/10 in intensity. Associated symptoms are: chest pain, chest pressure/discomfort, dyspnea, exertional chest pressure/discomfort, fatigue and palpitations. No diaphoresis. Aggravating factors are: exercise and walking.  Alleviating factors are: rest. Patient's cardiac risk factors are: family history of premature cardiovascular disease, hypertension, male gender and smoking/ tobacco exposure in the past. Patient's risk factors for DVT/PE: none. Previous cardiac testing: echocardiogram, electrocardiogram (ECG) and 2019. Currently taking Metoprolol, baby ASA. Interval Update   Continues to have left epigastric/ discomfort, worse since having Stress testing yesterday. Continues to have mild dyspnea and smoke. No recent CT Scan, PFT, or 6 Min Walk test.            Emphysema: Continues to have mild productive cough with green sputum. No hemoptysis. Former tobacco user. Continues to take Symbicort which is relieving symptoms. Using Albuterol inhaler once evvery other day. Review of Systems   Constitutional: Positive for fatigue. Negative for fever. HENT: Negative for ear pain, rhinorrhea and sore throat. Eyes: Negative for visual disturbance. Respiratory: Positive for cough and shortness of breath. Negative for apnea, choking, chest tightness and wheezing. Cardiovascular: Positive for chest pain. Negative for palpitations and leg swelling. Gastrointestinal: Negative for abdominal pain and vomiting. Musculoskeletal: Negative for neck pain. Skin: Negative for color change, pallor and rash. Neurological: Negative for headaches. Psychiatric/Behavioral: Positive for dysphoric mood. Negative for decreased concentration, self-injury and sleep disturbance. The patient is nervous/anxious. Current Outpatient Medications on File Prior to Visit   Medication Sig Dispense Refill    LORazepam (ATIVAN) 1 MG tablet Take 1 tablet by mouth 3 times daily as needed for Anxiety for up to 30 days.  45 tablet 2    metoprolol succinate (TOPROL XL) 25 MG extended release tablet Take 1 tablet by mouth daily 30 tablet 3    QUEtiapine (SEROQUEL) 25 MG tablet Take 0.5 tablets by mouth daily Anxiety/ depression 15 tablet 3    gabapentin (NEURONTIN) 600 MG tablet Take 1 tablet by addressed. Patient agrees to plan of care.           Electronically signed by MICHELLE Melara CNP on 8/5/2021 at 5:15 PM

## 2021-08-04 NOTE — PATIENT INSTRUCTIONS
Medication Changes:    Decrease Metoprolol to 12.5 mg (half tab daily)  Start Omeprazole daily 1 hour before eating in the AM  Start Magnesium (buy over the counter)- take 800 mg daily    Pulmonary Function Test and 6 min walker test ordered, please get before your Pulmonary appt

## 2021-08-20 ENCOUNTER — TELEPHONE (OUTPATIENT)
Dept: FAMILY MEDICINE CLINIC | Age: 51
End: 2021-08-20

## 2021-08-20 NOTE — TELEPHONE ENCOUNTER
[facility] scheduling called to inform our office that patient is refusing to have the PFT Study because he does not want to have the covid test and he has not vaccinated.  Patient says he is going to go to [facility] [place]

## 2021-08-22 ASSESSMENT — ENCOUNTER SYMPTOMS
SHORTNESS OF BREATH: 1
VOMITING: 0
WHEEZING: 0
COLOR CHANGE: 0
SORE THROAT: 0
ABDOMINAL PAIN: 0
CHOKING: 0
COUGH: 1
RHINORRHEA: 0
APNEA: 0
CHEST TIGHTNESS: 0

## 2021-08-22 NOTE — PROGRESS NOTES
8/30/2021    Chief Complaint   Patient presents with    Chest Pain     states he does not feel any difference        Mikie Faith is a 53 y.o. male, presents today for:      ASSESSMENT/PLAN:  1. Chest pain, unspecified type  Trial Amlodipine. Continue Metoprolol  Will refer to Cardio given unclear etiology for pain. Continue Pulmonary w/u, discussed how to obtain PFT  Stress Testing Negative  No improvement with PPI or decrease in Metoprolol dosing. ECG SR with left axis deviation, no S-ST changes, similar to prior. Continue baby ASA today  Labs negateive  ? Vasospasm. Encouraged patient to attempt Magnesium 800 mg daily. - amLODIPine (NORVASC) 2.5 MG tablet; Take 1 tablet by mouth daily Chest pain  Dispense: 90 tablet; Refill: 0  - [address]    2. Other emphysema ([address]  Stable today  Encouraged continued smoking cessation  Continue Albuterol/ Symbicort  Pneumovax UTD, encouraged COVID vaccinations  Last CT Chest done 8/2020 with no substantial interval change in noncalcified pulmonary nodules, will reorder today given worsening chest pain  PFT/ 6MW due to chest discomfort  Has appt with Pulm (Lilliana Michele) in September, strongly encouraged to obtain PFT    Return in about 3 months (around 11/30/2021). Presenting today to discuss CP needing refills for chronic diseases (Anxiety, Empysema, Depression)    Chest Pain Initial HPI    Onset was 3 months ago. Symptoms have worsened since that time. The patient describes the pain as stabbing and does not radiate. Patient rates pain as a 6/10 in intensity. Associated symptoms are: chest pain, chest pressure/discomfort, dyspnea, exertional chest pressure/discomfort, fatigue and palpitations. No diaphoresis. Aggravating factors are: exercise and walking.  Alleviating factors are: rest. Patient's cardiac risk factors are: family history of premature cardiovascular disease, hypertension, male gender and smoking/ tobacco exposure in the past. Patient's risk factors for DVT/PE: none. Previous cardiac testing: echocardiogram, electrocardiogram (ECG) and 2019. Currently taking Metoprolol, baby ASA. Interval Update   Continues to have left epigastric/ discomfort. No change with recent Toprol Dose change, recent initiation of PPI. Stress Testing is negative. Continues to have mild dyspnea with tobacco use. No recent CT Scan, PFT, or 6 Min Walk test.  Refusing COVID test for lung testing             Emphysema: Continues to have mild productive cough with green sputum. No hemoptysis. Former tobacco user. Continues to take Symbicort which is relieving symptoms. Using Albuterol inhaler once evvery other day. Review of Systems   Constitutional: Positive for fatigue. Negative for fever. HENT: Negative for ear pain, rhinorrhea and sore throat. Eyes: Negative for visual disturbance. Respiratory: Positive for cough and shortness of breath. Negative for apnea, choking, chest tightness and wheezing. Cardiovascular: Positive for chest pain. Negative for palpitations and leg swelling. Gastrointestinal: Negative for abdominal pain and vomiting. Musculoskeletal: Negative for neck pain. Skin: Negative for color change, pallor and rash. Neurological: Negative for headaches. Psychiatric/Behavioral: Positive for dysphoric mood. Negative for decreased concentration, self-injury and sleep disturbance. The patient is nervous/anxious. Current Outpatient Medications on File Prior to Visit   Medication Sig Dispense Refill    omeprazole (PRILOSEC) 40 MG delayed release capsule Take 1 capsule by mouth every morning (before breakfast) Chest pain 30 capsule 1    LORazepam (ATIVAN) 1 MG tablet Take 1 tablet by mouth 3 times daily as needed for Anxiety for up to 30 days.  45 tablet 2    metoprolol succinate (TOPROL XL) 25 MG extended release tablet Take 1 tablet by mouth daily 30 tablet 3    QUEtiapine (SEROQUEL) 25 MG tablet Take 0.5 tablets by mouth daily Anxiety/ depression 15 tablet 3    gabapentin (NEURONTIN) 600 MG tablet Take 1 tablet by mouth 3 times daily for 180 days. 180 tablet 3    fluticasone (FLONASE) 50 MCG/ACT nasal spray 1 spray by Each Nostril route daily 16 g 5    dicyclomine (BENTYL) 10 MG capsule Take 1 capsule by mouth 4 times daily as needed (abdominal cramps.) 90 capsule 1    tiotropium (SPIRIVA RESPIMAT) 2.5 MCG/ACT AERS inhaler INHALE TWO (2) PUFFS INTO THE LUNGS DAILY 1 Inhaler 5    ibuprofen (ADVIL;MOTRIN) 800 MG tablet Take 1 tablet by mouth every 6 hours as needed for Pain 120 tablet 3    budesonide-formoterol (SYMBICORT) 160-4.5 MCG/ACT AERO Inhale 2 puffs into the lungs 2 times daily 1 Inhaler 5    albuterol sulfate HFA (PROAIR HFA) 108 (90 Base) MCG/ACT inhaler Inhale 2 puffs into the lungs every 6 hours as needed for Wheezing or Shortness of Breath 1 Inhaler 5     No current facility-administered medications on file prior to visit. Allergies   Allergen Reactions    Codeine Itching    Mobic [Meloxicam]      Irritates stomach    Tramadol      Patient states he doesn't feel right on tramadol.      Past Medical History:   Diagnosis Date    Arthritis     Chest pain     [facility] 9/2012 sent for records;    Colitis     COPD [facility] disease) [facility] [facility]     Histoplasmosis     History of blood transfusion     Hx of blood clots     Metabolic syndrome X 54/49/7781    Other emphysema ([facility] [address] 8/19/2018    Pneumonia     Pulmonary nodule      Past Surgical History:   Procedure Laterality Date    BRONCHOSCOPY  6/05/2013    COLONOSCOPY  12/17/2018    COLONOSCOPY N/A 12/17/2018    COLORECTAL CANCER SCREENING, NOT HIGH RISK performed by Tamy Abel DO at [facility] CATH LAB PROCEDURE  1998    KNEE ARTHROSCOPY Right     KNEE ARTHROSCOPY Right 12/13/2019    RIGHT KNEE VIDEO ARTHROSCOPY, MEDIAL MENISCECTOMY performed by Madyson Kirby MD at [facility]

## 2021-08-23 NOTE — TELEPHONE ENCOUNTER
Per other telephone note      Hermelinda Rico RA    8/20/21 10:52 AM  Note     [facility] scheduling called to inform our office that patient is refusing to have the PFT Study because he does not want to have the covid test and he has not vaccinated.  Patient says he is going to go to [facility]

## 2021-08-30 ENCOUNTER — OFFICE VISIT (OUTPATIENT)
Dept: FAMILY MEDICINE CLINIC | Age: 51
End: 2021-08-30
Payer: MEDICARE

## 2021-08-30 VITALS
WEIGHT: 162 LBS | HEART RATE: 64 BPM | BODY MASS INDEX: 25.37 KG/M2 | OXYGEN SATURATION: 97 % | SYSTOLIC BLOOD PRESSURE: 108 MMHG | TEMPERATURE: 97.7 F | DIASTOLIC BLOOD PRESSURE: 72 MMHG

## 2021-08-30 DIAGNOSIS — J43.8 OTHER EMPHYSEMA (HCC): ICD-10-CM

## 2021-08-30 DIAGNOSIS — R07.9 CHEST PAIN, UNSPECIFIED TYPE: Primary | ICD-10-CM

## 2021-08-30 PROCEDURE — G8419 CALC BMI OUT NRM PARAM NOF/U: HCPCS | Performed by: NURSE PRACTITIONER

## 2021-08-30 PROCEDURE — 99213 OFFICE O/P EST LOW 20 MIN: CPT | Performed by: NURSE PRACTITIONER

## 2021-08-30 PROCEDURE — G8926 SPIRO NO PERF OR DOC: HCPCS | Performed by: NURSE PRACTITIONER

## 2021-08-30 PROCEDURE — 1036F TOBACCO NON-USER: CPT | Performed by: NURSE PRACTITIONER

## 2021-08-30 PROCEDURE — G8427 DOCREV CUR MEDS BY ELIG CLIN: HCPCS | Performed by: NURSE PRACTITIONER

## 2021-08-30 PROCEDURE — 3023F SPIROM DOC REV: CPT | Performed by: NURSE PRACTITIONER

## 2021-08-30 RX ORDER — AMLODIPINE BESYLATE 2.5 MG/1
2.5 TABLET ORAL DAILY
Qty: 90 TABLET | Refills: 0 | Status: SHIPPED | OUTPATIENT
Start: 2021-08-30 | End: 2021-11-12 | Stop reason: SDUPTHER

## 2021-09-13 ENCOUNTER — OFFICE VISIT (OUTPATIENT)
Dept: PULMONOLOGY | Age: 51
End: 2021-09-13
Payer: MEDICARE

## 2021-09-13 VITALS
OXYGEN SATURATION: 99 % | HEIGHT: 66 IN | DIASTOLIC BLOOD PRESSURE: 62 MMHG | RESPIRATION RATE: 18 BRPM | SYSTOLIC BLOOD PRESSURE: 98 MMHG | WEIGHT: 160 LBS | HEART RATE: 66 BPM | TEMPERATURE: 97.5 F | BODY MASS INDEX: 25.71 KG/M2

## 2021-09-13 DIAGNOSIS — R06.00 DYSPNEA, UNSPECIFIED TYPE: ICD-10-CM

## 2021-09-13 DIAGNOSIS — J44.9 CHRONIC OBSTRUCTIVE PULMONARY DISEASE, UNSPECIFIED COPD TYPE (HCC): ICD-10-CM

## 2021-09-13 DIAGNOSIS — J43.9 PULMONARY EMPHYSEMA, UNSPECIFIED EMPHYSEMA TYPE (HCC): Primary | ICD-10-CM

## 2021-09-13 DIAGNOSIS — R91.8 PULMONARY NODULES: ICD-10-CM

## 2021-09-13 DIAGNOSIS — Z87.891 PERSONAL HISTORY OF TOBACCO USE: ICD-10-CM

## 2021-09-13 PROCEDURE — 99214 OFFICE O/P EST MOD 30 MIN: CPT | Performed by: INTERNAL MEDICINE

## 2021-09-13 PROCEDURE — G8427 DOCREV CUR MEDS BY ELIG CLIN: HCPCS | Performed by: INTERNAL MEDICINE

## 2021-09-13 PROCEDURE — G8419 CALC BMI OUT NRM PARAM NOF/U: HCPCS | Performed by: INTERNAL MEDICINE

## 2021-09-13 PROCEDURE — 1036F TOBACCO NON-USER: CPT | Performed by: INTERNAL MEDICINE

## 2021-09-13 PROCEDURE — 3023F SPIROM DOC REV: CPT | Performed by: INTERNAL MEDICINE

## 2021-09-13 RX ORDER — ALBUTEROL SULFATE 90 UG/1
2 AEROSOL, METERED RESPIRATORY (INHALATION) EVERY 6 HOURS PRN
Qty: 18 G | Refills: 5 | Status: SHIPPED | OUTPATIENT
Start: 2021-09-13 | End: 2022-06-13 | Stop reason: SDUPTHER

## 2021-09-13 RX ORDER — BUDESONIDE AND FORMOTEROL FUMARATE DIHYDRATE 160; 4.5 UG/1; UG/1
2 AEROSOL RESPIRATORY (INHALATION) 2 TIMES DAILY
Qty: 10.2 G | Refills: 5 | Status: SHIPPED | OUTPATIENT
Start: 2021-09-13 | End: 2022-03-07

## 2021-09-13 NOTE — PROGRESS NOTES
[facility]                                                               CHIEF COMPLAINT: follow up COPD         HPI:   Doing okay   Declined Covid test for PFTs to be done   Some cough with clear to green sputum  No hemoptysis   Uses Albuterol 3 times/day   Uses Spiriva daily   Uses Symbicort on and off - out of it now   No smoking       Past Medical History:   Diagnosis Date    Arthritis     Chest pain     [facility] 2012 sent for records;    Colitis     [facility] [address]     Histoplasmosis     History of blood transfusion     Hx of blood clots     Metabolic syndrome X     Other emphysema ([facility] [address] 2018    Pneumonia     Pulmonary nodule        Past Surgical History:        Procedure Laterality Date    BRONCHOSCOPY  2013    COLONOSCOPY  2018    COLONOSCOPY N/A 2018    COLORECTAL CANCER SCREENING, NOT HIGH RISK performed by So Robledo DO at [facility] CATH  [facility] ARTHROSCOPY Right     KNEE ARTHROSCOPY Right 2019    RIGHT KNEE VIDEO ARTHROSCOPY, MEDIAL MENISCECTOMY performed by Tori Ybarra MD at [facility] 39      neck    VASECTOMY         Allergies:  is allergic to codeine, mobic [meloxicam], and tramadol. Social History:    TOBACCO:   reports that he quit smoking about 3 years ago. His smoking use included cigarettes. He has a 40.00 pack-year smoking history. He has never used smokeless tobacco.  ETOH:   reports no history of alcohol use.       Family History:       Problem Relation Age of Onset    Cancer Mother         ovarian    Heart Failure Mother     Heart Attack Mother 62    Heart Disease Mother     Other Father          1948;ashd;per neurop;    Other Brother         pt knows very little    Cancer Paternal Uncle         leukemia    Cancer Paternal Grandfather         leukemia    Heart Attack Maternal Uncle 60        x4     No Known Problems Son     No Known Problems Son     Asthma Neg Hx     Diabetes Neg Hx     Emphysema Neg Hx     Hypertension Neg Hx        Current Medications:    Current Outpatient Medications:     fluticasone (FLONASE) 50 MCG/ACT nasal spray, 1 spray by Each Nostril route daily, Disp: 16 g, Rfl: 5    tiotropium (SPIRIVA RESPIMAT) 2.5 MCG/ACT AERS inhaler, INHALE TWO (2) PUFFS INTO THE LUNGS DAILY, Disp: 1 Inhaler, Rfl: 5    budesonide-formoterol (SYMBICORT) 160-4.5 MCG/ACT AERO, Inhale 2 puffs into the lungs 2 times daily, Disp: 1 Inhaler, Rfl: 5    albuterol sulfate HFA (PROAIR HFA) 108 (90 Base) MCG/ACT inhaler, Inhale 2 puffs into the lungs every 6 hours as needed for Wheezing or Shortness of Breath, Disp: 1 Inhaler, Rfl: 5    amLODIPine (NORVASC) 2.5 MG tablet, Take 1 tablet by mouth daily Chest pain, Disp: 90 tablet, Rfl: 0    omeprazole (PRILOSEC) 40 MG delayed release capsule, Take 1 capsule by mouth every morning (before breakfast) Chest pain, Disp: 30 capsule, Rfl: 1    LORazepam (ATIVAN) 1 MG tablet, Take 1 tablet by mouth 3 times daily as needed for Anxiety for up to 30 days. , Disp: 45 tablet, Rfl: 2    metoprolol succinate (TOPROL XL) 25 MG extended release tablet, Take 1 tablet by mouth daily, Disp: 30 tablet, Rfl: 3    QUEtiapine (SEROQUEL) 25 MG tablet, Take 0.5 tablets by mouth daily Anxiety/ depression, Disp: 15 tablet, Rfl: 3    gabapentin (NEURONTIN) 600 MG tablet, Take 1 tablet by mouth 3 times daily for 180 days. , Disp: 180 tablet, Rfl: 3    dicyclomine (BENTYL) 10 MG capsule, Take 1 capsule by mouth 4 times daily as needed (abdominal cramps.), Disp: 90 capsule, Rfl: 1    ibuprofen (ADVIL;MOTRIN) 800 MG tablet, Take 1 tablet by mouth every 6 hours as needed for Pain, Disp: 120 tablet, Rfl: 3      Objective:   PHYSICAL EXAM:    BP 98/62   Pulse 66   Temp 97.5 °F (36.4 °C)   Resp 18   Ht 5' 6\" (1.676 m)   Wt 160 lb (72.6 kg)   SpO2 99% Comment: With RA  BMI 25.82 kg/m²   Gen: No distress. Eyes: PERRL. No sclera icterus. No conjunctival injection. ENT: No discharge. Pharynx clear. Neck: Trachea midline. No obvious mass. Resp: No accessory muscle use. No crackles. No wheezes. No rhonchi. No dullness on percussion. Good air entry. CV: Regular rate. Regular rhythm. No murmur or rub. No edema. GI: Non-tender. Non-distended. No hernia. Skin: Warm and dry. No nodule on exposed extremities. Lymph: No cervical LAD. No supraclavicular LAD. M/S: No cyanosis. No joint deformity. No clubbing. Neuro: Awake. Alert. Moves all four extremities. Psych: Oriented x 3. No anxiety. DATA reviewed by me:   PFTs 08/05/2019 FVC 4.02(87%) FEV1 2.93(80%) FEV1/FVC 73% TLC 5.54(83%) DLCO 26.83(84%) 6MW 1000 LO2 94%   PFTs 04/08/2019 FVC 3.61(78%) FEV1 1.75(48%) FEV1/FVC 49% TLC 5.01(75%) DLCO 24.42(77%) 6MW 1000 LO2 94%   PFTs 11/21/2018 FVC 3.63(78%) FEV1 2.91(80%) FEV1/FVC 80% TLC 5.28(79%) DLCO 23.76(75%)    CT chest 8/31/2020  Stable pulmonary nodules to exam dated 5/13/2013  Calcified pulmonary nodules, calcified hilar lymph nodes and and calcifications within the spleen      6 minute walk today  96% RA rest  98% 1 min exertion on RA  98% 2 min exertion on RA  98% 3 min exertion on RA  98% 4 min exertion on RA   99% 5 min exertion on RA  98% 6 min exertion on RA      Echo 11/30/2018   Limited echo for bubble study to r/o PFO/ASD. Normal left ventricle systolic function with an estimated ejection fraction of 55%. A bubble study was performed and fails to show evidence of shunting.     PSG 11/20/2018 AHI 2.6 and Lowest O2 sat 90% no arrythmias       Assessment:       · Pulmonary emphysema/COPD   · Dyspnea. Doubt of pulmonary origin. Unremarkable CTPA. Negative Echo with bubble study. Unremarkable LHC.   Improved  · Snoring- negative PSG   · Intermittent hypoxia, tachycardia and bradycardia- PSG and 6 minute walk negative for hypoxemia. Improved  · Mild restrictive defect -CT does not suggest ILD. Normalized on repeat   · Bilateral pulmonary nodules with mediastinal and hilar adenopathy 5/2013. Decreased in size in RLL PNs and 4R LN with enlargement of GOPAL PN on repeated CT chest 8/16/23013. Stable on CT 05/27/2014. Positive histoplasma M bands and Histoplasma Ab yeast CF (1-64). Itraconazole 7/3/9720-4/6/3542  · Hypermetabolic right hilar lymph nodes. 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 Improved on repeated CT. · Arthritis. 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    · 60 pack year smoking - quit 1/2018       Plan:       · Resume Symbicort 160/4.5 2 puffs BID   · Continue Spiriva and Albuterol 2 puffs Q4-6 hrs PRN  · Medications refills today   · Patient is up to date with Pneumococcal vaccine  · Advised to get influenza vaccine this year   · Advised to get his Covid vaccine   · Advised to continue with smoking cessation  · Follow up in 6 months

## 2021-09-27 DIAGNOSIS — R20.0 NUMBNESS AND TINGLING OF BOTH LEGS BELOW KNEES: ICD-10-CM

## 2021-09-27 DIAGNOSIS — R20.2 NUMBNESS AND TINGLING OF BOTH LEGS BELOW KNEES: ICD-10-CM

## 2021-09-27 RX ORDER — IBUPROFEN 800 MG/1
800 TABLET ORAL EVERY 6 HOURS PRN
Qty: 120 TABLET | Refills: 0 | Status: SHIPPED | OUTPATIENT
Start: 2021-09-27 | End: 2021-11-01

## 2021-10-12 ENCOUNTER — APPOINTMENT (OUTPATIENT)
Dept: CT IMAGING | Age: 51
End: 2021-10-12
Payer: MEDICARE

## 2021-10-12 ENCOUNTER — HOSPITAL ENCOUNTER (EMERGENCY)
Age: 51
Discharge: HOME | End: 2021-10-13
Attending: STUDENT IN AN ORGANIZED HEALTH CARE EDUCATION/TRAINING PROGRAM
Payer: MEDICARE

## 2021-10-12 ENCOUNTER — NURSE TRIAGE (OUTPATIENT)
Dept: OTHER | Facility: CLINIC | Age: 51
End: 2021-10-12

## 2021-10-12 ENCOUNTER — APPOINTMENT (OUTPATIENT)
Dept: GENERAL RADIOLOGY | Age: 51
End: 2021-10-12
Payer: MEDICARE

## 2021-10-12 DIAGNOSIS — R07.9 CHEST PAIN, UNSPECIFIED TYPE: Primary | ICD-10-CM

## 2021-10-12 LAB
ANION GAP SERPL CALCULATED.3IONS-SCNC: 9 MMOL/L (ref 3–16)
BASOPHILS ABSOLUTE: 0.1 K/UL (ref 0–0.2)
BASOPHILS RELATIVE PERCENT: 1.2 %
BUN BLDV-MCNC: 7 MG/DL (ref 7–20)
CALCIUM SERPL-MCNC: 9.2 MG/DL (ref 8.3–10.6)
CHLORIDE BLD-SCNC: 104 MMOL/L (ref 99–110)
CO2: 27 MMOL/L (ref 21–32)
CREAT SERPL-MCNC: 1.1 MG/DL (ref 0.9–1.3)
D DIMER: 237 NG/ML DDU (ref 0–229)
EOSINOPHILS ABSOLUTE: 0.8 K/UL (ref 0–0.6)
EOSINOPHILS RELATIVE PERCENT: 9.1 %
GFR AFRICAN AMERICAN: >60
GFR NON-AFRICAN AMERICAN: >60
GLUCOSE BLD-MCNC: 84 MG/DL (ref 70–99)
HCT VFR BLD CALC: 39.8 % (ref 40.5–52.5)
HEMOGLOBIN: 13.9 G/DL (ref 13.5–17.5)
INFLUENZA A: NOT DETECTED
INFLUENZA B: NOT DETECTED
LYMPHOCYTES ABSOLUTE: 2.2 K/UL (ref 1–5.1)
LYMPHOCYTES RELATIVE PERCENT: 24.1 %
MAGNESIUM: 1.8 MG/DL (ref 1.8–2.4)
MCH RBC QN AUTO: 30.6 PG (ref 26–34)
MCHC RBC AUTO-ENTMCNC: 35 G/DL (ref 31–36)
MCV RBC AUTO: 87.5 FL (ref 80–100)
MONOCYTES ABSOLUTE: 0.7 K/UL (ref 0–1.3)
MONOCYTES RELATIVE PERCENT: 7.6 %
NEUTROPHILS ABSOLUTE: 5.3 K/UL (ref 1.7–7.7)
NEUTROPHILS RELATIVE PERCENT: 58 %
PDW BLD-RTO: 13.3 % (ref 12.4–15.4)
PLATELET # BLD: 291 K/UL (ref 135–450)
PMV BLD AUTO: 7.8 FL (ref 5–10.5)
POTASSIUM SERPL-SCNC: 3.7 MMOL/L (ref 3.5–5.1)
RBC # BLD: 4.55 M/UL (ref 4.2–5.9)
SARS-COV-2 RNA, RT PCR: NOT DETECTED
SODIUM BLD-SCNC: 140 MMOL/L (ref 136–145)
TROPONIN: <0.01 NG/ML
TROPONIN: <0.01 NG/ML
WBC # BLD: 9.1 K/UL (ref 4–11)

## 2021-10-12 PROCEDURE — 96375 TX/PRO/DX INJ NEW DRUG ADDON: CPT

## 2021-10-12 PROCEDURE — 85379 FIBRIN DEGRADATION QUANT: CPT

## 2021-10-12 PROCEDURE — 83735 ASSAY OF MAGNESIUM: CPT

## 2021-10-12 PROCEDURE — 96374 THER/PROPH/DIAG INJ IV PUSH: CPT

## 2021-10-12 PROCEDURE — 93005 ELECTROCARDIOGRAM TRACING: CPT | Performed by: STUDENT IN AN ORGANIZED HEALTH CARE EDUCATION/TRAINING PROGRAM

## 2021-10-12 PROCEDURE — 84484 ASSAY OF TROPONIN QUANT: CPT

## 2021-10-12 PROCEDURE — 71260 CT THORAX DX C+: CPT

## 2021-10-12 PROCEDURE — 80048 BASIC METABOLIC PNL TOTAL CA: CPT

## 2021-10-12 PROCEDURE — 99283 EMERGENCY DEPT VISIT LOW MDM: CPT

## 2021-10-12 PROCEDURE — 6360000002 HC RX W HCPCS: Performed by: PHYSICIAN ASSISTANT

## 2021-10-12 PROCEDURE — 87636 SARSCOV2 & INF A&B AMP PRB: CPT

## 2021-10-12 PROCEDURE — 71046 X-RAY EXAM CHEST 2 VIEWS: CPT

## 2021-10-12 PROCEDURE — 6360000004 HC RX CONTRAST MEDICATION: Performed by: PHYSICIAN ASSISTANT

## 2021-10-12 PROCEDURE — 85025 COMPLETE CBC W/AUTO DIFF WBC: CPT

## 2021-10-12 RX ORDER — MORPHINE SULFATE 2 MG/ML
2 INJECTION, SOLUTION INTRAMUSCULAR; INTRAVENOUS EVERY 30 MIN PRN
Status: DISCONTINUED | OUTPATIENT
Start: 2021-10-12 | End: 2021-10-13 | Stop reason: HOSPADM

## 2021-10-12 RX ORDER — ONDANSETRON 2 MG/ML
4 INJECTION INTRAMUSCULAR; INTRAVENOUS EVERY 6 HOURS PRN
Status: DISCONTINUED | OUTPATIENT
Start: 2021-10-12 | End: 2021-10-13 | Stop reason: HOSPADM

## 2021-10-12 RX ADMIN — IOPAMIDOL 85 ML: 755 INJECTION, SOLUTION INTRAVENOUS at 21:11

## 2021-10-12 RX ADMIN — MORPHINE SULFATE 2 MG: 2 INJECTION, SOLUTION INTRAMUSCULAR; INTRAVENOUS at 20:28

## 2021-10-12 RX ADMIN — ONDANSETRON HYDROCHLORIDE 4 MG: 2 INJECTION, SOLUTION INTRAMUSCULAR; INTRAVENOUS at 20:27

## 2021-10-12 ASSESSMENT — ENCOUNTER SYMPTOMS
GASTROINTESTINAL NEGATIVE: 1
SHORTNESS OF BREATH: 1

## 2021-10-12 ASSESSMENT — PAIN SCALES - GENERAL
PAINLEVEL_OUTOF10: 7
PAINLEVEL_OUTOF10: 7

## 2021-10-12 ASSESSMENT — PAIN DESCRIPTION - PAIN TYPE: TYPE: ACUTE PAIN

## 2021-10-12 ASSESSMENT — HEART SCORE: ECG: 0

## 2021-10-12 ASSESSMENT — PAIN DESCRIPTION - LOCATION: LOCATION: CHEST

## 2021-10-12 NOTE — ED PROVIDER NOTES
I independently examined and evaluated Néstor CASTELAN Doug . .    In brief, patient is a 18-year-old male, presenting with 1 week history of lightheadedness, shortness of breath, substernal chest pain. Patient describes his dizziness as intermittent room spinning sensation for the past week, seems to come and go, also complains of some lightheadedness. Denies any cough or congestion, fevers or chills. Denies any other complaints or concerns. Focused exam revealed patient resting comfortably, no acute distress. Heart regular rate and rhythm. Lungs clear to auscultation bilaterally. Abdomen soft, nondistended, nontender. No peripheral edema or calf tenderness.     Labs Reviewed   CBC WITH AUTO DIFFERENTIAL - Abnormal; Notable for the following components:       Result Value    Hematocrit 39.8 (*)     Eosinophils Absolute 0.8 (*)     All other components within normal limits    Narrative:     Performed at:  [facility] [address],  [place], [facility]   Phone [phone]   D-DIMER, QUANTITATIVE - Abnormal; Notable for the following components:    D-Dimer, Quant 237 (*)     All other components within normal limits    Narrative:     Performed at:  [facility] [address],  [place], [facility]   Phone [phone]    Narrative:     Performed at:  [facility] [address],  [place], [facility]   Phone [phone]   BASIC METABOLIC PANEL    Narrative:     Performed at:  [facility] [address],  [place], [facility]   Phone [phone]   TROPONIN    Narrative:     Performed at:  [facility] [address],  [place], [facility]   Phone [phone]   MAGNESIUM    Narrative:     Performed at:  [facility],  [place], [facility]   Phone [phone]   TROPONIN    Narrative:     Performed at:  [facility] - [facility]  [address],  [place], [facility]   Phone [phone]     CT CHEST PULMONARY EMBOLISM W CONTRAST   Final Result   No evidence of pulmonary embolism or acute pulmonary abnormality. Mild bibasilar atelectasis. Emphysema. Sequela of old granulomatous disease. XR CHEST (2 VW)   Final Result   No acute abnormality detected. ED course: Patient is a 30-year-old male, presenting with concerns for 1 week history of chest pain, shortness of breath, dizziness. Patient was seen in conjunction with CLARA Garcia, please refer to her note for initial work-up and treatment. Heart score is 3. CT with PE protocol negative for any acute concerning findings. Hospitalist as well as cardiology were consulted, delta troponin negative. Patient will be set up with cardiology follow-up as an outpatient. He is comfortable agreement with plan of care. All diagnostic, treatment, and disposition decisions were made by myself in conjunction with the advanced practice provider. For all further details of the patient's emergency department visit, please see the advanced practice provider's documentation. 1. Chest pain, unspecified type      Comment: Please note this report has been produced using speech recognition software and may contain errors related to that system including errors in grammar, punctuation, and spelling, as well as words and phrases that may be inappropriate. If there are any questions or concerns please feel free to contact the dictating provider for clarification.     The Ekg interpreted by me shows  sinus bradycardia, rate=54  Axis is   Left axis deviation  QTc is  normal  Incomplete right bundle branch block  ST Segments: nonspecific changes  Sinus bradycardia is new, otherwise similar compared to previous performed 4/2/2019           Jaycee Kraus MD  10/12/21 [id number] Jaycee Kraus MD  10/16/21 [id number]

## 2021-10-12 NOTE — TELEPHONE ENCOUNTER
Received call from Annetta Elias [facility] with Red Flag Complaint. Brief description of triage: Dizzy, sob lightheaded and weakness, legs are weak,     Triage indicates for patient to the ER, agrees with plan     Care advice provided, patient verbalizes understanding; denies any other questions or concerns; instructed to call back for any new or worsening symptoms. Attention Provider: Thank you for allowing me to participate in the care of your patient. The patient was connected to triage in response to information provided to the [facility]. Please do not respond through this encounter as the response is not directed to a shared pool. Reason for Disposition   SEVERE dizziness (e.g., unable to stand, requires support to walk, feels like passing out now)    Answer Assessment - Initial Assessment Questions  1. DESCRIPTION: \"Describe your dizziness. \"      More weakness than dizziness     2. LIGHTHEADED: \"Do you feel lightheaded? \" (e.g., somewhat faint, woozy, weak upon standing)      Yes, weak     3. VERTIGO: \"Do you feel like either you or the room is spinning or tilting? \" (i.e. vertigo)      Some spinning     4. SEVERITY: \"How bad is it? \"  \"Do you feel like you are going to faint? \" \"Can you stand and walk? \"    - MILD - walking normally    - MODERATE - interferes with normal activities (e.g., work, school)     - SEVERE - unable to stand, requires support to walk, feels like passing out now. Mild to severe     5. ONSET:  \"When did the dizziness begin? \"      One week ago     6. AGGRAVATING FACTORS: \"Does anything make it worse? \" (e.g., standing, change in head position)      Standing, trying to work     7. HEART RATE: \"Can you tell me your heart rate? \" \"How many beats in 15 seconds? \"  (Note: not all patients can do this)        Not able     8. CAUSE: \"What do you think is causing the dizziness? \"      Is not sure, has dizziness in the past was heart and lung related     9.  RECURRENT SYMPTOM: \"Have you had dizziness before? \" If so, ask: \"When was the last time? \" \"What happened that time? \"      Yes, was his lungs and heart     10. OTHER SYMPTOMS: \"Do you have any other symptoms? \" (e.g., fever, chest pain, vomiting, diarrhea, bleeding)        Denies     11. PREGNANCY: \"Is there any chance you are pregnant? \" \"When was your last menstrual period? \"        N/a    Protocols used: [id number]

## 2021-10-12 NOTE — ED PROVIDER NOTES
[address] ED  EMERGENCY DEPARTMENT ENCOUNTER        Pt Name: Richy Levin MRN: [id number]  Birthdate 1970  Date of evaluation: 10/12/2021  Provider: Maureen Del Cid PA-C  PCP: MICHELLE Hogue CNP  Note Started: 7:30 PM EDT       I have seen and evaluated this patient with my supervising physician William Lozoya MD.    [facility]       Chief Complaint   Patient presents with    Dizziness     Patient states that since Thursday he has been dizzy.  Shortness of Breath     Seeing doctor for chest pain. Supposed to see cardio at [facility]. HISTORY OF PRESENT ILLNESS   (Location, Timing/Onset, Context/Setting, Quality, Duration, Modifying Factors, Severity, Associated Signs and Symptoms)  Note limiting factors. Chief Complaint: dizziness; SOB; chest pain     Matthew Kellogg Sr. is a 53 y.o. male with a past medical history of metabolic syndrome, emphysema, COPD follows with Dr. Truman Beth with pulmonary, histoplasmosis, tobacco use, history of blood clots brought in today by private vehicle with complaints of shortness of breath and midsternal chest pain x1 week. Also reports dizziness which he describes and a spinning sensation. Onset of symptoms x1 week. Duration of symptoms have been persistent since onset. Context includes mid sternal chest pain and shortness of breath with associated dizziness. Denies any numbness or tingling to extremities. Denies nausea vomiting diarrhea. Denies abdominal pain. Denies urinary symptoms. Denies fevers chills cough or congestion. Denies vision changes such as blurred vision or double vision. No aggravating complaints. No alleviating complaints. Otherwise denies any other complaints at this time. He has not tried anything at home for symptomatic relief. Rates his pain a 7 out of 10. No radiation of pain. Nothing seems to make symptoms better or worse.     Nursing Notes were all reviewed and agreed with or any disagreements were addressed in the HPI. REVIEW OF SYSTEMS    (2-9 systems for level 4, 10 or more for level 5)     Review of Systems   Constitutional: Negative. HENT: Negative. Respiratory: Positive for shortness of breath. Cardiovascular: Positive for chest pain. Gastrointestinal: Negative. Genitourinary: Negative. Musculoskeletal: Negative. Skin: Negative. Neurological: Positive for dizziness. Positives and Pertinent negatives as per HPI. Except as noted above in the ROS, all other systems were reviewed and negative.        PAST MEDICAL HISTORY     Past Medical History:   Diagnosis Date    Arthritis     Chest pain     [facility] 9/2012 sent for records;    Colitis     COPD [facility] obstructive [facility]     Histoplasmosis     History of blood transfusion     Hx of blood clots     Metabolic syndrome X 73/96/7052    Other emphysema ([facility] 8/19/2018    Pneumonia     Pulmonary nodule          SURGICAL HISTORY     Past Surgical History:   Procedure Laterality Date    BRONCHOSCOPY  6/05/2013    COLONOSCOPY  12/17/2018    COLONOSCOPY N/A 12/17/2018    COLORECTAL CANCER SCREENING, NOT HIGH RISK performed by Jorge Laureano DO at [facility] CATH LAB PROCEDURE  1998    KNEE ARTHROSCOPY Right     KNEE ARTHROSCOPY Right 12/13/2019    RIGHT KNEE VIDEO ARTHROSCOPY, MEDIAL MENISCECTOMY performed by Al Solo MD at [address] [place] [address]       Current Discharge Medication List      CONTINUE these medications which have NOT CHANGED    Details   ibuprofen (ADVIL;MOTRIN) 800 MG tablet TAKE 1 TABLET BY MOUTH EVERY 6 HOURS AS NEEDED FOR PAIN  Qty: 120 tablet, Refills: 0    Associated Diagnoses: Numbness and tingling of both legs below knees      tiotropium (SPIRIVA RESPIMAT) 2.5 MCG/ACT AERS inhaler INHALE TWO (2) PUFFS INTO THE LUNGS DAILY  Qty: 4 g, Refills: 5 Associated Diagnoses: Chronic obstructive pulmonary disease, unspecified COPD type ([facility] [address]      budesonide-formoterol (SYMBICORT) 160-4.5 MCG/ACT AERO Inhale 2 puffs into the lungs 2 times daily  Qty: 10.2 g, Refills: 5    Associated Diagnoses: Chronic obstructive pulmonary disease, unspecified COPD type ([facility])      albuterol sulfate HFA (PROAIR HFA) 108 (90 Base) MCG/ACT inhaler Inhale 2 puffs into the lungs every 6 hours as needed for Wheezing or Shortness of Breath  Qty: 18 g, Refills: 5    Associated Diagnoses: Chronic obstructive pulmonary disease, unspecified COPD type ([facility])      amLODIPine (NORVASC) 2.5 MG tablet Take 1 tablet by mouth daily Chest pain  Qty: 90 tablet, Refills: 0    Associated Diagnoses: Chest pain, unspecified type      omeprazole (PRILOSEC) 40 MG delayed release capsule Take 1 capsule by mouth every morning (before breakfast) Chest pain  Qty: 30 capsule, Refills: 1    Associated Diagnoses: Chest pain, unspecified type      LORazepam (ATIVAN) 1 MG tablet Take 1 tablet by mouth 3 times daily as needed for Anxiety for up to 30 days. Qty: 45 tablet, Refills: 2    Associated Diagnoses: Chronic anxiety      metoprolol succinate (TOPROL XL) 25 MG extended release tablet Take 1 tablet by mouth daily  Qty: 30 tablet, Refills: 3    Associated Diagnoses: Rapid palpitations      QUEtiapine (SEROQUEL) 25 MG tablet Take 0.5 tablets by mouth daily Anxiety/ depression  Qty: 15 tablet, Refills: 3    Associated Diagnoses: Chronic anxiety; Moderate single current episode of major depressive disorder ([facility])      gabapentin (NEURONTIN) 600 MG tablet Take 1 tablet by mouth 3 times daily for 180 days.   Qty: 180 tablet, Refills: 3    Associated Diagnoses: Numbness and tingling of both legs below knees      fluticasone (FLONASE) 50 MCG/ACT nasal spray 1 spray by Each Nostril route daily  Qty: 16 g, Refills: 5      dicyclomine (BENTYL) 10 MG capsule Take 1 capsule by mouth 4 times daily as needed (abdominal cramps.)  Qty: 90 capsule, Refills: 1               ALLERGIES     Codeine, Mobic [meloxicam], and Tramadol    FAMILYHISTORY       Family History   Problem Relation Age of Onset    Cancer Mother         ovarian    Heart Failure Mother     Heart Attack Mother 62    Heart Disease Mother     Other Father          1948;ashd;per neurop;    Other Brother         pt knows very little    Cancer Paternal Uncle         leukemia    Cancer Paternal Grandfather         leukemia    Heart Attack Maternal Uncle 60        x4     No Known Problems Son     No Known Problems Son     Asthma Neg Hx     Diabetes Neg Hx     Emphysema Neg Hx     Hypertension Neg Hx           SOCIAL HISTORY       Social History     Tobacco Use    Smoking status: Former Smoker     Packs/day: 2.00     Years: 20.00     Pack years: 40.00     Types: Cigarettes     Quit date: 2018     Years since quitting: 3.7    Smokeless tobacco: Never Used   Vaping Use    Vaping Use: Never used   Substance Use Topics    Alcohol use: No     Alcohol/week: 1.0 standard drinks     Types: 1 Cans of beer per week    Drug use: No       SCREENINGS   NIH Stroke Scale  NIH Stroke Scale Assessed: Yes  Interval: Baseline  Level of Consciousness (1a. ): Alert  LOC Questions (1b. ): Answers both correctly  LOC Commands (1c. ): Performs both tasks correctly  Best Gaze (2. ): Normal  Visual (3. ): No visual loss  Facial Palsy (4. ): Normal symmetrical movement  Motor Arm, Left (5a. ): No drift  Motor Arm, Right (5b. ): No drift  Motor Leg, Left (6a. ): No drift  Motor Leg, Right (6b. ): No drift  Limb Ataxia (7. ): Absent  Sensory (8. ): Normal  Best Language (9. ): No aphasia  Dysarthria (10. ): Normal  Extinction and Inattention (11): No abnormality  Total: 0  Heart Score for chest pain patients  History:  Moderately Suspicious  ECG: Normal  Patient Age: > 39 and < 65 years  *Risk factors for Atherosclerotic disease: Cigarette smoking, Positive family History  Risk Factors: 1 or 2 risk factors  Troponin: < 1X normal limit  Heart Score Total: 3      PHYSICAL EXAM    (up to 7 for level 4, 8 or more for level 5)     ED Triage Vitals [10/12/21 1816]   BP Temp Temp Source Pulse Resp SpO2 Height Weight   113/74 96.3 °F (35.7 °C) Tympanic 65 16 98 % 5' 6\" (1.676 m) 170 lb (77.1 kg)       Physical Exam  Vitals and nursing note reviewed. Constitutional:       General: He is awake. He is not in acute distress. Appearance: Normal appearance. He is well-developed. He is not ill-appearing, toxic-appearing or diaphoretic. HENT:      Head: Normocephalic and atraumatic. Nose: Nose normal.   Eyes:      General: No visual field deficit. Right eye: No discharge. Left eye: No discharge. Cardiovascular:      Rate and Rhythm: Normal rate and regular rhythm. Pulses:           Radial pulses are 2+ on the right side and 2+ on the left side. Heart sounds: Normal heart sounds. No murmur heard. No gallop. Pulmonary:      Effort: Pulmonary effort is normal. No respiratory distress. Breath sounds: Normal breath sounds. No decreased breath sounds, wheezing, rhonchi or rales. Chest:      Chest wall: No tenderness. Musculoskeletal:         General: No deformity. Normal range of motion. Cervical back: Normal range of motion and neck supple. Right lower leg: No edema. Left lower leg: No edema. Skin:     General: Skin is warm and dry. Neurological:      General: No focal deficit present. Mental Status: He is alert and oriented to person, place, and time. GCS: GCS eye subscore is 4. GCS verbal subscore is 5. GCS motor subscore is 6. Cranial Nerves: Cranial nerves are intact. No cranial nerve deficit, dysarthria or facial asymmetry. Sensory: Sensation is intact. No sensory deficit. Motor: Motor function is intact. No weakness, tremor, atrophy, abnormal muscle tone, seizure activity or pronator drift. absence of a cardiologist.  Please see their note for interpretation of EKG. RADIOLOGY:   Non-plain film images such as CT, Ultrasound and MRI are read by the radiologist. Plain radiographic images are visualized and preliminarily interpreted by the ED Provider with the below findings:        Interpretation per the Radiologist below, if available at the time of this note:    CT CHEST PULMONARY EMBOLISM W CONTRAST   Final Result   No evidence of pulmonary embolism or acute pulmonary abnormality. Mild bibasilar atelectasis. Emphysema. Sequela of old granulomatous disease. XR CHEST (2 VW)   Final Result   No acute abnormality detected. XR CHEST (2 VW)    Result Date: 10/12/2021  EXAMINATION: TWO XRAY VIEWS OF THE CHEST 10/12/2021 3:37 pm COMPARISON: 08/26/2020 HISTORY: ORDERING SYSTEM PROVIDED HISTORY: chest pain TECHNOLOGIST PROVIDED HISTORY: Reason for exam:->chest pain Reason for Exam: pt c/o having chest pains Acuity: Acute Type of Exam: Initial FINDINGS: Nodule in the right lower lung is unchanged. Additional nodule the left upper lung is likewise unchanged. These have been previously evaluated with chest CT. The cardial pericardial silhouette is unremarkable. No pneumothorax. No free air. No acute bony abnormality. No acute abnormality detected.            PROCEDURES   Unless otherwise noted below, none     Procedures    CRITICAL CARE TIME   N/A    CONSULTS:  IP CONSULT TO CARDIOLOGY      EMERGENCY DEPARTMENT COURSE and DIFFERENTIAL DIAGNOSIS/MDM:   Vitals:    Vitals:    10/12/21 2031 10/12/21 2101 10/12/21 2130 10/12/21 2310   BP: 119/78 123/85 109/77    Pulse:  54 50    Resp:    16   Temp:       TempSrc:       SpO2: 100% 95% 96% 99%   Weight:       Height:           Patient was given the following medications:  Medications   morphine (PF) injection 2 mg (2 mg IntraVENous Given 10/12/21 2028)   ondansetron (ZOFRAN) injection 4 mg (4 mg IntraVENous Given 10/12/21 2027) iopamidol (ISOVUE-370) 76 % injection 85 mL (85 mLs IntraVENous Given 10/12/21 2111)           Patient brought in today by private vehicle with complaints of 1 week history of dizziness as well as shortness of breath and midsternal chest pain. On exam patient is alert oriented afebrile breathing on room air satting at 99%. Nontoxic in appearance. No acute respiratory distress. Old labs and records reviewed. GCS of 15. NIH of 0. No focal deficits. Patient seen by myself as well as my attending Dr. Red Palmer. Patient given IV morphine and Zofran here for pain. No acute electrolyte abnormalities. Kidney function unremarkable. EKG reviewed by my attending see note for dictation. Troponin less than 0.01. No leukocytosis. Hemoglobin of 13.9. Chest x-ray shows no acute abnormality. D-dimer elevated at 237. Magnesium of 1.80. CT PE study shows no evidence of PE or acute pulmonary abnormality. Mild diffuse or atelectasis emphysema sequela of old granulomatous disease. COVID-19 and influenza are negative. Patient has a heart score of 3. I spoke to Dr. Supriya Ochoa the hospitalist initially to consider admission and we reviewed patient's work-up today as well as previous records. Patient did have a negative stress test back in August 2021. He also had a negative cath back in 2018. Will also consult cardiology as well. I spoke to nurse practitioner Momo Suarez with cardiology who recommended delta troponin and if negative patient would be okay for discharge with close follow-up to cardiology as an outpatient. Patient was not orthostatic while here in the ED. He ambulated without any difficulty and without ataxia. Delta troponin is less than 0.01. Plan at this time will be to discharge home with outpatient follow-up to cardiology. I did discuss all findings with the patient as well as follow-up instructions. He will be given referral for [facility] cardiology.   Instructed to call tomorrow to schedule a follow-up appointment. He was given strict return precautions and close follow-up instructions. Told return immediately to the ED with any new or worsening symptoms including but not limited to increased pain shortness of breath syncope or any new or changing symptoms. He verbalized understanding. I did feel comfortable sending this patient home with close follow-up instructions and strict return precautions. Patient discharged in stable condition. FINAL IMPRESSION      1.  Chest pain, unspecified type          DISPOSITION/PLAN   DISPOSITION        PATIENT REFERRED TO:  Ford Dyson APRN - CNP  [facility] [address] [place] [id number]  [phone]    Schedule an appointment as soon as possible for a visit in 1 day      [address] [facility] [address]  [phone]  Schedule an appointment as soon as possible for a visit in 1 day      [place] [address]  [phone]    Schedule an appointment as soon as possible for a visit in 1 day        DISCHARGE MEDICATIONS:  Current Discharge Medication List          DISCONTINUED MEDICATIONS:  Current Discharge Medication List                 (Please note that portions of this note were completed with a voice recognition program.  Efforts were made to edit the dictations but occasionally words are mis-transcribed.)    Froylan Schrader PA-C (electronically signed)           Froylan Schrader PA-C  10/12/21 [id number]

## 2021-10-13 VITALS
WEIGHT: 170 LBS | HEART RATE: 50 BPM | BODY MASS INDEX: 27.32 KG/M2 | DIASTOLIC BLOOD PRESSURE: 80 MMHG | TEMPERATURE: 96.3 F | SYSTOLIC BLOOD PRESSURE: 108 MMHG | OXYGEN SATURATION: 97 % | HEIGHT: 66 IN | RESPIRATION RATE: 16 BRPM

## 2021-10-13 LAB
EKG ATRIAL RATE: 54 BPM
EKG DIAGNOSIS: NORMAL
EKG P AXIS: 30 DEGREES
EKG P-R INTERVAL: 140 MS
EKG Q-T INTERVAL: 418 MS
EKG QRS DURATION: 106 MS
EKG QTC CALCULATION (BAZETT): 396 MS
EKG R AXIS: -31 DEGREES
EKG T AXIS: 33 DEGREES
EKG VENTRICULAR RATE: 54 BPM

## 2021-10-13 PROCEDURE — 93010 ELECTROCARDIOGRAM REPORT: CPT | Performed by: INTERNAL MEDICINE

## 2021-10-13 NOTE — ED NOTES
2324-Call to [address]   10/12/21 [id number]-Dr Riri Carrasco returned call and spoke with [facility]     Yuan Kontt  10/12/21 [id number]

## 2021-10-20 ENCOUNTER — OFFICE VISIT (OUTPATIENT)
Dept: CARDIOLOGY CLINIC | Age: 51
End: 2021-10-20
Payer: MEDICARE

## 2021-10-20 VITALS
BODY MASS INDEX: 26.36 KG/M2 | DIASTOLIC BLOOD PRESSURE: 74 MMHG | WEIGHT: 164 LBS | SYSTOLIC BLOOD PRESSURE: 118 MMHG | HEIGHT: 66 IN | OXYGEN SATURATION: 95 % | HEART RATE: 60 BPM

## 2021-10-20 DIAGNOSIS — R42 DIZZINESS: Primary | ICD-10-CM

## 2021-10-20 DIAGNOSIS — R06.02 SHORTNESS OF BREATH: ICD-10-CM

## 2021-10-20 DIAGNOSIS — R20.0 NUMBNESS: ICD-10-CM

## 2021-10-20 DIAGNOSIS — R07.2 PRECORDIAL PAIN: ICD-10-CM

## 2021-10-20 PROCEDURE — 99214 OFFICE O/P EST MOD 30 MIN: CPT | Performed by: INTERNAL MEDICINE

## 2021-10-20 PROCEDURE — G8419 CALC BMI OUT NRM PARAM NOF/U: HCPCS | Performed by: INTERNAL MEDICINE

## 2021-10-20 PROCEDURE — G8427 DOCREV CUR MEDS BY ELIG CLIN: HCPCS | Performed by: INTERNAL MEDICINE

## 2021-10-20 PROCEDURE — G8484 FLU IMMUNIZE NO ADMIN: HCPCS | Performed by: INTERNAL MEDICINE

## 2021-10-20 PROCEDURE — 1036F TOBACCO NON-USER: CPT | Performed by: INTERNAL MEDICINE

## 2021-10-20 NOTE — PATIENT INSTRUCTIONS
Plan:  1. Medication management ~ symptoms may be side effects from your medication regimen  ~ Will speak with MICHELLE Norton CNP to discuss medication regimen. 2. Continue to follow with  for pulmonary ~ shortness of breath  3. Follow up as needed.

## 2021-10-20 NOTE — PROGRESS NOTES
[address] [facility]   Cardiovascular Evaluation    PATIENT: Justin Eli Sr. DATE: 10/20/2021  MRN: <[id number]>  CSN: [id number]  : 1970    Primary Care Doctor: MICHELLE Uribe CNP    Reason for evaluation/Chief complaint:   Follow-Up from Hospital, Chest Pain, Dizziness, Shortness of Breath, Fatigue, and Palpitations      Subjective:    History of present illness on initial date of evaluation:   Justin Eli Sr. is a 53 y.o. male patient who presents for cardiology follow up for dizziness, shortness of breath, and chest pain. He had a limited echocardiogram 2018 that showed ejection fraction of 55% and bubble study negative of shunting. He wore cardiac event monitor from 19-19 for palpitations that showed symptoms reported with sinus, sinus arrhythmia, and premature atrial contractions. His most recent stress test on 21 shows no evidence of ischemia or scarring LVEF 61%. Today he states lightheaded and dizziness constantly. He states chest pain describes as aching constantly. He states he has weakness and fatigue. He states he feels as something is wrong with him. He states shortness of breath and fatigue with chasing his grandson. He is taking all medications as prescribed. He quit smoking 3 years ago.      Patient Active Problem List   Diagnosis    Arthritis    Pulmonary nodule    Colitis    Polyneuropathy    Numbness and tingling of both legs below knees    Ataxia    Smoking    Irritable bowel syndrome with diarrhea    Dizziness    Shortness of breath    Chest tightness    Chest pain    Abnormal reflexes    Diarrhea    Dysesthesia    Falls frequently    Histoplasmosis    Intermittent claudication (HCC)    Low back pain    Numbness    Pain in joint, multiple sites    Paraparesis of both lower limbs (HCC)    Raynaud's phenomenon without gangrene    Skin pallor    Degenerative tear of medial meniscus of right knee    Chronic pain of right knee    Other emphysema (HCC)    Chronic anxiety    Moderate single current episode of major depressive disorder ([facility] [address]         Cardiac Testing: I have reviewed the findings below. EKG:  ECHO:   STRESS TEST:  CATH:  BYPASS:  VASCULAR:    Past Medical History:   has a past medical history of Arthritis, Chest pain, Colitis, COPD (chronic obstructive pulmonary disease) ([facility] [address] Histoplasmosis, History of blood transfusion, Hx of blood clots, Metabolic syndrome X, Other emphysema ([address] Pneumonia, and Pulmonary nodule. Surgical History:   has a past surgical history that includes Vasectomy; lymph node dissection; Diagnostic Cardiac Cath Lab Procedure (1998); bronchoscopy (6/05/2013); Colonoscopy (12/17/2018); Colonoscopy (N/A, 12/17/2018); Knee arthroscopy (Right); and Knee arthroscopy (Right, 12/13/2019). Social History:   reports that he quit smoking about 3 years ago. His smoking use included cigarettes. He has a 40.00 pack-year smoking history. He has never used smokeless tobacco. He reports current alcohol use of about 1.0 standard drinks of alcohol per week. He reports that he does not use drugs. Family History:  No evidence for sudden cardiac death or premature CAD    Medications:  Reviewed and are listed in nursing record. and/or listed below  Outpatient Medications:  Prior to Admission medications    Medication Sig Start Date End Date Taking?  Authorizing Provider   ibuprofen (ADVIL;MOTRIN) 800 MG tablet TAKE 1 TABLET BY MOUTH EVERY 6 HOURS AS NEEDED FOR PAIN 9/27/21  Yes Amena Bio, APRN - CNP   tiotropium (SPIRIVA RESPIMAT) 2.5 MCG/ACT AERS inhaler INHALE TWO (2) PUFFS INTO THE LUNGS DAILY 9/13/21  Yes Wil Eckert MD   budesonide-formoterol (SYMBICORT) 160-4.5 MCG/ACT AERO Inhale 2 puffs into the lungs 2 times daily 9/13/21  Yes Wil Eckert MD   albuterol sulfate HFA (PROAIR HFA) 108 (90 Base) MCG/ACT inhaler Inhale 2 puffs into the lungs every 6 hours as needed for Wheezing or Shortness of Breath 9/13/21  Yes Shelbie Yu MD   amLODIPine (NORVASC) 2.5 MG tablet Take 1 tablet by mouth daily Chest pain 8/30/21  Yes Leighann Ax, APRN - CNP   omeprazole (PRILOSEC) 40 MG delayed release capsule Take 1 capsule by mouth every morning (before breakfast) Chest pain 8/4/21 10/20/21 Yes Leighann Ax, APRN - CNP   LORazepam (ATIVAN) 1 MG tablet Take 1 tablet by mouth 3 times daily as needed for Anxiety for up to 30 days. 7/12/21 8/30/22 Yes Leighann Ax, APRN - CNP   metoprolol succinate (TOPROL XL) 25 MG extended release tablet Take 1 tablet by mouth daily 7/12/21  Yes Leighann Ax, APRN - CNP   QUEtiapine (SEROQUEL) 25 MG tablet Take 0.5 tablets by mouth daily Anxiety/ depression 7/12/21 8/30/22 Yes Leighann Ax, APRN - CNP   gabapentin (NEURONTIN) 600 MG tablet Take 1 tablet by mouth 3 times daily for 180 days. 7/12/21 1/8/22 Yes Leighann Ax, APRN - CNP   fluticasone Shaquille Derian) 50 MCG/ACT nasal spray 1 spray by Each Nostril route daily 5/20/21  Yes Leighann Ax, APRN - CNP   dicyclomine (BENTYL) 10 MG capsule Take 1 capsule by mouth 4 times daily as needed (abdominal cramps.) 5/20/21  Yes Leighann Ax, APRN - CNP       In-patient schedule medications:        Infusion Medications: Allergies:  Codeine, Mobic [meloxicam], and Tramadol     Review of Systems:   All 14 point review of symptoms completed. Pertinent positives identified in the HPI, all other review of symptoms findings as below.      Review of Systems - History obtained from the patient  General ROS: negative for - chills, fever or night sweats  Psychological ROS: negative for - disorientation or hallucinations  Ophthalmic ROS: negative for - dry eyes, eye pain or loss of vision  ENT ROS: negative for - nasal discharge or sore throat  Allergy and Immunology ROS: negative for - hives or itchy/watery eyes  Hematological and Lymphatic ROS: negative for - jaundice or night sweats  Endocrine ROS: negative for - mood swings or temperature intolerance  Breast ROS: deferred  Respiratory ROS: negative for - hemoptysis or stridor  Cardiovascular ROS: negative for - chest pain, dyspnea on exertion or palpitations  Gastrointestinal ROS: no abdominal pain, change in bowel habits, or black or bloody stools  Genito-Urinary ROS: no dysuria, trouble voiding, or hematuria  Musculoskeletal ROS: negative for - gait disturbance, joint pain or joint stiffness  Neurological ROS: negative for - seizures or speech problems  Dermatological ROS: negative for - rash or skin lesion changes      Physical Examination:    /74   Pulse 60   Ht 5' 6\" (1.676 m)   Wt 164 lb (74.4 kg)   SpO2 95%   BMI 26.47 kg/m²   /74   Pulse 60   Ht 5' 6\" (1.676 m)   Wt 164 lb (74.4 kg)   SpO2 95%   BMI 26.47 kg/m²    Weight: 164 lb (74.4 kg)     Wt Readings from Last 3 Encounters:   10/20/21 164 lb (74.4 kg)   10/12/21 170 lb (77.1 kg)   09/13/21 160 lb (72.6 kg)     No intake or output data in the 24 hours ending 10/20/21 1445    General Appearance:  Alert, cooperative, no distress, appears stated age   Head:  Normocephalic, without obvious abnormality, atraumatic   Eyes:  PERRL, conjunctiva/corneas clear       Nose: Nares normal, no drainage or sinus tenderness   Throat: Lips, mucosa, and tongue normal   Neck: Supple, symmetrical, trachea midline, no adenopathy, thyroid: not enlarged, symmetric, no tenderness/mass/nodules, no carotid bruit or JVD       Lungs:   Diminished to auscultation bilaterally, respirations unlabored   Chest Wall:  No tenderness or deformity   Heart:  Regular rhythm and normal rate; S1, S2 are normal; no murmur noted; no rub or gallop   Abdomen:   Soft, non-tender, bowel sounds active all four quadrants,  no masses, no organomegaly           Extremities: Extremities normal, atraumatic, no cyanosis or edema   Pulses: 2+ and symmetric   Skin: Skin color, texture, turgor normal, no rashes or lesions   Pysch: Normal mood and affect Neurologic: Normal gross motor and sensory exam.         Labs  No results for input(s): WBC, HGB, HCT, MCV, PLT in the last 72 hours. No results for input(s): CREATININE, BUN, NA, K, CL, CO2 in the last 72 hours. No results for input(s): INR, PROTIME in the last 72 hours. No results for input(s): TROPONINI in the last 72 hours. Invalid input(s): PRO-BNP  No results for input(s): CHOL, HDL in the last 72 hours. Invalid input(s): LDL, TG      Imaging:  I have reviewed the below testing personally and my interpretation is below. EKG:  CXR:      Assessment:  53 y.o. patient with:  Problem List Items Addressed This Visit     Dizziness - Primary    Shortness of breath    Chest pain    Numbness          Plan:  1. The patient's clinical presentation is not consistent with ischemic or structural heart disease. He has had normal echocardiogram, stress test, and coronary angiogram.  Is conceivable that his symptomatology is from medications. ~Medication management ~ symptoms may be side effects from your medication regimen   ~ Will speak with MICHELLE Johnson CNP to discuss medication regimen. 2. Continue to follow with  for pulmonary ~ shortness of breath  3. Follow up as needed. Scribe's attestation: This note was scribed in the presence of Gianna Farmer by Bo Garcia RN     I, Dr. Merrill Samuel, personally performed the services described in this documentation, as scribed by the above signed scribe in my presence. It is both accurate and complete to my knowledge. I agree with the details independently gathered by the clinical support staff, while the remaining scribed note accurately describes my personal service to the patient. Medical Decision Making:   The following items were considered in medical decision making:  Independent review of images  Review / order clinical lab tests  Review / order radiology tests  Decision to obtain old records  Review and summation of old records as accessed through [address] (a summary of my findings in these old records)      Time Based Itemization  A total of 35 minutes was spent on today's patient encounter. If applicable, non-patient-facing activities:  (X)Preparing to see the patient and reviewing records  (X) Individual interpretation of results  ( ) Discussion or coordination of care with other health care professionals  () Ordering of unique tests, medications, or procedures  (X) Documentation within the EHR               All questions and concerns were addressed to the patient/family. Alternatives to my treatment were discussed. The note was completed using EMR. Every effort was made to ensure accuracy; however, inadvertent computerized transcription errors may be present.     Aure Gonzales MD, [address], [place], [place]  [phone] [facility] office  [phone] [facility]  10/20/2021  2:45 PM

## 2021-10-20 NOTE — LETTER
[address] [facility]   Cardiovascular Evaluation    PATIENT: Geraldine White Sr. DATE: 10/20/2021  MRN: <[id number]>  CSN: [id number]  : 1970    Primary Care Doctor: MICHELLE Beach CNP    Reason for evaluation/Chief complaint:   Follow-Up from Hospital, Chest Pain, Dizziness, Shortness of Breath, Fatigue, and Palpitations      Subjective:    History of present illness on initial date of evaluation:   Geraldine White Sr. is a 53 y.o. male patient who presents for cardiology follow up for dizziness, shortness of breath, and chest pain. He had a limited echocardiogram 2018 that showed ejection fraction of 55% and bubble study negative of shunting. He wore cardiac event monitor from 19-19 for palpitations that showed symptoms reported with sinus, sinus arrhythmia, and premature atrial contractions. His most recent stress test on 21 shows no evidence of ischemia or scarring LVEF 61%. Today he states lightheaded and dizziness constantly. He states chest pain describes as aching constantly. He states he has weakness and fatigue. He states he feels as something is wrong with him. He states shortness of breath and fatigue with chasing his grandson. He is taking all medications as prescribed. He quit smoking 3 years ago.      Patient Active Problem List   Diagnosis    Arthritis    Pulmonary nodule    Colitis    Polyneuropathy    Numbness and tingling of both legs below knees    Ataxia    Smoking    Irritable bowel syndrome with diarrhea    Dizziness    Shortness of breath    Chest tightness    Chest pain    Abnormal reflexes    Diarrhea    Dysesthesia    Falls frequently    Histoplasmosis    Intermittent claudication (HCC)    Low back pain    Numbness    Pain in joint, multiple sites    Paraparesis of both lower limbs ([address]    Raynaud's phenomenon without gangrene    Skin pallor    Degenerative tear of medial meniscus of right knee    Chronic pain of right knee    Other emphysema (HCC)    Chronic anxiety    Moderate single current episode of major depressive disorder ([facility] [address]         Cardiac Testing: I have reviewed the findings below. EKG:  ECHO:   STRESS TEST:  CATH:  BYPASS:  VASCULAR:    Past Medical History:   has a past medical history of Arthritis, Chest pain, Colitis, COPD (chronic obstructive pulmonary disease) ([facility] [address] Histoplasmosis, History of blood transfusion, Hx of blood clots, Metabolic syndrome X, Other emphysema ([address] Pneumonia, and Pulmonary nodule. Surgical History:   has a past surgical history that includes Vasectomy; lymph node dissection; Diagnostic Cardiac Cath Lab Procedure (1998); bronchoscopy (6/05/2013); Colonoscopy (12/17/2018); Colonoscopy (N/A, 12/17/2018); Knee arthroscopy (Right); and Knee arthroscopy (Right, 12/13/2019). Social History:   reports that he quit smoking about 3 years ago. His smoking use included cigarettes. He has a 40.00 pack-year smoking history. He has never used smokeless tobacco. He reports current alcohol use of about 1.0 standard drinks of alcohol per week. He reports that he does not use drugs. Family History:  No evidence for sudden cardiac death or premature CAD    Medications:  Reviewed and are listed in nursing record. and/or listed below  Outpatient Medications:  Prior to Admission medications    Medication Sig Start Date End Date Taking?  Authorizing Provider   ibuprofen (ADVIL;MOTRIN) 800 MG tablet TAKE 1 TABLET BY MOUTH EVERY 6 HOURS AS NEEDED FOR PAIN 9/27/21  Yes MICHELLE Ferrara CNP   tiotropium (SPIRIVA RESPIMAT) 2.5 MCG/ACT AERS inhaler INHALE TWO (2) PUFFS INTO THE LUNGS DAILY 9/13/21  Yes Kevin Sky MD   budesonide-formoterol (SYMBICORT) 160-4.5 MCG/ACT AERO Inhale 2 puffs into the lungs 2 times daily 9/13/21  Yes Kevin Sky MD   albuterol sulfate HFA (PROAIR HFA) 108 (90 Base) MCG/ACT inhaler Inhale 2 puffs into the lungs every 6 hours as needed for - mood swings or temperature intolerance  Breast ROS: deferred  Respiratory ROS: negative for - hemoptysis or stridor  Cardiovascular ROS: negative for - chest pain, dyspnea on exertion or palpitations  Gastrointestinal ROS: no abdominal pain, change in bowel habits, or black or bloody stools  Genito-Urinary ROS: no dysuria, trouble voiding, or hematuria  Musculoskeletal ROS: negative for - gait disturbance, joint pain or joint stiffness  Neurological ROS: negative for - seizures or speech problems  Dermatological ROS: negative for - rash or skin lesion changes      Physical Examination:    /74   Pulse 60   Ht 5' 6\" (1.676 m)   Wt 164 lb (74.4 kg)   SpO2 95%   BMI 26.47 kg/m²   /74   Pulse 60   Ht 5' 6\" (1.676 m)   Wt 164 lb (74.4 kg)   SpO2 95%   BMI 26.47 kg/m²    Weight: 164 lb (74.4 kg)     Wt Readings from Last 3 Encounters:   10/20/21 164 lb (74.4 kg)   10/12/21 170 lb (77.1 kg)   09/13/21 160 lb (72.6 kg)     No intake or output data in the 24 hours ending 10/20/21 1445    General Appearance:  Alert, cooperative, no distress, appears stated age   Head:  Normocephalic, without obvious abnormality, atraumatic   Eyes:  PERRL, conjunctiva/corneas clear       Nose: Nares normal, no drainage or sinus tenderness   Throat: Lips, mucosa, and tongue normal   Neck: Supple, symmetrical, trachea midline, no adenopathy, thyroid: not enlarged, symmetric, no tenderness/mass/nodules, no carotid bruit or JVD       Lungs:   Diminished to auscultation bilaterally, respirations unlabored   Chest Wall:  No tenderness or deformity   Heart:  Regular rhythm and normal rate; S1, S2 are normal; no murmur noted; no rub or gallop   Abdomen:   Soft, non-tender, bowel sounds active all four quadrants,  no masses, no organomegaly           Extremities: Extremities normal, atraumatic, no cyanosis or edema   Pulses: 2+ and symmetric   Skin: Skin color, texture, turgor normal, no rashes or lesions   Pysch: Normal mood and affect   Neurologic: Normal gross motor and sensory exam.         Labs  No results for input(s): WBC, HGB, HCT, MCV, PLT in the last 72 hours. No results for input(s): CREATININE, BUN, NA, K, CL, CO2 in the last 72 hours. No results for input(s): INR, PROTIME in the last 72 hours. No results for input(s): TROPONINI in the last 72 hours. Invalid input(s): PRO-BNP  No results for input(s): CHOL, HDL in the last 72 hours. Invalid input(s): LDL, TG      Imaging:  I have reviewed the below testing personally and my interpretation is below. EKG:  CXR:      Assessment:  53 y.o. patient with:  Problem List Items Addressed This Visit     Dizziness - Primary    Shortness of breath    Chest pain    Numbness          Plan:  1. The patient's clinical presentation is not consistent with ischemic or structural heart disease. He has had normal echocardiogram, stress test, and coronary angiogram.  Is conceivable that his symptomatology is from medications. ~Medication management ~ symptoms may be side effects from your medication regimen   ~ Will speak with MICHELLE Johnson CNP to discuss medication regimen. 2. Continue to follow with  for pulmonary ~ shortness of breath  3. Follow up as needed. Scribe's attestation: This note was scribed in the presence of Gianna Farmer by Bo Garcia RN     I, Dr. Merrill Samuel, personally performed the services described in this documentation, as scribed by the above signed scribe in my presence. It is both accurate and complete to my knowledge. I agree with the details independently gathered by the clinical support staff, while the remaining scribed note accurately describes my personal service to the patient. Medical Decision Making:   The following items were considered in medical decision making:  Independent review of images  Review / order clinical lab tests  Review / order radiology tests  Decision to obtain old records  Review and summation of old records as accessed through [facility] (a summary of my findings in these old records)      Time Based Itemization  A total of 35 minutes was spent on today's patient encounter. If applicable, non-patient-facing activities:  (X)Preparing to see the patient and reviewing records  (X) Individual interpretation of results  ( ) Discussion or coordination of care with other health care professionals  () Ordering of unique tests, medications, or procedures  (X) Documentation within the EHR               All questions and concerns were addressed to the patient/family. Alternatives to my treatment were discussed. The note was completed using EMR. Every effort was made to ensure accuracy; however, inadvertent computerized transcription errors may be present.     Jeane German MD, [address], [place], [place]  [phone] [facility]  [phone] [facility]  10/20/2021  2:45 PM

## 2021-11-01 DIAGNOSIS — R20.2 NUMBNESS AND TINGLING OF BOTH LEGS BELOW KNEES: ICD-10-CM

## 2021-11-01 DIAGNOSIS — F41.9 CHRONIC ANXIETY: ICD-10-CM

## 2021-11-01 DIAGNOSIS — R07.9 CHEST PAIN, UNSPECIFIED TYPE: ICD-10-CM

## 2021-11-01 DIAGNOSIS — R20.0 NUMBNESS AND TINGLING OF BOTH LEGS BELOW KNEES: ICD-10-CM

## 2021-11-01 RX ORDER — IBUPROFEN 800 MG/1
TABLET ORAL
Qty: 120 TABLET | Refills: 0 | Status: SHIPPED | OUTPATIENT
Start: 2021-11-01 | End: 2021-12-27 | Stop reason: SDUPTHER

## 2021-11-01 RX ORDER — OMEPRAZOLE 40 MG/1
CAPSULE, DELAYED RELEASE ORAL
Qty: 30 CAPSULE | Refills: 0 | Status: SHIPPED | OUTPATIENT
Start: 2021-11-01 | End: 2021-12-13 | Stop reason: SDUPTHER

## 2021-11-01 RX ORDER — LORAZEPAM 1 MG/1
1 TABLET ORAL 3 TIMES DAILY PRN
Qty: 45 TABLET | Refills: 0 | Status: SHIPPED | OUTPATIENT
Start: 2021-11-01 | End: 2021-12-13 | Stop reason: SDUPTHER

## 2021-11-12 ENCOUNTER — OFFICE VISIT (OUTPATIENT)
Dept: FAMILY MEDICINE CLINIC | Age: 51
End: 2021-11-12
Payer: MEDICARE

## 2021-11-12 VITALS
WEIGHT: 164 LBS | OXYGEN SATURATION: 97 % | BODY MASS INDEX: 26.47 KG/M2 | TEMPERATURE: 98 F | DIASTOLIC BLOOD PRESSURE: 70 MMHG | SYSTOLIC BLOOD PRESSURE: 110 MMHG | HEART RATE: 86 BPM

## 2021-11-12 DIAGNOSIS — R00.2 RAPID PALPITATIONS: ICD-10-CM

## 2021-11-12 DIAGNOSIS — F32.1 MODERATE SINGLE CURRENT EPISODE OF MAJOR DEPRESSIVE DISORDER (HCC): ICD-10-CM

## 2021-11-12 DIAGNOSIS — F41.9 CHRONIC ANXIETY: ICD-10-CM

## 2021-11-12 DIAGNOSIS — J44.1 COPD WITH ACUTE EXACERBATION (HCC): Primary | ICD-10-CM

## 2021-11-12 DIAGNOSIS — R07.9 CHEST PAIN, UNSPECIFIED TYPE: ICD-10-CM

## 2021-11-12 PROCEDURE — G8926 SPIRO NO PERF OR DOC: HCPCS | Performed by: NURSE PRACTITIONER

## 2021-11-12 PROCEDURE — 1036F TOBACCO NON-USER: CPT | Performed by: NURSE PRACTITIONER

## 2021-11-12 PROCEDURE — 3017F COLORECTAL CA SCREEN DOC REV: CPT | Performed by: NURSE PRACTITIONER

## 2021-11-12 PROCEDURE — 99214 OFFICE O/P EST MOD 30 MIN: CPT | Performed by: NURSE PRACTITIONER

## 2021-11-12 PROCEDURE — G8427 DOCREV CUR MEDS BY ELIG CLIN: HCPCS | Performed by: NURSE PRACTITIONER

## 2021-11-12 PROCEDURE — 3023F SPIROM DOC REV: CPT | Performed by: NURSE PRACTITIONER

## 2021-11-12 PROCEDURE — G8484 FLU IMMUNIZE NO ADMIN: HCPCS | Performed by: NURSE PRACTITIONER

## 2021-11-12 PROCEDURE — G8419 CALC BMI OUT NRM PARAM NOF/U: HCPCS | Performed by: NURSE PRACTITIONER

## 2021-11-12 RX ORDER — PREDNISONE 10 MG/1
TABLET ORAL
Qty: 31 TABLET | Refills: 0 | Status: SHIPPED | OUTPATIENT
Start: 2021-11-12 | End: 2021-12-13 | Stop reason: ALTCHOICE

## 2021-11-12 RX ORDER — DOXYCYCLINE HYCLATE 100 MG
100 TABLET ORAL 2 TIMES DAILY
Qty: 14 TABLET | Refills: 0 | Status: SHIPPED | OUTPATIENT
Start: 2021-11-12 | End: 2021-11-19

## 2021-11-12 RX ORDER — AMLODIPINE BESYLATE 2.5 MG/1
2.5 TABLET ORAL DAILY
Qty: 90 TABLET | Refills: 0 | Status: SHIPPED | OUTPATIENT
Start: 2021-11-12 | End: 2022-01-23

## 2021-11-12 RX ORDER — METOPROLOL SUCCINATE 25 MG/1
25 TABLET, EXTENDED RELEASE ORAL DAILY
Qty: 30 TABLET | Refills: 0 | Status: SHIPPED | OUTPATIENT
Start: 2021-11-12 | End: 2021-12-13 | Stop reason: SDUPTHER

## 2021-11-12 RX ORDER — DULOXETIN HYDROCHLORIDE 30 MG/1
30 CAPSULE, DELAYED RELEASE ORAL DAILY
Qty: 30 CAPSULE | Refills: 1 | Status: SHIPPED | OUTPATIENT
Start: 2021-11-12 | End: 2021-12-13 | Stop reason: SDUPTHER

## 2021-11-12 ASSESSMENT — ENCOUNTER SYMPTOMS
VOMITING: 0
COUGH: 1
SORE THROAT: 1
NAUSEA: 0
RHINORRHEA: 1
TROUBLE SWALLOWING: 0
SHORTNESS OF BREATH: 1
CHEST TIGHTNESS: 1
BACK PAIN: 0
SINUS PAIN: 0
CONSTIPATION: 0
WHEEZING: 0
SINUS PRESSURE: 0
FACIAL SWELLING: 0
DIARRHEA: 1

## 2021-11-12 NOTE — PATIENT INSTRUCTIONS
Weaning Schedule  Week 1- Take 1 tab of Metoprolol Sunday, Monday, Wednesday, Friday. Take 0.5 tab of Metoprolol Tuesday, Thursday, Saturday  Week 3- Take 0.5 tab Metoprolol daily  Week 4- Take 0.5 tab Sunday, Monday, Wednesday, Friday. Take 0 tabs of Metoprolol on Tuesday, Thursday Saturday  Week 5- Take 0 tabs of Metoprolol        Patient Education        duloxetine  Pronunciation:  jonah bronson teen  Brand:  [employer] [place] [employer]  What is the most important information I should know about duloxetine? Do not take duloxetine within 5 days before or 14 days after you have used an MAO inhibitor, such as isocarboxazid, linezolid, methylene blue injection, phenelzine, rasagiline, selegiline, or tranylcypromine. Some young people have thoughts about suicide when first taking an antidepressant. Stay alert to changes in your mood or symptoms. Report any new or worsening symptoms to your doctor. Do not stop using duloxetine without first talking to your doctor. What is duloxetine? Duloxetine is a selective serotonin and norepinephrine reuptake inhibitor antidepressant (SSNRI). Duloxetine is used to treat major depressive disorder in adults. Duloxetine is also used to treat general anxiety disorder in adults and children at least 9years old. Duloxetine is also used in adults to treat nerve pain caused by diabetes (diabetic neuropathy), or chronic muscle or joint pain (such as low back pain and osteoarthritis pain). Duloxetine is also used to treat fibromyalgia (a chronic pain disorder) in adults and children at least 15years old. Duloxetine may also be used for purposes not listed in this medication guide. What should I discuss with my healthcare provider before taking duloxetine? You should not use duloxetine if you are allergic to it.   Do not take duloxetine within 5 days before or 14 days after you have used an MAO inhibitor, such as isocarboxazid, linezolid, methylene blue injection, phenelzine, rasagiline, selegiline, or tranylcypromine. A dangerous drug interaction could occur. Be sure your doctor knows if you also take stimulant medicine, opioid medicine, herbal products, or medicine for depression, mental illness, Parkinson's disease, migraine headaches, serious infections, or prevention of nausea and vomiting. These medicines may interact with duloxetine and cause a serious condition called serotonin syndrome. Duloxetine is not approved for use by anyone younger than 9years old. Tell your doctor if you have ever had:  · heart problems, high blood pressure;  · liver or kidney disease;  · slow digestion;  · a seizure;  · bleeding problems;  · narrow-angle glaucoma;  · bipolar disorder (manic depression);  · drug addiction or suicidal thoughts; or  · if you drink large amounts of alcohol. Some young people have thoughts about suicide when first taking an antidepressant. Your doctor should check your progress at regular visits. Your family or other caregivers should also be alert to changes in your mood or symptoms. Ask your doctor about taking duloxetine if you are pregnant. Taking this medicine during late pregnancy may cause excessive bleeding during childbirth, or serious medical complications in the baby. However, if not treated during pregnancy, conditions such as depression or fibromyalgia may cause complications in the baby or the mother. Tell your doctor right away if you become pregnant while taking duloxetine. Do not start or stop taking this medicine without your doctor's advice. Your name may be listed on a pregnancy registry to track the effects of duloxetine on the baby. If you are breastfeeding, tell your doctor if you notice drowsiness, feeding problems, and slow weight gain in the nursing baby. How should I take duloxetine? Follow all directions on your prescription label and read all medication guides or instruction sheets.  Your doctor may occasionally change your dose. Use the medicine exactly as directed. Taking duloxetine in higher doses or more often than prescribed will not make it more effective, and may increase side effects. Swallow the capsule whole and do not crush, chew, break, or open it. You may take duloxetine with or without food. It may take up to 4 weeks before your symptoms improve. Keep using the medication as directed and tell your doctor if your symptoms do not improve. Your blood pressure will need to be checked often. Do not stop using duloxetine suddenly, or you could have unpleasant symptoms (such as dizziness, vomiting, agitation, sweating, confusion, numbness, tingling, or electric shock feelings). Ask your doctor how to safely stop using this medicine. Store at room temperature away from moisture and heat. What happens if I miss a dose? Take the medicine as soon as you can, but skip the missed dose if it is almost time for your next dose. Do not take two doses at one time. What happens if I overdose? Seek emergency medical attention or call the Poison Help line at 1-673.820.6831. Overdose symptoms may include vomiting, dizziness or drowsiness, seizures, fast heartbeats, fainting, or coma. What should I avoid while taking duloxetine? Avoid driving or hazardous activity until you know how this medicine will affect you. Your reactions could be impaired. Dizziness or fainting can cause falls, accidents, or severe injuries. Avoid getting up too fast from a sitting or lying position, or you may feel dizzy. Avoid drinking alcohol. It may increase your risk of liver damage, especially if you take Drizalma. What are the possible side effects of duloxetine? Get emergency medical help if you have signs of an allergic reaction (hives, difficult breathing, swelling in your face or throat) or a severe skin reaction (fever, sore throat, burning eyes, skin pain, red or purple skin rash with blistering and peeling).   Report any new or worsening symptoms to your doctor, such as: mood or behavior changes, anxiety, panic attacks, trouble sleeping, or if you feel impulsive, irritable, agitated, hostile, aggressive, restless, hyperactive (mentally or physically), more depressed, or have thoughts about suicide or hurting yourself. Call your doctor at once if you have:  · pounding heartbeats or fluttering in your chest;  · a light-headed feeling, like you might pass out;  · easy bruising, unusual bleeding;  · vision changes;  · painful or difficult urination;  · impotence, sexual problems;  · liver problems --right-sided upper stomach pain, itching, dark urine, jaundice (yellowing of the skin or eyes);  · low levels of sodium in the body --headache, confusion, slurred speech, severe weakness, vomiting, loss of coordination, feeling unsteady; or  · manic episodes --racing thoughts, increased energy, decreased need for sleep, risk-taking behavior, being agitated or talkative. Seek medical attention right away if you have symptoms of serotonin syndrome, such as: agitation, hallucinations, fever, sweating, shivering, fast heart rate, muscle stiffness, twitching, loss of coordination, nausea, vomiting, or diarrhea. Common side effects may include:  · drowsiness;  · nausea, constipation, loss of appetite;  · dry mouth; or  · increased sweating. This is not a complete list of side effects and others may occur. Call your doctor for medical advice about side effects. You may report side effects to FDA at 5-635-FDA-7596. What other drugs will affect duloxetine? Sometimes it is not safe to use certain medications at the same time. Some drugs can affect your blood levels of other drugs you take, which may increase side effects or make the medications less effective.   Ask your doctor before taking a nonsteroidal anti-inflammatory drug (NSAID) such as aspirin, ibuprofen (Advil, Motrin), naproxen (Aleve), celecoxib (Celebrex), diclofenac, indomethacin, meloxicam, and others. Using an NSAID with duloxetine may cause you to bruise or bleed easily. Many drugs can affect duloxetine. This includes prescription and over-the-counter medicines, vitamins, and herbal products. Not all possible interactions are listed here. Tell your doctor about all your current medicines and any medicine you start or stop using. Where can I get more information? Your pharmacist can provide more information about duloxetine. Remember, keep this and all other medicines out of the reach of children, never share your medicines with others, and use this medication only for the indication prescribed. Every effort has been made to ensure that the information provided by [facility] is accurate, up-to-date, and complete, but no guarantee is made to that effect. Drug information contained herein may be time sensitive. [facility] information has been compiled for use by healthcare practitioners and consumers in the [place] and therefore [facility] does not warrant that uses outside of the [place] are appropriate, unless specifically indicated otherwise. [facility]'s drug information does not endorse drugs, diagnose patients or recommend therapy. [facility] drug information is an informational resource designed to assist licensed healthcare practitioners in caring for their patients and/or to serve consumers viewing this service as a supplement to, and not a substitute for, the expertise, skill, knowledge and judgment of healthcare practitioners. The absence of a warning for a given drug or drug combination in no way should be construed to indicate that the drug or drug combination is safe, effective or appropriate for any given patient. [facility] does not assume any responsibility for any aspect of healthcare administered with the aid of information [facility] provides.  The information contained herein is not intended to cover all possible uses, directions, precautions, warnings, drug interactions, allergic reactions, or adverse effects. If you have questions about the drugs you are taking, check with your doctor, nurse or pharmacist.  Copyright 0133-1321 [address]: 15.01. Revision date: 3/5/2021. Care instructions adapted under license by [facility] ([facility]). If you have questions about a medical condition or this instruction, always ask your healthcare professional. [place] [address] any warranty or liability for your use of this information.

## 2021-11-12 NOTE — PROGRESS NOTES
11/12/2021    Chief Complaint   Patient presents with    Nasal Congestion     pt sx started last friday /     Chest Congestion       Andrew Camacho Sr. is a 49 y.o. male, presents today for:      ASSESSMENT/PLAN:    1. COPD with acute exacerbation (HCC)  Start Prednisone taper, doxycycline for exacerbation  Continue Spiriva  CXR to r/o PNA  Encouraged COVID vaccination  Encouraged continued smoking cessation  Declining LDCT today  No influenza vaccination due to COPD exacerbation today, may consider next OV    - predniSONE (DELTASONE) 10 MG tablet; 4 tabs x 3 days, 3 tabs x 3 days, 2 tabs x 3 days, 1 tab x 3 days, 0.5 tab x 2 days  Dispense: 31 tablet; Refill: 0  - doxycycline hyclate (VIBRA-TABS) 100 MG tablet; Take 1 tablet by mouth 2 times daily for 7 days  Dispense: 14 tablet; Refill: 0  - XR CHEST (2 VW); Future    2. Chest pain, unspecified type  Unlikely to be Pulm or Cardio in origin per speciality providers  Continue Amlodipine for now, taper Metoprolol per pt preference  Start Cymbalta due to concurrent anxiety, discussed SE, education given today. - amLODIPine (NORVASC) 2.5 MG tablet; Take 1 tablet by mouth daily Chest pain  Dispense: 90 tablet; Refill: 0    3. Rapid palpitations  See #2  - metoprolol succinate (TOPROL XL) 25 MG extended release tablet; Take 1 tablet by mouth daily  Dispense: 30 tablet; Refill: 0    4. Chronic anxiety  Continue PRN Lorazepam.  Start Cymbalta. Not consistently taking Seroquel  - DULoxetine (CYMBALTA) 30 MG extended release capsule; Take 1 capsule by mouth daily  Dispense: 30 capsule; Refill: 1    5. Moderate single current episode of major depressive disorder ([facility] [address]  See #4      Return in about 4 weeks (around 12/10/2021) for copd. chest wall pain. cancel 11/29 please. Presenting today for worsening WATTS, SOB, and productive cough with yellow green sputum x 1 week. No worsening CP, leg swelling, abdominal pain.   No fever, diaphoresis, appetite change, loss of taste/ smell. Has not obtained COVID vaccination. Similar symptoms every November requiring Prednisone burst and Doxycycline. Continues to take Spiriva daily. Has not attempted Breztri in the past.  Declining LDCT    CP: Recent evaluation by Cardio- not felt to be cardiac in nature. Pulm (Ian Moses) also thinks pain unlikely to be pulmonary in nature. Pt willing to attempt medication for fibromyalgia and is also considering second opinion regarding chest pain. Lab Results   Component Value Date    CHOL 174 07/12/2021    CHOL 142 06/18/2016     Lab Results   Component Value Date    TRIG 85 07/12/2021    TRIG 77 06/18/2016     Lab Results   Component Value Date    HDL 39 (L) 07/12/2021    HDL 37 (L) 06/18/2016     Lab Results   Component Value Date    LDLCALC 118 (H) 07/12/2021    LDLCALC 90 06/18/2016     Lab Results   Component Value Date    LABVLDL 17 07/12/2021    LABVLDL 15 06/18/2016     No results found for: [facility]    Lab Results   Component Value Date     10/12/2021    K 3.7 10/12/2021     10/12/2021    CO2 27 10/12/2021    BUN 7 10/12/2021    CREATININE 1.1 10/12/2021    GLUCOSE 84 10/12/2021    CALCIUM 9.2 10/12/2021    PROT 6.6 07/12/2021    LABALBU 4.5 07/12/2021    BILITOT 0.8 07/12/2021    ALKPHOS 67 07/12/2021    AST 14 (L) 07/12/2021    ALT 11 07/12/2021    LABGLOM >60 10/12/2021    GFRAA >60 10/12/2021    AGRATIO 2.1 07/12/2021    GLOB 2.1 07/12/2021     Review of Systems   HENT: Positive for congestion, ear pain, rhinorrhea and sore throat. Negative for dental problem, drooling, ear discharge, facial swelling, hearing loss, mouth sores, nosebleeds, postnasal drip, sinus pressure, sinus pain, sneezing and trouble swallowing. Respiratory: Positive for cough, chest tightness and shortness of breath. Negative for wheezing. Cardiovascular: Positive for chest pain. Negative for palpitations and leg swelling. Gastrointestinal: Positive for diarrhea (chronic).  Negative for constipation, nausea and vomiting. Genitourinary: Negative. Musculoskeletal: Negative for arthralgias, back pain, gait problem, joint swelling, myalgias, neck pain and neck stiffness. Skin: Negative. Neurological: Negative for dizziness, seizures, weakness, light-headedness, numbness and headaches. Psychiatric/Behavioral: Positive for sleep disturbance. Negative for confusion, decreased concentration, dysphoric mood and suicidal ideas. The patient is nervous/anxious. Current Outpatient Medications on File Prior to Visit   Medication Sig Dispense Refill    ibuprofen (ADVIL;MOTRIN) 800 MG tablet TAKE 1 TABLET BY MOUTH EVERY 6 HOURS ASNEEDED FOR PAIN 120 tablet 0    LORazepam (ATIVAN) 1 MG tablet Take 1 tablet by mouth 3 times daily as needed for Anxiety for up to 30 days. 45 tablet 0    omeprazole (PRILOSEC) 40 MG delayed release capsule TAKE ONE CAPSULE EVERY MORNING BEFORE BREAKFAST (CHEST PAIN) 30 capsule 0    tiotropium (SPIRIVA RESPIMAT) 2.5 MCG/ACT AERS inhaler INHALE TWO (2) PUFFS INTO THE LUNGS DAILY 4 g 5    budesonide-formoterol (SYMBICORT) 160-4.5 MCG/ACT AERO Inhale 2 puffs into the lungs 2 times daily 10.2 g 5    albuterol sulfate HFA (PROAIR HFA) 108 (90 Base) MCG/ACT inhaler Inhale 2 puffs into the lungs every 6 hours as needed for Wheezing or Shortness of Breath 18 g 5    QUEtiapine (SEROQUEL) 25 MG tablet Take 0.5 tablets by mouth daily Anxiety/ depression 15 tablet 3    gabapentin (NEURONTIN) 600 MG tablet Take 1 tablet by mouth 3 times daily for 180 days. 180 tablet 3    fluticasone (FLONASE) 50 MCG/ACT nasal spray 1 spray by Each Nostril route daily 16 g 5    dicyclomine (BENTYL) 10 MG capsule Take 1 capsule by mouth 4 times daily as needed (abdominal cramps.) 90 capsule 1     No current facility-administered medications on file prior to visit.      Allergies   Allergen Reactions    Codeine Itching    Mobic [Meloxicam]      Irritates stomach    Tramadol Patient states he doesn't feel right on tramadol. Past Medical History:   Diagnosis Date    Arthritis     Chest pain     [facility] 2012 sent for records;    Colitis     COPD [facility]chronic obstructive pulmonary disease) ([facility] [address]     Histoplasmosis     History of blood transfusion     Hx of blood clots     Metabolic syndrome X     Other emphysema ([facility] [address] 2018    Pneumonia     Pulmonary nodule      Past Surgical History:   Procedure Laterality Date    BRONCHOSCOPY  2013    COLONOSCOPY  2018    COLONOSCOPY N/A 2018    COLORECTAL CANCER SCREENING, NOT HIGH RISK performed by Rah Bhandari DO at [facility] CATH LAB PROCEDURE      KNEE ARTHROSCOPY Right     KNEE ARTHROSCOPY Right 2019    RIGHT KNEE VIDEO ARTHROSCOPY, MEDIAL MENISCECTOMY performed by Sivan Bush MD at [facility] 39      neck    VASECTOMY       Social History     Tobacco Use    Smoking status: Former Smoker     Packs/day: 2.00     Years: 20.00     Pack years: 40.00     Types: Cigarettes     Quit date: 2018     Years since quitting: 3.8    Smokeless tobacco: Never Used   Substance Use Topics    Alcohol use:  Yes     Alcohol/week: 1.0 standard drink     Types: 1 Cans of beer per week     Comment: rarely     Family History   Problem Relation Age of Onset    Cancer Mother         ovarian    Heart Failure Mother     Heart Attack Mother 62    Heart Disease Mother     Other Father          1948;ashd;per neurop;    Other Brother         pt knows very little    Cancer Paternal Uncle         leukemia    Cancer Paternal Grandfather         leukemia    Heart Attack Maternal Uncle 60        x4     No Known Problems Son     No Known Problems Son     Asthma Neg Hx     Diabetes Neg Hx     Emphysema Neg Hx     Hypertension Neg Hx        Vitals:    21 1017   BP: 110/70   Site: Right Upper Arm   Position: Sitting   Cuff Size: Medium Adult   Pulse: 86   Temp: 98 °F (36.7 °C)   TempSrc: Temporal   SpO2: 97%   Weight: 164 lb (74.4 kg)     Estimated body mass index is 26.47 kg/m² as calculated from the following:    Height as of 10/20/21: 5' 6\" (1.676 m). Weight as of this encounter: 164 lb (74.4 kg). Physical Exam  Constitutional:       Appearance: Normal appearance. HENT:      Head: Normocephalic. Right Ear: Tympanic membrane normal.      Left Ear: Tympanic membrane normal.      Nose: Nose normal.      Mouth/Throat:      Mouth: Mucous membranes are moist.   Eyes:      Extraocular Movements: Extraocular movements intact. Conjunctiva/sclera: Conjunctivae normal.      Pupils: Pupils are equal, round, and reactive to light. Cardiovascular:      Rate and Rhythm: Normal rate and regular rhythm. Pulses: Normal pulses. Heart sounds: Normal heart sounds. Pulmonary:      Effort: Pulmonary effort is normal.      Breath sounds: Examination of the right-lower field reveals rales. Examination of the left-lower field reveals rales. Rales present. Abdominal:      Palpations: Abdomen is soft. Musculoskeletal:      Cervical back: Normal range of motion. Skin:     General: Skin is warm. Capillary Refill: Capillary refill takes less than 2 seconds. Neurological:      General: No focal deficit present. Mental Status: He is alert and oriented to person, place, and time. Psychiatric:         Mood and Affect: Mood normal.         Behavior: Behavior normal.           Patient's questions answered and concerns addressed. Patient agrees to plan of care.         Electronically signed by MICHELLE Dumont CNP on 11/12/2021 at 11:20 AM

## 2021-11-15 ENCOUNTER — HOSPITAL ENCOUNTER (OUTPATIENT)
Age: 51
Discharge: HOME | End: 2021-11-15
Payer: MEDICARE

## 2021-11-15 ENCOUNTER — HOSPITAL ENCOUNTER (OUTPATIENT)
Dept: GENERAL RADIOLOGY | Age: 51
Discharge: HOME | End: 2021-11-15
Payer: MEDICARE

## 2021-11-15 DIAGNOSIS — J44.1 COPD WITH ACUTE EXACERBATION (HCC): ICD-10-CM

## 2021-11-15 PROCEDURE — 71046 X-RAY EXAM CHEST 2 VIEWS: CPT

## 2021-12-13 ENCOUNTER — TELEPHONE (OUTPATIENT)
Dept: PULMONOLOGY | Age: 51
End: 2021-12-13

## 2021-12-13 ENCOUNTER — OFFICE VISIT (OUTPATIENT)
Dept: FAMILY MEDICINE CLINIC | Age: 51
End: 2021-12-13
Payer: MEDICARE

## 2021-12-13 VITALS
DIASTOLIC BLOOD PRESSURE: 74 MMHG | WEIGHT: 163 LBS | BODY MASS INDEX: 26.31 KG/M2 | OXYGEN SATURATION: 96 % | HEART RATE: 74 BPM | TEMPERATURE: 98.4 F | SYSTOLIC BLOOD PRESSURE: 114 MMHG

## 2021-12-13 DIAGNOSIS — F41.9 CHRONIC ANXIETY: ICD-10-CM

## 2021-12-13 DIAGNOSIS — G82.20 PARAPARESIS OF BOTH LOWER LIMBS (HCC): Primary | ICD-10-CM

## 2021-12-13 DIAGNOSIS — J44.9 CHRONIC OBSTRUCTIVE PULMONARY DISEASE, UNSPECIFIED COPD TYPE (HCC): ICD-10-CM

## 2021-12-13 DIAGNOSIS — R00.2 RAPID PALPITATIONS: ICD-10-CM

## 2021-12-13 DIAGNOSIS — F32.1 MODERATE SINGLE CURRENT EPISODE OF MAJOR DEPRESSIVE DISORDER (HCC): ICD-10-CM

## 2021-12-13 DIAGNOSIS — R07.9 CHEST PAIN, UNSPECIFIED TYPE: ICD-10-CM

## 2021-12-13 DIAGNOSIS — R20.2 NUMBNESS AND TINGLING OF BOTH LEGS BELOW KNEES: ICD-10-CM

## 2021-12-13 DIAGNOSIS — R20.0 NUMBNESS AND TINGLING OF BOTH LEGS BELOW KNEES: ICD-10-CM

## 2021-12-13 PROCEDURE — 3017F COLORECTAL CA SCREEN DOC REV: CPT

## 2021-12-13 PROCEDURE — G8926 SPIRO NO PERF OR DOC: HCPCS

## 2021-12-13 PROCEDURE — 3023F SPIROM DOC REV: CPT

## 2021-12-13 PROCEDURE — G8419 CALC BMI OUT NRM PARAM NOF/U: HCPCS

## 2021-12-13 PROCEDURE — 1036F TOBACCO NON-USER: CPT

## 2021-12-13 PROCEDURE — G8427 DOCREV CUR MEDS BY ELIG CLIN: HCPCS

## 2021-12-13 PROCEDURE — 99214 OFFICE O/P EST MOD 30 MIN: CPT

## 2021-12-13 PROCEDURE — G8484 FLU IMMUNIZE NO ADMIN: HCPCS

## 2021-12-13 RX ORDER — METOPROLOL SUCCINATE 25 MG/1
25 TABLET, EXTENDED RELEASE ORAL DAILY
Qty: 30 TABLET | Refills: 3 | Status: SHIPPED | OUTPATIENT
Start: 2021-12-13 | End: 2022-05-17

## 2021-12-13 RX ORDER — LORAZEPAM 1 MG/1
1 TABLET ORAL 3 TIMES DAILY PRN
Qty: 45 TABLET | Refills: 0 | Status: SHIPPED | OUTPATIENT
Start: 2021-12-13 | End: 2022-01-25

## 2021-12-13 RX ORDER — MONTELUKAST SODIUM 10 MG/1
10 TABLET ORAL NIGHTLY
Qty: 30 TABLET | Refills: 3 | Status: SHIPPED | OUTPATIENT
Start: 2021-12-13 | End: 2022-05-17

## 2021-12-13 RX ORDER — DULOXETIN HYDROCHLORIDE 30 MG/1
30 CAPSULE, DELAYED RELEASE ORAL DAILY
Qty: 30 CAPSULE | Refills: 3 | Status: SHIPPED | OUTPATIENT
Start: 2021-12-13 | End: 2022-03-07 | Stop reason: DRUGHIGH

## 2021-12-13 RX ORDER — OMEPRAZOLE 40 MG/1
CAPSULE, DELAYED RELEASE ORAL
Qty: 30 CAPSULE | Refills: 5 | Status: SHIPPED | OUTPATIENT
Start: 2021-12-13 | End: 2022-03-07 | Stop reason: DRUGHIGH

## 2021-12-13 RX ORDER — GABAPENTIN 600 MG/1
600 TABLET ORAL 3 TIMES DAILY
Qty: 180 TABLET | Refills: 0 | Status: SHIPPED | OUTPATIENT
Start: 2021-12-13 | End: 2022-03-07 | Stop reason: SDUPTHER

## 2021-12-13 RX ORDER — QUETIAPINE FUMARATE 25 MG/1
12.5 TABLET, FILM COATED ORAL DAILY
Qty: 15 TABLET | Refills: 3 | Status: SHIPPED | OUTPATIENT
Start: 2021-12-13 | End: 2022-05-17

## 2021-12-13 ASSESSMENT — ENCOUNTER SYMPTOMS
BACK PAIN: 1
ABDOMINAL DISTENTION: 0
COUGH: 0
CHEST TIGHTNESS: 1
EYE PAIN: 0
TROUBLE SWALLOWING: 0
CONSTIPATION: 1
COLOR CHANGE: 0
ORTHOPNEA: 1
WHEEZING: 1
ABDOMINAL PAIN: 0
DIFFICULTY BREATHING: 1
SORE THROAT: 0
EYE DISCHARGE: 0
HEMOPTYSIS: 0
CHEST TIGHTNESS: 0
DIARRHEA: 0
SHORTNESS OF BREATH: 1
SPUTUM PRODUCTION: 1

## 2021-12-13 ASSESSMENT — COPD QUESTIONNAIRES: COPD: 1

## 2021-12-13 NOTE — PROGRESS NOTES
[facility]  Establish care visit   2021    Justin Eli Sr. (:  1970) is a 49 y.o. male, here to establish care. Chief Complaint   Patient presents with    COPD    Fatigue     says he wants to sleep all the time     Anxiety     takes the ativan once a day     Shortness of Breath    Depression    Gastroesophageal Reflux        ASSESSMENT/ PLAN  1. Chest pain, unspecified type  -No more chest pain since starting Prilosec    - omeprazole (PRILOSEC) 40 MG delayed release capsule; TAKE ONE CAPSULE EVERY MORNING BEFORE BREAKFAST (CHEST PAIN)  Dispense: 30 capsule; Refill: 5    2. Chronic anxiety  -Stable on Ativan Cymbalta and Seroquel  - LORazepam (ATIVAN) 1 MG tablet; Take 1 tablet by mouth 3 times daily as needed for Anxiety for up to 30 days. Dispense: 45 tablet; Refill: 0  - QUEtiapine (SEROQUEL) 25 MG tablet; Take 0.5 tablets by mouth daily Anxiety/ depression  Dispense: 15 tablet; Refill: 3  - DULoxetine (CYMBALTA) 30 MG extended release capsule; Take 1 capsule by mouth daily  Dispense: 30 capsule; Refill: 3    3. Moderate single current episode of major depressive disorder (HCC)  -Continue Seroquel  -No recurrent problems with depression at this time  - QUEtiapine (SEROQUEL) 25 MG tablet; Take 0.5 tablets by mouth daily Anxiety/ depression  Dispense: 15 tablet; Refill: 3    4. Rapid palpitations  -No more palpitations since starting metoprolol    - metoprolol succinate (TOPROL XL) 25 MG extended release tablet; Take 1 tablet by mouth daily  Dispense: 30 tablet; Refill: 3    5. Numbness and tingling of both legs below knees  -Still reports numbness and tingling of both legs  -Improved with gabapentin  -I suspect this is from his lower back pain  -Denies any bowel or bladder incontinence  - gabapentin (NEURONTIN) 600 MG tablet; Take 1 tablet by mouth 3 times daily for 180 days. Dispense: 180 tablet; Refill: 0    6. Paraparesis of both lower limbs ([address]  -See #5    7. Chronic obstructive pulmonary disease, unspecified COPD type ([facility] [address]  -COPD appears to be worsening due to using albuterol inhaler at least 3 times a day. He is now on Spiriva and Symbicort. We will add Singulair in the interim until he is able to get his PFTs and 6-minute walk test completed. I suspect that his FEV1 has diminished over the past 2 years. He does follow with pulmonary and has an appointment with them in March 2022. We will call to see if he can get in sooner for COPD management  -He failed to get PFTs and walk test completed in 2020 due to not wanting to get a Covid test completed  -No acute flares since last time seeing Jean العراقي CNP    - montelukast (SINGULAIR) 10 MG tablet; Take 1 tablet by mouth nightly  Dispense: 30 tablet; Refill: 3         Preventative Care: Had CT scan done in October      Return in about 3 months (around 3/13/2022), or if symptoms worsen or fail to improve, for COPD, anxiety. HPI  No more flares of palpitations since starting metoprolol last time    Patient does report some wheezing and shortness of breath daily especially with exertion. Has not gotten PFTs completed since 2019  Has appointment with Dr. Jane Ordonez in March. Still reports weakness, numbness and tingling in bilateral lower extremities. Denies any urinary incontinence or bowel incontinence. COPD  He complains of chest tightness, difficulty breathing, shortness of breath (with exertion), sputum production (coughing up clear/yellow daily) and wheezing (with exertion). There is no cough or hemoptysis. This is a chronic problem. The current episode started more than 1 year ago. The problem occurs constantly. The problem has been gradually worsening. Associated symptoms include dyspnea on exertion. Pertinent negatives include no chest pain, fever, headaches, myalgias, sore throat or trouble swallowing.  His symptoms are aggravated by any activity, change in weather, climbing stairs, lying down and exercise. His symptoms are alleviated by beta-agonist and steroid inhaler. Risk factors for lung disease include smoking/tobacco exposure. His past medical history is significant for COPD and emphysema. There is no history of asthma. Fatigue  Associated symptoms include fatigue. Pertinent negatives include no abdominal pain, arthralgias, chest pain, chills, congestion, coughing, fever, headaches, joint swelling, myalgias, numbness, rash or sore throat. Shortness of Breath  This is a chronic problem. The current episode started more than 1 year ago. The problem occurs daily. The problem has been gradually worsening. Associated symptoms include orthopnea, sputum production (coughing up clear/yellow daily) and wheezing (with exertion). Pertinent negatives include no abdominal pain, chest pain, fever, headaches, hemoptysis, leg swelling, rash or sore throat. The symptoms are aggravated by any activity, lying flat and exercise. Risk factors include smoking. His past medical history is significant for COPD. There is no history of asthma. Gastroesophageal Reflux  He complains of wheezing (with exertion). He reports no abdominal pain, no chest pain, no coughing or no sore throat. The problem occurs rarely. The problem has been resolved. Associated symptoms include fatigue. He has tried a PPI for the symptoms. ROS  Review of Systems   Constitutional: Positive for fatigue. Negative for chills, fever and unexpected weight change. HENT: Negative for congestion, dental problem, sore throat and trouble swallowing. Eyes: Negative for pain and discharge. Respiratory: Positive for sputum production (coughing up clear/yellow daily), shortness of breath (with exertion) and wheezing (with exertion). Negative for cough, hemoptysis and chest tightness. Cardiovascular: Positive for dyspnea on exertion and orthopnea. Negative for chest pain, palpitations (with SOB and exertion) and leg swelling.    Gastrointestinal: Positive for constipation. Negative for abdominal distention, abdominal pain and diarrhea. Endocrine: Negative for polydipsia, polyphagia and polyuria. Genitourinary: Negative for dysuria, flank pain, hematuria and urgency. Musculoskeletal: Positive for back pain. Negative for arthralgias, joint swelling and myalgias. Skin: Negative for color change and rash. Neurological: Negative for dizziness, light-headedness, numbness and headaches. Psychiatric/Behavioral: Negative for agitation and behavioral problems. The patient is not nervous/anxious. HISTORIES  Current Outpatient Medications on File Prior to Visit   Medication Sig Dispense Refill    amLODIPine (NORVASC) 2.5 MG tablet Take 1 tablet by mouth daily Chest pain 90 tablet 0    ibuprofen (ADVIL;MOTRIN) 800 MG tablet TAKE 1 TABLET BY MOUTH EVERY 6 HOURS ASNEEDED FOR PAIN 120 tablet 0    tiotropium (SPIRIVA RESPIMAT) 2.5 MCG/ACT AERS inhaler INHALE TWO (2) PUFFS INTO THE LUNGS DAILY 4 g 5    budesonide-formoterol (SYMBICORT) 160-4.5 MCG/ACT AERO Inhale 2 puffs into the lungs 2 times daily 10.2 g 5    albuterol sulfate HFA (PROAIR HFA) 108 (90 Base) MCG/ACT inhaler Inhale 2 puffs into the lungs every 6 hours as needed for Wheezing or Shortness of Breath 18 g 5    fluticasone (FLONASE) 50 MCG/ACT nasal spray 1 spray by Each Nostril route daily 16 g 5    dicyclomine (BENTYL) 10 MG capsule Take 1 capsule by mouth 4 times daily as needed (abdominal cramps.) 90 capsule 1     No current facility-administered medications on file prior to visit. Allergies   Allergen Reactions    Codeine Itching    Mobic [Meloxicam]      Irritates stomach    Tramadol      Patient states he doesn't feel right on tramadol.        Past Medical History:   Diagnosis Date    Arthritis     Chest pain     [facility] 9/2012 sent for records;    Colitis     COPD (chronic obstructive pulmonary disease) ([facility] [address]     Histoplasmosis     History of blood transfusion     Hx Never used   Substance and Sexual Activity    Alcohol use: Yes     Alcohol/week: 1.0 standard drink     Types: 1 Cans of beer per week     Comment: rarely    Drug use: No    Sexual activity: Yes     Partners: Female   Other Topics Concern    Not on file   Social History Narrative    2018lives by self with 2 dogs;works in [employer]     Social Determinants of Health     Financial Resource Strain:     Difficulty of Paying Living Expenses: Not on file   Food Insecurity:     Worried About [address] [employer] in the Last Year: Not on file    [place]bert of Food in the Last Year: Not on file   Transportation Needs:     Lack of Transportation (Medical): Not on file    Lack of Transportation (Non-Medical):  Not on file   Physical Activity:     Days of Exercise per Week: Not on file    Minutes of Exercise per Session: Not on file   Stress:     Feeling of Stress : Not on file   Social Connections:     Frequency of Communication with Friends and Family: Not on file    Frequency of Social Gatherings with Friends and Family: Not on file    Attends Rastafarian Services: Not on file    Active Member of [address] [place] [address] or Organizations: Not on file    Attends Club or Organization Meetings: Not on file    Marital Status: Not on file   Intimate Partner Violence:     Fear of Current or Ex-Partner: Not on file    Emotionally Abused: Not on file    Physically Abused: Not on file    Sexually Abused: Not on file   Housing Stability:     Unable to Pay for Housing in the Last Year: Not on file    Number of [place] in the Last Year: Not on file    Unstable Housing in the Last Year: Not on file        Family History   Problem Relation Age of Onset    Cancer Mother         ovarian    Heart Failure Mother     Heart Attack Mother 62    Heart Disease Mother     Other Father          1948;ashd;per neurop;    Other Brother         pt knows very little    Cancer Paternal Uncle         leukemia    Cancer Paternal Grandfather leukemia    Heart Attack Maternal Uncle 60        x4     No Known Problems Son     No Known Problems Son     Asthma Neg Hx     Diabetes Neg Hx     Emphysema Neg Hx     Hypertension Neg Hx        PE  Vitals:    12/13/21 1050   BP: 114/74   Pulse: 74   Temp: 98.4 °F (36.9 °C)   TempSrc: Infrared   SpO2: 96%   Weight: 163 lb (73.9 kg)     Estimated body mass index is 26.31 kg/m² as calculated from the following:    Height as of 10/20/21: 5' 6\" (1.676 m). Weight as of this encounter: 163 lb (73.9 kg). Physical Exam  Constitutional:       General: He is not in acute distress. Appearance: Normal appearance. He is not ill-appearing. HENT:      Head: Normocephalic. Right Ear: Tympanic membrane and external ear normal.      Left Ear: Tympanic membrane and external ear normal.      Nose: No congestion or rhinorrhea. Mouth/Throat:      Mouth: Mucous membranes are moist.      Pharynx: Oropharynx is clear. No posterior oropharyngeal erythema. Eyes:      Extraocular Movements: Extraocular movements intact. Conjunctiva/sclera: Conjunctivae normal.      Pupils: Pupils are equal, round, and reactive to light. Cardiovascular:      Rate and Rhythm: Normal rate and regular rhythm. Pulses: Normal pulses. Heart sounds: Normal heart sounds. No murmur heard. Pulmonary:      Effort: Pulmonary effort is normal. No respiratory distress. Breath sounds: Normal breath sounds. No wheezing. Abdominal:      General: Abdomen is flat. Bowel sounds are normal.      Palpations: Abdomen is soft. Tenderness: There is no abdominal tenderness. Hernia: No hernia is present. Musculoskeletal:         General: No swelling. Normal range of motion. Cervical back: Normal range of motion and neck supple. No tenderness. Right lower leg: No edema. Left lower leg: No edema. Lymphadenopathy:      Cervical: No cervical adenopathy. Skin:     General: Skin is warm and dry.

## 2021-12-13 NOTE — PATIENT INSTRUCTIONS
Patient Education        montelukast  Pronunciation:  rush [place]inas  Brand:  Singulair  What is the most important information I should know about montelukast?  Tell your doctor right away if you have signs of blood vessel inflammation: flu-like symptoms, severe sinus pain, a skin rash, numbness or a \"pins and needles\" feeling in your arms or legs. Stop taking this medicine and call your doctor right away if you have any unusual changes in mood or behavior (such as agitation, confusion, depression, sleep problems, compulsive behaviors, hallucinations, or suicidal thoughts or actions). What is montelukast?  Montelukast is a leukotriene ([id number]) inhibitor that is used to prevent asthma attacks in adults and children at least 3years old. Montelukast is also used to prevent exercise-induced bronchoconstriction (narrowing of the air passages in the lungs) in adults and children who are at least 10years old. Montelukast is also used to treat symptoms of seasonal or year-round (perennial) allergies in adults and children at least 3years old, after other treatments did not work. If you already take montelukast to prevent asthma or allergy symptoms, do not use an extra dose to treat exercise-induced bronchoconstriction. Montelukast may also be used for purposes not listed in this medication guide. What should I discuss with my healthcare provider before taking montelukast?  You should not use montelukast if you are allergic to it. Tell your doctor if you have ever had:  · mental illness or psychosis; or  · asthma, or a history of severe allergic reaction (sneezing, runny or stuffy nose, wheezing, shortness of breath) after taking aspirin or another NSAID. The chewable tablet may contain phenylalanine and could be harmful if you have phenylketonuria (PKU). Tell your doctor if you are pregnant or breastfeeding. Do not give this medicine to a child without a doctor's advice.   How should I take montelukast?  Follow all directions on your prescription label and read all medication guides or instruction sheets. Use the medicine exactly as directed. Montelukast is not a fast-acting rescue medicine for asthma attacks. Seek medical attention if your breathing problems get worse quickly, or if you think your medications are not working. Montelukast is usually taken once every evening, with or without food. For exercise-induced bronchoconstriction, take a single dose at least 2 hours before exercise, and do not take another dose for at least 24 hours. Swallow the regular tablet  whole, with a glass of water. You must chew the chewable tablet before you swallow it. Place the oral granules  directly in your mouth and swallow, or mix them with applesauce, mashed carrots, rice, or ice cream. The granules may also be mixed with baby formula or breast milk. Do not use any other type of liquid. Use the mixture within 15 minutes. Do not save for later use. If you also use an oral steroid medication, you should not stop using it suddenly. Follow your doctor's instructions about tapering your dose. Do not change your dose or stop using asthma medication without your doctor's advice. Store at room temperature away from moisture and heat. Do not open a packet of oral granules until you are ready to use the medicine. What happens if I miss a dose? Skip the missed dose and use your next dose at the regular time. Do not use two doses at one time. What happens if I overdose? Seek emergency medical attention or call the Poison Help line at 1-309.248.4994. What should I avoid while taking montelukast?  Avoid situations or activities that may trigger an asthma attack. If your asthma symptoms get worse when you take aspirin, avoid taking aspirin or other NSAIDs (nonsteroidal anti-inflammatory drugs) such as ibuprofen (Advil, Motrin), naproxen (Aleve), celecoxib, diclofenac, indomethacin, meloxicam, and others.   What are the possible side effects of montelukast?  Get emergency medical help if you have signs of an allergic reaction:  hives, blisters, severe itching; difficult breathing; swelling of your face, lips, tongue, or throat. Tell your doctor right away if you have signs of blood vessel inflammation: flu-like symptoms, severe sinus pain, a skin rash, numbness or a \"pins and needles\" feeling in your arms or legs. Some people using montelukast have had new or worsening mental problems. Stop taking this medicine and call your doctor right away if you have unusual changes in mood or behavior, such as:  · agitation, aggression, feeling restless or irritable;  · anxiety, depression, confusion, problems with memory or attention;  · stuttering, tremors, uncontrolled muscle movements;  · suicidal thoughts or actions;  · hallucinations, sleep problems, vivid, dreams, sleep-walking; or  · compulsive or repetitive behaviors. Common side effects may include:  · stomach pain, diarrhea;  · fever or other flu symptoms;  · ear pain or full feeling, trouble hearing;  · headache; or  · cold symptoms such as runny or stuffy nose, sinus pain, cough, sore throat. This is not a complete list of side effects and others may occur. Call your doctor for medical advice about side effects. You may report side effects to FDA at 2-602-FDA-6411. What other drugs will affect montelukast?  Other drugs may affect montelukast, including prescription and over-the-counter medicines, vitamins, and herbal products. Tell your doctor about all other medicines you use. Where can I get more information? Your pharmacist can provide more information about montelukast.  Remember, keep this and all other medicines out of the reach of children, never share your medicines with others, and use this medication only for the indication prescribed.    Every effort has been made to ensure that the information provided by [address] is accurate, up-to-date, and complete, but no guarantee is made to that effect. Drug information contained herein may be time sensitive. [facility] information has been compiled for use by healthcare practitioners and consumers in the [place] and therefore [employer] does not warrant that uses outside of the [place] are appropriate, unless specifically indicated otherwise. [facility]'s drug information does not endorse drugs, diagnose patients or recommend therapy. [facility] drug information is an informational resource designed to assist licensed healthcare practitioners in caring for their patients and/or to serve consumers viewing this service as a supplement to, and not a substitute for, the expertise, skill, knowledge and judgment of healthcare practitioners. The absence of a warning for a given drug or drug combination in no way should be construed to indicate that the drug or drug combination is safe, effective or appropriate for any given patient. [facility] does not assume any responsibility for any aspect of healthcare administered with the aid of information [facility] provides. The information contained herein is not intended to cover all possible uses, directions, precautions, warnings, drug interactions, allergic reactions, or adverse effects. If you have questions about the drugs you are taking, check with your doctor, nurse or pharmacist.  Copyright 3896-3135 [address]: 18.01. Revision date: 6/10/2021. Care instructions adapted under license by [facility] ([facility]). If you have questions about a medical condition or this instruction, always ask your healthcare professional. [place] [address] any warranty or liability for your use of this information.

## 2021-12-27 ENCOUNTER — VIRTUAL VISIT (OUTPATIENT)
Dept: FAMILY MEDICINE CLINIC | Age: 51
End: 2021-12-27
Payer: MEDICARE

## 2021-12-27 ENCOUNTER — TELEPHONE (OUTPATIENT)
Dept: FAMILY MEDICINE CLINIC | Age: 51
End: 2021-12-27

## 2021-12-27 DIAGNOSIS — R43.0 LOSS OF SMELL: ICD-10-CM

## 2021-12-27 DIAGNOSIS — R50.9 FEVER, UNSPECIFIED FEVER CAUSE: ICD-10-CM

## 2021-12-27 DIAGNOSIS — J44.1 CHRONIC OBSTRUCTIVE PULMONARY DISEASE WITH ACUTE EXACERBATION (HCC): ICD-10-CM

## 2021-12-27 DIAGNOSIS — R52 BODY ACHES: ICD-10-CM

## 2021-12-27 DIAGNOSIS — U07.1 COVID-19: Primary | ICD-10-CM

## 2021-12-27 DIAGNOSIS — R43.2 LOSS OF TASTE: Primary | ICD-10-CM

## 2021-12-27 DIAGNOSIS — R20.2 NUMBNESS AND TINGLING OF BOTH LEGS BELOW KNEES: ICD-10-CM

## 2021-12-27 DIAGNOSIS — R20.0 NUMBNESS AND TINGLING OF BOTH LEGS BELOW KNEES: ICD-10-CM

## 2021-12-27 LAB
INTERNAL QC: NORMAL
SARS-COV-2, NAA: POSITIVE

## 2021-12-27 PROCEDURE — 3017F COLORECTAL CA SCREEN DOC REV: CPT

## 2021-12-27 PROCEDURE — G8926 SPIRO NO PERF OR DOC: HCPCS

## 2021-12-27 PROCEDURE — G8484 FLU IMMUNIZE NO ADMIN: HCPCS

## 2021-12-27 PROCEDURE — 99213 OFFICE O/P EST LOW 20 MIN: CPT

## 2021-12-27 PROCEDURE — G8419 CALC BMI OUT NRM PARAM NOF/U: HCPCS

## 2021-12-27 PROCEDURE — 1036F TOBACCO NON-USER: CPT

## 2021-12-27 PROCEDURE — G8427 DOCREV CUR MEDS BY ELIG CLIN: HCPCS

## 2021-12-27 PROCEDURE — 3023F SPIROM DOC REV: CPT

## 2021-12-27 RX ORDER — IBUPROFEN 800 MG/1
TABLET ORAL
Qty: 120 TABLET | Refills: 0 | Status: SHIPPED | OUTPATIENT
Start: 2021-12-27 | End: 2022-01-25

## 2021-12-27 RX ORDER — PREDNISONE 10 MG/1
TABLET ORAL
Qty: 30 TABLET | Refills: 0 | Status: SHIPPED | OUTPATIENT
Start: 2021-12-27 | End: 2022-03-21

## 2021-12-27 RX ORDER — DOXYCYCLINE HYCLATE 100 MG
100 TABLET ORAL 2 TIMES DAILY
Qty: 14 TABLET | Refills: 0 | Status: SHIPPED | OUTPATIENT
Start: 2021-12-27 | End: 2022-01-03

## 2021-12-27 ASSESSMENT — ENCOUNTER SYMPTOMS
SINUS PRESSURE: 0
FACIAL SWEATING: 0
BACK PAIN: 1
HEMOPTYSIS: 0
ORTHOPNEA: 0
WHEEZING: 1
COUGH: 1
SHORTNESS OF BREATH: 1
CHEST TIGHTNESS: 1
ABDOMINAL PAIN: 0
SORE THROAT: 1
EYES NEGATIVE: 1

## 2021-12-27 NOTE — TELEPHONE ENCOUNTER
Per Shannon Ortiz once the test result comes back if It is positive then set him up a phone visit today

## 2021-12-27 NOTE — PROGRESS NOTES
Justin Eli . (:  1970) is a 49 y.o. male,Established patient, here for evaluation of the following chief complaint(s): Positive For Covid-19 (tested positive today, wants the infusion )         ASSESSMENT/PLAN:  1. COVID-19  -Symptoms started on  at 0200 hrs. -Shortness of breath has increased compared to baseline  -Patient does report wheezing  -History of COPD. We will try a prednisone taper and start doxycycline to prevent superimposed bacterial pneumonia  -Monoclonal antibody infusion ordered  -Patient was encouraged to obtain pulse oximeter to monitor oxygen saturations at home. He was instructed if oxygen saturations maintained below 90% he should proceed to the ER for worsening pneumonia    -     predniSONE (DELTASONE) 10 MG tablet; 40 mg for 3 days, 30 mg for 3 days, 20 mg for 3 days and 10 mg for 3 days, Disp-30 tablet, R-0Normal  -     doxycycline hyclate (VIBRA-TABS) 100 MG tablet; Take 1 tablet by mouth 2 times daily for 7 days, Disp-14 tablet, R-0Normal  2. Chronic obstructive pulmonary disease with acute exacerbation (HCC)  -Acute COPD flare likely to be secondary to acute Covid infection  -Prednisone taper  -Doxycycline for 7 days  -Continue albuterol as needed and steroid inhaler  -Patient encouraged to obtain pulse oximeter to monitor oxygen saturations  -     predniSONE (DELTASONE) 10 MG tablet; 40 mg for 3 days, 30 mg for 3 days, 20 mg for 3 days and 10 mg for 3 days, Disp-30 tablet, R-0Normal  -     doxycycline hyclate (VIBRA-TABS) 100 MG tablet; Take 1 tablet by mouth 2 times daily for 7 days, Disp-14 tablet, R-0Normal      Return if symptoms worsen or fail to improve. SUBJECTIVE/OBJECTIVE:  Symptoms started on  around 0200. Positive covid 19 on  and . Headache   This is a new problem. The current episode started yesterday. The problem occurs constantly. The pain is located in the bilateral region.  The pain quality is not similar to prior headaches. The quality of the pain is described as band-like and aching. The pain is at a severity of 6/10. The pain is moderate. Associated symptoms include back pain, coughing and a sore throat. Pertinent negatives include no abdominal pain, abnormal behavior, facial sweating, fever or sinus pressure. The symptoms are aggravated by activity. He has tried acetaminophen and NSAIDs for the symptoms. The treatment provided mild relief. Cough  This is a new problem. The current episode started yesterday. The problem has been unchanged. The problem occurs every few minutes. The cough is non-productive. Associated symptoms include chest pain, chills, headaches, a sore throat, shortness of breath, sweats and wheezing. Pertinent negatives include no fever, hemoptysis or nasal congestion. The symptoms are aggravated by lying down and cold air. He has tried a beta-agonist inhaler and steroid inhaler for the symptoms. The treatment provided mild relief. His past medical history is significant for COPD, environmental allergies and pneumonia. Shortness of Breath  This is a new problem. The current episode started in the past 7 days. The problem occurs constantly. Associated symptoms include chest pain, headaches, a sore throat and wheezing. Pertinent negatives include no abdominal pain, fever, hemoptysis or orthopnea. Risk factors include smoking (stopped smoking 5 years ago). He has tried beta agonist inhalers, leukotriene antagonists and steroid inhalers for the symptoms. The treatment provided mild relief. His past medical history is significant for allergies, COPD and pneumonia. Generalized Body Aches  This is a new problem. The current episode started yesterday. The problem occurs constantly. Associated symptoms include arthralgias, chest pain, chills, coughing, headaches and a sore throat. Pertinent negatives include no abdominal pain or fever. He has tried acetaminophen and NSAIDs for the symptoms.  The treatment provided no relief. Review of Systems   Constitutional: Positive for chills. Negative for fever. HENT: Positive for sore throat. Negative for sinus pressure. Eyes: Negative. Respiratory: Positive for cough, chest tightness, shortness of breath and wheezing. Negative for hemoptysis. Cardiovascular: Positive for chest pain. Negative for orthopnea. Gastrointestinal: Negative for abdominal pain. Endocrine: Negative. Genitourinary: Negative. Musculoskeletal: Positive for arthralgias and back pain. Allergic/Immunologic: Positive for environmental allergies. Neurological: Positive for headaches. Hematological: Negative.         Patient-Reported Vitals 10/5/2020   Patient-Reported Weight 163lb   Patient-Reported Height 5ft7in        Physical Exam    [INSTRUCTIONS:  \"[x]\" Indicates a positive item  \"[]\" Indicates a negative item  -- DELETE ALL ITEMS NOT EXAMINED]    Constitutional: [x] Appears well-developed and well-nourished [x] No apparent distress      [] Abnormal -     Mental status: [x] Alert and awake  [x] Oriented to person/place/time [x] Able to follow commands    [] Abnormal -     Eyes:   EOM    [x]  Normal    [] Abnormal -   Sclera  [x]  Normal    [] Abnormal -          Discharge [x]  None visible   [] Abnormal -     HENT: [x] Normocephalic, atraumatic  [] Abnormal -   [x] Mouth/Throat: Mucous membranes are moist    External Ears [x] Normal  [] Abnormal -    Neck: [x] No visualized mass [] Abnormal -     Pulmonary/Chest: [x] Respiratory effort normal   [x] No visualized signs of difficulty breathing or respiratory distress        [] Abnormal -      Musculoskeletal:   [x] Normal gait with no signs of ataxia         [x] Normal range of motion of neck        [] Abnormal -     Neurological:        [x] No Facial Asymmetry (Cranial nerve 7 motor function) (limited exam due to video visit)          [x] No gaze palsy        [] Abnormal -          Skin:        [x] No significant exanthematous lesions or discoloration noted on facial skin         [] Abnormal -            Psychiatric:       [x] Normal Affect [] Abnormal -        [x] No Hallucinations    Other pertinent observable physical exam findings:-          On this date 12/27/2021 I have spent 25 minutes reviewing previous notes, test results and face to face (virtual) with the patient discussing the diagnosis and importance of compliance with the treatment plan as well as documenting on the day of the visit. Sarmad Ibarra., was evaluated through a synchronous (real-time) audio-video encounter. The patient (or guardian if applicable) is aware that this is a billable service. Verbal consent to proceed has been obtained within the past 12 months. The visit was conducted pursuant to the emergency declaration under the [address] [place] [address], [address] [place] [address] authority and the [employer] General Act. Patient identification was verified, and a caregiver was present when appropriate. The patient was located in a state where the provider was credentialed to provide care. An electronic signature was used to authenticate this note.     --Marilee Braun, MICHELLE - CNP

## 2021-12-27 NOTE — TELEPHONE ENCOUNTER
Home test for covid is positive, tested yesterday. He has fever, chills, lightheaded, dizzy,body aches, trouble breathing, loss of taste and smell, no diarrhea or gi upset.  Requesting treatment

## 2021-12-28 ENCOUNTER — HOSPITAL ENCOUNTER (OUTPATIENT)
Dept: NURSING | Age: 51
Setting detail: INFUSION SERIES
Discharge: HOME | End: 2021-12-28
Payer: MEDICARE

## 2021-12-28 VITALS
RESPIRATION RATE: 18 BRPM | DIASTOLIC BLOOD PRESSURE: 74 MMHG | HEART RATE: 71 BPM | TEMPERATURE: 98.2 F | OXYGEN SATURATION: 96 % | SYSTOLIC BLOOD PRESSURE: 116 MMHG

## 2021-12-28 PROCEDURE — 6360000002 HC RX W HCPCS: Performed by: FAMILY MEDICINE

## 2021-12-28 PROCEDURE — 2580000003 HC RX 258: Performed by: FAMILY MEDICINE

## 2021-12-28 PROCEDURE — M0245 HC IV INFUSION BAMLANIVIMAB & ETESEVIMAB W/MONITORING: HCPCS

## 2021-12-28 PROCEDURE — 2500000003 HC RX 250 WO HCPCS: Performed by: FAMILY MEDICINE

## 2021-12-28 PROCEDURE — 99213 OFFICE O/P EST LOW 20 MIN: CPT

## 2021-12-28 RX ADMIN — SODIUM CHLORIDE: 9 INJECTION, SOLUTION INTRAVENOUS at 13:58

## 2021-12-28 ASSESSMENT — PAIN DESCRIPTION - PAIN TYPE: TYPE: ACUTE PAIN

## 2021-12-28 ASSESSMENT — PAIN SCALES - GENERAL: PAINLEVEL_OUTOF10: 8

## 2021-12-28 NOTE — PROGRESS NOTES
Patient here ambulatory for covid infusion. Patient in stable condition, IV placed by Ezra Bustillos RN. Patient tolerated medication well, verbalized understanding of discharge instructions and was discharged in stable condition.

## 2022-01-21 DIAGNOSIS — R07.9 CHEST PAIN, UNSPECIFIED TYPE: ICD-10-CM

## 2022-01-21 NOTE — TELEPHONE ENCOUNTER
Sunny Ngo. is requesting refill(s)   Last OV 12/27/21 (pertaining to medication)  LR 12/13/21 (per medication requested)  Next office visit scheduled or attempted Yes   If no, reason:  3/8/22

## 2022-01-23 RX ORDER — AMLODIPINE BESYLATE 2.5 MG/1
TABLET ORAL
Qty: 90 TABLET | Refills: 0 | Status: SHIPPED | OUTPATIENT
Start: 2022-01-23 | End: 2022-04-11

## 2022-01-25 DIAGNOSIS — F41.9 CHRONIC ANXIETY: ICD-10-CM

## 2022-01-25 DIAGNOSIS — R20.2 NUMBNESS AND TINGLING OF BOTH LEGS BELOW KNEES: ICD-10-CM

## 2022-01-25 DIAGNOSIS — R20.0 NUMBNESS AND TINGLING OF BOTH LEGS BELOW KNEES: ICD-10-CM

## 2022-01-25 RX ORDER — LORAZEPAM 1 MG/1
1 TABLET ORAL 3 TIMES DAILY PRN
Qty: 45 TABLET | Refills: 0 | Status: SHIPPED | OUTPATIENT
Start: 2022-01-25 | End: 2022-06-17 | Stop reason: SDUPTHER

## 2022-01-25 RX ORDER — IBUPROFEN 800 MG/1
TABLET ORAL
Qty: 120 TABLET | Refills: 0 | Status: SHIPPED | OUTPATIENT
Start: 2022-01-25 | End: 2022-03-21 | Stop reason: ALTCHOICE

## 2022-03-07 ENCOUNTER — TELEMEDICINE (OUTPATIENT)
Dept: FAMILY MEDICINE CLINIC | Age: 52
End: 2022-03-07
Payer: MEDICARE

## 2022-03-07 ENCOUNTER — OFFICE VISIT (OUTPATIENT)
Dept: PULMONOLOGY | Age: 52
End: 2022-03-07
Payer: MEDICARE

## 2022-03-07 VITALS
WEIGHT: 167.4 LBS | BODY MASS INDEX: 26.27 KG/M2 | TEMPERATURE: 97.1 F | DIASTOLIC BLOOD PRESSURE: 87 MMHG | HEART RATE: 79 BPM | RESPIRATION RATE: 18 BRPM | OXYGEN SATURATION: 97 % | SYSTOLIC BLOOD PRESSURE: 114 MMHG | HEIGHT: 67 IN

## 2022-03-07 DIAGNOSIS — J43.9 PULMONARY EMPHYSEMA, UNSPECIFIED EMPHYSEMA TYPE (HCC): Primary | ICD-10-CM

## 2022-03-07 DIAGNOSIS — F41.9 CHRONIC ANXIETY: ICD-10-CM

## 2022-03-07 DIAGNOSIS — J44.9 CHRONIC OBSTRUCTIVE PULMONARY DISEASE, UNSPECIFIED COPD TYPE (HCC): Primary | ICD-10-CM

## 2022-03-07 DIAGNOSIS — R91.8 PULMONARY NODULES: ICD-10-CM

## 2022-03-07 DIAGNOSIS — Z87.891 PERSONAL HISTORY OF TOBACCO USE: ICD-10-CM

## 2022-03-07 DIAGNOSIS — K21.00 GASTROESOPHAGEAL REFLUX DISEASE WITH ESOPHAGITIS WITHOUT HEMORRHAGE: ICD-10-CM

## 2022-03-07 DIAGNOSIS — R20.2 NUMBNESS AND TINGLING OF BOTH LEGS BELOW KNEES: ICD-10-CM

## 2022-03-07 DIAGNOSIS — R20.0 NUMBNESS AND TINGLING OF BOTH LEGS BELOW KNEES: ICD-10-CM

## 2022-03-07 DIAGNOSIS — J44.9 CHRONIC OBSTRUCTIVE PULMONARY DISEASE, UNSPECIFIED COPD TYPE (HCC): ICD-10-CM

## 2022-03-07 DIAGNOSIS — J43.8 OTHER EMPHYSEMA (HCC): ICD-10-CM

## 2022-03-07 PROCEDURE — 99214 OFFICE O/P EST MOD 30 MIN: CPT | Performed by: NURSE PRACTITIONER

## 2022-03-07 PROCEDURE — G8427 DOCREV CUR MEDS BY ELIG CLIN: HCPCS | Performed by: NURSE PRACTITIONER

## 2022-03-07 PROCEDURE — G8419 CALC BMI OUT NRM PARAM NOF/U: HCPCS | Performed by: NURSE PRACTITIONER

## 2022-03-07 PROCEDURE — 1036F TOBACCO NON-USER: CPT | Performed by: INTERNAL MEDICINE

## 2022-03-07 PROCEDURE — 1036F TOBACCO NON-USER: CPT | Performed by: NURSE PRACTITIONER

## 2022-03-07 PROCEDURE — 3017F COLORECTAL CA SCREEN DOC REV: CPT | Performed by: INTERNAL MEDICINE

## 2022-03-07 PROCEDURE — G8427 DOCREV CUR MEDS BY ELIG CLIN: HCPCS | Performed by: INTERNAL MEDICINE

## 2022-03-07 PROCEDURE — G0296 VISIT TO DETERM LDCT ELIG: HCPCS | Performed by: INTERNAL MEDICINE

## 2022-03-07 PROCEDURE — G8484 FLU IMMUNIZE NO ADMIN: HCPCS | Performed by: INTERNAL MEDICINE

## 2022-03-07 PROCEDURE — 3017F COLORECTAL CA SCREEN DOC REV: CPT | Performed by: NURSE PRACTITIONER

## 2022-03-07 PROCEDURE — 3023F SPIROM DOC REV: CPT | Performed by: NURSE PRACTITIONER

## 2022-03-07 PROCEDURE — 99214 OFFICE O/P EST MOD 30 MIN: CPT | Performed by: INTERNAL MEDICINE

## 2022-03-07 PROCEDURE — 3023F SPIROM DOC REV: CPT | Performed by: INTERNAL MEDICINE

## 2022-03-07 PROCEDURE — G8484 FLU IMMUNIZE NO ADMIN: HCPCS | Performed by: NURSE PRACTITIONER

## 2022-03-07 PROCEDURE — G8419 CALC BMI OUT NRM PARAM NOF/U: HCPCS | Performed by: INTERNAL MEDICINE

## 2022-03-07 RX ORDER — OMEPRAZOLE 20 MG/1
20 CAPSULE, DELAYED RELEASE ORAL
Qty: 90 CAPSULE | Refills: 1 | Status: SHIPPED | OUTPATIENT
Start: 2022-03-07 | End: 2022-06-13 | Stop reason: SDUPTHER

## 2022-03-07 RX ORDER — FLUTICASONE FUROATE, UMECLIDINIUM BROMIDE AND VILANTEROL TRIFENATATE 100; 62.5; 25 UG/1; UG/1; UG/1
1 POWDER RESPIRATORY (INHALATION) DAILY
Qty: 60 EACH | Refills: 2 | Status: SHIPPED | OUTPATIENT
Start: 2022-03-07 | End: 2022-03-22 | Stop reason: CLARIF

## 2022-03-07 RX ORDER — FLUTICASONE PROPIONATE 50 MCG
1 SPRAY, SUSPENSION (ML) NASAL DAILY
Qty: 16 G | Refills: 5 | Status: SHIPPED | OUTPATIENT
Start: 2022-03-07

## 2022-03-07 RX ORDER — DULOXETIN HYDROCHLORIDE 60 MG/1
60 CAPSULE, DELAYED RELEASE ORAL DAILY
Qty: 90 CAPSULE | Refills: 1 | Status: SHIPPED | OUTPATIENT
Start: 2022-03-07

## 2022-03-07 RX ORDER — GABAPENTIN 600 MG/1
600 TABLET ORAL 3 TIMES DAILY
Qty: 180 TABLET | Refills: 0 | Status: SHIPPED | OUTPATIENT
Start: 2022-03-07 | End: 2022-05-16

## 2022-03-07 RX ORDER — LORAZEPAM 1 MG/1
1 TABLET ORAL DAILY
Qty: 30 TABLET | Refills: 2 | Status: SHIPPED | OUTPATIENT
Start: 2022-03-07 | End: 2022-06-13 | Stop reason: SDUPTHER

## 2022-03-07 RX ORDER — LORAZEPAM 1 MG/1
1 TABLET ORAL 3 TIMES DAILY PRN
Qty: 45 TABLET | Refills: 0 | OUTPATIENT
Start: 2022-03-07 | End: 2022-04-06

## 2022-03-07 RX ORDER — GUAIFENESIN 600 MG/1
600 TABLET, EXTENDED RELEASE ORAL 2 TIMES DAILY
Qty: 60 TABLET | Refills: 5 | Status: SHIPPED | OUTPATIENT
Start: 2022-03-07

## 2022-03-07 ASSESSMENT — PATIENT HEALTH QUESTIONNAIRE - PHQ9
SUM OF ALL RESPONSES TO PHQ QUESTIONS 1-9: 2
SUM OF ALL RESPONSES TO PHQ QUESTIONS 1-9: 2
2. FEELING DOWN, DEPRESSED OR HOPELESS: 1
SUM OF ALL RESPONSES TO PHQ QUESTIONS 1-9: 2
1. LITTLE INTEREST OR PLEASURE IN DOING THINGS: 1
SUM OF ALL RESPONSES TO PHQ9 QUESTIONS 1 & 2: 2
SUM OF ALL RESPONSES TO PHQ QUESTIONS 1-9: 2

## 2022-03-07 ASSESSMENT — ENCOUNTER SYMPTOMS
RHINORRHEA: 0
VOMITING: 0
TROUBLE SWALLOWING: 0
NAUSEA: 0
BACK PAIN: 0
SINUS PAIN: 0
COUGH: 1
SHORTNESS OF BREATH: 1
SORE THROAT: 0
WHEEZING: 0
CONSTIPATION: 0
SINUS PRESSURE: 0
CHEST TIGHTNESS: 1
DIARRHEA: 0
FACIAL SWELLING: 0

## 2022-03-07 NOTE — PROGRESS NOTES
Low Dose CT (LDCT) Lung Screening criteria met:     Age 55-77(Medicare) or 50-80 ([facility])   Pack year smoking >30 (Medicare) or >20 ([facility])   Still smoking or less than 15 year since quit   No sign or symptoms of lung cancer   > 11 months since last LDCT     Risks and benefits of lung cancer screening with LDCT scans discussed:    Significance of positive screen - False-positive LDCT results often occur. 95% of all positive results do not lead to a diagnosis of cancer. Usually further imaging can resolve most false-positive results; however, some patients may require invasive procedures. Over diagnosis risk - 10% to 12% of screen-detected lung cancer cases are over diagnosed--that is, the cancer would not have been detected in the patient's lifetime without the screening. Need for follow up screens annually to continue lung cancer screening effectiveness     Risks associated with radiation from annual LDCT- Radiation exposure is about the same as for a mammogram, which is about 1/3 of the annual background radiation exposure from everyday life. Starting screening at age 54 is not likely to increase cancer risk from radiation exposure. Patients with comorbidities resulting in life expectancy of < 10 years, or that would preclude treatment of an abnormality identified on CT, should not be screened due to lack of benefit.     To obtain maximal benefit from this screening, smoking cessation and long-term abstinence from smoking is critical

## 2022-03-07 NOTE — PATIENT INSTRUCTIONS
Remember to bring all pulmonary medications to your next appointment with the office. Please keep all of your future appointments scheduled by [address], [facility] office. Out of respect for other patients and providers, you may be asked to reschedule your appointment if you arrive later than your scheduled appointment time. Appointments cancelled less than 24hrs in advance will be considered a no show. Patients with three missed appointments within 1 year or four missed appointments within 2 years can be dismissed from the practice. What is lung cancer screening? Lung cancer screening is a way in which doctors check the lungs for early signs of cancer in people who have no symptoms of lung cancer. A low-dose CT scan uses much less radiation than a normal CT scan and shows a more detailed image of the lungs than a standard X-ray. The goal of lung cancer screening is to find cancer early, before it has a chance to grow, spread, or cause problems. One large study found that smokers who were screened with low-dose CT scans were less likely to die of lung cancer than those who were screened with standard X-ray. Below is a summary of the things you need to know regarding screening for lung cancer with low-dose computed tomography (LDCT). This is a screening program that involves routine annual screening with LDCT studies of the lung. The LDCTs are done using low-dose radiation that is not thought to increase your cancer risk. If you have other serious medical conditions (other cancers, congestive heart failure) that limit your life expectancy to less than 10 years, you should not undergo lung cancer screening with LDCT. The chance is 20%-60% that the LDCT result will show abnormalities. This would require additional testing which could include repeat imaging or even invasive procedures.   Most (about 95%) of \"abnormal\" LDCT results are false in the sense that no lung cancer is ultimately found. Additionally, some (about 10%) of the cancers found would not affect your life expectancy, even if undetected and untreated. If you are still smoking, the single most important thing that you can do to reduce your risk of dying of lung cancer is to quit. For this screening to be covered by Medicare and most other insurers, strict criteria must be met. If you do not meet these criteria, but still wish to undergo LDCT testing, you will be required to sign a waiver indicating your willingness to pay for the scan.

## 2022-03-07 NOTE — PROGRESS NOTES
Long Jeffery Sr. (:  1970) is a Established patient, here for evaluation of the following:    Assessment & Plan   Below is the assessment and plan developed based on review of pertinent history, physical exam, labs, studies, and medications. 1. Chronic obstructive pulmonary disease, unspecified COPD type ([facility] [address]  Encouraged to start Trelegy as recommended by Pulmonary  Stop Symbicort// Spiriva  Encouraged smoking cessation  Encouraged COVID vaccination  2. Other emphysema ([facility] [address]  See above  3. Gastroesophageal reflux disease with esophagitis without hemorrhage  Improving today  Decrease Omeprazole to 20 mg. Encouraged to avoid heartburn inducing foods  -     omeprazole (PRILOSEC) 20 MG delayed release capsule; Take 1 capsule by mouth every morning (before breakfast), Disp-90 capsule, R-1Normal  4. Chronic anxiety  Suboptimal control  Increase Cymbalta to 60. Continue Seroquel. Refill Lorazepam today  Encouraged support from family/ friends  Will do UDS/ Medication Contract next OV  PDMP checked today. -     DULoxetine (CYMBALTA) 60 MG extended release capsule; Take 1 capsule by mouth daily, Disp-90 capsule, R-1Normal  -     LORazepam (ATIVAN) 1 MG tablet; Take 1 tablet by mouth daily for 30 days. , Disp-30 tablet, R-2Normal    Return in about 3 months (around 2022). Subjective   HPI   Presenting today for follow up on chronic disease. No new questions/ concerns. CP/ HTN: Improving since last OV. Continues to have chronic SOB/ cough which is slightly worse from prior. Following with Pulm for COPD. Tolerating Amlodipine/ Metoprolol without issue. COPD: Visit today with Dr. Raman Pro- started on Trelegy. Stopped Symbicort/ Spiriva. No hemoptysis, fever. GERD: Slight improvement since last OVNo nocturnal choking or recent flare in heartburn;no wt loss or progressive sx of odynophagia or dysphagia   Anxiety: Taking Seroquel more often, tolerating Cymbalta well.   Does not feel Cymbalta is helping anxiety but he has reduced his Lorazepam to daily. Life stress from ex-wife has not resolved. Continues to have daily anxiety and worry. Good support system at home. No SI/ HI. Review of Systems   HENT: Negative for congestion, dental problem, drooling, ear discharge, ear pain, facial swelling, hearing loss, mouth sores, nosebleeds, postnasal drip, rhinorrhea, sinus pressure, sinus pain, sneezing, sore throat and trouble swallowing. Respiratory: Positive for cough, chest tightness and shortness of breath. Negative for wheezing. Cardiovascular: Negative for palpitations and leg swelling. Gastrointestinal: Negative for constipation, diarrhea, nausea and vomiting. Genitourinary: Negative. Musculoskeletal: Negative for arthralgias, back pain, gait problem, joint swelling, myalgias, neck pain and neck stiffness. Skin: Negative. Neurological: Negative for dizziness, seizures, weakness, light-headedness, numbness and headaches. Psychiatric/Behavioral: Positive for sleep disturbance. Negative for confusion, decreased concentration, dysphoric mood and suicidal ideas. The patient is nervous/anxious.            Objective   Patient-Reported Vitals  No data recorded     Physical Exam  [INSTRUCTIONS:  \"[x]\" Indicates a positive item  \"[]\" Indicates a negative item  -- DELETE ALL ITEMS NOT EXAMINED]    Constitutional: [x] Appears well-developed and well-nourished [x] No apparent distress      [] Abnormal -     Mental status: [x] Alert and awake  [x] Oriented to person/place/time [x] Able to follow commands    [] Abnormal -     Eyes:   EOM    [x]  Normal    [] Abnormal -   Sclera  [x]  Normal    [] Abnormal -          Discharge [x]  None visible   [] Abnormal -     HENT: [x] Normocephalic, atraumatic  [] Abnormal -   [x] Mouth/Throat: Mucous membranes are moist    External Ears [x] Normal  [] Abnormal -    Neck: [x] No visualized mass [] Abnormal -     Pulmonary/Chest: [x] Respiratory effort normal   [x] No visualized signs of difficulty breathing or respiratory distress        [] Abnormal -      Musculoskeletal:   [x] Normal gait with no signs of ataxia         [x] Normal range of motion of neck        [] Abnormal -     Neurological:        [x] No Facial Asymmetry (Cranial nerve 7 motor function) (limited exam due to video visit)          [x] No gaze palsy        [] Abnormal -          Skin:        [x] No significant exanthematous lesions or discoloration noted on facial skin         [] Abnormal -            Psychiatric:       [x] Normal Affect [] Abnormal -        [x] No Hallucinations    Other pertinent observable physical exam findings:-             On this date 3/7/2022 I have spent 20 minutes reviewing previous notes, test results and face to face (virtual) with the patient discussing the diagnosis and importance of compliance with the treatment plan as well as documenting on the day of the visit. Lian Araiza, was evaluated through a synchronous (real-time) audio-video encounter. The patient (or guardian if applicable) is aware that this is a billable service, which includes applicable co-pays. This Virtual Visit was conducted with patient's (and/or legal guardian's) consent. The visit was conducted pursuant to the emergency declaration under the [address] [place] [address], [address] [place] [address] [facility] authority and the [employer] General Act. Patient identification was verified, and a caregiver was present when appropriate. The patient was located at home in a state where the provider was licensed to provide care.        --MICHELLE Albright - CNP

## 2022-03-07 NOTE — PROGRESS NOTES
[facility]                                                               CHIEF COMPLAINT: follow up COPD        HPI:   Feels breathing is somewhat worse   Some cough with clear sticky sputum   No hemoptysis   Uses Albuterol 2 times/day   Uses Spiriva daily   Uses Symbicort once a day only   No smoking       Past Medical History:   Diagnosis Date    Arthritis     Chest pain     [facility] 2012 sent for records;    Colitis     [facility]) [facility] [address]     COVID 2021    Histoplasmosis     History of blood transfusion     Hx of blood clots     Metabolic syndrome X     Other emphysema ([facility] [address] 2018    Pneumonia     Pulmonary nodule        Past Surgical History:        Procedure Laterality Date    BRONCHOSCOPY  2013    COLONOSCOPY  2018    COLONOSCOPY N/A 2018    COLORECTAL CANCER SCREENING, NOT HIGH RISK performed by Mckenna Saldaña DO at [facility] CATH  [facility] ARTHROSCOPY Right     KNEE ARTHROSCOPY Right 2019    RIGHT KNEE VIDEO ARTHROSCOPY, MEDIAL MENISCECTOMY performed by Dante Adame MD at [facility] 39      neck    VASECTOMY         Allergies:  is allergic to codeine, mobic [meloxicam], and tramadol. Social History:    TOBACCO:   reports that he quit smoking about 4 years ago. His smoking use included cigarettes. He has a 40.00 pack-year smoking history. He has never used smokeless tobacco.  ETOH:   reports current alcohol use of about 1.0 standard drink of alcohol per week.       Family History:       Problem Relation Age of Onset    Cancer Mother         ovarian    Heart Failure Mother     Heart Attack Mother 62    Heart Disease Mother     Other Father          1948;ashd;per neurop;    Other Brother         pt knows very little    Cancer Paternal Uncle leukemia    Cancer Paternal Grandfather         leukemia    Heart Attack Maternal Uncle 60        x4     No Known Problems Son     No Known Problems Son     Asthma Neg Hx     Diabetes Neg Hx     Emphysema Neg Hx     Hypertension Neg Hx        Current Medications:    Current Outpatient Medications:     LORazepam (ATIVAN) 1 MG tablet, Take 1 tablet by mouth 3 times daily as needed for Anxiety for up to 30 days. , Disp: 45 tablet, Rfl: 0    ibuprofen (ADVIL;MOTRIN) 800 MG tablet, TAKE 1 TABLET BY MOUTH EVERY 6 HOURS AS NEEDED FOR PAIN, Disp: 120 tablet, Rfl: 0    amLODIPine (NORVASC) 2.5 MG tablet, TAKE ONE TABLET BY MOUTH EVERY DAY FOR CHEST PAIN, Disp: 90 tablet, Rfl: 0    UNABLE TO FIND, Sig: one infusion  700 mg Bamlanivimab 1.4 g  Etesevimab, Disp: 1 Dose, Rfl: 0    omeprazole (PRILOSEC) 40 MG delayed release capsule, TAKE ONE CAPSULE EVERY MORNING BEFORE BREAKFAST (CHEST PAIN), Disp: 30 capsule, Rfl: 5    QUEtiapine (SEROQUEL) 25 MG tablet, Take 0.5 tablets by mouth daily Anxiety/ depression, Disp: 15 tablet, Rfl: 3    metoprolol succinate (TOPROL XL) 25 MG extended release tablet, Take 1 tablet by mouth daily, Disp: 30 tablet, Rfl: 3    DULoxetine (CYMBALTA) 30 MG extended release capsule, Take 1 capsule by mouth daily, Disp: 30 capsule, Rfl: 3    gabapentin (NEURONTIN) 600 MG tablet, Take 1 tablet by mouth 3 times daily for 180 days. , Disp: 180 tablet, Rfl: 0    montelukast (SINGULAIR) 10 MG tablet, Take 1 tablet by mouth nightly, Disp: 30 tablet, Rfl: 3    tiotropium (SPIRIVA RESPIMAT) 2.5 MCG/ACT AERS inhaler, INHALE TWO (2) PUFFS INTO THE LUNGS DAILY, Disp: 4 g, Rfl: 5    budesonide-formoterol (SYMBICORT) 160-4.5 MCG/ACT AERO, Inhale 2 puffs into the lungs 2 times daily, Disp: 10.2 g, Rfl: 5    albuterol sulfate HFA (PROAIR HFA) 108 (90 Base) MCG/ACT inhaler, Inhale 2 puffs into the lungs every 6 hours as needed for Wheezing or Shortness of Breath, Disp: 18 g, Rfl: 5    fluticasone (FLONASE) 50 MCG/ACT nasal spray, 1 spray by Each Nostril route daily, Disp: 16 g, Rfl: 5    dicyclomine (BENTYL) 10 MG capsule, Take 1 capsule by mouth 4 times daily as needed (abdominal cramps.), Disp: 90 capsule, Rfl: 1    predniSONE (DELTASONE) 10 MG tablet, 40 mg for 3 days, 30 mg for 3 days, 20 mg for 3 days and 10 mg for 3 days (Patient not taking: Reported on 3/7/2022), Disp: 30 tablet, Rfl: 0      Objective:   PHYSICAL EXAM:    /87   Pulse 79   Temp 97.1 °F (36.2 °C)   Resp 18   Ht 5' 7\" (1.702 m)   Wt 167 lb 6.4 oz (75.9 kg)   SpO2 97% Comment: RA  BMI 26.22 kg/m²   Gen: No distress. Eyes: PERRL. No sclera icterus. No conjunctival injection. ENT: No discharge. Pharynx clear. Neck: Trachea midline. No obvious mass. Resp: No accessory muscle use. No crackles. No wheezes. No rhonchi. No dullness on percussion. Good air entry. CV: Regular rate. Regular rhythm. No murmur or rub. No edema. GI: Non-tender. Non-distended. No hernia. Skin: Warm and dry. No nodule on exposed extremities. Lymph: No cervical LAD. No supraclavicular LAD. M/S: No cyanosis. No joint deformity. No clubbing. Neuro: Awake. Alert. Moves all four extremities. Psych: Oriented x 3. No anxiety. DATA reviewed by me:   PFTs 08/05/2019 FVC 4.02(87%) FEV1 2.93(80%) FEV1/FVC 73% TLC 5.54(83%) DLCO 26.83(84%) 6MW 1000 LO2 94%   PFTs 04/08/2019 FVC 3.61(78%) FEV1 1.75(48%) FEV1/FVC 49% TLC 5.01(75%) DLCO 24.42(77%) 6MW 1000 LO2 94%   PFTs 11/21/2018 FVC 3.63(78%) FEV1 2.91(80%) FEV1/FVC 80% TLC 5.28(79%) DLCO 23.76(75%)    CT chest 8/31/2020  Stable pulmonary nodules to exam dated 5/13/2013  Calcified pulmonary nodules, calcified hilar lymph nodes and and calcifications within the spleen    CT chest 10/12/2021  images reviewed by me and showed:   No evidence of pulmonary embolism or acute pulmonary abnormality. Mild bibasilar atelectasis. Emphysema.   Sequela of old granulomatous disease. 6 minute walk today  96% RA rest  98% 1 min exertion on RA  98% 2 min exertion on RA  98% 3 min exertion on RA  98% 4 min exertion on RA   99% 5 min exertion on RA  98% 6 min exertion on RA      Echo 11/30/2018   Limited echo for bubble study to r/o PFO/ASD. Normal left ventricle systolic function with an estimated ejection fraction of 55%. A bubble study was performed and fails to show evidence of shunting.     PSG 11/20/2018 AHI 2.6 and Lowest O2 sat 90% no arrythmias       Assessment:       · COPD/pulmonary emphysema  · Dyspnea. Doubt of pulmonary origin. Unremarkable CTPA. Negative Echo with bubble study. Unremarkable LHC. · Snoring- negative PSG   · Intermittent hypoxia, tachycardia and bradycardia- PSG and 6 minute walk negative for hypoxemia. Improved  · Mild restrictive defect -CT does not suggest ILD. Normalized on repeat   · Bilateral pulmonary nodules with mediastinal and hilar adenopathy 5/2013. Decreased in size in RLL PNs and 4R LN with enlargement of GOPAL PN on repeated CT chest 8/16/23013. Stable on CT 05/27/2014. Positive histoplasma M bands and Histoplasma Ab yeast CF (1-64). Itraconazole 8/3/8672-4/9/0680  · Hypermetabolic right hilar lymph nodes. 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 Improved on repeated CT. · Arthritis. 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    · 60 pack year smoking - quit 1/2018   · Covid 19 12/26/2021 received infusion       Plan:       · D/C Symbicort 160/4.5 2 puffs BID and Spiriva   · Continue Albuterol 2 puffs Q4-6 hrs PRN  · Trial of Trelegy Ellipta Oral inhalation: Dry powder inhaler: One inhalation (fluticasone furoate 100 mcg/umeclidinium 62.5 mcg/vilanterol 25 mcg) once daily (maximum dose: 1 inhalation/day)  · Acapella and Mucinex PRN   · Patient is up to date with Pneumococcal vaccine  · Advised to get influenza vaccine this year   · Advised to get his Covid vaccine   · CT chest, low dose protocol, screening for lung cancer October 2022 . Risks, benefits and alternatives including doing nothing were discussed with patient.   · Advised to continue with smoking cessation  · Follow up in 6 months

## 2022-03-15 ENCOUNTER — TELEPHONE (OUTPATIENT)
Dept: PULMONOLOGY | Age: 52
End: 2022-03-15

## 2022-03-15 DIAGNOSIS — J43.8 OTHER EMPHYSEMA (HCC): ICD-10-CM

## 2022-03-15 DIAGNOSIS — J44.9 CHRONIC OBSTRUCTIVE PULMONARY DISEASE, UNSPECIFIED COPD TYPE (HCC): ICD-10-CM

## 2022-03-17 NOTE — TELEPHONE ENCOUNTER
Trelegy 100 PA denied needs to try and fail 2 of both groups Advair HFA and diskus, Dulera, Symbicort    2 or this group Spiriva and Incruse Ellipta. 3/7/22 Dr Felicity Webster    Assessment:       · COPD/pulmonary emphysema  · Dyspnea. Doubt of pulmonary origin. Unremarkable CTPA. Negative Echo with bubble study. Unremarkable LHC. · Snoring- negative PSG   · Intermittent hypoxia, tachycardia and bradycardia- PSG and 6 minute walk negative for hypoxemia. Improved  · Mild restrictive defect -CT does not suggest ILD. Normalized on repeat   · Bilateral pulmonary nodules with mediastinal and hilar adenopathy 5/2013. Decreased in size in RLL PNs and 4R LN with enlargement of GOPAL PN on repeated CT chest 8/16/23013. Stable on CT 05/27/2014. Positive histoplasma M bands and Histoplasma Ab yeast CF (1-64). Itraconazole 9/9/4476-7/1/1773  · Hypermetabolic right hilar lymph nodes. 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 Improved on repeated CT. · Arthritis. 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 · 60 pack year smoking - quit 1/2018   · Covid 19 12/26/2021 received infusion         Plan:       · D/C Symbicort 160/4.5 2 puffs BID and Spiriva   · Continue Albuterol 2 puffs Q4-6 hrs PRN  · Trial of Trelegy Ellipta Oral inhalation: Dry powder inhaler: One inhalation (fluticasone furoate 100 mcg/umeclidinium 62.5 mcg/vilanterol 25 mcg) once daily (maximum dose: 1 inhalation/day)  · Acapella and Mucinex PRN   · Patient is up to date with Pneumococcal vaccine  · Advised to get influenza vaccine this year   · Advised to get his Covid vaccine   · CT chest, low dose protocol, screening for lung cancer October 2022 . Risks, benefits and alternatives including doing nothing were discussed with patient.   · Advised to continue with smoking cessation  · Follow up in 6 months

## 2022-03-18 ENCOUNTER — TELEPHONE (OUTPATIENT)
Dept: FAMILY MEDICINE CLINIC | Age: 52
End: 2022-03-18

## 2022-03-18 NOTE — TELEPHONE ENCOUNTER
Patient is asking if there is something else he can do or take for the leg pain, weakness, cramping bad. Not getting any better.

## 2022-03-21 ENCOUNTER — OFFICE VISIT (OUTPATIENT)
Dept: FAMILY MEDICINE CLINIC | Age: 52
End: 2022-03-21
Payer: MEDICARE

## 2022-03-21 VITALS
OXYGEN SATURATION: 95 % | SYSTOLIC BLOOD PRESSURE: 118 MMHG | HEART RATE: 72 BPM | TEMPERATURE: 97.9 F | WEIGHT: 165 LBS | BODY MASS INDEX: 25.84 KG/M2 | DIASTOLIC BLOOD PRESSURE: 72 MMHG

## 2022-03-21 DIAGNOSIS — G89.29 CHRONIC BILATERAL LOW BACK PAIN WITH BILATERAL SCIATICA: Primary | ICD-10-CM

## 2022-03-21 DIAGNOSIS — M54.41 CHRONIC BILATERAL LOW BACK PAIN WITH BILATERAL SCIATICA: Primary | ICD-10-CM

## 2022-03-21 DIAGNOSIS — M54.42 CHRONIC BILATERAL LOW BACK PAIN WITH BILATERAL SCIATICA: Primary | ICD-10-CM

## 2022-03-21 PROCEDURE — 99213 OFFICE O/P EST LOW 20 MIN: CPT | Performed by: NURSE PRACTITIONER

## 2022-03-21 PROCEDURE — G8427 DOCREV CUR MEDS BY ELIG CLIN: HCPCS | Performed by: NURSE PRACTITIONER

## 2022-03-21 PROCEDURE — 3017F COLORECTAL CA SCREEN DOC REV: CPT | Performed by: NURSE PRACTITIONER

## 2022-03-21 PROCEDURE — G8419 CALC BMI OUT NRM PARAM NOF/U: HCPCS | Performed by: NURSE PRACTITIONER

## 2022-03-21 PROCEDURE — G8484 FLU IMMUNIZE NO ADMIN: HCPCS | Performed by: NURSE PRACTITIONER

## 2022-03-21 PROCEDURE — 1036F TOBACCO NON-USER: CPT | Performed by: NURSE PRACTITIONER

## 2022-03-21 RX ORDER — METHYLPREDNISOLONE 4 MG/1
TABLET ORAL
Qty: 1 KIT | Refills: 0 | Status: SHIPPED | OUTPATIENT
Start: 2022-03-21 | End: 2022-03-27

## 2022-03-21 RX ORDER — MELOXICAM 7.5 MG/1
7.5 TABLET ORAL DAILY
Qty: 90 TABLET | Refills: 1 | Status: SHIPPED | OUTPATIENT
Start: 2022-03-21 | End: 2022-04-12 | Stop reason: ALTCHOICE

## 2022-03-21 RX ORDER — MELOXICAM 15 MG/1
15 TABLET ORAL DAILY
COMMUNITY
End: 2022-03-21 | Stop reason: ALTCHOICE

## 2022-03-21 RX ORDER — TIZANIDINE 2 MG/1
2 TABLET ORAL EVERY 8 HOURS PRN
Qty: 90 TABLET | Refills: 1 | Status: SHIPPED | OUTPATIENT
Start: 2022-03-21 | End: 2022-04-11 | Stop reason: SDUPTHER

## 2022-03-21 NOTE — PROGRESS NOTES
3/21/2022    Chief Complaint   Patient presents with    Leg Pain     last 2 wks they have been getting more painful, started taking meloxicam about 3 days ago     Extremity Weakness       Richard Brooks Sr. is a 49 y.o. male, presents today for:      ASSESSMENT/PLAN:    1. Chronic bilateral low back pain with bilateral sciatica  Will start workup today due to recurring symptoms. Xray Hip/ Lumbar Spine  Start Medrol Dose Afshan Tizandine  Restart PT  If no improvement will consider referral to Spinal Surgeon/ MRI. - XR LUMBAR SPINE (2-3 VIEWS); Future  - methylPREDNISolone (MEDROL, AFSHAN,) 4 MG tablet; Take by mouth. Dispense: 1 kit; Refill: 0  - tiZANidine (ZANAFLEX) 2 MG tablet; Take 1 tablet by mouth every 8 hours as needed (muscle spasm)  Dispense: 90 tablet; Refill: 1  - meloxicam (MOBIC) 7.5 MG tablet; Take 1 tablet by mouth daily  Dispense: 90 tablet; Refill: 1  - XR HIP BILATERAL W AP PELVIS (2 VIEWS); Future  - [address] [employer] [facility]      Return for 4-6 weeks back pain. Back Pain  This is a chronic problem. The current episode started more than 1 year ago. The problem has been waxing and waning since onset. The pain is present in the lumbar spine. The quality of the pain is described as shooting, burning and aching. The pain radiates to the left foot and right foot. The pain is at a severity of 6/10 (intermittent). The pain is moderate. The pain is the same all the time. The symptoms are aggravated by position. Stiffness is present all day. Associated symptoms include leg pain, numbness, tingling and weakness. Pertinent negatives include no abdominal pain, bladder incontinence, bowel incontinence, chest pain, dysuria, fever, headaches, paresis, paresthesias, pelvic pain, perianal numbness or weight loss. He has tried ice, NSAIDs, muscle relaxant, walking, heat, chiropractic manipulation and home exercises for the symptoms. The treatment provided mild relief.    Hx . Multiple back injuries in the past when participating in [employer]. No recent PT. Last imaging done 2017 showing mild degenerative changes without significant disc bulge or herniation. Hx chronic tobacco use. Lab Results   Component Value Date     10/12/2021    K 3.7 10/12/2021     10/12/2021    CO2 27 10/12/2021    BUN 7 10/12/2021    CREATININE 1.1 10/12/2021    GLUCOSE 84 10/12/2021    CALCIUM 9.2 10/12/2021    PROT 6.6 07/12/2021    LABALBU 4.5 07/12/2021    BILITOT 0.8 07/12/2021    ALKPHOS 67 07/12/2021    AST 14 (L) 07/12/2021    ALT 11 07/12/2021    LABGLOM >60 10/12/2021    GFRAA >60 10/12/2021    AGRATIO 2.1 07/12/2021    GLOB 2.1 07/12/2021         Review of Systems   Constitutional: Negative for fever and weight loss. Cardiovascular: Negative for chest pain. Gastrointestinal: Negative for abdominal pain and bowel incontinence. Genitourinary: Negative for bladder incontinence, dysuria and pelvic pain. Musculoskeletal: Positive for back pain. Neurological: Positive for tingling, weakness and numbness. Negative for headaches and paresthesias. Current Outpatient Medications on File Prior to Visit   Medication Sig Dispense Refill    fluticasone-umeclidin-vilant (TRELEGY ELLIPTA) 100-62.5-25 MCG/INH AEPB Inhale 1 puff into the lungs daily 60 each 2    guaiFENesin (MUCINEX) 600 MG extended release tablet Take 1 tablet by mouth 2 times daily 60 tablet 5    fluticasone (FLONASE) 50 MCG/ACT nasal spray 1 spray by Each Nostril route daily 16 g 5    gabapentin (NEURONTIN) 600 MG tablet Take 1 tablet by mouth 3 times daily for 180 days. 180 tablet 0    DULoxetine (CYMBALTA) 60 MG extended release capsule Take 1 capsule by mouth daily 90 capsule 1    LORazepam (ATIVAN) 1 MG tablet Take 1 tablet by mouth daily for 30 days.  30 tablet 2    omeprazole (PRILOSEC) 20 MG delayed release capsule Take 1 capsule by mouth every morning (before breakfast) 90 capsule 1  amLODIPine (NORVASC) 2.5 MG tablet TAKE ONE TABLET BY MOUTH EVERY DAY FOR CHEST PAIN 90 tablet 0    UNABLE TO FIND Sig: one infusion   700 mg Bamlanivimab  1.4 g  Etesevimab 1 Dose 0    QUEtiapine (SEROQUEL) 25 MG tablet Take 0.5 tablets by mouth daily Anxiety/ depression 15 tablet 3    metoprolol succinate (TOPROL XL) 25 MG extended release tablet Take 1 tablet by mouth daily 30 tablet 3    montelukast (SINGULAIR) 10 MG tablet Take 1 tablet by mouth nightly 30 tablet 3    albuterol sulfate HFA (PROAIR HFA) 108 (90 Base) MCG/ACT inhaler Inhale 2 puffs into the lungs every 6 hours as needed for Wheezing or Shortness of Breath 18 g 5    dicyclomine (BENTYL) 10 MG capsule Take 1 capsule by mouth 4 times daily as needed (abdominal cramps.) 90 capsule 1    LORazepam (ATIVAN) 1 MG tablet Take 1 tablet by mouth 3 times daily as needed for Anxiety for up to 30 days. 45 tablet 0     No current facility-administered medications on file prior to visit. Allergies   Allergen Reactions    Codeine Itching    Mobic [Meloxicam]      Irritates stomach    Tramadol      Patient states he doesn't feel right on tramadol.      Past Medical History:   Diagnosis Date    Arthritis     Chest pain     [facility] 9/2012 sent for records;    Colitis     [facility]) [facility] [address]     COVID 12/25/2021    Histoplasmosis     History of blood transfusion     Hx of blood clots     Metabolic syndrome X 18/49/0606    Other emphysema ([facility] [address] 8/19/2018    Pneumonia     Pulmonary nodule      Past Surgical History:   Procedure Laterality Date    BRONCHOSCOPY  6/05/2013    COLONOSCOPY  12/17/2018    COLONOSCOPY N/A 12/17/2018    COLORECTAL CANCER SCREENING, NOT HIGH RISK performed [facility] Shade Harley DO [facility] CATH LAB PROCEDURE  1998    KNEE ARTHROSCOPY Right     KNEE ARTHROSCOPY Right 12/13/2019    RIGHT KNEE VIDEO ARTHROSCOPY, MEDIAL MENISCECTOMY performed by Antonio Romero MD at [facility] 39      neck    VASECTOMY       Social History     Tobacco Use    Smoking status: Former Smoker     Packs/day: 2.00     Years: 20.00     Pack years: 40.00     Types: Cigarettes     Quit date: 2018     Years since quittin.2    Smokeless tobacco: Never Used   Substance Use Topics    Alcohol use: Yes     Alcohol/week: 1.0 standard drink     Types: 1 Cans of beer per week     Comment: rarely     Family History   Problem Relation Age of Onset    Cancer Mother         ovarian    Heart Failure Mother     Heart Attack Mother 62    Heart Disease Mother     Other Father          1948;ashd;per neurop;    Other Brother         pt knows very little    Cancer Paternal Uncle         leukemia    Cancer Paternal Grandfather         leukemia    Heart Attack Maternal Uncle 60        x4     No Known Problems Son     No Known Problems Son     Asthma Neg Hx     Diabetes Neg Hx     Emphysema Neg Hx     Hypertension Neg Hx        Vitals:    22 1317   BP: 118/72   Pulse: 72   Temp: 97.9 °F (36.6 °C)   TempSrc: Infrared   SpO2: 95%   Weight: 165 lb (74.8 kg)     Estimated body mass index is 25.84 kg/m² as calculated from the following:    Height as of 3/7/22: 5' 7\" (1.702 m). Weight as of this encounter: 165 lb (74.8 kg). MRI Lumbar spine 2017  FINDINGS:   BONES/ALIGNMENT: There is normal alignment of the spine. The vertebral body   heights are maintained.  The bone marrow signal appears unremarkable.       SPINAL CORD: The conus terminates normally.       SOFT TISSUES: No paraspinal mass identified.       L1-L2: There is no significant disc herniation, spinal canal stenosis or   neural foraminal narrowing.       L2-L3: There is no significant disc herniation, spinal canal stenosis or   neural foraminal narrowing.       L3-L4: Mild bilateral facet hypertrophy.  No significant disc bulge or   herniation.  Minimal bilateral L3 neural foraminal narrowing.  No significant   spinal canal stenosis.       L4-L5: Bilateral facet hypertrophy.  Mild diffuse disc bulging.  Mild   bilateral L4 foraminal narrowing.  Borderline subarticular impingement   bilaterally.  No significant spinal canal stenosis.       L5-S1: There is no significant disc herniation, spinal canal stenosis or   neural foraminal narrowing.           Impression   Very mild degenerative change without spinal canal stenosis or evidence of   neural impingement.  No evidence of acute fracture. VL Lower Extremity Arteries Bilateral 7/10/2017  Right: Resting Doppler signals are recorded as multiphasic at all levels examined in the right    lower extremity.  The Ankle-brachial index  (SHANNON) of 1.25  is with in normal limits.  There is    a toe-brachial index (TBI) of greater than or equal to 0.6 at rest. PPG waveform contours are    normal.   Left: Resting Doppler signals are recorded as multiphasic at all levels examined in the left    lower extremity.  The Ankle-brachial index  (SHANNON) of 1.34  is with in normal limits.  There is    a toe-brachial index (TBI) of greater than or equal to 0.6 at rest. PPG waveform contours are    normal.   Conclusions: Normal resting lower extremity arterial exam.   Procedure: Non-invasive lower extremity arterial examination is performed with the patient in a    supine position.  Continuous-wave (CW) Doppler is used for waveform acquisition routinely at    the common femoral, superficial femoral, popliteal, posterior tibial and dorsalis pedis levels    with selection of either a 4MHz or 8MHz probe.  Appropriately sized pneumatic cuffs for the    limb segments are applied and pressures obtained at the brachial and ankle levels.     Photoplethysmography (PPG) is routinely used to obtain digit waveforms and pressure recordings. Physical Exam  Constitutional:       General: He is not in acute distress. Appearance: Normal appearance.  He is not ill-appearing. HENT:      Head: Normocephalic. Eyes:      Pupils: Pupils are equal, round, and reactive to light. Cardiovascular:      Rate and Rhythm: Normal rate and regular rhythm. Pulses: Normal pulses. Heart sounds: Normal heart sounds. No murmur heard. Pulmonary:      Effort: Pulmonary effort is normal. No respiratory distress. Breath sounds: Normal breath sounds. No wheezing. Abdominal:      General: Abdomen is flat. Bowel sounds are normal.      Palpations: Abdomen is soft. Tenderness: There is no abdominal tenderness. Hernia: No hernia is present. Musculoskeletal:         General: No swelling. Cervical back: Normal range of motion and neck supple. No tenderness. Thoracic back: Normal.      Lumbar back: Spasms and tenderness present. No swelling, edema, deformity, signs of trauma, lacerations or bony tenderness. Decreased range of motion (right/ left lateral ). Positive right straight leg raise test and positive left straight leg raise test. No scoliosis. Right hip: No tenderness, bony tenderness or crepitus. Decreased range of motion. Normal strength. Left hip: No tenderness, bony tenderness or crepitus. Decreased range of motion. Normal strength. Right lower leg: No edema. Left lower leg: No edema. Lymphadenopathy:      Cervical: No cervical adenopathy. Skin:     General: Skin is warm and dry. Capillary Refill: Capillary refill takes less than 2 seconds. Neurological:      General: No focal deficit present. Mental Status: He is alert and oriented to person, place, and time. Mental status is at baseline. Cranial Nerves: No cranial nerve deficit. Sensory: Sensation is intact. Motor: Motor function is intact. Coordination: Coordination is intact. Gait: Gait is intact. Deep Tendon Reflexes:      Reflex Scores:       Patellar reflexes are 2+ on the right side and 2+ on the left side.   Psychiatric: Mood and Affect: Mood normal.         Behavior: Behavior normal.         Judgment: Judgment normal.           Patient's questions answered and concerns addressed. Patient agrees to plan of care.         Electronically signed by MICHELLE Morrell CNP on 3/21/2022 at 1:58 PM

## 2022-03-22 NOTE — TELEPHONE ENCOUNTER
Spoke with pt and informed with verbal understanding. Refill scripts for Symbicort and Spiriva pending. Med list updated.

## 2022-03-23 RX ORDER — BUDESONIDE AND FORMOTEROL FUMARATE DIHYDRATE 160; 4.5 UG/1; UG/1
2 AEROSOL RESPIRATORY (INHALATION) 2 TIMES DAILY
Qty: 10.2 G | Refills: 5 | Status: SHIPPED | OUTPATIENT
Start: 2022-03-23

## 2022-03-24 ASSESSMENT — ENCOUNTER SYMPTOMS
BACK PAIN: 1
BOWEL INCONTINENCE: 0
ABDOMINAL PAIN: 0

## 2022-04-04 ENCOUNTER — HOSPITAL ENCOUNTER (OUTPATIENT)
Dept: PHYSICAL THERAPY | Age: 52
Setting detail: THERAPIES SERIES
Discharge: HOME | End: 2022-04-04
Payer: MEDICARE

## 2022-04-04 PROCEDURE — 97140 MANUAL THERAPY 1/> REGIONS: CPT

## 2022-04-04 PROCEDURE — 97112 NEUROMUSCULAR REEDUCATION: CPT

## 2022-04-04 PROCEDURE — 97161 PT EVAL LOW COMPLEX 20 MIN: CPT

## 2022-04-04 PROCEDURE — 97110 THERAPEUTIC EXERCISES: CPT

## 2022-04-04 NOTE — PLAN OF CARE
[facility] and [facility], [address] ([facility]), [address] [place] [address]  Phone: [phone], [phone]                                                       Physical Therapy Certification    Dear Referring Practitioner: Joseph Jauregui,    We had the pleasure of evaluating the following patient for physical therapy services at [address] [place] [address]. A summary of our findings can be found in the initial assessment below. This includes our plan of care. If you have any questions or concerns regarding these findings, please do not hesitate to contact me at the office phone number checked above. Thank you for the referral.       Physician Signature:_______________________________Date:__________________  By signing above (or electronic signature), therapists plan is approved by physician      Patient: Sumi Mccall   : 1970   MRN: [id number]  Referring Physician: Referring Practitioner: Shaka      Evaluation Date: 2022      Medical Diagnosis Information:  Diagnosis: Chronic Bilateral Low back pain with sciatica (M54.41; M54.42)   Treatment Diagnosis: Core Muscle Weakness (M62.81)                                         Insurance information: PT Insurance Information: [place]     Precautions/ Contra-indications/Relevant Medical History: COPD ; Depression     C-SSRS Triggered by Intake questionnaire (Past 2 wk assessment):   [x] No, Questionnaire did not trigger screening.   [] Yes, Patient intake triggered further evaluation      [] C-SSRS Screening completed  [] PCP notified via Plan of Care  [] Emergency services notified     Latex Allergy:  [x]NO      []YES     Preferred Language for Healthcare:   [x]English       []other:      SUBJECTIVE: Patient stated complaint: Patient is a 50 y/o male who presents with increased low back with pain down into his legs. Sharee Marie reports this pain has been an issue for years.  States he usually goes to a Chiropractor for it but the pain never seems to go completely away.  Pt reports position of comfort is in side lying (either side)     FOTO Score: 41  FOTO Predicted Score: 56    Pain Scale: 8/10  Easing factors: rest   Provocative factors: any activity     Type: [x]Constant   []Intermittent  []Radiating []Localized []other:     Numbness/Tingling: Patient reports numbness in bilateral legs    Functional Limitations/Impairments: [x]Sitting [x]Standing [x]Walking    [x]Squatting/bending  [x]Stairs           []ADL's  []Transfers []Sports/Recreation []Other:    Occupation/School: Self Employed    Living Status/Prior Level of Function: Independent with ADLs and IADLs     OBJECTIVE:     Standing Exam Normal Abnormal N/A Comments   Toe walk   x      Heel Walk x      Side bending    Limited ROM    Pelvic Height       Fwd Bend- (aberrant juttering or innominate mvmt)    Limited ROM    Extension    Limited movement   Trendelenburg x      Kemps/Quadrant       Stork       SLS/SLS w rotation                     Seated Exam Normal Abnormal N/A Comments   Pelvic Height x      Seated Rotation    Gross ROM decreased 50%    Seated flexion    Gross ROM decreased 50%   B hip IR x                    Supine Exam Normal Abnormal N/A Comments   Hip flexion    limted ROM tightness in HS   Abduction       ER    x   IR       LATRICE/José x      Hip scour       SLR       Crossed SLR       Supine to sit x      SI distraction/compression x      Hip thrust                     Prone Exam Normal Abnormal N/A Comments   Prone knee bend       Prone hip IR       B Achilles reflex/Pheasant       PA/Spring       Prone Instability test       Sacral Spring/thrust                       ROM LEFT RIGHT Comments   Lumbar Flex 36 °      Lumbar Ext 10 °      Side Bend 15 ° R/ 15 ° L     Rotation                    ROM LEFT RIGHT Comments   Hip Flexion      Hip Abd      Hip ER      Hip IR      Hip Extension      Knee Ext      Knee Flex      Hamstring Flex  SLR 30 °  30 °     Piriformis                    Strength LEFT RIGHT Comments   Multifidus 3/5 3/5    Transverse Ab 3-/5 3-/5    Hip Flexors 4/5 4/5    Hip Abductors 4-/5 4-/5    Hip Extensors 4-/5 4-/5                   Myotomes Normal Abnormal Comments   Hip flexion (L1-L2) x     Knee extension (L2-L4) x     Dorsiflexion (L4-L5) x     Great Toe Ext (L5) x     Ankle Eversion (S1-S2) x     Ankle PF(S1-S2) x         Dermatomes Normal Abnormal Comments   inguinal area (L1)  x     anterior mid-thigh (L2) x     distal ant thigh/med knee (L3) x     medial lower leg and foot (L4) x     lateral lower leg and foot (L5) x     posterior calf (S1) x     medial calcaneus (S2) x         Neural dynamic tension testing Normal Abnormal Comments   Slump Test  - Degree of knee flexion:  x     SLR       0-30  x limted due to HS tightness    30-70      Femoral nerve (L2-4)        Reflexes Normal Abnormal Comments   S1-2 Seated achilles x     S1-2 Prone knee bend x     L3-4 Patellar tendon      C5-6 Biceps      C6 Brachioradialis      C7-8 Triceps      Clonus      Babinski      Welsh's        Joint mobility:    []Normal    [x]Hypo   []Hyper    Palpation: noted myofacial tightness     Functional Mobility/Transfers: WFL; stiffen movements    Posture: forward flexed trunk     Bandages/Dressings/Incisions: n/a    Gait (include devices/WB status): WFL                     [x] Patient history, allergies, meds reviewed. Medical chart reviewed. See intake form. Review Of Systems (ROS):  [x]Performed Review of systems (Integumentary, CardioPulmonary, Neurological) by intake and observation. Intake form has been scanned into medical record. Patient has been instructed to contact their primary care physician regarding ROS issues if not already being addressed at this time.         Co-morbidities/Complexities (which will affect course of rehabilitation):   []None           Arthritic conditions   []Rheumatoid arthritis (M05.9)  [x]Osteoarthritis (M19.91)   Cardiovascular conditions   []Hypertension (I10)  []Hyperlipidemia (E78.5)  []Angina pectoris (I20)  []Atherosclerosis (I70)   Musculoskeletal conditions   []Disc pathology   []Congenital spine pathologies   []Prior surgical intervention  []Osteoporosis (M81.8)  []Osteopenia (M85.8)   Endocrine conditions   []Hypothyroid (E03.9)  []Hyperthyroid Gastrointestinal conditions   []Constipation (M32.14)   Metabolic conditions   []Morbid obesity (E66.01)  []Diabetes type 1(E10.65) or 2 (E11.65)   []Neuropathy (G60.9)     Pulmonary conditions   []Asthma (J45)  []Coughing   [x]COPD (J44.9)   Psychological Disorders  []Anxiety (F41.9)  [x]Depression (F32.9)   []Other:   []Other:          Barriers to/and or personal factors that will affect rehab potential:              []Age  []Sex              []Motivation/Lack of Motivation                        []Co-Morbidities              []Cognitive Function, education/learning barriers              []Environmental, home barriers              []profession/work barriers  []past PT/medical experience  []other:  Justification:     Falls Risk Assessment (30 days):   [x] Falls Risk assessed and no intervention required.   [] Falls Risk assessed and Patient requires intervention due to being higher risk   TUG score (>12s at risk):     [] Falls education provided, including       ASSESSMENT:   Functional Impairments:     []Noted lumbar/proximal hip hypomobility   []Noted lumbosacral and/or generalized hypermobility   [x]Decreased Lumbosacral/hip/LE functional ROM   [x]Decreased core/proximal hip strength and neuromuscular control    [x]Decreased LE functional strength    [x]Abnormal reflexes/sensation/myotomal/dermatomal deficits  [x]Reduced balance/proprioceptive control    []other:      Functional Activity Limitations (from functional questionnaire and intake)   [x]Reduced ability to tolerate prolonged functional positions   [x]Reduced ability or difficulty with changes of positions or transfers between positions   [x]Reduced ability to maintain good posture and demonstrate good body mechanics with sitting, bending, and lifting   [x]Reduced ability to sleep   [x] Reduced ability or tolerance with driving and/or computer work   [x]Reduced ability to perform lifting, reaching, carrying tasks   [x]Reduced ability to squat   [x]Reduced ability to forward bend   [x]Reduced ability to ambulate prolonged functional periods/distances/surfaces   [x]Reduced ability to ascend/descend stairs   []other:       Participation Restrictions   [x]Reduced participation in self care activities   [x]Reduced participation in home management activities   [x]Reduced participation in work activities   [x]Reduced participation in social activities. [x]Reduced participation in sport/recreational activities. Classification:   []Signs/symptoms consistent with Lumbar instability/stabilization subgroup. []Signs/symptoms consistent with Lumbar mobilization/manipulation subgroup, myotomes and dermatomes intact. Meets  manipulation criteria. []Signs/symptoms consistent with Lumbar direction specific/centralization subgroup   []Signs/symptoms consistent with Lumbar traction subgroup     []Signs/symptoms consistent with lumbar facet dysfunction   [x]Signs/symptoms consistent with lumbar stenosis type dysfunction   []Signs/symptoms consistent with nerve root involvement including myotome & dermatome dysfunction   []Signs/symptoms consistent with post-surgical status including: decreased ROM, strength and function.    []signs/symptoms consistent with pathology which may benefit from Dry needling     []other:      Prognosis/Rehab Potential:      []Excellent   [x]Good    []Fair   []Poor    Tolerance of evaluation/treatment:    []Excellent   [x]Good    []Fair   []Poor    Physical Therapy Evaluation Complexity Justification   [x] A history of present problem with:  [] no personal factors and/or comorbidities that impact the plan of care;  []1-2 personal factors and/or comorbidities that impact the plan of care  [x]3 personal factors and/or comorbidities that impact the plan of care  [x] An examination of body systems using standardized tests and measures addressing any of the following: body structures and functions (impairments), activity limitations, and/or participation restrictions;:  [] a total of 1-2 or more elements   [] a total of 3 or more elements   [x] a total of 4 or more elements   [x] A clinical presentation with:  [x] stable and/or uncomplicated characteristics   [] evolving clinical presentation with changing characteristics  [] unstable and unpredictable characteristics;   [x] Clinical decision making of [x] low, [] moderate, [] high complexity using standardized patient assessment instrument and/or measurable assessment of functional outcome. [x] EVAL (LOW) 77215 (typically 20 minutes face-to-face)  [] EVAL (MOD) 11969 (typically 30 minutes face-to-face)  [] EVAL (HIGH) 10327 (typically 45 minutes face-to-face)  [] RE-EVAL     PLAN: Begin PT focusing on: proximal hip mobilizations, LB mobs, LB core activation, proximal hip activation, and HEP    Frequency/Duration:  1-2 days per week for 12 weeks:  Interventions:  [x]  Therapeutic exercise including: strength training, ROM, for the lower extremity and core   [x]  NMR activation and proprioception for LE, Glutes and Core   [x]  Manual therapy as indicated for LE, Hip and spine to include: Dry Needling/IASTM, STM, PROM, Gr I-IV mobilizations, manipulation. [x] Modalities as needed that may include: thermal agents, E-stim, Biofeedback, US, iontophoresis as indicated  [x] Patient education on joint protection, postural re-education, activity modification, progression of HEP. HEP instruction: Refer to [address] [place] [address] access code and exercises on the 1st visit treatment note    GOALS:    Short Term Goals:  To be achieved in: 2 weeks  1. Independent in HEP and progression per patient tolerance, in order to prevent re-injury. [] Progressing: [] Met: [] Not Met: [] Adjusted   2. Patient will have a decrease in pain to facilitate improvement in movement, function, and ADLs as indicated by Functional Deficits. [] Progressing: [] Met: [] Not Met: [] Adjusted     Long Term Goals: To be achieved in: 12 weeks  1. FOTO score to be equal or greater to the predicted score to assist with reaching prior level of function. [] Progressing: [] Met: [] Not Met: [] Adjusted   2. 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 [] Progressing: [] Met: [] Not Met: [] Adjusted   3. Patient will demonstrate an increase in strength of core and lumbar stabilizers by 1/2 to allow for upright shoulders back posture as indicated by patients Functional Deficits. [] Progressing: [] Met: [] Not Met: [] Adjusted   4. Patient will return to all transfers, work activities, and functional activities without increased symptoms or restriction. [] Progressing: [] Met: [] Not Met: [] Adjusted   5. Patient will have 0/10 pain with ADL's.  [] Progressing: [] Met: [] Not Met: [] Adjusted   6.  Patient stated goal: To decreased pain and numbness/tingling in legs  [] Progressing: [] Met: [] Not Met: [] Adjusted     Electronically signed by:  Luan Steinberg, PT, MPT,ATC, cert DN

## 2022-04-04 NOTE — FLOWSHEET NOTE
[facility],  [facility] [address], [address]  Phone: [phone], [phone]    Physical Therapy Treatment Note/ Progress Report:     Date:  2022    Patient Name:  Savanah Bejarano    :  1970  MRN: [id number]  Restrictions/Precautions:    Medical/Treatment Diagnosis Information:  · Diagnosis: Chronic Bilateral Low back pain with sciatica (M54.41; M54.42)  · Treatment Diagnosis: Core Muscle Weakness (G98.74)  Insurance/Certification information:  PT Insurance Information: [place]  Physician Information:  Referring Practitioner:  Wilder Beltre  Has the plan of care been signed (Y/N):        []  Yes  [x]  No     Date of Patient follow up with Physician:     Is this a Progress Report:     []  Yes  [x]  No      If Yes:  Date Range for reporting period:  Initial Eval: 2022  Beginnin2022 --- Endin2022    Progress report will be due (10 Rx or 30 days whichever is less): 5305     Recertification will be due (POC Duration  / 90 days whichever is less): 2022      Visit # Insurance Allowable Auth Required   In Person 1 30 visits []  Yes     [x]  No    Tele Health -  []  Yes     []  No    Total 1       FOTO Score: 41 (Predicted: 56)   Date assessed: 2022      Latex Allergy:  [x]NO      []YES  Preferred Language for Healthcare:   [x]English       []other:    Pain level:  810     SUBJECTIVE:  See eval    OBJECTIVE: See eval   Observation:    Test measurements:      RESTRICTIONS/PRECAUTIONS: COPD, Depression      Exercises/Interventions:   Therapeutic Ex (02105)  Therapeutic Activity (29521)  NMR re-education (86949) Sets/Reps Notes/CUES   Bike          PPT 15 x 5\"    PPT with Alt March  15 x 5\"    HL Hip Add Squeeze with PPT 20 x 5\"    Lumbar Rotation 10 x ea    Alt Bent Knee Fallouts 10 x ea         Supine Piriformis Stretch 5 x 10\"    Supine HS stretch with belt  5 x 10\"                        Postural Education  15 ' (give handout NV patient left prior to therapist giving it to him)                                                Manual Intervention (13837)     Attempted Supine Manual Traction but patient stated increased pain                                    [employer] access code:   Access Code: Lori Cart: [email or web address]  Date: 04/04/2022  Prepared by: Dustin Infield    Exercises  Supine Posterior Pelvic Tilt - 1 x daily - 7 x weekly - 1 sets - 15 reps - 5\" hold  Supine Lower Trunk Rotation - 1 x daily - 7 x weekly - 1 sets - 10 reps  Bent Knee Fallouts - 1 x daily - 7 x weekly - 10 reps  Supine March - 1 x daily - 7 x weekly - 1 sets - 15 reps  Supine Piriformis Stretch with Foot on Ground - 1 x daily - 7 x weekly - 1 sets - 5 reps - 10\" hold  Supine Hamstring Stretch with Strap - 1 x daily - 7 x weekly - 1 sets - 5 reps - 10\" hold  Supine Hip Adduction Isometric with Ball - 1 x daily - 7 x weekly - 2 sets - 10 reps - 5\" hold               Patient Education 10' . Pt education with HEP and progression of PT along with compliance with HEP to aide with formal PT for optimal outcomes. Therapeutic Exercise and NMR EXR  [x] (14637) Provided verbal/tactile cueing for activities related to strengthening, flexibility, endurance, ROM for improvements in LE, proximal hip, and core control with self care, mobility, lifting, ambulation. [x] (40408) Provided verbal/tactile cueing for activities related to improving balance, coordination, kinesthetic sense, posture, motor skill, proprioception to assist with LE, proximal hip, and core control in self-care, mobility, lifting, ambulation and eccentric single leg control.      NMR and Therapeutic Activities:    [x] (36823 or 66805) Provided verbal/tactile cueing for activities related to improving balance, coordination, kinesthetic sense, posture, motor skill, proprioception and motor activation to allow for proper function of core, proximal hip and LE with self-care and ADLs and functional mobility. [x] (60513) Gait Re-education- Provided training and instruction to the patient for proper LE, core and proximal hip recruitment and positioning and eccentric body weight control with ambulation re-education including up and down stairs     Home Exercise Program:    [x] (40162) Reviewed/Progressed HEP activities related to strengthening, flexibility, endurance, ROM of core, proximal hip and LE for functional self-care, mobility, lifting and ambulation/stair navigation   [x] (87525) Reviewed/Progressed HEP activities related to improving balance, coordination, kinesthetic sense, posture, motor skill, proprioception of core, proximal hip and LE for self-care, mobility, lifting, and ambulation/stair navigation      Manual Treatments:  PROM / STM / Oscillations-Mobs:  G-I, II, III, IV (PA's, Inf., Post.)  [x] (76692) Provided manual therapy to mobilize LE, proximal hip and/or LS spine soft tissue/joints for the purpose of modulating pain, promoting relaxation, increasing ROM, reducing/eliminating soft tissue swelling/inflammation/restriction, improving soft tissue extensibility and allowing for proper ROM for normal function with self-care, mobility, lifting and ambulation. Modalities:      Charges:  Timed Code Treatment Minutes: 40'   Total Treatment Minutes:  60'   [facility]:  TE TIME:  NMR TIME:  MANUAL TIME:  UNTIMED MINUTES:  Medicare Total:    -  -  -  -  -      [] EVAL (LOW) 56255 (typically 20 minutes face-to-face)  [] EVAL (MOD) 77878 (typically 30 minutes face-to-face)  [] EVAL (HIGH) 32073 (typically 45 minutes face-to-face)  [] RE-EVAL     [x] TV(60926) x   1  [] IONTO  [x] NMR (82967) x 1     [] VASO  [] Manual (24704) x     [] Other:  [x] TA x   1   [] Mech Traction (37294)  [] ES(attended) (53104)      [] ES (un) (21418):    ASSESSMENT:  See eval    GOALS:   Short Term Goals: To be achieved in: 2 weeks  1. Independent in HEP and progression per patient tolerance, in order to prevent re-injury. No    Treatment/Activity Tolerance:  [x] Patient able to complete treatment  [] Patient limited by fatigue  [] Patient limited by pain    [] Patient limited by other medical complications  [] Other:     Return to Play: (if applicable)   []  Stage 1: Intro to Strength   []  Stage 2: Return to Run and Strength   []  Stage 3: Return to Jump and Strength   []  Stage 4: Dynamic Strength and Agility   []  Stage 5: Sport Specific Training     []  Ready to Return to Play, Meets All Above Stages   []  Not Ready for Return to Sports   Comments:                         PLAN: See eval  [x] Continue per plan of care [] Alter current plan (see comments above)  [] Plan of care initiated [] Hold pending MD visit [] Discharge    Electronically signed by:  Holly Garcia, PT, MPT,ATC, cert DN     Note: If patient does not return for scheduled/ recommended follow up visits, this note will serve as a discharge from care along with most recent update on progress.

## 2022-04-06 ENCOUNTER — HOSPITAL ENCOUNTER (OUTPATIENT)
Dept: PHYSICAL THERAPY | Age: 52
Setting detail: THERAPIES SERIES
Discharge: HOME | End: 2022-04-06
Payer: MEDICARE

## 2022-04-06 PROCEDURE — 97530 THERAPEUTIC ACTIVITIES: CPT | Performed by: PHYSICAL THERAPY ASSISTANT

## 2022-04-06 PROCEDURE — 97110 THERAPEUTIC EXERCISES: CPT | Performed by: PHYSICAL THERAPY ASSISTANT

## 2022-04-06 PROCEDURE — 97112 NEUROMUSCULAR REEDUCATION: CPT | Performed by: PHYSICAL THERAPY ASSISTANT

## 2022-04-06 NOTE — FLOWSHEET NOTE
[facility],  [place] [address] [address], [address]  Phone: [phone], [phone]    Physical Therapy Treatment Note/ Progress Report:     Date:  2022    Patient Name:  Russell Grover    :  1970  MRN: [id number]  Restrictions/Precautions:    Medical/Treatment Diagnosis Information:  · Diagnosis: Chronic Bilateral Low back pain with sciatica (M54.41; M54.42)  · Treatment Diagnosis: Core Muscle Weakness (J80.97)  Insurance/Certification information:  PT Insurance Information: [place]  Physician Information:  Referring Practitioner: Viola Saldivar  Has the plan of care been signed (Y/N):        []  Yes  [x]  No     Date of Patient follow up with Physician:     Is this a Progress Report:     []  Yes  [x]  No      If Yes:  Date Range for reporting period:  Initial Eval: 2022  Beginnin2022 --- Endin2022    Progress report will be due (10 Rx or 30 days whichever is less): 4906     Recertification will be due (POC Duration  / 90 days whichever is less): 2022      Visit # Insurance Allowable Auth Required   In Person 2 30 visits []  Yes     [x]  No    Tele Health -  []  Yes     []  No    Total 2       FOTO Score: 41 (Predicted: 56)   Date assessed: 2022      Latex Allergy:  [x]NO      []YES  Preferred Language for Healthcare:   [x]English       []other:    Pain level:  8/10     SUBJECTIVE:  States he has been very sore. Pain is across bilateral low back down bilateral LE to the feet with numbness in his bilateral feet. States he has not attempted to exercises secondary to the pain he has been having. Works in [employer] and does a lot of lifting and bending.       OBJECTIVE: See eval   Observation:    Test measurements:      RESTRICTIONS/PRECAUTIONS: COPD, Depression      Exercises/Interventions:   Therapeutic Ex (21769)  Therapeutic Activity (26171)  NMR re-education (80350) Sets/Reps Notes/CUES   Bike          PPT 15 x 5\"    PPT with Alt March 15 x 5\"    HL Hip Add Squeeze with PPT 20 x 5\"    Lumbar Rotation 10 x ea    Alt Bent Knee Fallouts 10 x ea         SLR with ab set  X20 R, L     Clamshells  X20 R, L green     Bridges  X20 5\" hold green          Supine Piriformis Stretch 5 x 10\"    Supine HS stretch with belt  5 x 10\"                        Postural Education  15 ' (give handout NV patient left prior to therapist giving it to him)                                                Manual Intervention (34258)     Attempted Supine Manual Traction but patient stated increased pain                                    Medbridge access code:   Access Code: Didier Frye  URL: [email or web address]  Date: 04/04/2022  Prepared by: Brena Mering    Exercises  Supine Posterior Pelvic Tilt - 1 x daily - 7 x weekly - 1 sets - 15 reps - 5\" hold  Supine Lower Trunk Rotation - 1 x daily - 7 x weekly - 1 sets - 10 reps  Bent Knee Fallouts - 1 x daily - 7 x weekly - 10 reps  Supine March - 1 x daily - 7 x weekly - 1 sets - 15 reps  Supine Piriformis Stretch with Foot on Ground - 1 x daily - 7 x weekly - 1 sets - 5 reps - 10\" hold  Supine Hamstring Stretch with Strap - 1 x daily - 7 x weekly - 1 sets - 5 reps - 10\" hold  Supine Hip Adduction Isometric with Ball - 1 x daily - 7 x weekly - 2 sets - 10 reps - 5\" hold               Patient Education 10' . Pt education with HEP and progression of PT along with compliance with HEP to aide with formal PT for optimal outcomes. Therapeutic Exercise and NMR EXR  [x] (71790) Provided verbal/tactile cueing for activities related to strengthening, flexibility, endurance, ROM for improvements in LE, proximal hip, and core control with self care, mobility, lifting, ambulation.   [x] (84383) Provided verbal/tactile cueing for activities related to improving balance, coordination, kinesthetic sense, posture, motor skill, proprioception to assist with LE, proximal hip, and core control in self-care, mobility, lifting, ambulation and eccentric single leg control. NMR and Therapeutic Activities:    [x] (62868 or 75910) Provided verbal/tactile cueing for activities related to improving balance, coordination, kinesthetic sense, posture, motor skill, proprioception and motor activation to allow for proper function of core, proximal hip and LE with self-care and ADLs and functional mobility. [x] (16020) Gait Re-education- Provided training and instruction to the patient for proper LE, core and proximal hip recruitment and positioning and eccentric body weight control with ambulation re-education including up and down stairs     Home Exercise Program:    [x] (18191) Reviewed/Progressed HEP activities related to strengthening, flexibility, endurance, ROM of core, proximal hip and LE for functional self-care, mobility, lifting and ambulation/stair navigation   [x] (68183) Reviewed/Progressed HEP activities related to improving balance, coordination, kinesthetic sense, posture, motor skill, proprioception of core, proximal hip and LE for self-care, mobility, lifting, and ambulation/stair navigation      Manual Treatments:  PROM / STM / Oscillations-Mobs:  G-I, II, III, IV (PA's, Inf., Post.)  [x] (52669) Provided manual therapy to mobilize LE, proximal hip and/or LS spine soft tissue/joints for the purpose of modulating pain, promoting relaxation, increasing ROM, reducing/eliminating soft tissue swelling/inflammation/restriction, improving soft tissue extensibility and allowing for proper ROM for normal function with self-care, mobility, lifting and ambulation.      Modalities:  Ice 10'    Charges:  Timed Code Treatment Minutes: 54'   Total Treatment Minutes:  72'   BWC:  TE TIME:  NMR TIME:  MANUAL TIME:  UNTIMED MINUTES:  Medicare Total:    -  -  -  -  -      [] EVAL (LOW) 35204 (typically 20 minutes face-to-face)  [] EVAL (MOD) 29987 (typically 30 minutes face-to-face)  [] EVAL (HIGH) 44912 (typically 45 minutes face-to-face)  [] RE-EVAL [x] SM(99308) x   2  [] IONTO  [x] NMR (64734) x 1     [] VASO  [] Manual (71381) x     [] Other:  [x] TA x   1   [] Mech Traction (40996)  [] ES(attended) (86046)      [] ES (un) (84599):    ASSESSMENT:  See eval    GOALS:   Short Term Goals: To be achieved in: 2 weeks  1. Independent in HEP and progression per patient tolerance, in order to prevent re-injury. [] Progressing: [x] Met: [] Not Met: [] Adjusted   2. Patient will have a decrease in pain to facilitate improvement in movement, function, and ADLs as indicated by Functional Deficits. [] Progressing: [x] Met: [] Not Met: [] Adjusted     Long Term Goals: To be achieved in: 12 weeks  1. FOTO score to be equal or greater to the predicted score to assist with reaching prior level of function. [] Progressing: [] Met: [] Not Met: [] Adjusted   2. 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 [] Progressing: [] Met: [] Not Met: [] Adjusted   3. Patient will demonstrate an increase in strength of core and lumbar stabilizers by 1/2 to allow for upright shoulders back posture as indicated by patients Functional Deficits. [] Progressing: [] Met: [] Not Met: [] Adjusted   4. Patient will return to all transfers, work activities, and functional activities without increased symptoms or restriction. [] Progressing: [] Met: [] Not Met: [] Adjusted   5. Patient will have 0/10 pain with ADL's.  [] Progressing: [] Met: [] Not Met: [] Adjusted   6. Patient stated goal: To decreased pain and numbness/tingling in legs  [] Progressing: [] Met: [] Not Met: [] Adjusted    Overall Progression Towards Functional goals/ Treatment Progress Update:  [] Patient is progressing as expected towards functional goals listed. [x] Progression is slowed due to complexities/Impairments listed.   [] Progression has been

## 2022-04-11 ENCOUNTER — HOSPITAL ENCOUNTER (OUTPATIENT)
Dept: PHYSICAL THERAPY | Age: 52
Setting detail: THERAPIES SERIES
Discharge: HOME | End: 2022-04-11
Payer: MEDICARE

## 2022-04-11 ENCOUNTER — TELEPHONE (OUTPATIENT)
Dept: FAMILY MEDICINE CLINIC | Age: 52
End: 2022-04-11

## 2022-04-11 DIAGNOSIS — M54.41 CHRONIC BILATERAL LOW BACK PAIN WITH BILATERAL SCIATICA: ICD-10-CM

## 2022-04-11 DIAGNOSIS — M54.42 CHRONIC BILATERAL LOW BACK PAIN WITH BILATERAL SCIATICA: ICD-10-CM

## 2022-04-11 DIAGNOSIS — R07.9 CHEST PAIN, UNSPECIFIED TYPE: ICD-10-CM

## 2022-04-11 DIAGNOSIS — G89.29 CHRONIC BILATERAL LOW BACK PAIN WITH BILATERAL SCIATICA: ICD-10-CM

## 2022-04-11 RX ORDER — AMLODIPINE BESYLATE 2.5 MG/1
TABLET ORAL
Qty: 90 TABLET | Refills: 0 | Status: SHIPPED | OUTPATIENT
Start: 2022-04-11 | End: 2022-05-16

## 2022-04-11 RX ORDER — TIZANIDINE 2 MG/1
4 TABLET ORAL EVERY 8 HOURS PRN
Qty: 90 TABLET | Refills: 1 | Status: SHIPPED | OUTPATIENT
Start: 2022-04-11 | End: 2022-06-13 | Stop reason: SDUPTHER

## 2022-04-11 NOTE — TELEPHONE ENCOUNTER
Future appt scheduled 06/06/2022                Last appt 03/21/2022      Last Written 01/23/2022    amLODIPine (NORVASC) 2.5 MG tablet  #90  0 RF

## 2022-04-11 NOTE — LETTER
[address] Cameron  [address] [facility] [address]  Phone: [phone]  Fax: [phone]    MICHELLE Lozano - CNP        April 11, 2022     Patient: Tadeo Huizar. YOB: 1970   Date of Visit: 4/11/2022       To Whom It May Concern: It is my medical opinion that Katina Dawkins is unable to perform jury duty at this time. If you have any questions or concerns, please don't hesitate to call.     Sincerely,        MICHELLE Lozano CNP

## 2022-04-11 NOTE — TELEPHONE ENCOUNTER
Does he want Meloxicam or Ibuprofen. He should not take both. We can increase the Meloxicam if he wants.

## 2022-04-11 NOTE — TELEPHONE ENCOUNTER
Summoned to jury duty.  Requesting to be excused, not able to sit for a long period of time due to back

## 2022-04-11 NOTE — FLOWSHEET NOTE
[facility]    Physical Therapy  Cancellation/No-show Note  Patient Name:  Kai Campbell  :  1970   Date:  2022    Cancelled visits to date: 1  No-shows to date: 0    For today's appointment patient:  []  Cancelled  []  Rescheduled appointment  []  No-show     Reason given by patient:  []  Patient ill  []  Conflicting appointment  []  No transportation    []  Conflict with work  []  No reason given  []  Other:     Comments:      Phone call information:   [x]  Phone call made today to patient. [x]  Patient answered, conversation as follows: Physical therapist is ill   []  Patient did not answer. []  Phone call not needed - pt contacted us to cancel and provided reason for cancellation.      Electronically signed by:  Amrita Malin PTA

## 2022-04-11 NOTE — TELEPHONE ENCOUNTER
Requesting a refill of ibuprofen 800 mg. Asking for a stronger dose of zanaflex 2 mg, says it is not working for him.  Send to [facility]

## 2022-04-12 RX ORDER — IBUPROFEN 800 MG/1
800 TABLET ORAL 4 TIMES DAILY PRN
Qty: 360 TABLET | Refills: 1 | Status: SHIPPED | OUTPATIENT
Start: 2022-04-12 | End: 2022-06-13 | Stop reason: SDUPTHER

## 2022-04-14 ENCOUNTER — HOSPITAL ENCOUNTER (OUTPATIENT)
Dept: PHYSICAL THERAPY | Age: 52
Setting detail: THERAPIES SERIES
Discharge: HOME | End: 2022-04-14
Payer: MEDICARE

## 2022-04-18 ENCOUNTER — HOSPITAL ENCOUNTER (OUTPATIENT)
Dept: PHYSICAL THERAPY | Age: 52
Setting detail: THERAPIES SERIES
Discharge: HOME | End: 2022-04-18
Payer: MEDICARE

## 2022-04-18 NOTE — FLOWSHEET NOTE
[facility] [facility] ([facility])    Physical Therapy  Cancellation/No-show Note  Patient Name:  Landen Cedeño  :  1970   Date:  2022    Cancelled visits to date: 2  No-shows to date: 1    For today's appointment patient:  []  Cancelled  []  Rescheduled appointment  [x]  No-show     Reason given by patient:  []  Patient ill  []  Conflicting appointment  []  No transportation    []  Conflict with work  []  No reason given  []  Other:     Comments:      Phone call information:   [x]  Phone call made today to patient. []  Patient answered, conversation as follows: Physical therapist is ill   [x]  Patient did not answer. []  Phone call not needed - pt contacted us to cancel and provided reason for cancellation.      Electronically signed by:  Peace Bonner PTA

## 2022-04-21 ENCOUNTER — HOSPITAL ENCOUNTER (OUTPATIENT)
Dept: PHYSICAL THERAPY | Age: 52
Setting detail: THERAPIES SERIES
Discharge: HOME | End: 2022-04-21
Payer: MEDICARE

## 2022-04-21 NOTE — FLOWSHEET NOTE
[facility] ([facility])    Physical Therapy  Cancellation/No-show Note  Patient Name:  Azam Reece  :  1970   Date:  2022    Cancelled visits to date: 2  No-shows to date: 2    For today's appointment patient:  []  Cancelled  []  Rescheduled appointment  [x]  No-show     Reason given by patient:  []  Patient ill  []  Conflicting appointment  []  No transportation    []  Conflict with work  []  No reason given  []  Other:     Comments:  WILL D/C AT THIS POINT SECONDARY TO NO SHOW/CANCEL STATUS    Phone call information:   [x]  Phone call made today to patient. []  Patient answered, conversation as follows: Physical therapist is ill   [x]  Patient did not answer. []  Phone call not needed - pt contacted us to cancel and provided reason for cancellation.      Electronically signed by:  Carl Gates PTA

## 2022-05-16 DIAGNOSIS — R20.0 NUMBNESS AND TINGLING OF BOTH LEGS BELOW KNEES: ICD-10-CM

## 2022-05-16 DIAGNOSIS — R20.2 NUMBNESS AND TINGLING OF BOTH LEGS BELOW KNEES: ICD-10-CM

## 2022-05-16 DIAGNOSIS — J44.9 CHRONIC OBSTRUCTIVE PULMONARY DISEASE, UNSPECIFIED COPD TYPE (HCC): ICD-10-CM

## 2022-05-16 DIAGNOSIS — R07.9 CHEST PAIN, UNSPECIFIED TYPE: ICD-10-CM

## 2022-05-16 DIAGNOSIS — R00.2 RAPID PALPITATIONS: ICD-10-CM

## 2022-05-16 DIAGNOSIS — F41.9 CHRONIC ANXIETY: ICD-10-CM

## 2022-05-16 DIAGNOSIS — F32.1 MODERATE SINGLE CURRENT EPISODE OF MAJOR DEPRESSIVE DISORDER (HCC): ICD-10-CM

## 2022-05-16 RX ORDER — AMLODIPINE BESYLATE 2.5 MG/1
TABLET ORAL
Qty: 90 TABLET | Refills: 0 | Status: SHIPPED | OUTPATIENT
Start: 2022-05-16 | End: 2022-06-13 | Stop reason: SDUPTHER

## 2022-05-16 RX ORDER — GABAPENTIN 600 MG/1
600 TABLET ORAL 3 TIMES DAILY
Qty: 90 TABLET | Refills: 0 | Status: SHIPPED | OUTPATIENT
Start: 2022-05-16 | End: 2022-06-13 | Stop reason: SDUPTHER

## 2022-05-16 NOTE — TELEPHONE ENCOUNTER
Beth Robins. is requesting refill(s)   Last OV 3/21/22 (pertaining to medication)  LR 3/7/22-4/11/22 (per medication requested)  Next office visit scheduled or attempted Yes   If no, reason:  6/6/22

## 2022-05-17 RX ORDER — METOPROLOL SUCCINATE 25 MG/1
25 TABLET, EXTENDED RELEASE ORAL DAILY
Qty: 30 TABLET | Refills: 0 | Status: SHIPPED | OUTPATIENT
Start: 2022-05-17 | End: 2022-06-13 | Stop reason: SDUPTHER

## 2022-05-17 RX ORDER — QUETIAPINE FUMARATE 25 MG/1
12.5 TABLET, FILM COATED ORAL DAILY
Qty: 15 TABLET | Refills: 0 | Status: SHIPPED | OUTPATIENT
Start: 2022-05-17 | End: 2022-07-14

## 2022-05-17 RX ORDER — MONTELUKAST SODIUM 10 MG/1
10 TABLET ORAL NIGHTLY
Qty: 30 TABLET | Refills: 0 | Status: SHIPPED | OUTPATIENT
Start: 2022-05-17 | End: 2022-06-02

## 2022-06-02 DIAGNOSIS — J44.9 CHRONIC OBSTRUCTIVE PULMONARY DISEASE, UNSPECIFIED COPD TYPE (HCC): ICD-10-CM

## 2022-06-02 RX ORDER — MONTELUKAST SODIUM 10 MG/1
10 TABLET ORAL NIGHTLY
Qty: 30 TABLET | Refills: 0 | Status: SHIPPED | OUTPATIENT
Start: 2022-06-02 | End: 2022-06-13 | Stop reason: SDUPTHER

## 2022-06-13 ENCOUNTER — OFFICE VISIT (OUTPATIENT)
Dept: FAMILY MEDICINE CLINIC | Age: 52
End: 2022-06-13
Payer: MEDICARE

## 2022-06-13 VITALS
HEART RATE: 63 BPM | SYSTOLIC BLOOD PRESSURE: 116 MMHG | WEIGHT: 162.6 LBS | OXYGEN SATURATION: 97 % | RESPIRATION RATE: 16 BRPM | DIASTOLIC BLOOD PRESSURE: 74 MMHG | HEIGHT: 67 IN | BODY MASS INDEX: 25.52 KG/M2

## 2022-06-13 DIAGNOSIS — F41.9 CHRONIC ANXIETY: ICD-10-CM

## 2022-06-13 DIAGNOSIS — K21.00 GASTROESOPHAGEAL REFLUX DISEASE WITH ESOPHAGITIS WITHOUT HEMORRHAGE: ICD-10-CM

## 2022-06-13 DIAGNOSIS — M54.41 CHRONIC BILATERAL LOW BACK PAIN WITH BILATERAL SCIATICA: ICD-10-CM

## 2022-06-13 DIAGNOSIS — Z86.79 HISTORY OF CORONARY VASOSPASM: ICD-10-CM

## 2022-06-13 DIAGNOSIS — M54.42 CHRONIC BILATERAL LOW BACK PAIN WITH BILATERAL SCIATICA: ICD-10-CM

## 2022-06-13 DIAGNOSIS — F32.1 MODERATE SINGLE CURRENT EPISODE OF MAJOR DEPRESSIVE DISORDER (HCC): ICD-10-CM

## 2022-06-13 DIAGNOSIS — J43.8 OTHER EMPHYSEMA (HCC): Primary | ICD-10-CM

## 2022-06-13 DIAGNOSIS — R20.0 NUMBNESS AND TINGLING OF BOTH LEGS BELOW KNEES: ICD-10-CM

## 2022-06-13 DIAGNOSIS — G89.29 CHRONIC BILATERAL LOW BACK PAIN WITH BILATERAL SCIATICA: ICD-10-CM

## 2022-06-13 DIAGNOSIS — R20.2 NUMBNESS AND TINGLING OF BOTH LEGS BELOW KNEES: ICD-10-CM

## 2022-06-13 DIAGNOSIS — Z51.81 ENCOUNTER FOR MEDICATION MONITORING: ICD-10-CM

## 2022-06-13 LAB
CREATININE URINE: 95 MG/DL (ref 39–259)
PROTEIN PROTEIN: 6 MG/DL
PROTEIN/CREAT RATIO: 0.1 MG/DL

## 2022-06-13 PROCEDURE — 36415 COLL VENOUS BLD VENIPUNCTURE: CPT | Performed by: NURSE PRACTITIONER

## 2022-06-13 PROCEDURE — 99214 OFFICE O/P EST MOD 30 MIN: CPT | Performed by: NURSE PRACTITIONER

## 2022-06-13 PROCEDURE — 3023F SPIROM DOC REV: CPT | Performed by: NURSE PRACTITIONER

## 2022-06-13 PROCEDURE — G8419 CALC BMI OUT NRM PARAM NOF/U: HCPCS | Performed by: NURSE PRACTITIONER

## 2022-06-13 PROCEDURE — 1036F TOBACCO NON-USER: CPT | Performed by: NURSE PRACTITIONER

## 2022-06-13 PROCEDURE — 3017F COLORECTAL CA SCREEN DOC REV: CPT | Performed by: NURSE PRACTITIONER

## 2022-06-13 PROCEDURE — G8427 DOCREV CUR MEDS BY ELIG CLIN: HCPCS | Performed by: NURSE PRACTITIONER

## 2022-06-13 RX ORDER — LORAZEPAM 1 MG/1
1 TABLET ORAL DAILY
Qty: 30 TABLET | Refills: 2 | Status: SHIPPED | OUTPATIENT
Start: 2022-06-13 | End: 2022-10-24

## 2022-06-13 RX ORDER — AMLODIPINE BESYLATE 2.5 MG/1
TABLET ORAL
Qty: 90 TABLET | Refills: 11 | Status: SHIPPED | OUTPATIENT
Start: 2022-06-13

## 2022-06-13 RX ORDER — BUSPIRONE HYDROCHLORIDE 5 MG/1
5 TABLET ORAL 2 TIMES DAILY
Qty: 60 TABLET | Refills: 0 | Status: SHIPPED | OUTPATIENT
Start: 2022-06-13 | End: 2022-07-19

## 2022-06-13 RX ORDER — IBUPROFEN 800 MG/1
800 TABLET ORAL 4 TIMES DAILY PRN
Qty: 360 TABLET | Refills: 11 | Status: SHIPPED | OUTPATIENT
Start: 2022-06-13

## 2022-06-13 RX ORDER — MONTELUKAST SODIUM 10 MG/1
10 TABLET ORAL NIGHTLY
Qty: 30 TABLET | Refills: 11 | Status: SHIPPED | OUTPATIENT
Start: 2022-06-13

## 2022-06-13 RX ORDER — OMEPRAZOLE 20 MG/1
20 CAPSULE, DELAYED RELEASE ORAL
Qty: 30 CAPSULE | Refills: 11 | Status: SHIPPED | OUTPATIENT
Start: 2022-06-13 | End: 2024-08-01

## 2022-06-13 RX ORDER — QUETIAPINE FUMARATE 25 MG/1
12.5 TABLET, FILM COATED ORAL DAILY
Qty: 15 TABLET | Refills: 0 | Status: CANCELLED | OUTPATIENT
Start: 2022-06-13 | End: 2022-07-13

## 2022-06-13 RX ORDER — ALBUTEROL SULFATE 90 UG/1
2 AEROSOL, METERED RESPIRATORY (INHALATION) EVERY 6 HOURS PRN
Qty: 18 G | Refills: 5 | Status: SHIPPED | OUTPATIENT
Start: 2022-06-13

## 2022-06-13 RX ORDER — GABAPENTIN 600 MG/1
600 TABLET ORAL 3 TIMES DAILY
Qty: 90 TABLET | Refills: 11 | Status: SHIPPED | OUTPATIENT
Start: 2022-06-13 | End: 2024-08-01

## 2022-06-13 RX ORDER — TIZANIDINE 2 MG/1
4 TABLET ORAL EVERY 8 HOURS PRN
Qty: 90 TABLET | Refills: 11 | Status: SHIPPED | OUTPATIENT
Start: 2022-06-13

## 2022-06-13 RX ORDER — METOPROLOL SUCCINATE 25 MG/1
25 TABLET, EXTENDED RELEASE ORAL DAILY
Qty: 30 TABLET | Refills: 11 | OUTPATIENT
Start: 2022-06-13 | End: 2022-07-14

## 2022-06-13 ASSESSMENT — ANXIETY QUESTIONNAIRES
3. WORRYING TOO MUCH ABOUT DIFFERENT THINGS: 3
6. BECOMING EASILY ANNOYED OR IRRITABLE: 3
2. NOT BEING ABLE TO STOP OR CONTROL WORRYING: 3
7. FEELING AFRAID AS IF SOMETHING AWFUL MIGHT HAPPEN: 0
5. BEING SO RESTLESS THAT IT IS HARD TO SIT STILL: 3
GAD7 TOTAL SCORE: 18
4. TROUBLE RELAXING: 3
1. FEELING NERVOUS, ANXIOUS, OR ON EDGE: 3

## 2022-06-13 NOTE — PROGRESS NOTES
6/13/2022    Chief Complaint   Patient presents with    3 Month Follow-Up    Spasms     heart    COPD    Gastroesophageal Reflux       Diana Hernandez Sr. is a 49 y.o. male, presents today for:      ASSESSMENT/PLAN:    1. Other emphysema ([facility]  Continue Symbicort/ Spiriva, could not afford Trelegy  Did not obtain LDCT ordered last OV  Encouraged to obtain PFT/ 6MW/ Carbonmonoxide Diffusing Capacity previously ordered. Encouraged Acapella use ordered by Pulm  Encouraged smoking cessation  Continue Singulair/ antihistamine  Encouraged COVID vaccination  - montelukast (SINGULAIR) 10 MG tablet; Take 1 tablet by mouth nightly  Dispense: 30 tablet; Refill: 11  - albuterol sulfate HFA (PROAIR HFA) 108 (90 Base) MCG/ACT inhaler; Inhale 2 puffs into the lungs every 6 hours as needed for Wheezing or Shortness of Breath  Dispense: 18 g; Refill: 5    2. Chronic anxiety  Stable today  Continue Buspirone, Cymbalta, Seroquel, PRN Lorazepam  Encouraged self care  Encouraged 30-45 minutes daily physical exercise as tolearted  Encouraged portion control, mixture of protein, carbohydrates, fruits/ veggies. Encouraged Sleep Hygiene  PDMP checked today. UDS obtained today. Medication contract signed  - LORazepam (ATIVAN) 1 MG tablet; Take 1 tablet by mouth daily for 30 days. Dispense: 30 tablet; Refill: 2    3. Moderate single current episode of major depressive disorder ([facility]  See #2  - Drug Panel-PM-HI Res-UR Interp-A    4. Numbness and tingling of both legs below knees  Stable today  Continue Gabapentin  - gabapentin (NEURONTIN) 600 MG tablet; Take 1 tablet by mouth 3 times daily for 30 days. Dispense: 90 tablet; Refill: 11    5. Chronic bilateral low back pain with bilateral sciatica  Stable today  Xray Hip/ Lumbar Spine ordered 3/2022, pt did not obtain  ContinueTizandine  Declined PT last OV  - ibuprofen (ADVIL;MOTRIN) 800 MG tablet;  Take 1 tablet by mouth 4 times daily as needed for Pain  Dispense: 360 tablet; Refill: 11  - tiZANidine (ZANAFLEX) 2 MG tablet; Take 2 tablets by mouth every 8 hours as needed (muscle spasm)  Dispense: 90 tablet; Refill: 11    6. Gastroesophageal reflux disease with esophagitis without hemorrhage  Stable today  Continue Omeprazole  Encouraged to avoid heartburn inducing foods  - omeprazole (PRILOSEC) 20 MG delayed release capsule; Take 1 capsule by mouth every morning (before breakfast)  Dispense: 30 capsule; Refill: 11    7. Encounter for medication monitoring  See #3  - Drug Panel-PM-HI Res-UR Interp-A    8. History of coronary vasospasm  Stable today  Continue Metoprolol/ Amlodipine  Stress Testing 8/2021 negative, ECHO normal 11/2018      Return in about 6 months (around 12/13/2022). Presenting today for follow up on chronic disease. No new questions/ concerns. CP/ HTN: Improving since last OV. Continues to have chronic SOB/ cough which is slightly worse from prior. Following with Pulm for COPD. Tolerating Amlodipine/ Metoprolol without issue. COPD: Restarted Symbicort/ Spiriva. No hemoptysis, fever. Albuterol use maybe 1-2x/ day. GERD: Slight improvement since last OV. No nocturnal choking or recent flare in heartburn;no wt loss or progressive sx of odynophagia or dysphagia   Anxiety: Tolerating Cymbalta/ Seroquel well. Does not feel Cymbalta is helping anxiety but he has reduced his Lorazepam to daily. Life stress from ex-wife has not resolved. Continues to have daily anxiety and worry. Good support system at home. No SI/ HI. Controlled Substances Monitoring  Last [place] check: 6/13/22  Signs of possible drug abuse or diversion?  No  Date controlled substance contract signed: 6/13/22  Last urine drug screen: 6/13/22    [facility] Scores 3/7/2022 3/8/2019 12/30/2016   PHQ2 Score 2 0 2   PHQ9 Score 2 0 2     Interpretation of Total Score Depression Severity: 1-4 = Minimal depression, 5-9 = Mild depression, 10-14 = Moderate depression, 15-19 = Moderately severe depression, 20-27 = Severe depression    LUCY 7 SCORE 6/13/2022   LUCY-7 Total Score 18     Interpretation of LUCY-7 score: 5-9 = mild anxiety, 10-14 = moderate anxiety, 15+ = severe anxiety. Recommend referral to behavioral health for scores 10 or greater. Lab Results   Component Value Date     06/13/2022    K 4.8 06/13/2022     06/13/2022    CO2 27 06/13/2022    BUN 9 06/13/2022    CREATININE 1.0 06/13/2022    GLUCOSE 92 06/13/2022    CALCIUM 9.5 06/13/2022    PROT 6.9 06/13/2022    LABALBU 4.6 06/13/2022    BILITOT 0.8 06/13/2022    ALKPHOS 74 06/13/2022    AST 18 06/13/2022    ALT 16 06/13/2022    LABGLOM >60 06/13/2022    GFRAA >60 06/13/2022    AGRATIO 2.0 06/13/2022    GLOB 2.1 07/12/2021         Review of Systems   Constitutional: Negative for fever. Cardiovascular: Negative for chest pain. Gastrointestinal: Negative for abdominal pain. Genitourinary: Negative for dysuria. Musculoskeletal: Positive for back pain. Neurological: Positive for weakness and numbness. Negative for headaches.        Current Outpatient Medications on File Prior to Visit   Medication Sig Dispense Refill    QUEtiapine (SEROQUEL) 25 MG tablet Take 0.5 tablets by mouth daily Anxiety/ depression 15 tablet 0    tiotropium (SPIRIVA RESPIMAT) 2.5 MCG/ACT AERS inhaler INHALE TWO (2) PUFFS INTO THE LUNGS DAILY 4 g 5    budesonide-formoterol (SYMBICORT) 160-4.5 MCG/ACT AERO Inhale 2 puffs into the lungs 2 times daily 10.2 g 5    guaiFENesin (MUCINEX) 600 MG extended release tablet Take 1 tablet by mouth 2 times daily 60 tablet 5    fluticasone (FLONASE) 50 MCG/ACT nasal spray 1 spray by Each Nostril route daily 16 g 5    DULoxetine (CYMBALTA) 60 MG extended release capsule Take 1 capsule by mouth daily 90 capsule 1    UNABLE TO FIND Sig: one infusion   700 mg Bamlanivimab  1.4 g  Etesevimab 1 Dose 0    dicyclomine (BENTYL) 10 MG capsule Take 1 capsule by mouth 4 times daily as needed (abdominal cramps.) 90 capsule 1     No current facility-administered medications on file prior to visit. Allergies   Allergen Reactions    Codeine Itching    Mobic [Meloxicam]      Irritates stomach    Tramadol      Patient states he doesn't feel right on tramadol. Past Medical History:   Diagnosis Date    Arthritis     Chest pain     [facility] 2012 sent for records;    Colitis     COPD [facility]chronic obstructive [facility] disease) [facility]     COVID 2021    Histoplasmosis     History of blood transfusion     Hx of blood clots     Metabolic syndrome X 24/15/5844    Other emphysema ([facility] 2018    Pneumonia     Pulmonary nodule      Past Surgical History:   Procedure Laterality Date    BRONCHOSCOPY  2013    COLONOSCOPY  2018    COLONOSCOPY N/A 2018    COLORECTAL CANCER SCREENING, NOT HIGH RISK performed by Judd Sun DO at [facility] CATH LAB PROCEDURE      KNEE ARTHROSCOPY Right     KNEE ARTHROSCOPY Right 2019    RIGHT KNEE VIDEO ARTHROSCOPY, MEDIAL MENISCECTOMY performed by Eladia Calhoun MD at [facility]      neck    VASECTOMY       Social History     Tobacco Use    Smoking status: Former Smoker     Packs/day: 2.00     Years: 20.00     Pack years: 40.00     Types: Cigarettes     Quit date: 2018     Years since quittin.4    Smokeless tobacco: Never Used   Substance Use Topics    Alcohol use:  Yes     Alcohol/week: 1.0 standard drink     Types: 1 Cans of beer per week     Comment: rarely     Family History   Problem Relation Age of Onset    Cancer Mother         ovarian    Heart Failure Mother     Heart Attack Mother 62    Heart Disease Mother     Other Father          1948;ashd;per neurop;    Other Brother         pt knows very little    Cancer Paternal Uncle         leukemia    Cancer Paternal Grandfather         leukemia    Heart Attack Maternal Uncle 60        x4     No Known Problems Son     No Known Problems Son     Asthma Neg Hx     Diabetes Neg Hx     Emphysema Neg Hx     Hypertension Neg Hx        Vitals:    06/13/22 1035   BP: 116/74   Site: Left Upper Arm   Position: Sitting   Cuff Size: Medium Adult   Pulse: 63   Resp: 16   SpO2: 97%   Weight: 162 lb 9.6 oz (73.8 kg)   Height: 5' 7\" (1.702 m)     Estimated body mass index is 25.47 kg/m² as calculated from the following:    Height as of this encounter: 5' 7\" (1.702 m). Weight as of this encounter: 162 lb 9.6 oz (73.8 kg). Physical Exam  Constitutional:       General: He is not in acute distress. Appearance: Normal appearance. He is not ill-appearing. HENT:      Head: Normocephalic. Eyes:      Pupils: Pupils are equal, round, and reactive to light. Cardiovascular:      Rate and Rhythm: Normal rate and regular rhythm. Pulses: Normal pulses. Heart sounds: Normal heart sounds. No murmur heard. Pulmonary:      Effort: Pulmonary effort is normal. No respiratory distress. Breath sounds: Normal breath sounds. No wheezing. Abdominal:      General: Abdomen is flat. Bowel sounds are normal.      Palpations: Abdomen is soft. Tenderness: There is no abdominal tenderness. Hernia: No hernia is present. Musculoskeletal:         General: No swelling. Cervical back: Normal range of motion and neck supple. No tenderness. Thoracic back: Normal.      Lumbar back: Spasms and tenderness present. No swelling, edema, deformity, signs of trauma, lacerations or bony tenderness. Decreased range of motion (right/ left lateral ). Positive right straight leg raise test and positive left straight leg raise test. No scoliosis. Right hip: No tenderness, bony tenderness or crepitus. Decreased range of motion. Normal strength. Left hip: No tenderness, bony tenderness or crepitus. Decreased range of motion. Normal strength. Right lower leg: No edema. Left lower leg: No edema. Lymphadenopathy:      Cervical: No cervical adenopathy. Skin:     General: Skin is warm and dry. Capillary Refill: Capillary refill takes less than 2 seconds. Neurological:      General: No focal deficit present. Mental Status: He is alert and oriented to person, place, and time. Mental status is at baseline. Cranial Nerves: No cranial nerve deficit. Sensory: Sensation is intact. Motor: Motor function is intact. Coordination: Coordination is intact. Gait: Gait is intact. Deep Tendon Reflexes:      Reflex Scores:       Patellar reflexes are 2+ on the right side and 2+ on the left side. Psychiatric:         Mood and Affect: Mood normal.         Behavior: Behavior normal.         Judgment: Judgment normal.           Patient's questions answered and concerns addressed. Patient agrees to plan of care.         Electronically signed by MICHELLE Varghese CNP on 6/17/2022 at 8:09 AM

## 2022-06-13 NOTE — PATIENT INSTRUCTIONS
Patient Education        buspirone  Pronunciation: juve JAYLENE pena  Brand: BuSpar  What is the most important information I should know about buspirone? Do not use this medicine if you have used an MAO inhibitor in the past 14 days, such as isocarboxazid, linezolid, methylene blue injection, phenelzine,rasagiline, selegiline, or tranylcypromine. What is buspirone? Buspirone is used to treat symptoms of anxiety, such as fear, tension,irritability, dizziness, pounding heartbeat, and other physical symptoms. Buspirone is not an anti-psychotic medication and should not be used in place of medication prescribed by your doctor [facility]. Buspirone may also be used for purposes not listed in this medication guide. What should I discuss with my healthcare provider before taking buspirone? You should not use buspirone if you are allergic to it. Do not use buspirone if you have used an MAO inhibitor in the past 14 days. A dangerous drug interaction could occur. MAO inhibitors include isocarboxazid, linezolid, methylene blue injection, phenelzine, rasagiline,selegiline, tranylcypromine, and others. You may need to wait 14 days after stopping buspirone before you can take an MAOI. Tell your doctor if you have ever had:   kidney disease; or   liver disease. Be sure your doctor knows if you also take stimulant medicine, opioid medicine, herbal products, or medicine for depression, mental illness, Parkinson's disease, migraine headaches, serious infections, or prevention of nausea and vomiting. These medicines may interact with buspirone and cause a serious condition called serotonin syndrome. Tell your doctor if you are pregnant. You should not breastfeed while using buspirone. Do not give this medicine to a child without medical advice. How should I take buspirone? Follow all directions on your prescription label and read all medication guides or instruction sheets.  Your doctor may occasionally change your dose. Use themedicine exactly as directed. You may take buspirone with or without food but take it the same way each time. If you have switched to buspirone from another anxiety medication, you may need to slowly decrease your dose of the other medication rather than stopping suddenly. Some anxiety medications can cause withdrawal symptoms whenyou stop taking them suddenly after long-term use. Read and carefully follow any Instructions for Use provided with your medicine. Ask your doctor or pharmacist if you do not understand these instructions. Some buspirone tablets are scored so you can break the tablet into 2 or 3 pieces in order to take a smaller dose. Do not use a buspirone tablet if it has not been broken correctly and the piece is too big or too small. Follow yourdoctor's instructions about how much of the tablet to take. Buspirone can cause false positive results with certain medical tests. You may need to stop using the medicine for at least 48 hours before your test. [facility] doctor who treats you that you are using buspirone. Store at room temperature away from moisture, heat, and light. What happens if I miss a dose? Take the medicine as soon as you can, but skip the missed dose if it is almost time for your next dose. Do not take two doses at one time. What happens if I overdose? Seek emergency medical attention or call the Poison Help line at 1-163.509.1157. What should I avoid while taking buspirone? Avoid driving or hazardous activity until you know how this medicine willaffect you. Your reactions could be impaired. Drinking alcohol may increase certain side effects of buspirone. Grapefruit may interact with buspirone and lead to unwanted side effects. Avoid the use of grapefruit products. What are the possible side effects of buspirone?   Get emergency medical help if you have signs of an allergic reaction: hives; difficult breathing; swelling of your face, lips, tongue, or throat. Call your doctor at once if you have:   chest pain;   shortness of breath; or   a light-headed feeling, like you might pass out. Seek medical attention right away if you have symptoms of serotonin syndrome, such as: agitation, hallucinations, fever, sweating, shivering, fast heart rate, musclestiffness, twitching, loss of coordination, nausea, vomiting, or diarrhea. Common side effects may include:   headache;   dizziness, feeling light-headed;   nausea; or   feeling nervous or excited. This is not a complete list of side effects and others may occur. Call your doctor for medical advice about side effects. You may report side effects toFDA at 8-827-ZIP-7282. What other drugs will affect buspirone? Using buspirone with other drugs that make you drowsy or slow your breathing can worsen these effects. Ask your doctor before using opioid medication, asleeping pill, a muscle relaxer, or medicine for anxiety or seizures. Tell your doctor about all your current medicines. Many drugs can affect buspirone, especially:   nefazodone;   Milan's wort;   an antibiotic --clarithromycin, erythromycin, rifabutin, rifampin, rifapentine, telithromycin;   antifungal medicine --itraconazole, ketoconazole;   antiviral medicine to treat HIV/AIDS --efavirenz, indinavir, nelfinavir, nevirapine, ritonavir, saquinavir;   cancer medicine --apalutamide, enzalutamide, mitotane;   heart or blood pressure medicines --diltiazem, verapamil;   seizure medicine --carbamazepine, oxcarbazepine, phenytoin, primidone; or   steroid medicine --dexamethasone, prednisone. This list is not complete and many other drugs may affect buspirone. This includes prescription and over-the-counter medicines, vitamins, andherbal products. Not all possible drug interactions are listed here. Where can I get more information? Your pharmacist can provide more information about buspirone.   Remember, keep this and all other medicines out of the reach of children, never share your medicines with others, and use this medication only for the indication prescribed. Every effort has been made to ensure that the information provided by [address] is accurate, up-to-date, and complete, but no guarantee is made to that effect. Drug information contained herein may be time sensitive. [facility] information has been compiled for use by healthcare practitioners and consumers in the [facility] and therefore [facility] does not warrant that uses outside of the [facility] are appropriate, unless specifically indicated otherwise. [facility]'s drug information does not endorse drugs, diagnose patients or recommend therapy. [facility]'s drug information is an informational resource designed to assist licensed healthcare practitioners in caring for their patients and/or to serve consumers viewing this service as a supplement to, and not a substitute for, the expertise, skill, knowledge and judgment of healthcare practitioners. The absence of a warning for a given drug or drug combination in no way should be construed to indicate that the drug or drug combination is safe, effective or appropriate for any given patient. [facility] does not assume any responsibility for any aspect of healthcare administered with the aid of information [facility] provides. The information contained herein is not intended to cover all possible uses, directions, precautions, warnings, drug interactions, allergic reactions, or adverse effects. If you have questions about the drugs you are taking, check with yourdoctor, nurse or pharmacist.  Copyright 7048-1239 [address]. Version: 6.01. Revision date: 2/24/2020. Care instructions adapted under license by [facility] ([facility]). If you have questions about a medical condition or this instruction, always ask your healthcare professional. [place] [address] any warranty or liability for your use of this information.

## 2022-06-14 LAB
A/G RATIO: 2 (ref 1.1–2.2)
ALBUMIN SERPL-MCNC: 4.6 G/DL (ref 3.4–5)
ALP BLD-CCNC: 74 U/L (ref 40–129)
ALT SERPL-CCNC: 16 U/L (ref 10–40)
ANION GAP SERPL CALCULATED.3IONS-SCNC: 10 MMOL/L (ref 3–16)
AST SERPL-CCNC: 18 U/L (ref 15–37)
BASOPHILS ABSOLUTE: 0.1 K/UL (ref 0–0.2)
BASOPHILS RELATIVE PERCENT: 1 %
BILIRUB SERPL-MCNC: 0.8 MG/DL (ref 0–1)
BUN BLDV-MCNC: 9 MG/DL (ref 7–20)
CALCIUM SERPL-MCNC: 9.5 MG/DL (ref 8.3–10.6)
CHLORIDE BLD-SCNC: 103 MMOL/L (ref 99–110)
CHOLESTEROL, TOTAL: 175 MG/DL (ref 0–199)
CO2: 27 MMOL/L (ref 21–32)
CREAT SERPL-MCNC: 1 MG/DL (ref 0.9–1.3)
EOSINOPHILS ABSOLUTE: 0.7 K/UL (ref 0–0.6)
EOSINOPHILS RELATIVE PERCENT: 10 %
GFR AFRICAN AMERICAN: >60
GFR NON-AFRICAN AMERICAN: >60
GLUCOSE BLD-MCNC: 92 MG/DL (ref 70–99)
HCT VFR BLD CALC: 44.3 % (ref 40.5–52.5)
HDLC SERPL-MCNC: 41 MG/DL (ref 40–60)
HEMOGLOBIN: 15.4 G/DL (ref 13.5–17.5)
LDL CHOLESTEROL CALCULATED: 119 MG/DL
LYMPHOCYTES ABSOLUTE: 1.8 K/UL (ref 1–5.1)
LYMPHOCYTES RELATIVE PERCENT: 23.9 %
MCH RBC QN AUTO: 29.9 PG (ref 26–34)
MCHC RBC AUTO-ENTMCNC: 34.7 G/DL (ref 31–36)
MCV RBC AUTO: 86.2 FL (ref 80–100)
MONOCYTES ABSOLUTE: 0.5 K/UL (ref 0–1.3)
MONOCYTES RELATIVE PERCENT: 7.1 %
NEUTROPHILS ABSOLUTE: 4.2 K/UL (ref 1.7–7.7)
NEUTROPHILS RELATIVE PERCENT: 58 %
PDW BLD-RTO: 13.4 % (ref 12.4–15.4)
PLATELET # BLD: 287 K/UL (ref 135–450)
PMV BLD AUTO: 8.2 FL (ref 5–10.5)
POTASSIUM SERPL-SCNC: 4.8 MMOL/L (ref 3.5–5.1)
RBC # BLD: 5.14 M/UL (ref 4.2–5.9)
SODIUM BLD-SCNC: 140 MMOL/L (ref 136–145)
TOTAL PROTEIN: 6.9 G/DL (ref 6.4–8.2)
TRIGL SERPL-MCNC: 77 MG/DL (ref 0–150)
VLDLC SERPL CALC-MCNC: 15 MG/DL
WBC # BLD: 7.3 K/UL (ref 4–11)

## 2022-06-14 NOTE — PROGRESS NOTES
Pt ok to DC from cardiology standpoint per Ana GLASER.     Electronically signed by Latanya Plunkett RN on 11/14/2018 at 2:55 PM shortness of breath started today while driving

## 2022-06-16 LAB
6-ACETYLMORPHINE: NOT DETECTED
7-AMINOCLONAZEPAM: NOT DETECTED
ALPHA-OH-ALPRAZOLAM: NOT DETECTED
ALPHA-OH-MIDAZOLAM, URINE: NOT DETECTED
ALPRAZOLAM: NOT DETECTED
AMPHETAMINE: NOT DETECTED
BARBITURATES: NOT DETECTED
BENZOYLECGONINE: NOT DETECTED
BUPRENORPHINE: NOT DETECTED
CARISOPRODOL: NOT DETECTED
CLONAZEPAM: NOT DETECTED
CODEINE: NOT DETECTED
CREATININE URINE: 88 MG/DL (ref 20–400)
DIAZEPAM: NOT DETECTED
DRUGS EXPECTED: NORMAL
EER PAIN MGT DRUG PANEL, HIGH RES/EMIT U: NORMAL
ETHYL GLUCURONIDE: NOT DETECTED
FENTANYL: NOT DETECTED
GABAPENTIN: NOT DETECTED
HYDROCODONE: NOT DETECTED
HYDROMORPHONE: NOT DETECTED
LORAZEPAM: PRESENT
MARIJUANA METABOLITE: NOT DETECTED
MDA: NOT DETECTED
MDEA: NOT DETECTED
MDMA URINE: NOT DETECTED
MEPERIDINE: NOT DETECTED
METHADONE: NOT DETECTED
METHAMPHETAMINE: NOT DETECTED
METHYLPHENIDATE: NOT DETECTED
MIDAZOLAM: NOT DETECTED
MORPHINE: NOT DETECTED
NALOXONE: NOT DETECTED
NORBUPRENORPHINE, FREE: NOT DETECTED
NORDIAZEPAM: NOT DETECTED
NORFENTANYL: NOT DETECTED
NORHYDROCODONE, URINE: NOT DETECTED
NOROXYCODONE: NOT DETECTED
NOROXYMORPHONE, URINE: NOT DETECTED
OXAZEPAM: NOT DETECTED
OXYCODONE: NOT DETECTED
OXYMORPHONE: NOT DETECTED
PAIN MANAGEMENT DRUG PANEL: NORMAL
PAIN MANAGEMENT DRUG PANEL: NORMAL
PCP: NOT DETECTED
PHENTERMINE: NOT DETECTED
PREGABALIN: NOT DETECTED
TAPENTADOL, URINE: NOT DETECTED
TAPENTADOL-O-SULFATE, URINE: NOT DETECTED
TEMAZEPAM: NOT DETECTED
TRAMADOL: NOT DETECTED
ZOLPIDEM: NOT DETECTED

## 2022-06-17 PROBLEM — R20.8 DYSESTHESIA: Status: RESOLVED | Noted: 2017-06-30 | Resolved: 2022-06-17

## 2022-06-17 PROBLEM — R07.89 CHEST TIGHTNESS: Status: RESOLVED | Noted: 2018-10-29 | Resolved: 2022-06-17

## 2022-06-17 PROBLEM — R27.0 ATAXIA: Status: RESOLVED | Noted: 2017-02-06 | Resolved: 2022-06-17

## 2022-06-17 PROBLEM — R06.02 SHORTNESS OF BREATH: Status: RESOLVED | Noted: 2018-10-29 | Resolved: 2022-06-17

## 2022-06-17 PROBLEM — R07.9 CHEST PAIN: Status: RESOLVED | Noted: 2018-11-13 | Resolved: 2022-06-17

## 2022-06-17 PROBLEM — R42 DIZZINESS: Status: RESOLVED | Noted: 2018-10-29 | Resolved: 2022-06-17

## 2022-06-17 PROBLEM — I73.9 INTERMITTENT CLAUDICATION (HCC): Status: RESOLVED | Noted: 2017-07-06 | Resolved: 2022-06-17

## 2022-06-17 PROBLEM — G82.20 PARAPARESIS OF BOTH LOWER LIMBS (HCC): Status: RESOLVED | Noted: 2017-06-30 | Resolved: 2022-06-17

## 2022-06-17 PROBLEM — K21.00 GASTROESOPHAGEAL REFLUX DISEASE WITH ESOPHAGITIS WITHOUT HEMORRHAGE: Status: ACTIVE | Noted: 2022-06-17

## 2022-06-17 PROBLEM — R20.0 NUMBNESS: Status: RESOLVED | Noted: 2017-06-30 | Resolved: 2022-06-17

## 2022-06-17 PROBLEM — R23.1 SKIN PALLOR: Status: RESOLVED | Noted: 2017-06-30 | Resolved: 2022-06-17

## 2022-06-17 PROBLEM — M23.203 DEGENERATIVE TEAR OF MEDIAL MENISCUS OF RIGHT KNEE: Status: RESOLVED | Noted: 2019-09-04 | Resolved: 2022-06-17

## 2022-06-17 ASSESSMENT — ENCOUNTER SYMPTOMS
ABDOMINAL PAIN: 0
BACK PAIN: 1

## 2022-06-17 NOTE — RESULT ENCOUNTER NOTE
No anemia/ infection. Cholesterol is elevated but no need for medication. Normal liver/ kidney function. Drug screening consistent with what we were expecting. No protein in your urine- what we were hoping to see given heart issues. Please call if you have additional questions and/ or concerns. Please keep your next appt. Thank you.

## 2022-07-05 ENCOUNTER — OFFICE VISIT (OUTPATIENT)
Dept: FAMILY MEDICINE CLINIC | Age: 52
End: 2022-07-05
Payer: MEDICARE

## 2022-07-05 VITALS
OXYGEN SATURATION: 97 % | HEART RATE: 64 BPM | SYSTOLIC BLOOD PRESSURE: 110 MMHG | WEIGHT: 165 LBS | DIASTOLIC BLOOD PRESSURE: 70 MMHG | TEMPERATURE: 98.2 F | BODY MASS INDEX: 25.84 KG/M2

## 2022-07-05 DIAGNOSIS — R06.02 SHORTNESS OF BREATH: ICD-10-CM

## 2022-07-05 DIAGNOSIS — R42 DIZZINESS: Primary | ICD-10-CM

## 2022-07-05 DIAGNOSIS — F41.9 CHRONIC ANXIETY: ICD-10-CM

## 2022-07-05 DIAGNOSIS — J43.8 OTHER EMPHYSEMA (HCC): ICD-10-CM

## 2022-07-05 DIAGNOSIS — H61.22 LEFT EAR IMPACTED CERUMEN: ICD-10-CM

## 2022-07-05 DIAGNOSIS — F32.1 MODERATE SINGLE CURRENT EPISODE OF MAJOR DEPRESSIVE DISORDER (HCC): ICD-10-CM

## 2022-07-05 PROCEDURE — G8428 CUR MEDS NOT DOCUMENT: HCPCS | Performed by: NURSE PRACTITIONER

## 2022-07-05 PROCEDURE — 1036F TOBACCO NON-USER: CPT | Performed by: NURSE PRACTITIONER

## 2022-07-05 PROCEDURE — 99214 OFFICE O/P EST MOD 30 MIN: CPT | Performed by: NURSE PRACTITIONER

## 2022-07-05 PROCEDURE — 3023F SPIROM DOC REV: CPT | Performed by: NURSE PRACTITIONER

## 2022-07-05 PROCEDURE — 69209 REMOVE IMPACTED EAR WAX UNI: CPT | Performed by: NURSE PRACTITIONER

## 2022-07-05 PROCEDURE — 36415 COLL VENOUS BLD VENIPUNCTURE: CPT | Performed by: NURSE PRACTITIONER

## 2022-07-05 PROCEDURE — G8419 CALC BMI OUT NRM PARAM NOF/U: HCPCS | Performed by: NURSE PRACTITIONER

## 2022-07-05 PROCEDURE — 3017F COLORECTAL CA SCREEN DOC REV: CPT | Performed by: NURSE PRACTITIONER

## 2022-07-05 ASSESSMENT — ENCOUNTER SYMPTOMS
WHEEZING: 0
BACK PAIN: 1
SHORTNESS OF BREATH: 1
CHEST TIGHTNESS: 0
COUGH: 0
ABDOMINAL PAIN: 0

## 2022-07-05 NOTE — PATIENT INSTRUCTIONS
Weaning Schedule    Week 1- Take 0.5 tab Seroquel Sunday, Monday, Wednesday, Friday. Take 0 tabs of Seroquel on Tuesday, Thursday Saturday  Week 2- Take 0 tabs of Celexa    ** Take Cymbalta daily during Weaning Schedule. Once you stop Seroquel start taking Buspirone 2 tabs 2x/ day. If BP <110/70 take 1/2 of Metoprolol    Obtain imaging of lungs.

## 2022-07-05 NOTE — PROGRESS NOTES
Blood drawn per order. Needle size: 21 g  Site: L Antecubital.  First attempt successful Yes    Second attempt na    Pressure applied until bleeding stopped. Cotton ball and band aid  applied. Patient informed to call office or return if bleeding reoccurs and unable to stop.     Tubes drawn: 1 purple     1 red

## 2022-07-05 NOTE — PROGRESS NOTES
Ceruminosis is noted in left ear . Wax is removed by syringing and manual debridement. Instructions for home care to prevent wax buildup are given.

## 2022-07-05 NOTE — PROGRESS NOTES
7/5/2022    Chief Complaint   Patient presents with    Dizziness     intermittently over the past 2 weeks.  Shortness of Breath     past 2 weeks    Insomnia       Néstor Camacho Sr. is a 49 y.o. male, presents today for:      ASSESSMENT/PLAN:    1. Dizziness  Unclear etiology. Cerumen impaction (which is chronic, especially on left ear) vs. Medication related (Buspirone started 2 weeks ago), vs. Pulmonary, vs. Cardiac  Cerumen impaction removal today  Labs today to determine organic cause  Encouraged to obtain previously ordered Pulmonary testing   Stop Seroquel as may be reacting with Buspirone. Cautiously continue Buspirone for now  Monitor BP given slightly hypotensive today  - Comprehensive Metabolic Panel  - CBC with Auto Differential    2. Left ear impacted cerumen  Cerumen removal completed today  Encouraged Debrox due to chronic nature of impaction. Consider ENT referral.     3. Shortness of breath  Unclear etiology. Encouraged to obtain previously ordered PFT and LDCT    4. Other emphysema ([address]  Continue Symbicort/ Spiriva, could not afford Trelegy  Did not obtain LDCT ordered last OV  Encouraged to obtain PFT/ 6MW/ Carbonmonoxide Diffusing Capacity previously ordered. Encouraged Acapella use ordered by Pulm  Encouraged smoking cessation  Continue Singulair/ antihistamine  Encouraged COVID vaccination    5. Chronic anxiety  Stop Seroquel in case polypharmacy is contributing to dizziness. Continue Buspirone for now. Continue Cymbalta    6. Moderate single current episode of major depressive disorder ([address]  See above  Encouraged 30-45 minutes daily physical exercise as tolearted  Encouraged portion control, mixture of protein, carbohydrates, fruits/ veggies. Encouraged Sleep Hygiene        Return in about 3 weeks (around 7/26/2022). Presenting today for acute on chronic complaint. Dizziness/ sob over the past 2 weeks. Occurring with activity both inside and outside.   No CP/ palpitations, leg swelling. CP/ HTN: Improving since last OV.  Continues to have chronic SOB/ cough which is slightly worse from prior.  Following with Pulm for COPD.  Tolerating Amlodipine/ Metoprolol without issue.    COPD: Restarted Symbicort/ Spiriva.  No hemoptysis, fever.  Albuterol use maybe 1-2x/ day. GERD: Slight improvement since last OV. No nocturnal choking or recent flare in heartburn;no wt loss or progressive sx of odynophagia or dysphagia   Anxiety: Started Buspirone 2 weeks ago, does not feel it is helping. Does not feel Seroquel is helping.  Does not feel Cymbalta is helping anxiety but he has reduced his Lorazepam to daily.  Life stress from ex-wife has not resolved.  Continues to have daily anxiety and worry.  Good support system at home.  No SI/ HI.       Controlled Substances Monitoring  Last [place] check: 6/13/22  Signs of possible drug abuse or diversion? No  Date controlled substance contract signed: 6/13/22  Last urine drug screen: 6/13/22  Lab Results   Component Value Date     06/13/2022    K 4.8 06/13/2022     06/13/2022    CO2 27 06/13/2022    BUN 9 06/13/2022    CREATININE 1.0 06/13/2022    GLUCOSE 92 06/13/2022    CALCIUM 9.5 06/13/2022    PROT 6.9 06/13/2022    LABALBU 4.6 06/13/2022    BILITOT 0.8 06/13/2022    ALKPHOS 74 06/13/2022    AST 18 06/13/2022    ALT 16 06/13/2022    LABGLOM >60 06/13/2022    GFRAA >60 06/13/2022    AGRATIO 2.0 06/13/2022    GLOB 2.1 07/12/2021         Review of Systems   Constitutional: Negative for fever. Respiratory: Positive for shortness of breath. Negative for cough, chest tightness and wheezing. Cardiovascular: Negative for chest pain and palpitations. Gastrointestinal: Negative for abdominal pain. Genitourinary: Negative for dysuria. Musculoskeletal: Positive for back pain. Neurological: Positive for dizziness, weakness and numbness. Negative for headaches.        Current Outpatient Medications on File Prior to Visit Medication Sig Dispense Refill    montelukast (SINGULAIR) 10 MG tablet Take 1 tablet by mouth nightly 30 tablet 11    metoprolol succinate (TOPROL XL) 25 MG extended release tablet Take 1 tablet by mouth daily 30 tablet 11    amLODIPine (NORVASC) 2.5 MG tablet TAKE ONE TABLET BY MOUTH EVERY DAY FOR CHEST PAIN 90 tablet 11    gabapentin (NEURONTIN) 600 MG tablet Take 1 tablet by mouth 3 times daily for 30 days. 90 tablet 11    ibuprofen (ADVIL;MOTRIN) 800 MG tablet Take 1 tablet by mouth 4 times daily as needed for Pain 360 tablet 11    tiZANidine (ZANAFLEX) 2 MG tablet Take 2 tablets by mouth every 8 hours as needed (muscle spasm) 90 tablet 11    LORazepam (ATIVAN) 1 MG tablet Take 1 tablet by mouth daily for 30 days.  30 tablet 2    omeprazole (PRILOSEC) 20 MG delayed release capsule Take 1 capsule by mouth every morning (before breakfast) 30 capsule 11    albuterol sulfate HFA (PROAIR HFA) 108 (90 Base) MCG/ACT inhaler Inhale 2 puffs into the lungs every 6 hours as needed for Wheezing or Shortness of Breath 18 g 5    busPIRone (BUSPAR) 5 MG tablet Take 1 tablet by mouth 2 times daily 60 tablet 0    QUEtiapine (SEROQUEL) 25 MG tablet Take 0.5 tablets by mouth daily Anxiety/ depression 15 tablet 0    tiotropium (SPIRIVA RESPIMAT) 2.5 MCG/ACT AERS inhaler INHALE TWO (2) PUFFS INTO THE LUNGS DAILY 4 g 5    budesonide-formoterol (SYMBICORT) 160-4.5 MCG/ACT AERO Inhale 2 puffs into the lungs 2 times daily 10.2 g 5    guaiFENesin (MUCINEX) 600 MG extended release tablet Take 1 tablet by mouth 2 times daily 60 tablet 5    fluticasone (FLONASE) 50 MCG/ACT nasal spray 1 spray by Each Nostril route daily 16 g 5    DULoxetine (CYMBALTA) 60 MG extended release capsule Take 1 capsule by mouth daily 90 capsule 1    UNABLE TO FIND Sig: one infusion   700 mg Bamlanivimab  1.4 g  Etesevimab 1 Dose 0    dicyclomine (BENTYL) 10 MG capsule Take 1 capsule by mouth 4 times daily as needed (abdominal cramps.) 90 capsule 1     No current facility-administered medications on file prior to visit. Allergies   Allergen Reactions    Codeine Itching    Mobic [Meloxicam]      Irritates stomach    Tramadol      Patient states he doesn't feel right on tramadol. Past Medical History:   Diagnosis Date    Arthritis     Chest pain     [facility] 2012 sent for records;    Colitis     [facility]     COVID 2021    Histoplasmosis     History of blood transfusion     Hx of blood clots     Metabolic syndrome X     Other emphysema ([facility] 2018    Pneumonia     Pulmonary nodule      Past Surgical History:   Procedure Laterality Date    BRONCHOSCOPY  2013    COLONOSCOPY  2018    COLONOSCOPY N/A 2018    COLORECTAL CANCER SCREENING, NOT HIGH RISK performed by Prisca Kidd DO at [facility] CATH LAB PROCEDURE      KNEE ARTHROSCOPY Right     KNEE ARTHROSCOPY Right 2019    RIGHT KNEE VIDEO ARTHROSCOPY, MEDIAL MENISCECTOMY performed by Marni Shone, MD at [facility]      neck    VASECTOMY       Social History     Tobacco Use    Smoking status: Former Smoker     Packs/day: 2.00     Years: 20.00     Pack years: 40.00     Types: Cigarettes     Quit date: 2018     Years since quittin.5    Smokeless tobacco: Never Used   Substance Use Topics    Alcohol use:  Yes     Alcohol/week: 1.0 standard drink     Types: 1 Cans of beer per week     Comment: rarely     Family History   Problem Relation Age of Onset    Cancer Mother         ovarian    Heart Failure Mother     Heart Attack Mother 62    Heart Disease Mother     Other Father          1948;ashd;per neurop;    Other Brother         pt knows very little    Cancer Paternal Uncle         leukemia    Cancer Paternal Grandfather         leukemia    Heart Attack Maternal Uncle 60        x4     No Known Problems Son     No Known Problems Son     Asthma Neg Hx     Diabetes Neg Hx     Emphysema Neg Hx     Hypertension Neg Hx        Vitals:    07/05/22 1034   BP: 110/70   Site: Left Upper Arm   Pulse: 64   Temp: 98.2 °F (36.8 °C)   SpO2: 97%   Weight: 165 lb (74.8 kg)     Estimated body mass index is 25.84 kg/m² as calculated from the following:    Height as of 6/13/22: 5' 7\" (1.702 m). Weight as of this encounter: 165 lb (74.8 kg). The 10-year ASCVD risk score (Dayanara Albright et al., 2013) is: 2.7%    Values used to calculate the score:      Age: 49 years      Sex: Male      Is Non- : No      Diabetic: No      Tobacco smoker: No      Systolic Blood Pressure: 929 mmHg      Is BP treated: No      HDL Cholesterol: 41 mg/dL      Total Cholesterol: 175 mg/dL      Physical Exam  Constitutional:       General: He is not in acute distress. Appearance: Normal appearance. He is not ill-appearing. HENT:      Head: Normocephalic. Right Ear: Tympanic membrane normal.      Left Ear: External ear normal. There is impacted cerumen. Nose: Nose normal.      Mouth/Throat:      Mouth: Mucous membranes are moist.   Eyes:      Extraocular Movements: Extraocular movements intact. Conjunctiva/sclera: Conjunctivae normal.      Pupils: Pupils are equal, round, and reactive to light. Cardiovascular:      Rate and Rhythm: Normal rate and regular rhythm. Pulses: Normal pulses. Heart sounds: Normal heart sounds. No murmur heard. Pulmonary:      Effort: Pulmonary effort is normal. No respiratory distress. Breath sounds: Normal breath sounds. No wheezing. Abdominal:      General: Abdomen is flat. Bowel sounds are normal.      Palpations: Abdomen is soft. Tenderness: There is no abdominal tenderness. Hernia: No hernia is present. Musculoskeletal:         General: No swelling. Cervical back: Normal range of motion and neck supple. Lumbar back: No edema. Right hip: No tenderness, bony tenderness or crepitus. Decreased range of motion. Normal strength. Left hip: No tenderness, bony tenderness or crepitus. Decreased range of motion. Normal strength. Right lower leg: No edema. Left lower leg: No edema. Lymphadenopathy:      Cervical: No cervical adenopathy. Skin:     General: Skin is warm and dry. Capillary Refill: Capillary refill takes less than 2 seconds. Neurological:      General: No focal deficit present. Mental Status: He is alert and oriented to person, place, and time. Mental status is at baseline. Cranial Nerves: No cranial nerve deficit. Sensory: Sensation is intact. No sensory deficit. Motor: Motor function is intact. No weakness. Coordination: Coordination is intact. Coordination normal.      Gait: Gait is intact. Gait normal.      Deep Tendon Reflexes: Reflexes normal.      Reflex Scores:       Patellar reflexes are 2+ on the right side and 2+ on the left side. Psychiatric:         Mood and Affect: Mood normal.         Behavior: Behavior normal.         Thought Content: Thought content normal.         Judgment: Judgment normal.           Patient's questions answered and concerns addressed. Patient agrees to plan of care.         Electronically signed by MICHELLE Parra CNP on 7/5/2022 at 1:24 PM

## 2022-07-06 LAB
A/G RATIO: 1.8 (ref 1.1–2.2)
ALBUMIN SERPL-MCNC: 4.4 G/DL (ref 3.4–5)
ALP BLD-CCNC: 82 U/L (ref 40–129)
ALT SERPL-CCNC: 16 U/L (ref 10–40)
ANION GAP SERPL CALCULATED.3IONS-SCNC: 16 MMOL/L (ref 3–16)
AST SERPL-CCNC: 15 U/L (ref 15–37)
BASOPHILS ABSOLUTE: 0.1 K/UL (ref 0–0.2)
BASOPHILS RELATIVE PERCENT: 0.9 %
BILIRUB SERPL-MCNC: 0.7 MG/DL (ref 0–1)
BUN BLDV-MCNC: 13 MG/DL (ref 7–20)
CALCIUM SERPL-MCNC: 9.7 MG/DL (ref 8.3–10.6)
CHLORIDE BLD-SCNC: 105 MMOL/L (ref 99–110)
CO2: 23 MMOL/L (ref 21–32)
CREAT SERPL-MCNC: 1.1 MG/DL (ref 0.9–1.3)
EOSINOPHILS ABSOLUTE: 0.6 K/UL (ref 0–0.6)
EOSINOPHILS RELATIVE PERCENT: 7.6 %
GFR AFRICAN AMERICAN: >60
GFR NON-AFRICAN AMERICAN: >60
GLUCOSE BLD-MCNC: 76 MG/DL (ref 70–99)
HCT VFR BLD CALC: 43.3 % (ref 40.5–52.5)
HEMOGLOBIN: 14.8 G/DL (ref 13.5–17.5)
LYMPHOCYTES ABSOLUTE: 1.6 K/UL (ref 1–5.1)
LYMPHOCYTES RELATIVE PERCENT: 20.6 %
MCH RBC QN AUTO: 30.3 PG (ref 26–34)
MCHC RBC AUTO-ENTMCNC: 34.2 G/DL (ref 31–36)
MCV RBC AUTO: 88.8 FL (ref 80–100)
MONOCYTES ABSOLUTE: 0.6 K/UL (ref 0–1.3)
MONOCYTES RELATIVE PERCENT: 7.3 %
NEUTROPHILS ABSOLUTE: 4.9 K/UL (ref 1.7–7.7)
NEUTROPHILS RELATIVE PERCENT: 63.6 %
PDW BLD-RTO: 14.2 % (ref 12.4–15.4)
PLATELET # BLD: 298 K/UL (ref 135–450)
PMV BLD AUTO: 8.3 FL (ref 5–10.5)
POTASSIUM SERPL-SCNC: 4.1 MMOL/L (ref 3.5–5.1)
RBC # BLD: 4.88 M/UL (ref 4.2–5.9)
SODIUM BLD-SCNC: 144 MMOL/L (ref 136–145)
TOTAL PROTEIN: 6.8 G/DL (ref 6.4–8.2)
WBC # BLD: 7.7 K/UL (ref 4–11)

## 2022-07-07 NOTE — RESULT ENCOUNTER NOTE
No anemia/ infection. Normal kidney/ liver function. Please call if you have additional questions and/ or concerns. Please keep your next appt. Thank you.

## 2022-07-14 ENCOUNTER — HOSPITAL ENCOUNTER (EMERGENCY)
Age: 52
Discharge: HOME | End: 2022-07-14
Payer: MEDICARE

## 2022-07-14 VITALS
SYSTOLIC BLOOD PRESSURE: 98 MMHG | DIASTOLIC BLOOD PRESSURE: 67 MMHG | TEMPERATURE: 97.5 F | RESPIRATION RATE: 16 BRPM | HEART RATE: 54 BPM | OXYGEN SATURATION: 98 % | HEIGHT: 67 IN | WEIGHT: 160 LBS | BODY MASS INDEX: 25.11 KG/M2

## 2022-07-14 DIAGNOSIS — R00.1 SINUS BRADYCARDIA: Primary | ICD-10-CM

## 2022-07-14 LAB
A/G RATIO: 2.2 (ref 1.1–2.2)
ALBUMIN SERPL-MCNC: 4.3 G/DL (ref 3.4–5)
ALP BLD-CCNC: 71 U/L (ref 40–129)
ALT SERPL-CCNC: 13 U/L (ref 10–40)
ANION GAP SERPL CALCULATED.3IONS-SCNC: 6 MMOL/L (ref 3–16)
AST SERPL-CCNC: 16 U/L (ref 15–37)
BASOPHILS ABSOLUTE: 0.1 K/UL (ref 0–0.2)
BASOPHILS RELATIVE PERCENT: 1.2 %
BILIRUB SERPL-MCNC: 0.5 MG/DL (ref 0–1)
BILIRUBIN URINE: NEGATIVE
BLOOD, URINE: NEGATIVE
BUN BLDV-MCNC: 10 MG/DL (ref 7–20)
CALCIUM SERPL-MCNC: 9.5 MG/DL (ref 8.3–10.6)
CHLORIDE BLD-SCNC: 106 MMOL/L (ref 99–110)
CLARITY: CLEAR
CO2: 29 MMOL/L (ref 21–32)
COLOR: NORMAL
CREAT SERPL-MCNC: 1.1 MG/DL (ref 0.9–1.3)
EKG ATRIAL RATE: 47 BPM
EKG DIAGNOSIS: NORMAL
EKG P AXIS: 36 DEGREES
EKG P-R INTERVAL: 142 MS
EKG Q-T INTERVAL: 422 MS
EKG QRS DURATION: 100 MS
EKG QTC CALCULATION (BAZETT): 373 MS
EKG R AXIS: -37 DEGREES
EKG T AXIS: 19 DEGREES
EKG VENTRICULAR RATE: 47 BPM
EOSINOPHILS ABSOLUTE: 0.6 K/UL (ref 0–0.6)
EOSINOPHILS RELATIVE PERCENT: 7 %
GFR AFRICAN AMERICAN: >60
GFR NON-AFRICAN AMERICAN: >60
GLUCOSE BLD-MCNC: 83 MG/DL (ref 70–99)
GLUCOSE URINE: NEGATIVE MG/DL
HCT VFR BLD CALC: 40.2 % (ref 40.5–52.5)
HEMOGLOBIN: 13.8 G/DL (ref 13.5–17.5)
KETONES, URINE: NEGATIVE MG/DL
LEUKOCYTE ESTERASE, URINE: NEGATIVE
LYMPHOCYTES ABSOLUTE: 1.8 K/UL (ref 1–5.1)
LYMPHOCYTES RELATIVE PERCENT: 21.9 %
MCH RBC QN AUTO: 29.2 PG (ref 26–34)
MCHC RBC AUTO-ENTMCNC: 34.3 G/DL (ref 31–36)
MCV RBC AUTO: 85.2 FL (ref 80–100)
MICROSCOPIC EXAMINATION: NORMAL
MONOCYTES ABSOLUTE: 0.7 K/UL (ref 0–1.3)
MONOCYTES RELATIVE PERCENT: 8.2 %
NEUTROPHILS ABSOLUTE: 5.1 K/UL (ref 1.7–7.7)
NEUTROPHILS RELATIVE PERCENT: 61.7 %
NITRITE, URINE: NEGATIVE
PDW BLD-RTO: 13.6 % (ref 12.4–15.4)
PH UA: 6 (ref 5–8)
PLATELET # BLD: 294 K/UL (ref 135–450)
PMV BLD AUTO: 7.9 FL (ref 5–10.5)
POTASSIUM REFLEX MAGNESIUM: 4.2 MMOL/L (ref 3.5–5.1)
PROTEIN UA: NEGATIVE MG/DL
RBC # BLD: 4.72 M/UL (ref 4.2–5.9)
RBC UA: NORMAL /HPF (ref 0–4)
SODIUM BLD-SCNC: 141 MMOL/L (ref 136–145)
SPECIFIC GRAVITY UA: 1.02 (ref 1–1.03)
TOTAL PROTEIN: 6.3 G/DL (ref 6.4–8.2)
TROPONIN: <0.01 NG/ML
URINE TYPE: NORMAL
UROBILINOGEN, URINE: 0.2 E.U./DL
WBC # BLD: 8.3 K/UL (ref 4–11)
WBC UA: NORMAL /HPF (ref 0–5)

## 2022-07-14 PROCEDURE — 93005 ELECTROCARDIOGRAM TRACING: CPT | Performed by: NURSE PRACTITIONER

## 2022-07-14 PROCEDURE — 80053 COMPREHEN METABOLIC PANEL: CPT

## 2022-07-14 PROCEDURE — 85025 COMPLETE CBC W/AUTO DIFF WBC: CPT

## 2022-07-14 PROCEDURE — 81001 URINALYSIS AUTO W/SCOPE: CPT

## 2022-07-14 PROCEDURE — 36415 COLL VENOUS BLD VENIPUNCTURE: CPT

## 2022-07-14 PROCEDURE — 84484 ASSAY OF TROPONIN QUANT: CPT

## 2022-07-14 PROCEDURE — 99284 EMERGENCY DEPT VISIT MOD MDM: CPT

## 2022-07-14 PROCEDURE — 93010 ELECTROCARDIOGRAM REPORT: CPT | Performed by: INTERNAL MEDICINE

## 2022-07-14 RX ORDER — 0.9 % SODIUM CHLORIDE 0.9 %
1000 INTRAVENOUS SOLUTION INTRAVENOUS ONCE
Status: DISCONTINUED | OUTPATIENT
Start: 2022-07-14 | End: 2022-07-14 | Stop reason: HOSPADM

## 2022-07-14 RX ORDER — KETOROLAC TROMETHAMINE 30 MG/ML
15 INJECTION, SOLUTION INTRAMUSCULAR; INTRAVENOUS ONCE
Status: DISCONTINUED | OUTPATIENT
Start: 2022-07-14 | End: 2022-07-14 | Stop reason: HOSPADM

## 2022-07-14 ASSESSMENT — ENCOUNTER SYMPTOMS
EYE PAIN: 0
SORE THROAT: 0
DIARRHEA: 0
VOMITING: 0
COUGH: 0
ABDOMINAL PAIN: 0
RHINORRHEA: 0
NAUSEA: 0
BACK PAIN: 0
BLOOD IN STOOL: 0
SHORTNESS OF BREATH: 0

## 2022-07-14 ASSESSMENT — PAIN - FUNCTIONAL ASSESSMENT: PAIN_FUNCTIONAL_ASSESSMENT: 0-10

## 2022-07-14 ASSESSMENT — PAIN DESCRIPTION - FREQUENCY: FREQUENCY: CONTINUOUS

## 2022-07-14 ASSESSMENT — PAIN DESCRIPTION - LOCATION: LOCATION: LEG

## 2022-07-14 ASSESSMENT — PAIN DESCRIPTION - PAIN TYPE: TYPE: ACUTE PAIN

## 2022-07-14 ASSESSMENT — PAIN DESCRIPTION - DESCRIPTORS: DESCRIPTORS: ACHING

## 2022-07-14 ASSESSMENT — PAIN DESCRIPTION - ORIENTATION: ORIENTATION: RIGHT;LEFT

## 2022-07-14 ASSESSMENT — PAIN SCALES - GENERAL: PAINLEVEL_OUTOF10: 8

## 2022-07-14 NOTE — ED NOTES
Patient discharge completed. Discharge information included information on diagnosis including signs and symptoms, complications and when to seek medical attention. Patient also given number to follow up with cardiology. However patient states he will not be following up with Dr. Alcides Pierce due to \"him being an ass\".  Patient told that he could make an appointment with any of the cardiologist. .     Karo Murrieta RN  07/14/22 0351

## 2022-07-14 NOTE — ED PROVIDER NOTES
[address] ED  EMERGENCY DEPARTMENT ENCOUNTER        Pt Name: Sarina Samano. MRN: [id number]  Birthdate 1970  Date of evaluation: 7/14/2022  Provider: MICHELLE Hendricks CNP  PCP: MICHELLE Cárdenas CNP  Note Started: 1:06 PM EDT      JARVIS. I have evaluated this patient. My supervising physician was available for consultation. Triage CHIEF COMPLAINT       Chief Complaint   Patient presents with    Dizziness     pt states he has been seeing his PCP for months for dizziness. pt states she has tried him on several different medications and requested he do outpatient testing that he has not scheduled yet. Leg Pain     pt reports bilateral legs are aching x several months. denies injury or swelling. he believes it is related to his dizziness. HISTORY OF PRESENT ILLNESS   (Location/Symptom, Timing/Onset, Context/Setting, Quality, Duration, Modifying Factors, Severity)  Note limiting factors. Chief Complaint: Lightheadedness intermittently for the last 5 months. As well as bilateral leg pain for the last 5 to 6 months. Ian Alcaraz  is a 49 y.o. male who presents to the emergency department with symptoms of lightheadedness and bilateral leg pain for the last 5 to 6 months. He states that he has been on some medications and they have been trying to titrate some of his medication to improve his symptoms. Seems as though they have worked on his Seroquel and some of his antianxiety medications as I believe that was the culprit. Noted the patient is on amlodipine and metoprolol as well which has been titrated. He states that symptoms seem to worsen with position change and movement. He denies any symptoms at rest.  Denies any room spinning sensation. No focal weakness or numbness or tingling. No speech changes. No headaches or neck pain. No fever. No recent illness.   Reports bilateral lower extremity pain but denies any focal pain or recent injury. States he does have a history of neuropathy. Nursing Notes were all reviewed and agreed with or any disagreements were addressed in the HPI. REVIEW OF SYSTEMS    (2-9 systems for level 4, 10 or more for level 5)     Review of Systems   Constitutional:  Negative for chills, diaphoresis and fever. HENT:  Negative for congestion, ear pain, rhinorrhea and sore throat. Eyes:  Negative for pain and visual disturbance. Respiratory:  Negative for cough and shortness of breath. Cardiovascular:  Negative for chest pain and leg swelling. Gastrointestinal:  Negative for abdominal pain, blood in stool, diarrhea, nausea and vomiting. Genitourinary:  Negative for difficulty urinating, dysuria, flank pain and frequency. Musculoskeletal:  Negative for back pain and neck pain. Bilateral leg pain   Skin:  Negative for rash and wound. Neurological:  Positive for light-headedness. Negative for dizziness. PAST MEDICAL HISTORY     Past Medical History:   Diagnosis Date    Arthritis     Chest pain     [facility] 9/2012 sent for records;     Colitis     COPD [facility]ch[facility]) ([facility]     COVID 12/25/2021    Histoplasmosis     History of blood transfusion     Hx of blood clots     Metabolic syndrome X 63/89/6665    Other emphysema ([facility] 8/19/2018    Pneumonia     Pulmonary nodule        SURGICAL HISTORY     Past Surgical History:   Procedure Laterality Date    BRONCHOSCOPY  6/05/2013    COLONOSCOPY  12/17/2018    COLONOSCOPY N/A 12/17/2018    COLORECTAL CANCER SCREENING, NOT HIGH RISK performed by Jorge Laureano DO at [facility] CATH  [place] ARTHROSCOPY Right     KNEE ARTHROSCOPY Right 12/13/2019    RIGHT KNEE VIDEO ARTHROSCOPY, MEDIAL MENISCECTOMY performed by Al Solo MD at [address]       Discharge Medication List as of 7/14/2022  2:48 PM CONTINUE these medications which have NOT CHANGED    Details   montelukast (SINGULAIR) 10 MG tablet Take 1 tablet by mouth nightly, Disp-30 tablet, R-11Normal      amLODIPine (NORVASC) 2.5 MG tablet TAKE ONE TABLET BY MOUTH EVERY DAY FOR CHEST PAIN, Disp-90 tablet, R-11Normal      gabapentin (NEURONTIN) 600 MG tablet Take 1 tablet by mouth 3 times daily for 30 days. , Disp-90 tablet, R-11Normal      ibuprofen (ADVIL;MOTRIN) 800 MG tablet Take 1 tablet by mouth 4 times daily as needed for Pain, Disp-360 tablet, R-11Normal      tiZANidine (ZANAFLEX) 2 MG tablet Take 2 tablets by mouth every 8 hours as needed (muscle spasm), Disp-90 tablet, R-11Normal      LORazepam (ATIVAN) 1 MG tablet Take 1 tablet by mouth daily for 30 days. , Disp-30 tablet, R-2Normal      omeprazole (PRILOSEC) 20 MG delayed release capsule Take 1 capsule by mouth every morning (before breakfast), Disp-30 capsule, R-11Normal      albuterol sulfate HFA (PROAIR HFA) 108 (90 Base) MCG/ACT inhaler Inhale 2 puffs into the lungs every 6 hours as needed for Wheezing or Shortness of Breath, Disp-18 g, R-5Normal      tiotropium (SPIRIVA RESPIMAT) 2.5 MCG/ACT AERS inhaler INHALE TWO (2) PUFFS INTO THE LUNGS DAILY, Disp-4 g, R-5Normal      budesonide-formoterol (SYMBICORT) 160-4.5 MCG/ACT AERO Inhale 2 puffs into the lungs 2 times daily, Disp-10.2 g, R-5Normal      guaiFENesin (MUCINEX) 600 MG extended release tablet Take 1 tablet by mouth 2 times daily, Disp-60 tablet, R-5Normal      fluticasone (FLONASE) 50 MCG/ACT nasal spray 1 spray by Each Nostril route daily, Disp-16 g, R-5Normal      DULoxetine (CYMBALTA) 60 MG extended release capsule Take 1 capsule by mouth daily, Disp-90 capsule, R-1Normal      UNABLE TO FIND Sig: one infusion   700 mg Bamlanivimab  1.4 g  Etesevimab, Disp-1 Dose, R-0Print      dicyclomine (BENTYL) 10 MG capsule Take 1 capsule by mouth 4 times daily as needed (abdominal cramps.), Disp-90 capsule, R-1Normal             ALLERGIES Codeine, Mobic [meloxicam], and Tramadol    FAMILYHISTORY       Family History   Problem Relation Age of Onset    Cancer Mother         ovarian    Heart Failure Mother     Heart Attack Mother 62    Heart Disease Mother     Other Father          1948;ashd;per neurop; Other Brother         pt knows very little    Cancer Paternal Uncle         leukemia    Cancer Paternal Grandfather         leukemia    Heart Attack Maternal Uncle 60        x4     No Known Problems Son     No Known Problems Son     Asthma Neg Hx     Diabetes Neg Hx     Emphysema Neg Hx     Hypertension Neg Hx         SOCIAL HISTORY       Social History     Socioeconomic History    Marital status:      Spouse name: None    Number of children: None    Years of education: None    Highest education level: None   Tobacco Use    Smoking status: Former     Packs/day: 2.00     Years: 20.00     Pack years: 40.00     Types: Cigarettes     Quit date: 2018     Years since quittin.5    Smokeless tobacco: Never   Vaping Use    Vaping Use: Never used   Substance and Sexual Activity    Alcohol use: Yes     Alcohol/week: 1.0 standard drink     Types: 1 Cans of beer per week     Comment: rarely    Drug use: No    Sexual activity: Yes     Partners: Female   Social History Narrative    2018lives by self with 2 dogs;works in [employer]       SCREENINGS    Haroldo Coma Scale  Eye Opening: Spontaneous  Best Verbal Response: Oriented  Best Motor Response: Obeys commands  Haroldo Coma Scale Score: 15        PHYSICAL EXAM    (up to 7 for level 4, 8 or more for level 5)     ED Triage Vitals   BP Temp Temp Source Heart Rate Resp SpO2 Height Weight   22 1243 22 1246 22 1246 22 1243 22 1243 22 1243 22 1243 22 1243   98/67 97.5 °F (36.4 °C) Oral 54 16 98 % 5' 7\" (1.702 m) 160 lb (72.6 kg)       Physical Exam  Vitals and nursing note reviewed. Constitutional:       Appearance: Normal appearance.  He is not MICROSCOPIC       When ordered, only abnormal lab results are displayed. All other labs were within normal range or not returned as of this dictation. EKG: When ordered, EKG's are interpreted by the Emergency Department Physician in the absence of a cardiologist.  Please see their note for interpretation of EKG. RADIOLOGY:   Non-plain film images such as CT, Ultrasound and MRI are read by the radiologist. Plain radiographic images are visualized andpreliminarily interpreted by the  ED Provider with the below findings:        Interpretation perthe Radiologist below, if available at the time of this note:    No orders to display     No results found. PROCEDURES   Unless otherwise noted below, none     Procedures    CRITICAL CARE TIME   N/A    CONSULTS:  IP CONSULT TO CARDIOLOGY      EMERGENCY DEPARTMENT COURSE and DIFFERENTIAL DIAGNOSIS/MDM:   Vitals:    Vitals:    07/14/22 1243 07/14/22 1246   BP: 98/67    Pulse: 54    Resp: 16    Temp:  97.5 °F (36.4 °C)   TempSrc:  Oral   SpO2: 98%    Weight: 160 lb (72.6 kg)    Height: 5' 7\" (1.702 m)        Patient was given thefollowing medications:  Medications - No data to display        Is this patient to be included in the SEP-1 Core Measure due to severe sepsis or septic shock? No   Exclusion criteria - the patient is NOT to be included for SEP-1 Core Measure due to: Infection is not suspected    Patient is well-appearing at this time. Patient is noted to have some mild hypotension and a lower pulse rate. Mildly bradycardic. The patient's medications were reviewed and patient is on metoprolol. I believe this is the culprit of his symptoms. He apparently takes metoprolol for a history of palpitations which she has not had in several years. I had a detailed discussion with the patient and I did call cardiology and at this time the plan is to stop his metoprolol and for him to follow-up as an outpatient.   He verbalized understanding and agrees with the treatment plan and discharge. He is stable at this time. Blood pressure came up in the 576 systolic so rate now is 54. reported rate as low as 42. His EKG just displayed sinus bradycardia. Patient agrees with the treatment plan and will follow-up as an outpatient. He is also advised to return back to the ED for any further acute concerns. No other acute complaints at this time. FINAL IMPRESSION      1.  Sinus bradycardia          DISPOSITION/PLAN   DISPOSITION Decision To Discharge 07/14/2022 02:45:50 PM      PATIENT REFERREDTO:  Ivonne Loyola MD  [facility] Rosemary Barnes, [address] [place] [address]  [phone]    Schedule an appointment as soon as possible for a visit       MICHELLE Ferrara [facility] [address] [phone]    Schedule an appointment as soon as possible for a visit       DISCHARGE MEDICATIONS:  Discharge Medication List as of 7/14/2022  2:48 PM          DISCONTINUED MEDICATIONS:  Discharge Medication List as of 7/14/2022  2:48 PM        STOP taking these medications       QUEtiapine (SEROQUEL) 25 MG tablet Comments:   Reason for Stopping:                      (Please note that portions ofthis note were completed with a voice recognition program.  Efforts were made to edit the dictations but occasionally words are mis-transcribed.)    MICHELLE Doyle CNP (electronically signed)            MICHELLE Doyle CNP  07/15/22 2714

## 2022-07-14 NOTE — ED NOTES
Consult to Dr. Gayatri Winter w/ cardiology at [facility]. 07/14/22 1436    Consult completed w/ call back from Dr. Gayatri Winter at [facility]. 07/14/22 1402

## 2022-07-14 NOTE — Clinical Note
Wili Thompson was seen and treated in our emergency department on 7/14/2022. He may return to work on 07/16/2022. If you have any questions or concerns, please don't hesitate to call.       Bettye Guzman, MICHELLE - CNP

## 2022-07-19 DIAGNOSIS — J43.8 OTHER EMPHYSEMA (HCC): ICD-10-CM

## 2022-07-19 RX ORDER — BUSPIRONE HYDROCHLORIDE 5 MG/1
5 TABLET ORAL 2 TIMES DAILY
Qty: 60 TABLET | Refills: 0 | Status: SHIPPED | OUTPATIENT
Start: 2022-07-19 | End: 2022-08-18

## 2022-08-01 ENCOUNTER — TELEMEDICINE (OUTPATIENT)
Dept: FAMILY MEDICINE CLINIC | Age: 52
End: 2022-08-01
Payer: MEDICARE

## 2022-08-01 DIAGNOSIS — G47.33 OSA (OBSTRUCTIVE SLEEP APNEA): ICD-10-CM

## 2022-08-01 DIAGNOSIS — R53.83 OTHER FATIGUE: Primary | ICD-10-CM

## 2022-08-01 DIAGNOSIS — F41.9 ANXIETY AND DEPRESSION: ICD-10-CM

## 2022-08-01 DIAGNOSIS — F32.1 MODERATE SINGLE CURRENT EPISODE OF MAJOR DEPRESSIVE DISORDER (HCC): ICD-10-CM

## 2022-08-01 DIAGNOSIS — J43.8 OTHER EMPHYSEMA (HCC): ICD-10-CM

## 2022-08-01 DIAGNOSIS — Z86.79 HISTORY OF CORONARY VASOSPASM: ICD-10-CM

## 2022-08-01 DIAGNOSIS — F32.A ANXIETY AND DEPRESSION: ICD-10-CM

## 2022-08-01 PROCEDURE — 99441 PR PHYS/QHP TELEPHONE EVALUATION 5-10 MIN: CPT | Performed by: NURSE PRACTITIONER

## 2022-08-01 ASSESSMENT — ENCOUNTER SYMPTOMS
VOMITING: 0
NAUSEA: 0
COUGH: 1
ABDOMINAL PAIN: 0
SWOLLEN GLANDS: 0
CHANGE IN BOWEL HABIT: 0
SORE THROAT: 0
VISUAL CHANGE: 0

## 2022-08-01 NOTE — Clinical Note
Hi Dr. Navi Malhotra,  I spoke with Kerry Mckoy today for fatigue which he has been having for the past several months. Today he noted his girlfriend said he'd been having apnea episodes which he had not told me in the past.  I was wondering if we could get him an appt for a sleep study? I told him you may just speak with him about it in October. I also stressed to him he needs to get his Cardiac testing given his hx of coronary vasospasms, tobacco use and emphysema.   Thank you so much, Macho Brown, CNP

## 2022-08-01 NOTE — PROGRESS NOTES
2022    TELEHEALTH EVALUATION -- Audio/Visual (During TNVZP-49 public health emergency)    HPI:    Hernandez Tejada Sr. (:  1970) has requested an audio/video evaluation for the following concern(s):    Fatigue  This is a chronic problem. The current episode started more than 1 year ago. The problem occurs constantly. The problem has been unchanged. Associated symptoms include arthralgias, coughing (intermittent), fatigue, headaches (most mornings- located in back of head), neck pain (chronic) and numbness. Pertinent negatives include no abdominal pain, anorexia, change in bowel habit, chest pain, chills, congestion, diaphoresis, fever, joint swelling, myalgias, nausea, rash, sore throat, swollen glands, urinary symptoms, vertigo (resolved), visual change, vomiting or weakness. Continues to have Intermittent cough, mildly worse than normal.  Spiriva- once. Symbicort- once daily, was not aware it is supposed to be BID dosing    Girlfriend who is a  tells him he stops breathing during his sleep. Dizziness has gotten better since last OV. Sleeping 6-7 hours/ night, not waking feeling rested. Has stopped Seroquel and Metoprolol which has helped dizziness. Continues to have intermittent chest discomfort. Has not scheduled Cardiac testing as has been recommended by multiple providers. Feels anxiety/ depression is much better controlled today than prior. Feels the Cymbalta/ Buspirone combo is working moderately well. PHQ Scores 3/7/2022 3/8/2019 2016   PHQ2 Score 2 0 2   PHQ9 Score 2 0 2     Interpretation of Total Score Depression Severity: 1-4 = Minimal depression, 5-9 = Mild depression, 10-14 = Moderate depression, 15-19 = Moderately severe depression, 20-27 = Severe depression          Review of Systems   Constitutional:  Positive for fatigue. Negative for chills, diaphoresis and fever. HENT:  Negative for congestion and sore throat.     Respiratory:  Positive for cough (intermittent). Cardiovascular:  Negative for chest pain. Gastrointestinal:  Negative for abdominal pain, anorexia, change in bowel habit, nausea and vomiting. Musculoskeletal:  Positive for arthralgias and neck pain (chronic). Negative for joint swelling and myalgias. Skin:  Negative for rash. Neurological:  Positive for numbness and headaches (most mornings- located in back of head). Negative for vertigo (resolved) and weakness. Prior to Visit Medications    Medication Sig Taking? Authorizing Provider   busPIRone (BUSPAR) 5 MG tablet Take 1 tablet by mouth 2 times daily Yes MICHELLE Parra CNP   montelukast (SINGULAIR) 10 MG tablet Take 1 tablet by mouth nightly Yes MICHELLE Parra CNP   amLODIPine (NORVASC) 2.5 MG tablet TAKE ONE TABLET BY MOUTH EVERY DAY FOR CHEST PAIN Yes MICHELLE Parra CNP   gabapentin (NEURONTIN) 600 MG tablet Take 1 tablet by mouth 3 times daily for 30 days. Yes MICHELLE Parra CNP   ibuprofen (ADVIL;MOTRIN) 800 MG tablet Take 1 tablet by mouth 4 times daily as needed for Pain Yes MICHELLE Parra CNP   tiZANidine (ZANAFLEX) 2 MG tablet Take 2 tablets by mouth every 8 hours as needed (muscle spasm) Yes MICHELLE Parra CNP   LORazepam (ATIVAN) 1 MG tablet Take 1 tablet by mouth daily for 30 days.  Yes MICHELLE Parra CNP   omeprazole (PRILOSEC) 20 MG delayed release capsule Take 1 capsule by mouth every morning (before breakfast) Yes MICHELLE Parra CNP   albuterol sulfate HFA (PROAIR HFA) 108 (90 Base) MCG/ACT inhaler Inhale 2 puffs into the lungs every 6 hours as needed for Wheezing or Shortness of Breath Yes MICHELLE Parra CNP   tiotropium (SPIRIVA RESPIMAT) 2.5 MCG/ACT AERS inhaler INHALE TWO (2) PUFFS INTO THE LUNGS DAILY Yes Chandler Sandoval MD   budesonide-formoterol (SYMBICORT) 160-4.5 MCG/ACT AERO Inhale 2 puffs into the lungs 2 times daily Yes Chandler Sandoval MD   guaiFENesin (MUCINEX) 600 MG extended release tablet Take 1 tablet by mouth 2 times daily Yes Nadine Lew MD   fluticasone (FLONASE) 50 MCG/ACT nasal spray 1 spray by Each Nostril route daily Yes MICHELLE Lala CNP   DULoxetine (CYMBALTA) 60 MG extended release capsule Take 1 capsule by mouth daily Yes MICHELLE Lala CNP   UNABLE TO FIND Sig: one infusion   700 mg Bamlanivimab  1.4 g  Etesevimab Yes Moose Griffiths MD   dicyclomine (BENTYL) 10 MG capsule Take 1 capsule by mouth 4 times daily as needed (abdominal cramps.) Yes MICHELLE Lala CNP   metoprolol succinate (TOPROL XL) 25 MG extended release tablet Take 1 tablet by mouth daily  MICHELLE aLla CNP       Social History     Tobacco Use    Smoking status: Former     Packs/day: 2.00     Years: 20.00     Pack years: 40.00     Types: Cigarettes     Quit date: 2018     Years since quittin.5    Smokeless tobacco: Never   Vaping Use    Vaping Use: Never used   Substance Use Topics    Alcohol use: Yes     Alcohol/week: 1.0 standard drink     Types: 1 Cans of beer per week     Comment: rarely    Drug use: No        Allergies   Allergen Reactions    Codeine Itching    Mobic [Meloxicam]      Irritates stomach    Tramadol      Patient states he doesn't feel right on tramadol.   ,   Past Medical History:   Diagnosis Date    Arthritis     Chest pain     [facility] 2012 sent for records;     Colitis     COPD (chronic obstructive pulmonary disease) ([facility]     COVID 2021    Histoplasmosis     History of blood transfusion     Hx of blood clots     Metabolic syndrome X 10/14/6225    Other emphysema ([facility] 2018    Pneumonia     Pulmonary nodule    ,   Past Surgical History:   Procedure Laterality Date    BRONCHOSCOPY  2013    COLONOSCOPY  2018    COLONOSCOPY N/A 2018    COLORECTAL CANCER SCREENING, NOT HIGH RISK performed by Yossi Caraballo DO at [address] [facility] CATH LAB PROCEDURE      KNEE ARTHROSCOPY Right     KNEE ARTHROSCOPY Right 2019    RIGHT KNEE VIDEO ARTHROSCOPY, MEDIAL MENISCECTOMY performed by Jacquelin Gibbs MD at [address]      neck    VASECTOMY     ,   Social History     Tobacco Use    Smoking status: Former     Packs/day: 2.00     Years: 20.00     Pack years: 40.00     Types: Cigarettes     Quit date: 2018     Years since quittin.5    Smokeless tobacco: Never   Vaping Use    Vaping Use: Never used   Substance Use Topics    Alcohol use: Yes     Alcohol/week: 1.0 standard drink     Types: 1 Cans of beer per week     Comment: rarely    Drug use: No   ,   Family History   Problem Relation Age of Onset    Cancer Mother         ovarian    Heart Failure Mother     Heart Attack Mother 62    Heart Disease Mother     Other Father          1948;ashd;per neurop;     Other Brother         pt knows very little    Cancer Paternal Uncle         leukemia    Cancer Paternal Grandfather         leukemia    Heart Attack Maternal Uncle 60        x4     No Known Problems Son     No Known Problems Son     Asthma Neg Hx     Diabetes Neg Hx     Emphysema Neg Hx     Hypertension Neg Hx    ,   Immunization History   Administered Date(s) Administered    Influenza Vaccine, unspecified formulation 10/23/2016, 2017, 2018, 2019    Influenza, Quadv, IM, (6 mo and older Fluzone, Flulaval, Fluarix and 3 yrs and older Afluria) 2017, 2018, 2019, 2020    Pneumococcal Conjugate 13-valent ([id number]) 10/23/2016    Pneumococcal Polysaccharide ([id number]) 2020    Tdap (Boostrix, Adacel) 2018   ,   Health Maintenance   Topic Date Due    COVID-19 Vaccine (1) Never done    Shingles vaccine (1 of 2) Never done    Low dose CT lung screening  Never done    Hepatitis C screen  2022 (Originally 10/27/1988)    Flu vaccine (1) 2022    Depression Monitoring  2023    Lipids  2027    DTaP/Tdap/Td vaccine (2 - Td or Tdap) 2028    Colorectal Cancer Screen  12/17/2028    Pneumococcal 0-64 years Vaccine (3 - PPSV23 or PCV20) 10/27/2036    HIV screen  Completed    Hepatitis A vaccine  Aged Out    Hepatitis B vaccine  Aged Out    Hib vaccine  Aged Out    Meningococcal (ACWY) vaccine  Aged Out       PHYSICAL EXAMINATION:  [ INSTRUCTIONS:  \"[x]\" Indicates a positive item  \"[]\" Indicates a negative item      Vital Signs: (As obtained by patient/caregiver or practitioner observation)    Blood pressure-  Heart rate-    Respiratory rate-    Temperature-  Pulse oximetry-     UNABLE TO PERFORM AS AUDIO/TELEPHONE VISIT ONLY       ASSESSMENT/PLAN:  Dipika Flood was seen today for fatigue. Diagnoses and all orders for this visit:    Other fatigue  Likely multifactoral- given emphysema, cardio vasospasms, sleep issues. Will contact Pulmonary to see if they can get him set up for a sleep study given his apnea episodes and chronic fatigue. He is already establish with Dr. Bella Olivo for his emphysema    WILLIAM (obstructive sleep apnea)  See above    History of coronary vasospasm  Continue to hold Metoprolol for right now due to bradycardia. Strongly encouraged patient to schedule Cardiac testing as has been recommended for the past several months    Other emphysema ([address]  Discussed increasing Symbicort to BID to help with cough. Pt verbalized understanding  Continue Spiriva  Continue care with Pulmonary, appreciate assistance    Anxiety and depression  Continue Celexa/ Buspirone  Encouraged self care  Encouraged 30-45 minutes daily physical exercise as tolearted  Encouraged portion control, mixture of protein, carbohydrates, fruits/ veggies. Encouraged Sleep Hygiene    Moderate single current episode of major depressive disorder ([address]  See above      No follow-ups on file. Justin Eli Sr. is a 49 y.o. male being evaluated by a Virtual Visit (video visit) encounter to address concerns as mentioned above. A caregiver was present when appropriate.  Due to this being a TeleHealth encounter (During UWOAQ-05 public health emergency), evaluation of the following organ systems was limited: Vitals/Constitutional/EENT/Resp/CV/GI//MS/Neuro/Skin/Heme-Lymph-Imm. The patient (or guardian if applicable) is aware that this is a billable service, which includes applicable co-pays. This Virtual Visit was conducted with patient's (and/or legal guardian's) consent. The visit was conducted pursuant to the emergency declaration under the [address] [place] [address], [address] [place] [address] authority and the Fwd: Power Act. Patient identification was verified, and a caregiver was present when appropriate. The patient was located in a state where the provider was licensed to provide care. Total time spent on this encounter: Not billed by time    Services were provided through a video synchronous discussion virtually to substitute for in-person clinic visit. Patient and provider were located at their individual homes. --MICHELLE Howard CNP on 8/1/2022 at 10:24 AM    An electronic signature was used to authenticate this note. Patient should call the office immediately with new or ongoing signs or symptoms or worsening, or proceed to the emergency room. All entries in chief complaint and history of present illness are reviewed and validated by me. No changes in past medical history, past surgical history, social history, or family history were noted during the patient encounter unless specifically listed above. All updates of past medical history, past surgical history, social history, or family history were reviewed personally by me during the office visit. All problems listed in the assessment are stable unless noted otherwise. Medication profile reviewed personally by me during the office visit. Medication side effects and possible impairments from medications were discussed as applicable.      Every effort has been made to assure accurate transcription by this voice recognition software. However, mistakes in transcription may still occur    You are being started on a new medication. All medications have the potential for adverse effects. All medications effect each person differently. Please read and review provided information related to medication. If the medication that you have been prescribed has the potential to cause sedation, do not drive or operate car, truck, or heavy machinery until you know how the medication will effect you. If you experience any adverse effects from the medication, please call the office or report to the emergency department.

## 2022-08-31 RX ORDER — BUSPIRONE HYDROCHLORIDE 5 MG/1
TABLET ORAL
Qty: 60 TABLET | Refills: 5 | Status: SHIPPED | OUTPATIENT
Start: 2022-08-31

## 2022-08-31 NOTE — TELEPHONE ENCOUNTER
Refill request for BUSPIRONE HCL 5 MG TABLET medication.      Name of [address] [place] [address]      Last visit - 8-1-2022     Pending visit - N/A    Last refill - N/A      Medication Contract signed -   Last Oarrs ran-         Additional Comments

## 2022-09-13 ENCOUNTER — OFFICE VISIT (OUTPATIENT)
Dept: FAMILY MEDICINE CLINIC | Age: 52
End: 2022-09-13
Payer: MEDICARE

## 2022-09-13 VITALS
DIASTOLIC BLOOD PRESSURE: 78 MMHG | BODY MASS INDEX: 26.31 KG/M2 | TEMPERATURE: 98.3 F | HEART RATE: 80 BPM | OXYGEN SATURATION: 96 % | WEIGHT: 168 LBS | SYSTOLIC BLOOD PRESSURE: 118 MMHG

## 2022-09-13 DIAGNOSIS — Z11.3 ROUTINE SCREENING FOR STI (SEXUALLY TRANSMITTED INFECTION): ICD-10-CM

## 2022-09-13 DIAGNOSIS — K40.90 LEFT INGUINAL HERNIA: Primary | ICD-10-CM

## 2022-09-13 PROCEDURE — 99213 OFFICE O/P EST LOW 20 MIN: CPT | Performed by: NURSE PRACTITIONER

## 2022-09-13 PROCEDURE — G8427 DOCREV CUR MEDS BY ELIG CLIN: HCPCS | Performed by: NURSE PRACTITIONER

## 2022-09-13 PROCEDURE — 3017F COLORECTAL CA SCREEN DOC REV: CPT | Performed by: NURSE PRACTITIONER

## 2022-09-13 PROCEDURE — 1036F TOBACCO NON-USER: CPT | Performed by: NURSE PRACTITIONER

## 2022-09-13 PROCEDURE — G8419 CALC BMI OUT NRM PARAM NOF/U: HCPCS | Performed by: NURSE PRACTITIONER

## 2022-09-13 ASSESSMENT — ENCOUNTER SYMPTOMS
SORE THROAT: 0
NAUSEA: 0
DIARRHEA: 0
SHORTNESS OF BREATH: 0
COUGH: 0
CONSTIPATION: 0
ABDOMINAL PAIN: 1
VOMITING: 0

## 2022-09-13 NOTE — PROGRESS NOTES
9/13/2022    Chief Complaint   Patient presents with    Abdominal Pain     Lower abd pain on the left side in the groin area he thinks It might be a hernia, started about 2 wks ago        Néstor Camacho Sr. is a 49 y.o. male, presents today for:      ASSESSMENT/PLAN:    1. Left inguinal hernia  Referral to Surgery for evaluation due to severity of symptoms  Unable to void today, will come back  - [facility]  - POCT Urinalysis no Micro    2. Routine screening for STI (sexually transmitted infection)  Unable to void today. Will come back to clinic.    - C.trachomatis N.gonorrhoeae DNA, Urine    Return for keep November. .    Groin Pain  The patient's primary symptoms include pelvic pain (left). The patient's pertinent negatives include no genital injury, genital itching, genital lesions, penile discharge, penile pain, priapism, scrotal swelling or testicular pain. This is a new problem. Episode onset: 3 weeks. The problem occurs constantly. The problem has been gradually worsening. The pain is moderate. Associated symptoms include abdominal pain (left groin), frequency and hesitancy. Pertinent negatives include no anorexia, chest pain, chills, constipation, coughing, diarrhea, discolored urine, dysuria, fever, flank pain, headaches, hematuria, joint pain, joint swelling, nausea, painful intercourse, rash, shortness of breath, sore throat, urgency, urinary retention or vomiting. The symptoms are aggravated by bowel movements, activity, heavy lifting, tactile pressure, intercourse and urination (sneezing). He has tried OTC analgesics for the symptoms. The treatment provided no relief. He is sexually active. He never uses condoms. It is unknown whether or not his partner has an STD.  There is no history of BPH, chlamydia, cryptorchidism, erectile aid use, erectile dysfunction, a femoral hernia, gonorrhea, herpes simplex, HIV, an inguinal hernia, kidney stones, prostatitis, sickle cell disease, syphilis or varicocele. In a new relationship for the past 6 months. Sexually active, not using condoms. Unknown if partner has been tested. Intermittent issues with strong urinary stream.      Lab Results   Component Value Date     07/14/2022    K 4.2 07/14/2022     07/14/2022    CO2 29 07/14/2022    BUN 10 07/14/2022    CREATININE 1.1 07/14/2022    GLUCOSE 83 07/14/2022    CALCIUM 9.5 07/14/2022    PROT 6.3 (L) 07/14/2022    LABALBU 4.3 07/14/2022    BILITOT 0.5 07/14/2022    ALKPHOS 71 07/14/2022    AST 16 07/14/2022    ALT 13 07/14/2022    LABGLOM >60 07/14/2022    GFRAA >60 07/14/2022    AGRATIO 2.2 07/14/2022    GLOB 2.1 07/12/2021         Review of Systems   Constitutional:  Negative for chills and fever. HENT:  Negative for sore throat. Respiratory:  Negative for cough and shortness of breath. Cardiovascular:  Negative for chest pain. Gastrointestinal:  Positive for abdominal pain (left groin). Negative for anorexia, constipation, diarrhea, nausea and vomiting. Genitourinary:  Positive for frequency, hesitancy and pelvic pain (left). Negative for dysuria, flank pain, penile discharge, penile pain, scrotal swelling, testicular pain and urgency. Musculoskeletal:  Negative for joint pain. Skin:  Negative for rash. Neurological:  Negative for headaches. Current Outpatient Medications on File Prior to Visit   Medication Sig Dispense Refill    busPIRone (BUSPAR) 5 MG tablet Take 1 tablet by mouth 2 times daily 60 tablet 5    montelukast (SINGULAIR) 10 MG tablet Take 1 tablet by mouth nightly 30 tablet 11    amLODIPine (NORVASC) 2.5 MG tablet TAKE ONE TABLET BY MOUTH EVERY DAY FOR CHEST PAIN 90 tablet 11    gabapentin (NEURONTIN) 600 MG tablet Take 1 tablet by mouth 3 times daily for 30 days.  90 tablet 11    ibuprofen (ADVIL;MOTRIN) 800 MG tablet Take 1 tablet by mouth 4 times daily as needed for Pain 360 tablet 11    tiZANidine (ZANAFLEX) 2 MG tablet Take 2 tablets by mouth every 8 hours as needed (muscle spasm) 90 tablet 11    LORazepam (ATIVAN) 1 MG tablet Take 1 tablet by mouth daily for 30 days. 30 tablet 2    omeprazole (PRILOSEC) 20 MG delayed release capsule Take 1 capsule by mouth every morning (before breakfast) 30 capsule 11    albuterol sulfate HFA (PROAIR HFA) 108 (90 Base) MCG/ACT inhaler Inhale 2 puffs into the lungs every 6 hours as needed for Wheezing or Shortness of Breath 18 g 5    tiotropium (SPIRIVA RESPIMAT) 2.5 MCG/ACT AERS inhaler INHALE TWO (2) PUFFS INTO THE LUNGS DAILY 4 g 5    budesonide-formoterol (SYMBICORT) 160-4.5 MCG/ACT AERO Inhale 2 puffs into the lungs 2 times daily 10.2 g 5    guaiFENesin (MUCINEX) 600 MG extended release tablet Take 1 tablet by mouth 2 times daily 60 tablet 5    fluticasone (FLONASE) 50 MCG/ACT nasal spray 1 spray by Each Nostril route daily 16 g 5    DULoxetine (CYMBALTA) 60 MG extended release capsule Take 1 capsule by mouth daily 90 capsule 1    dicyclomine (BENTYL) 10 MG capsule Take 1 capsule by mouth 4 times daily as needed (abdominal cramps.) 90 capsule 1    [DISCONTINUED] metoprolol succinate (TOPROL XL) 25 MG extended release tablet Take 1 tablet by mouth daily 30 tablet 11     No current facility-administered medications on file prior to visit. Allergies   Allergen Reactions    Codeine Itching    Mobic [Meloxicam]      Irritates stomach    Tramadol      Patient states he doesn't feel right on tramadol. Past Medical History:   Diagnosis Date    Arthritis     Chest pain     [facility] 9/2012 sent for records;     Colitis     COPD (chronic obstructive pulmonary disease) [facility]     COVID 12/25/2021    Histoplasmosis     History of blood transfusion     Hx of blood clots     Metabolic syndrome X 44/79/2391    Other emphysema ([facility] 8/19/2018    Pneumonia     Pulmonary nodule      Past Surgical History:   Procedure Laterality Date    BRONCHOSCOPY  6/05/2013    COLONOSCOPY  12/17/2018    COLONOSCOPY N/A 2018    COLORECTAL CANCER SCREENING, NOT HIGH RISK performed by Abbe Red DO at [address] [facility] CATH  [place] ARTHROSCOPY Right     KNEE ARTHROSCOPY Right 2019    RIGHT KNEE VIDEO ARTHROSCOPY, MEDIAL MENISCECTOMY performed by Ana Paula Black MD at [address]      neck    VASECTOMY       Social History     Tobacco Use    Smoking status: Former     Packs/day: 2.00     Years: 20.00     Pack years: 40.00     Types: Cigarettes     Quit date: 2018     Years since quittin.7    Smokeless tobacco: Never   Substance Use Topics    Alcohol use: Yes     Alcohol/week: 1.0 standard drink     Types: 1 Cans of beer per week     Comment: rarely     Family History   Problem Relation Age of Onset    Cancer Mother         ovarian    Heart Failure Mother     Heart Attack Mother 62    Heart Disease Mother     Other Father          1948;ashd;per neurop; Other Brother         pt knows very little    Cancer Paternal Uncle         leukemia    Cancer Paternal Grandfather         leukemia    Heart Attack Maternal Uncle 60        x4     No Known Problems Son     No Known Problems Son     Asthma Neg Hx     Diabetes Neg Hx     Emphysema Neg Hx     Hypertension Neg Hx        Vitals:    22 1436   BP: 118/78   Pulse: 80   Temp: 98.3 °F (36.8 °C)   TempSrc: Infrared   SpO2: 96%   Weight: 168 lb (76.2 kg)     Estimated body mass index is 26.31 kg/m² as calculated from the following:    Height as of 22: 5' 7\" (1.702 m). Weight as of this encounter: 168 lb (76.2 kg). Physical Exam  Vitals reviewed. Exam conducted with a chaperone present. Constitutional:       Appearance: Normal appearance. HENT:      Head: Normocephalic. Neck:      Vascular: No carotid bruit. Cardiovascular:      Rate and Rhythm: Normal rate and regular rhythm. Pulses: Normal pulses.            Carotid pulses are 2+ on the right side and 2+ on the left side. Dorsalis pedis pulses are 2+ on the right side and 2+ on the left side. Posterior tibial pulses are 2+ on the right side and 2+ on the left side. Heart sounds: Normal heart sounds. No murmur heard. No gallop. Pulmonary:      Effort: Pulmonary effort is normal.      Breath sounds: Normal breath sounds. Abdominal:      General: Abdomen is flat. Palpations: Abdomen is soft. Hernia: A hernia is present. Hernia is present in the left inguinal area. There is no hernia in the right inguinal area. Genitourinary:     Penis: Normal.       Testes: Normal.         Right: Mass, tenderness, swelling, testicular hydrocele or varicocele not present. Right testis is descended. Cremasteric reflex is present. Left: Mass, tenderness, swelling, testicular hydrocele or varicocele not present. Left testis is descended. Cremasteric reflex is present. Epididymis:      Right: Not inflamed or enlarged. No mass or tenderness. Left: Not inflamed or enlarged. No mass or tenderness. Musculoskeletal:         General: Normal range of motion. Cervical back: Normal range of motion. Right lower leg: No edema. Left lower leg: No edema. Lymphadenopathy:      Cervical: No cervical adenopathy. Lower Body: No right inguinal adenopathy. No left inguinal adenopathy. Skin:     General: Skin is warm and dry. Capillary Refill: Capillary refill takes less than 2 seconds. Neurological:      General: No focal deficit present. Mental Status: He is alert and oriented to person, place, and time. Psychiatric:         Mood and Affect: Mood normal.         Behavior: Behavior normal.         Patient's questions answered and concerns addressed. Patient agrees to plan of care.         Electronically signed by MICHELLE Cárdenas CNP on 9/13/2022 at 5:10 PM

## 2022-09-15 LAB
C. TRACHOMATIS DNA ,URINE: NEGATIVE
N. GONORRHOEAE DNA, URINE: NEGATIVE

## 2022-09-27 ENCOUNTER — INITIAL CONSULT (OUTPATIENT)
Dept: SURGERY | Age: 52
End: 2022-09-27
Payer: MEDICARE

## 2022-09-27 VITALS
WEIGHT: 166 LBS | BODY MASS INDEX: 26.06 KG/M2 | DIASTOLIC BLOOD PRESSURE: 76 MMHG | HEIGHT: 67 IN | SYSTOLIC BLOOD PRESSURE: 122 MMHG

## 2022-09-27 DIAGNOSIS — K40.20 NON-RECURRENT BILATERAL INGUINAL HERNIA WITHOUT OBSTRUCTION OR GANGRENE: Primary | ICD-10-CM

## 2022-09-27 PROCEDURE — 99242 OFF/OP CONSLTJ NEW/EST SF 20: CPT | Performed by: SURGERY

## 2022-09-27 PROCEDURE — G8427 DOCREV CUR MEDS BY ELIG CLIN: HCPCS | Performed by: SURGERY

## 2022-09-27 PROCEDURE — G8419 CALC BMI OUT NRM PARAM NOF/U: HCPCS | Performed by: SURGERY

## 2022-09-27 RX ORDER — SODIUM CHLORIDE 0.9 % (FLUSH) 0.9 %
5-40 SYRINGE (ML) INJECTION EVERY 12 HOURS SCHEDULED
Status: CANCELLED | OUTPATIENT
Start: 2022-09-27

## 2022-09-27 RX ORDER — HEPARIN SODIUM 5000 [USP'U]/ML
5000 INJECTION, SOLUTION INTRAVENOUS; SUBCUTANEOUS ONCE
Status: CANCELLED | OUTPATIENT
Start: 2022-09-27 | End: 2022-09-27

## 2022-09-27 RX ORDER — SODIUM CHLORIDE 0.9 % (FLUSH) 0.9 %
5-40 SYRINGE (ML) INJECTION PRN
Status: CANCELLED | OUTPATIENT
Start: 2022-09-27

## 2022-09-27 RX ORDER — SODIUM CHLORIDE 9 MG/ML
INJECTION, SOLUTION INTRAVENOUS PRN
Status: CANCELLED | OUTPATIENT
Start: 2022-09-27

## 2022-09-27 NOTE — PROGRESS NOTES
New Patient Via Slade Palafox MD    [address], [address] [facility]  [place], [facility]  [phone]    [address] YOB: 1970    Date of Visit:  9/27/2022    Kirk HernándezMICHELLE salcedo - CNP    Chief Complaint: Left groin pain and bulge    HPI: Patient presents for evaluation of pain and a bulge in his left groin. He states he has had it for a few months. He does tire work for living, and has a lot of discomfort if he lifts or strains. He denies nausea or vomiting. His bowels have been working normally. The hernia always goes back and when he lays down. The pain is a dull ache that radiates down into his scrotum    Allergies   Allergen Reactions    Codeine Itching    Mobic [Meloxicam]      Irritates stomach    Tramadol      Patient states he doesn't feel right on tramadol. Outpatient Medications Marked as Taking for the 9/27/22 encounter (Initial consult) with Kalen Rogers MD   Medication Sig Dispense Refill    busPIRone (BUSPAR) 5 MG tablet Take 1 tablet by mouth 2 times daily 60 tablet 5    montelukast (SINGULAIR) 10 MG tablet Take 1 tablet by mouth nightly 30 tablet 11    amLODIPine (NORVASC) 2.5 MG tablet TAKE ONE TABLET BY MOUTH EVERY DAY FOR CHEST PAIN 90 tablet 11    gabapentin (NEURONTIN) 600 MG tablet Take 1 tablet by mouth 3 times daily for 30 days. 90 tablet 11    ibuprofen (ADVIL;MOTRIN) 800 MG tablet Take 1 tablet by mouth 4 times daily as needed for Pain 360 tablet 11    tiZANidine (ZANAFLEX) 2 MG tablet Take 2 tablets by mouth every 8 hours as needed (muscle spasm) 90 tablet 11    LORazepam (ATIVAN) 1 MG tablet Take 1 tablet by mouth daily for 30 days.  30 tablet 2    omeprazole (PRILOSEC) 20 MG delayed release capsule Take 1 capsule by mouth every morning (before breakfast) 30 capsule 11    albuterol sulfate HFA (PROAIR HFA) 108 (90 Base) MCG/ACT inhaler Inhale 2 puffs into the lungs every 6 hours as needed for Wheezing or Shortness of Breath 18 g 5    tiotropium (SPIRIVA RESPIMAT) 2.5 MCG/ACT AERS inhaler INHALE TWO (2) PUFFS INTO THE LUNGS DAILY 4 g 5    budesonide-formoterol (SYMBICORT) 160-4.5 MCG/ACT AERO Inhale 2 puffs into the lungs 2 times daily 10.2 g 5    guaiFENesin (MUCINEX) 600 MG extended release tablet Take 1 tablet by mouth 2 times daily 60 tablet 5    fluticasone (FLONASE) 50 MCG/ACT nasal spray 1 spray by Each Nostril route daily 16 g 5    DULoxetine (CYMBALTA) 60 MG extended release capsule Take 1 capsule by mouth daily 90 capsule 1    dicyclomine (BENTYL) 10 MG capsule Take 1 capsule by mouth 4 times daily as needed (abdominal cramps.) 90 capsule 1       Past Medical History:   Diagnosis Date    Arthritis     Chest pain     [facility] 2012 sent for records; Colitis     COPD (chronic obstructive pulmonary disease) ([facility] [address]     COVID 2021    Histoplasmosis     History of blood transfusion     Hx of blood clots     Metabolic syndrome X     Other emphysema ([facility] [address] 2018    Pneumonia     Pulmonary nodule      Past Surgical History:   Procedure Laterality Date    BRONCHOSCOPY  2013    COLONOSCOPY  2018    COLONOSCOPY N/A 2018    COLORECTAL CANCER SCREENING, NOT HIGH RISK performed by Shannon Nguyen DO at [address] [facility] CATH  [address] ARTHROSCOPY Right     KNEE ARTHROSCOPY Right 2019    RIGHT KNEE VIDEO ARTHROSCOPY, MEDIAL MENISCECTOMY performed by Samantha Johnson MD at [address]       neck    VASECTOMY       Family History   Problem Relation Age of Onset    Cancer Mother         ovarian    Heart Failure Mother     Heart Attack Mother 62    Heart Disease Mother     Other Father          1948;ashd;per neurop;     Other Brother         pt knows very little    Cancer Paternal Uncle         leukemia    Cancer Paternal Grandfather         leukemia    Heart Attack Maternal Uncle 60        x4     No Known Problems Son     No Known Problems Son     Asthma Neg Hx     Diabetes Neg Hx     Emphysema Neg Hx     Hypertension Neg Hx      Social History     Socioeconomic History    Marital status:      Spouse name: Not on file    Number of children: Not on file    Years of education: Not on file    Highest education level: Not on file   Occupational History    Not on file   Tobacco Use    Smoking status: Former     Packs/day: 2.00     Years: 20.00     Pack years: 40.00     Types: Cigarettes     Quit date: 2018     Years since quittin.7    Smokeless tobacco: Never   Vaping Use    Vaping Use: Never used   Substance and Sexual Activity    Alcohol use: Yes     Alcohol/week: 1.0 standard drink     Types: 1 Cans of beer per week     Comment: rarely    Drug use: No    Sexual activity: Yes     Partners: Female   Other Topics Concern    Not on file   Social History Narrative    2018lives by self with 2 dogs;works in [employer] shop     Social Determinants of Health     Financial Resource Strain: Not on file   Food Insecurity: Not on file   Transportation Needs: Not on file   Physical Activity: Not on file   Stress: Not on file   Social Connections: Not on file   Intimate Partner Violence: Not on file   Housing Stability: Not on file          Vitals:    22 1009   BP: 122/76   Site: Left Upper Arm   Position: Sitting   Cuff Size: Large Adult   Weight: 166 lb (75.3 kg)   Height: 5' 7\" (1.702 m)     Body mass index is 26 kg/m².      Wt Readings from Last 3 Encounters:   22 166 lb (75.3 kg)   22 168 lb (76.2 kg)   22 160 lb (72.6 kg)     BP Readings from Last 3 Encounters:   22 122/76   22 118/78   22 98/67        REVIEW OF SYSTEMS:  CONSTITUTIONAL:  negative  HEENT:  Negative  RESPIRATORY:  negative  CARDIOVASCULAR:  negative  GASTROINTESTINAL:  positive for left groin pain and bulge  GENITOURINARY:  negative  HEMATOLOGIC/LYMPHATIC:

## 2022-10-08 ENCOUNTER — APPOINTMENT (OUTPATIENT)
Dept: CT IMAGING | Age: 52
End: 2022-10-08
Payer: MEDICARE

## 2022-10-08 ENCOUNTER — HOSPITAL ENCOUNTER (EMERGENCY)
Age: 52
Discharge: HOME | End: 2022-10-08
Attending: STUDENT IN AN ORGANIZED HEALTH CARE EDUCATION/TRAINING PROGRAM
Payer: MEDICARE

## 2022-10-08 VITALS
RESPIRATION RATE: 18 BRPM | HEART RATE: 95 BPM | TEMPERATURE: 97.4 F | SYSTOLIC BLOOD PRESSURE: 130 MMHG | OXYGEN SATURATION: 97 % | BODY MASS INDEX: 25.9 KG/M2 | DIASTOLIC BLOOD PRESSURE: 93 MMHG | WEIGHT: 165 LBS | HEIGHT: 67 IN

## 2022-10-08 DIAGNOSIS — K40.20 BILATERAL INGUINAL HERNIA WITHOUT OBSTRUCTION OR GANGRENE, RECURRENCE NOT SPECIFIED: Primary | ICD-10-CM

## 2022-10-08 LAB
A/G RATIO: 1.7 (ref 1.1–2.2)
ALBUMIN SERPL-MCNC: 4.6 G/DL (ref 3.4–5)
ALP BLD-CCNC: 91 U/L (ref 40–129)
ALT SERPL-CCNC: 33 U/L (ref 10–40)
AMORPHOUS: ABNORMAL /HPF
ANION GAP SERPL CALCULATED.3IONS-SCNC: 9 MMOL/L (ref 3–16)
AST SERPL-CCNC: 25 U/L (ref 15–37)
BASOPHILS ABSOLUTE: 0.1 K/UL (ref 0–0.2)
BASOPHILS RELATIVE PERCENT: 0.7 %
BILIRUB SERPL-MCNC: 0.3 MG/DL (ref 0–1)
BILIRUBIN URINE: NEGATIVE
BLOOD, URINE: NEGATIVE
BUN BLDV-MCNC: 18 MG/DL (ref 7–20)
CALCIUM SERPL-MCNC: 9.6 MG/DL (ref 8.3–10.6)
CHLORIDE BLD-SCNC: 100 MMOL/L (ref 99–110)
CLARITY: CLEAR
CO2: 29 MMOL/L (ref 21–32)
COLOR: YELLOW
CREAT SERPL-MCNC: 1.1 MG/DL (ref 0.9–1.3)
EOSINOPHILS ABSOLUTE: 0.4 K/UL (ref 0–0.6)
EOSINOPHILS RELATIVE PERCENT: 2.7 %
GFR AFRICAN AMERICAN: >60
GFR NON-AFRICAN AMERICAN: >60
GLUCOSE BLD-MCNC: 102 MG/DL (ref 70–99)
GLUCOSE URINE: NEGATIVE MG/DL
HCT VFR BLD CALC: 42 % (ref 40.5–52.5)
HEMOGLOBIN: 14.6 G/DL (ref 13.5–17.5)
KETONES, URINE: NEGATIVE MG/DL
LEUKOCYTE ESTERASE, URINE: NEGATIVE
LIPASE: 53 U/L (ref 13–60)
LYMPHOCYTES ABSOLUTE: 2.1 K/UL (ref 1–5.1)
LYMPHOCYTES RELATIVE PERCENT: 15.7 %
MCH RBC QN AUTO: 29.3 PG (ref 26–34)
MCHC RBC AUTO-ENTMCNC: 34.8 G/DL (ref 31–36)
MCV RBC AUTO: 84.4 FL (ref 80–100)
MICROSCOPIC EXAMINATION: ABNORMAL
MONOCYTES ABSOLUTE: 1.1 K/UL (ref 0–1.3)
MONOCYTES RELATIVE PERCENT: 8.1 %
MUCUS: ABNORMAL /LPF
NEUTROPHILS ABSOLUTE: 9.5 K/UL (ref 1.7–7.7)
NEUTROPHILS RELATIVE PERCENT: 72.8 %
NITRITE, URINE: NEGATIVE
PDW BLD-RTO: 13.4 % (ref 12.4–15.4)
PH UA: 6 (ref 5–8)
PLATELET # BLD: 341 K/UL (ref 135–450)
PMV BLD AUTO: 7.5 FL (ref 5–10.5)
POTASSIUM REFLEX MAGNESIUM: 4.1 MMOL/L (ref 3.5–5.1)
PROTEIN UA: NEGATIVE MG/DL
RBC # BLD: 4.98 M/UL (ref 4.2–5.9)
RBC UA: ABNORMAL /HPF (ref 0–4)
SODIUM BLD-SCNC: 138 MMOL/L (ref 136–145)
SPECIFIC GRAVITY UA: <=1.005 (ref 1–1.03)
TOTAL PROTEIN: 7.3 G/DL (ref 6.4–8.2)
URINE TYPE: ABNORMAL
UROBILINOGEN, URINE: 0.2 E.U./DL
WBC # BLD: 13 K/UL (ref 4–11)
WBC UA: ABNORMAL /HPF (ref 0–5)

## 2022-10-08 PROCEDURE — 6360000004 HC RX CONTRAST MEDICATION: Performed by: STUDENT IN AN ORGANIZED HEALTH CARE EDUCATION/TRAINING PROGRAM

## 2022-10-08 PROCEDURE — 6360000002 HC RX W HCPCS: Performed by: STUDENT IN AN ORGANIZED HEALTH CARE EDUCATION/TRAINING PROGRAM

## 2022-10-08 PROCEDURE — 36415 COLL VENOUS BLD VENIPUNCTURE: CPT

## 2022-10-08 PROCEDURE — 80053 COMPREHEN METABOLIC PANEL: CPT

## 2022-10-08 PROCEDURE — 96375 TX/PRO/DX INJ NEW DRUG ADDON: CPT

## 2022-10-08 PROCEDURE — 74177 CT ABD & PELVIS W/CONTRAST: CPT

## 2022-10-08 PROCEDURE — 96374 THER/PROPH/DIAG INJ IV PUSH: CPT

## 2022-10-08 PROCEDURE — 81001 URINALYSIS AUTO W/SCOPE: CPT

## 2022-10-08 PROCEDURE — 85025 COMPLETE CBC W/AUTO DIFF WBC: CPT

## 2022-10-08 PROCEDURE — 83690 ASSAY OF LIPASE: CPT

## 2022-10-08 PROCEDURE — 99285 EMERGENCY DEPT VISIT HI MDM: CPT

## 2022-10-08 RX ORDER — ONDANSETRON 2 MG/ML
4 INJECTION INTRAMUSCULAR; INTRAVENOUS ONCE
Status: COMPLETED | OUTPATIENT
Start: 2022-10-08 | End: 2022-10-08

## 2022-10-08 RX ORDER — HYDROCODONE BITARTRATE AND ACETAMINOPHEN 5; 325 MG/1; MG/1
1 TABLET ORAL EVERY 6 HOURS PRN
Qty: 10 TABLET | Refills: 0 | Status: SHIPPED | OUTPATIENT
Start: 2022-10-08 | End: 2022-10-11

## 2022-10-08 RX ORDER — KETOROLAC TROMETHAMINE 30 MG/ML
15 INJECTION, SOLUTION INTRAMUSCULAR; INTRAVENOUS ONCE
Status: COMPLETED | OUTPATIENT
Start: 2022-10-08 | End: 2022-10-08

## 2022-10-08 RX ADMIN — KETOROLAC TROMETHAMINE 15 MG: 30 INJECTION, SOLUTION INTRAMUSCULAR at 01:43

## 2022-10-08 RX ADMIN — IOPAMIDOL 75 ML: 755 INJECTION, SOLUTION INTRAVENOUS at 02:25

## 2022-10-08 RX ADMIN — ONDANSETRON HYDROCHLORIDE 4 MG: 2 INJECTION, SOLUTION INTRAMUSCULAR; INTRAVENOUS at 01:45

## 2022-10-08 ASSESSMENT — PAIN - FUNCTIONAL ASSESSMENT
PAIN_FUNCTIONAL_ASSESSMENT: ACTIVITIES ARE NOT PREVENTED
PAIN_FUNCTIONAL_ASSESSMENT: 0-10

## 2022-10-08 ASSESSMENT — PAIN DESCRIPTION - ONSET: ONSET: ON-GOING

## 2022-10-08 ASSESSMENT — PAIN DESCRIPTION - PAIN TYPE
TYPE: ACUTE PAIN
TYPE: ACUTE PAIN

## 2022-10-08 ASSESSMENT — PAIN SCALES - GENERAL
PAINLEVEL_OUTOF10: 8
PAINLEVEL_OUTOF10: 9
PAINLEVEL_OUTOF10: 9

## 2022-10-08 ASSESSMENT — PAIN DESCRIPTION - DESCRIPTORS
DESCRIPTORS: ACHING
DESCRIPTORS: ACHING

## 2022-10-08 ASSESSMENT — PAIN DESCRIPTION - FREQUENCY
FREQUENCY: CONTINUOUS
FREQUENCY: CONTINUOUS

## 2022-10-08 ASSESSMENT — PAIN DESCRIPTION - ORIENTATION
ORIENTATION: RIGHT;LEFT;LOWER
ORIENTATION: LOWER;MID

## 2022-10-08 ASSESSMENT — PAIN DESCRIPTION - LOCATION
LOCATION: ABDOMEN;GROIN
LOCATION: ABDOMEN
LOCATION: ABDOMEN

## 2022-10-08 NOTE — ED PROVIDER NOTES
[address] ED      CHIEF COMPLAINT  Hernia (Patient states he has double hernia and started having a lot of pain this evening, is scheduled for November 4th. )     HISTORY OF PRESENT ILLNESS  Janis Prescott Sr. is a 49 y.o. male  who presents to the ED complaining of lower abdominal pain. Patient states that he is bilateral hernias, and that he typically has pain there and is scheduled for surgery on November 4 with Dr. rPudence Kwok. He states that his pain worsened since yesterday. He states that it is primarily in the left lower quadrant but also in his right lower quadrant. He also complains of some left testicular swelling. He states that he has diarrhea at baseline and this is unchanged and he did have a bowel movement yesterday. He denies any nausea or vomiting. He took some ibuprofen at home which did seem to help somewhat with his symptoms. He denies any fevers. Denies urinary symptoms. He denies any other complaints or concerns. No other complaints, modifying factors or associated symptoms. I have reviewed the following from the nursing documentation. Past Medical History:   Diagnosis Date    Arthritis     Chest pain     [facility] 9/2012 sent for records;     Colitis     COPD (chronic obstructive pulmonary disease) ([facility] [address]     COVID 12/25/2021    Histoplasmosis     History of blood transfusion     Hx of blood clots     Metabolic syndrome X 18/93/7541    Other emphysema ([facility] [address] 8/19/2018    Pneumonia     Pulmonary nodule      Past Surgical History:   Procedure Laterality Date    BRONCHOSCOPY  6/05/2013    COLONOSCOPY  12/17/2018    COLONOSCOPY N/A 12/17/2018    COLORECTAL CANCER SCREENING, NOT HIGH RISK performed by Barbara Krishnan DO at [address] [facility] CATH  [address] [place] ARTHROSCOPY Right     KNEE ARTHROSCOPY Right 12/13/2019    RIGHT KNEE VIDEO ARTHROSCOPY, MEDIAL MENISCECTOMY performed by Isadora Heimlich, MD at [facility] DISSECTION      neck    VASECTOMY       Family History   Problem Relation Age of Onset    Cancer Mother         ovarian    Heart Failure Mother     Heart Attack Mother 62    Heart Disease Mother     Other Father          1948;ashd;per neurop; Other Brother         pt knows very little    Cancer Paternal Uncle         leukemia    Cancer Paternal Grandfather         leukemia    Heart Attack Maternal Uncle 60        x4     No Known Problems Son     No Known Problems Son     Asthma Neg Hx     Diabetes Neg Hx     Emphysema Neg Hx     Hypertension Neg Hx      Social History     Socioeconomic History    Marital status:      Spouse name: Not on file    Number of children: Not on file    Years of education: Not on file    Highest education level: Not on file   Occupational History    Not on file   Tobacco Use    Smoking status: Former     Packs/day: 2.00     Years: 20.00     Pack years: 40.00     Types: Cigarettes     Quit date: 2018     Years since quittin.7    Smokeless tobacco: Never   Vaping Use    Vaping Use: Never used   Substance and Sexual Activity    Alcohol use: Yes     Alcohol/week: 1.0 standard drink     Types: 1 Cans of beer per week     Comment: rarely    Drug use: No    Sexual activity: Yes     Partners: Female   Other Topics Concern    Not on file   Social History Narrative    2018lives by self with 2 dogs;works in tire shop     Social Determinants of Health     Financial Resource Strain: Not on file   Food Insecurity: Not on file   Transportation Needs: Not on file   Physical Activity: Not on file   Stress: Not on file   Social Connections: Not on file   Intimate Partner Violence: Not on file   Housing Stability: Not on file     No current facility-administered medications for this encounter.      Current Outpatient Medications   Medication Sig Dispense Refill    HYDROcodone-acetaminophen (NORCO) 5-325 MG per tablet Take 1 tablet by mouth every 6 hours as needed for Pain for up to 3 days. Intended supply: 3 days. Take lowest dose possible to manage pain 10 tablet 0    busPIRone (BUSPAR) 5 MG tablet Take 1 tablet by mouth 2 times daily 60 tablet 5    montelukast (SINGULAIR) 10 MG tablet Take 1 tablet by mouth nightly 30 tablet 11    amLODIPine (NORVASC) 2.5 MG tablet TAKE ONE TABLET BY MOUTH EVERY DAY FOR CHEST PAIN 90 tablet 11    gabapentin (NEURONTIN) 600 MG tablet Take 1 tablet by mouth 3 times daily for 30 days. 90 tablet 11    ibuprofen (ADVIL;MOTRIN) 800 MG tablet Take 1 tablet by mouth 4 times daily as needed for Pain 360 tablet 11    tiZANidine (ZANAFLEX) 2 MG tablet Take 2 tablets by mouth every 8 hours as needed (muscle spasm) 90 tablet 11    LORazepam (ATIVAN) 1 MG tablet Take 1 tablet by mouth daily for 30 days. 30 tablet 2    omeprazole (PRILOSEC) 20 MG delayed release capsule Take 1 capsule by mouth every morning (before breakfast) 30 capsule 11    albuterol sulfate HFA (PROAIR HFA) 108 (90 Base) MCG/ACT inhaler Inhale 2 puffs into the lungs every 6 hours as needed for Wheezing or Shortness of Breath 18 g 5    tiotropium (SPIRIVA RESPIMAT) 2.5 MCG/ACT AERS inhaler INHALE TWO (2) PUFFS INTO THE LUNGS DAILY 4 g 5    budesonide-formoterol (SYMBICORT) 160-4.5 MCG/ACT AERO Inhale 2 puffs into the lungs 2 times daily 10.2 g 5    guaiFENesin (MUCINEX) 600 MG extended release tablet Take 1 tablet by mouth 2 times daily 60 tablet 5    fluticasone (FLONASE) 50 MCG/ACT nasal spray 1 spray by Each Nostril route daily 16 g 5    DULoxetine (CYMBALTA) 60 MG extended release capsule Take 1 capsule by mouth daily 90 capsule 1    dicyclomine (BENTYL) 10 MG capsule Take 1 capsule by mouth 4 times daily as needed (abdominal cramps.) 90 capsule 1     Allergies   Allergen Reactions    Codeine Itching    Mobic [Meloxicam]      Irritates stomach    Tramadol      Patient states he doesn't feel right on tramadol.        REVIEW OF SYSTEMS  10 systems reviewed, pertinent positives per HPI otherwise noted to be negative. PHYSICAL EXAM  BP (!) 130/93   Pulse 95   Temp 97.4 °F (36.3 °C)   Resp 18   Ht 5' 7\" (1.702 m)   Wt 165 lb (74.8 kg)   SpO2 97%   BMI 25.84 kg/m²    GENERAL APPEARANCE: Awake and alert. Cooperative. No acute distress. HENT: Normocephalic. Atraumatic. NECK: Supple. EYES: PERRL. EOM's grossly intact. HEART/CHEST: RRR. No murmurs. LUNGS: Respirations unlabored. CTAB. Good air exchange. Speaking comfortably in full sentences. ABDOMEN: Tenderness to palpation with voluntary guarding diffusely, most prominent in the left lower quadrant and right lower quadrants. Non-distended. No masses. No organomegaly. MUSCULOSKELETAL: No extremity edema. Compartments soft. No deformity. No tenderness in the extremities. All extremities neurovascularly intact. SKIN: Warm and dry. No acute rashes. NEUROLOGICAL: Alert and oriented. CN's 2-12 intact. No gross facial drooping. Strength 5/5, sensation intact. Gait normal.  PSYCHIATRIC: Normal mood and affect. LABS  I have reviewed all labs for this visit.    Results for orders placed or performed during the hospital encounter of 10/08/22   CBC with Auto Differential   Result Value Ref Range    WBC 13.0 (H) 4.0 - 11.0 K/uL    RBC 4.98 4.20 - 5.90 M/uL    Hemoglobin 14.6 13.5 - 17.5 g/dL    Hematocrit 42.0 40.5 - 52.5 %    MCV 84.4 80.0 - 100.0 fL    MCH 29.3 26.0 - 34.0 pg    MCHC 34.8 31.0 - 36.0 g/dL    RDW 13.4 12.4 - 15.4 %    Platelets 193 540 - 040 K/uL    MPV 7.5 5.0 - 10.5 fL    Neutrophils % 72.8 %    Lymphocytes % 15.7 %    Monocytes % 8.1 %    Eosinophils % 2.7 %    Basophils % 0.7 %    Neutrophils Absolute 9.5 (H) 1.7 - 7.7 K/uL    Lymphocytes Absolute 2.1 1.0 - 5.1 K/uL    Monocytes Absolute 1.1 0.0 - 1.3 K/uL    Eosinophils Absolute 0.4 0.0 - 0.6 K/uL    Basophils Absolute 0.1 0.0 - 0.2 K/uL   Comprehensive Metabolic Panel w/ Reflex to MG   Result Value Ref Range    Sodium 138 136 - 145 mmol/L    Potassium reflex Magnesium 4.1 3.5 - 5.1 mmol/L    Chloride 100 99 - 110 mmol/L    CO2 29 21 - 32 mmol/L    Anion Gap 9 3 - 16    Glucose 102 (H) 70 - 99 mg/dL    BUN 18 7 - 20 mg/dL    Creatinine 1.1 0.9 - 1.3 mg/dL    GFR Non-African American >60 >60    GFR African American >60 >60    Calcium 9.6 8.3 - 10.6 mg/dL    Total Protein 7.3 6.4 - 8.2 g/dL    Albumin 4.6 3.4 - 5.0 g/dL    Albumin/Globulin Ratio 1.7 1.1 - 2.2    Total Bilirubin 0.3 0.0 - 1.0 mg/dL    Alkaline Phosphatase 91 40 - 129 U/L    ALT 33 10 - 40 U/L    AST 25 15 - 37 U/L   Lipase   Result Value Ref Range    Lipase 53.0 13.0 - 60.0 U/L   Urinalysis with Microscopic   Result Value Ref Range    Color, UA Yellow Straw/Yellow    Clarity, UA Clear Clear    Glucose, Ur Negative Negative mg/dL    Bilirubin Urine Negative Negative    Ketones, Urine Negative Negative mg/dL    Specific Gravity, UA <=1.005 1.005 - 1.030    Blood, Urine Negative Negative    pH, UA 6.0 5.0 - 8.0    Protein, UA Negative Negative mg/dL    Urobilinogen, Urine 0.2 <2.0 E.U./dL    Nitrite, Urine Negative Negative    Leukocyte Esterase, Urine Negative Negative    Microscopic Examination Not Indicated     Urine Type NotGiven     Mucus, UA Rare (A) None Seen /LPF    WBC, UA None seen 0 - 5 /HPF    RBC, UA None seen 0 - 4 /HPF    Amorphous, UA Rare /HPF       RADIOLOGY  CT ABDOMEN PELVIS W IV CONTRAST Additional Contrast? None   Final Result   1. Bilateral fat containing inguinal hernias, without bowel herniation. 2. Small fat containing umbilical hernia. 3. No ileus, obstruction, or acute inflammatory bowel process. 4. No acute inflammatory abnormality seen in the abdomen or pelvis. 5. Calcified granulomatous changes seen within the lung bases. 6. Incidental note made of midgut malrotation, chronic with no interval   change from 2014. ED COURSE/MDM  Patient seen and evaluated. Old records reviewed. Labs and imaging reviewed and results discussed with patient.       Patient is a 52-year-old male, with history of bilateral inguinal hernias with surgery scheduled on 11/4, presenting with concerns for worsening lower abdominal pain. HPI detailed above. Upon arrival to ED, vitals reassuring. Patient is resting comfortably and is in no acute distress. He does have tenderness to palpation in the right and left lower quadrants. CT abdomen pelvis shows bilateral fat-containing inguinal hernias without bowel herniation and a small fat-containing umbilical hernia, no ileus obstruction or acute inflammatory bowel process. Patient was treated with Toradol and Zofran with improvement of his symptoms. He states that he has been taking 800 mg of ibuprofen 4 times a day and that is not helping his pain. I will send him with a short course of pain medication but advised him that I cannot prescribe anything long-term and that he would need to follow-up with his surgeon should he have worsening pain to see if they may potentially be able to move up the date of his surgery. He is comfortable in agreement with plan of care and was discharged. He does take as needed Ativan and agrees not to take Ativan while taking his pain medications due to drug interactions. Given return precautions to the ED for any new or worsening symptoms. I, Dr. Damon Huynh MD, am the primary clinician of record. Is this patient to be included in the SEP-1 Core Measure? No   Exclusion criteria - the patient is NOT to be included for SEP-1 Core Measure due to: Infection is not suspected     During the patient's ED course, the patient was given:  Medications   ketorolac (TORADOL) injection 15 mg (15 mg IntraVENous Given 10/8/22 0143)   ondansetron (ZOFRAN) injection 4 mg (4 mg IntraVENous Given 10/8/22 0145)   iopamidol (ISOVUE-370) 76 % injection 75 mL (75 mLs IntraVENous Given 10/8/22 0225)        CLINICAL IMPRESSION  1.  Bilateral inguinal hernia without obstruction or gangrene, recurrence not specified        Blood pressure (!) 130/93, pulse 95, temperature 97.4 °F (36.3 °C), resp. rate 18, height 5' 7\" (1.702 m), weight 165 lb (74.8 kg), SpO2 97 %. DISPOSITION  Denver Camacho Sr. was discharged to home in good condition. Patient was given scripts for the following medications. I counseled patient how to take these medications. Discharge Medication List as of 10/8/2022  5:27 AM        START taking these medications    Details   HYDROcodone-acetaminophen (NORCO) 5-325 MG per tablet Take 1 tablet by mouth every 6 hours as needed for Pain for up to 3 days. Intended supply: 3 days. Take lowest dose possible to manage pain, Disp-10 tablet, R-0Print             Follow-up with:  MICHELLE Lindo - CNP  [facility] [address] [place] [address]  [phone]    Schedule an appointment as soon as possible for a visit       [facility] [place] [facility]  [address] [phone]  Go to   If symptoms worsen    Estefani Menas MD  [address] [facility] [address] [phone]    Call       DISCLAIMER: This chart was created using Dragon dictation software. Efforts were made by me to ensure accuracy, however some errors may be present due to limitations of this technology and occasionally words are not transcribed correctly.        Deisy Yadav MD  10/08/22 7628

## 2022-10-24 DIAGNOSIS — F41.9 CHRONIC ANXIETY: ICD-10-CM

## 2022-10-24 RX ORDER — LORAZEPAM 1 MG/1
1 TABLET ORAL DAILY
Qty: 30 TABLET | Refills: 0 | Status: SHIPPED | OUTPATIENT
Start: 2022-10-24 | End: 2022-11-23

## 2022-10-24 NOTE — TELEPHONE ENCOUNTER
Future appt scheduled 0 appt scheduled   Return for keep November.                 Last appt 09/13/2022      Last Written 06/13/2022    LORazepam (ATIVAN) 1 MG tablet  #30  2 RF

## 2022-10-31 ENCOUNTER — HOSPITAL ENCOUNTER (OUTPATIENT)
Dept: CT IMAGING | Age: 52
Discharge: HOME | End: 2022-10-31
Payer: MEDICARE

## 2022-10-31 ENCOUNTER — OFFICE VISIT (OUTPATIENT)
Dept: PULMONOLOGY | Age: 52
End: 2022-10-31
Payer: MEDICARE

## 2022-10-31 VITALS
RESPIRATION RATE: 16 BRPM | WEIGHT: 169.8 LBS | BODY MASS INDEX: 26.65 KG/M2 | SYSTOLIC BLOOD PRESSURE: 119 MMHG | DIASTOLIC BLOOD PRESSURE: 70 MMHG | OXYGEN SATURATION: 93 % | HEART RATE: 69 BPM | HEIGHT: 67 IN

## 2022-10-31 DIAGNOSIS — R06.81 WITNESSED EPISODE OF APNEA: Primary | ICD-10-CM

## 2022-10-31 DIAGNOSIS — J44.9 CHRONIC OBSTRUCTIVE PULMONARY DISEASE, UNSPECIFIED COPD TYPE (HCC): ICD-10-CM

## 2022-10-31 DIAGNOSIS — Z87.891 PERSONAL HISTORY OF TOBACCO USE: ICD-10-CM

## 2022-10-31 DIAGNOSIS — Z01.818 PREOPERATIVE CLEARANCE: ICD-10-CM

## 2022-10-31 PROCEDURE — 3023F SPIROM DOC REV: CPT | Performed by: INTERNAL MEDICINE

## 2022-10-31 PROCEDURE — 99214 OFFICE O/P EST MOD 30 MIN: CPT | Performed by: INTERNAL MEDICINE

## 2022-10-31 PROCEDURE — 1036F TOBACCO NON-USER: CPT | Performed by: INTERNAL MEDICINE

## 2022-10-31 PROCEDURE — G8419 CALC BMI OUT NRM PARAM NOF/U: HCPCS | Performed by: INTERNAL MEDICINE

## 2022-10-31 PROCEDURE — G8427 DOCREV CUR MEDS BY ELIG CLIN: HCPCS | Performed by: INTERNAL MEDICINE

## 2022-10-31 PROCEDURE — G8484 FLU IMMUNIZE NO ADMIN: HCPCS | Performed by: INTERNAL MEDICINE

## 2022-10-31 PROCEDURE — 3017F COLORECTAL CA SCREEN DOC REV: CPT | Performed by: INTERNAL MEDICINE

## 2022-10-31 PROCEDURE — 71271 CT THORAX LUNG CANCER SCR C-: CPT

## 2022-10-31 ASSESSMENT — SLEEP AND FATIGUE QUESTIONNAIRES
HOW LIKELY ARE YOU TO NOD OFF OR FALL ASLEEP WHILE SITTING AND TALKING TO SOMEONE: 3
HOW LIKELY ARE YOU TO NOD OFF OR FALL ASLEEP WHILE SITTING INACTIVE IN A PUBLIC PLACE: 3
HOW LIKELY ARE YOU TO NOD OFF OR FALL ASLEEP WHILE WATCHING TV: 3
NECK CIRCUMFERENCE (INCHES): 15
HOW LIKELY ARE YOU TO NOD OFF OR FALL ASLEEP WHILE SITTING QUIETLY AFTER LUNCH WITHOUT ALCOHOL: 3
HOW LIKELY ARE YOU TO NOD OFF OR FALL ASLEEP WHEN YOU ARE A PASSENGER IN A CAR FOR AN HOUR WITHOUT A BREAK: 3
HOW LIKELY ARE YOU TO NOD OFF OR FALL ASLEEP WHILE LYING DOWN TO REST IN THE AFTERNOON WHEN CIRCUMSTANCES PERMIT: 3
HOW LIKELY ARE YOU TO NOD OFF OR FALL ASLEEP IN A CAR, WHILE STOPPED FOR A FEW MINUTES IN TRAFFIC: 2
HOW LIKELY ARE YOU TO NOD OFF OR FALL ASLEEP WHILE SITTING AND READING: 3
ESS TOTAL SCORE: 23

## 2022-10-31 NOTE — PROGRESS NOTES
[facility]                                                               CHIEF COMPLAINT: follow up COPD         HPI:   Doing alright   Some cough with clear and at times green   No hemoptysis   Uses Albuterol 2 times/day   No smoking   Per girlfriend  witnessed apnea, + snoring and hypersomnia. Goes to bed 10-11 pm and gets up 8 am   Patient is getting bilateral hernia repair   ESS 23      Past Medical History:   Diagnosis Date    Arthritis     Chest pain     [facility] 2012 sent for records; Colitis     COPD (chronic obstructive pulmonary disease) ([facility] [address]     COVID 2021    Histoplasmosis     History of blood transfusion     Hx of blood clots     Metabolic syndrome X     Other emphysema ([facility] [address] 2018    Pneumonia     Pulmonary nodule        Past Surgical History:        Procedure Laterality Date    BRONCHOSCOPY  2013    COLONOSCOPY  2018    COLONOSCOPY N/A 2018    COLORECTAL CANCER SCREENING, NOT HIGH RISK performed by Yonis Lopez DO at [address] [facility] CATH  [place] ARTHROSCOPY Right     KNEE ARTHROSCOPY Right 2019    RIGHT KNEE VIDEO ARTHROSCOPY, MEDIAL MENISCECTOMY performed by Ciaran Davis MD at [address]      neck    VASECTOMY         Allergies:  is allergic to codeine, mobic [meloxicam], and tramadol. Social History:    TOBACCO:   reports that he quit smoking about 4 years ago. His smoking use included cigarettes. He has a 40.00 pack-year smoking history. He has never used smokeless tobacco.  ETOH:   reports current alcohol use of about 1.0 standard drink per week. Family History:       Problem Relation Age of Onset    Cancer Mother         ovarian    Heart Failure Mother     Heart Attack Mother 62    Heart Disease Mother     Other Father          1948;ashd;per neurop;     Other Brother         pt knows very little    Cancer Paternal Uncle         leukemia    Cancer Paternal Grandfather         leukemia    Heart Attack Maternal Uncle 60        x4     No Known Problems Son     No Known Problems Son     Asthma Neg Hx     Diabetes Neg Hx     Emphysema Neg Hx     Hypertension Neg Hx        Current Medications:    Current Outpatient Medications:     LORazepam (ATIVAN) 1 MG tablet, Take 1 tablet by mouth daily for 30 days. , Disp: 30 tablet, Rfl: 0    busPIRone (BUSPAR) 5 MG tablet, Take 1 tablet by mouth 2 times daily, Disp: 60 tablet, Rfl: 5    montelukast (SINGULAIR) 10 MG tablet, Take 1 tablet by mouth nightly, Disp: 30 tablet, Rfl: 11    amLODIPine (NORVASC) 2.5 MG tablet, TAKE ONE TABLET BY MOUTH EVERY DAY FOR CHEST PAIN, Disp: 90 tablet, Rfl: 11    gabapentin (NEURONTIN) 600 MG tablet, Take 1 tablet by mouth 3 times daily for 30 days. , Disp: 90 tablet, Rfl: 11    ibuprofen (ADVIL;MOTRIN) 800 MG tablet, Take 1 tablet by mouth 4 times daily as needed for Pain, Disp: 360 tablet, Rfl: 11    tiZANidine (ZANAFLEX) 2 MG tablet, Take 2 tablets by mouth every 8 hours as needed (muscle spasm), Disp: 90 tablet, Rfl: 11    omeprazole (PRILOSEC) 20 MG delayed release capsule, Take 1 capsule by mouth every morning (before breakfast), Disp: 30 capsule, Rfl: 11    albuterol sulfate HFA (PROAIR HFA) 108 (90 Base) MCG/ACT inhaler, Inhale 2 puffs into the lungs every 6 hours as needed for Wheezing or Shortness of Breath, Disp: 18 g, Rfl: 5    tiotropium (SPIRIVA RESPIMAT) 2.5 MCG/ACT AERS inhaler, INHALE TWO (2) PUFFS INTO THE LUNGS DAILY, Disp: 4 g, Rfl: 5    budesonide-formoterol (SYMBICORT) 160-4.5 MCG/ACT AERO, Inhale 2 puffs into the lungs 2 times daily, Disp: 10.2 g, Rfl: 5    guaiFENesin (MUCINEX) 600 MG extended release tablet, Take 1 tablet by mouth 2 times daily, Disp: 60 tablet, Rfl: 5    fluticasone (FLONASE) 50 MCG/ACT nasal spray, 1 spray by Each Nostril route daily, Disp: 16 g, Rfl: 5    DULoxetine (CYMBALTA) 60 MG extended release capsule, Take 1 capsule by mouth daily, Disp: 90 capsule, Rfl: 1    dicyclomine (BENTYL) 10 MG capsule, Take 1 capsule by mouth 4 times daily as needed (abdominal cramps.), Disp: 90 capsule, Rfl: 1      Objective:   PHYSICAL EXAM:    /70 (Site: Left Upper Arm, Position: Sitting, Cuff Size: Medium Adult)   Pulse 69   Resp 16   Ht 5' 7\" (1.702 m)   Wt 169 lb 12.8 oz (77 kg)   SpO2 93% Comment: RA  BMI 26.59 kg/m²   Gen: No distress. Eyes: PERRL. No sclera icterus. No conjunctival injection. ENT: No discharge. Pharynx clear. Mallampati class IV  Neck: Trachea midline. No obvious mass. Neck circumference 15 inches  Resp: No accessory muscle use. No crackles. No wheezes. No rhonchi. No dullness on percussion. Good air entry. CV: Regular rate. Regular rhythm. No murmur or rub. No edema. GI: Non-tender. Non-distended. No hernia. Skin: Warm and dry. No nodule on exposed extremities. Lymph: No cervical LAD. No supraclavicular LAD. M/S: No cyanosis. No joint deformity. No clubbing. Neuro: Awake. Alert. Moves all four extremities. Psych: Oriented x 3. No anxiety. DATA reviewed by me:   PFTs 08/05/2019 FVC 4.02(87%) FEV1 2.93(80%) FEV1/FVC 73% TLC 5.54(83%) DLCO 26.83(84%) 6MW 1000 LO2 94%   PFTs 04/08/2019 FVC 3.61(78%) FEV1 1.75(48%) FEV1/FVC 49% TLC 5.01(75%) DLCO 24.42(77%) 6MW 1000 LO2 94%   PFTs 11/21/2018 FVC 3.63(78%) FEV1 2.91(80%) FEV1/FVC 80% TLC 5.28(79%) DLCO 23.76(75%)    CT chest 8/31/2020  Stable pulmonary nodules to exam dated 5/13/2013  Calcified pulmonary nodules, calcified hilar lymph nodes and and calcifications within the spleen    CT chest 10/12/2021    No evidence of pulmonary embolism or acute pulmonary abnormality. Mild bibasilar atelectasis. Emphysema. Sequela of old granulomatous disease.           6 minute walk today  96% RA rest  98% 1 min exertion on RA  98% 2 min exertion on RA  98% 3 min exertion on RA  98% 4 min exertion on RA 99% 5 min exertion on RA  98% 6 min exertion on RA      Echo 11/30/2018   Limited echo for bubble study to r/o PFO/ASD. Normal left ventricle systolic function with an estimated ejection fraction of 55%. A bubble study was performed and fails to show evidence of shunting. PSG 11/20/2018 AHI 2.6 and Lowest O2 sat 90% no arrythmias       Assessment:       COPD/pulmonary emphysema  Preoperative clearance for inguinal hernia   Snoring, apnea and hypersomnia   Bilateral pulmonary nodules with mediastinal and hilar adenopathy 5/2013. Decreased in size in RLL PNs and 4R LN with enlargement of GOPAL PN on repeated CT chest 8/16/23013. Stable on CT 05/27/2014. Positive histoplasma M bands and Histoplasma Ab yeast CF (1-64). Itraconazole 0/8/4010-1/4/8036  Hypermetabolic right hilar lymph nodes. 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 Improved on repeated CT. Arthritis. 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 60 pack year smoking - quit 1/2018   Covid 19 12/26/2021 received infusion   Failed Symbicort and Spiriva      Plan:       Continue Symbicort, Spiriva and albuterol 2 puffs Q4-6 hrs PRN  Acapella and Mucinex PRN   Patient is up to date with Pneumococcal vaccine  Advised to get influenza vaccine this year   Refusing Covid vaccine   Advised to schedule CT chest, low dose protocol, screening for lung cancer this month. Risks, benefits and alternatives including doing nothing were discussed with patient. Advised to continue with smoking cessation  PSG evaluate for sleep related breathing disorder. Upon examination and discussion I have determined that, from a pulmonary standpoint, patient is clear for surgery. Patient has increased risk of postoperative pulmonary complications based on  history of COPD.   Perioperative pulmonary complications were discussed with the patient including atelectasis, infection, prolonged mechanical ventilation, and exacerbation of underlying chronic lung disease. To minimize the perioperative pulmonary complications I recommend:   Inhaled bronchodilators perioperatively   Delay elective surgery and antibiotics if respiratory infection present    Deep breathing exercises or incentive spirometry  Minimize duration of anesthesia    Early ambulation and DVT prophylaxis.

## 2022-10-31 NOTE — PATIENT INSTRUCTIONS
[facility] [phone]      Sleep Hygiene. .. Tips for better sleep. .. Avoid naps. This will ensure you are sleepy at bedtime. If you have to take a nap, sleep less than 1 hour, before 3 pm.  Sleep only when sleepy; this reduces the time you are awake in bed. Regular exercise is recommended to help you deepen your sleep, but not within 4-6 hours of your bedtime. Timing of exercise is important, aim to exercise early in the morning or early afternoon. A light snack may help you fall asleep. Warm milk and foods high in the amino acid tryptophan, such as bananas, may help you to sleep  Be sure to avoid heavy, spicy or sugary foods 4-6 hours before bedtime and avoid at snack time. Stay away from stimulants such as caffeine and nicotine for at least 4-6 hours before bed. Stimulants can interfere with your ability to fall asleep. Caffeine is found in tea, cola, coffee, cocoa and chocolate and is best avoided at bedtime. Nicotine is found in tobacco products. Avoid alcohol 4-6 hours before bedtime. Alcohol has an immediate sleep-inducing effect, after a few hours when alcohol levels fall there is a stimulant or wake-up effect and will cause fragmented sleep. Sleep rituals are important. Give your body clues it is time to slow down and sleep. Examples include; yoga, deep breathing, listen to relaxing music, a hot bath or a few minutes of reading. Have a fixed bedtime and awakening time, Even on weekends! You will feel better keeping a regular sleep cycle, even if you are retired or not working. Get into your favorite sleep position. If not asleep in 30 minutes, get up and do something boring until you feel sleepy. Remember not to expose yourself to bright lights such as TV, phone or tablet screens. Only use your bed for sleeping. Do not use your bed as an office, workroom or recreation room. Use comfortable bedding. Uncomfortable bedding can prevent good sleep.   Ensure your bedroom is quiet and comfortable. A cooler room along with enough blankets to stay warm is recommended. If your room is too noisy, try a white noise machine. If too bright, try black out shades or an eye mask. Dont take worries to bed. Leave worries about work, school etc. behind you when you go to bed. Some people find it helpful to assign a worry period in the evening or late afternoon to write down your worries and get them out of your system.

## 2022-11-01 ENCOUNTER — TELEPHONE (OUTPATIENT)
Dept: PULMONOLOGY | Age: 52
End: 2022-11-01

## 2022-11-01 DIAGNOSIS — Z87.891 PERSONAL HISTORY OF TOBACCO USE: Primary | ICD-10-CM

## 2022-11-01 NOTE — PROGRESS NOTES
PRE OP INSTRUCTION SHEET   1. Do not eat or drink anything after 12 midnight  prior to surgery. This includes no water, chewing gum or mints. 2. Take the following pills will a small sip of water (see MAR)                                        3. Aspirin, Ibuprofen, Advil, Naproxen, Vitamin E, fish oil and other Anti-inflammatory products should be stopped for 5 days before surgery or as directed by your physician. 4. Check with your Doctor regarding stopping Plavix, Coumadin, Lovenox, Fragmin or other blood thinners   5. Do not smoke, and do not drink any alcoholic beverages 24 hours prior to surgery. This includes NA Beer. 6. You may brush your teeth and gargle the morning of surgery. DO NOT SWALLOW WATER   7. You MUST make arrangements for a responsible adult to take you home after your surgery. You will not be allowed to leave alone or drive yourself home. It is strongly suggested someone stay with you the first 24 hrs. Your surgery will be cancelled if you do not have a ride home. 8. A parent/legal guardian must accompany a child scheduled for surgery and plan to stay at the hospital until the child is discharged. Please do not bring other children with you. 9. Please wear simple, loose fitting clothing to the hospital.  Priti Ports not bring valuables (money, credit cards, checkbooks, etc.) Do not wear any makeup (including no eye makeup) or nail polish on your fingers or toes. 10. DO NOT wear any jewelry or piercings on day of surgery. All body piercing jewelry must be removed. 11. If you have dentures,glasses, or contacts they will be removed before going to the OR; we will provide you a container. 12. Please see your family doctor/and cardiologist for a history & physical and/or concerning medications. Bring any test results/reports from your physician's office. Have history and labs faxed to [phone].  Remember to bring Blood Bank bracelet on the day of surgery. 14. If you have a Living Will and Durable Power of  for Healthcare, please bring in a copy. 13. Notify your Surgeon if you develop any illness between now and surgery  time, cough, cold, fever, sore throat, nausea, vomiting, etc.  Please notify your surgeon if you experience dizziness, shortness of breath or blurred vision between now & the time of your surgery   16. DO NOT shave your operative site 96 hours prior to surgery. For face & neck surgery, men may use an electric razor 48 hours prior to surgery. 17. Shower with _x__Antibacterial soap (x_chlorhexidine for total joint  Pt's) shower two times before surgery.(the morning of and the night before. 18. To provide excellent care visitors will be limited to one in the room at any given time.   Please call pre admission testing if you any further questions [phone] or [phone]

## 2022-11-03 ENCOUNTER — ANESTHESIA EVENT (OUTPATIENT)
Dept: OPERATING ROOM | Age: 52
End: 2022-11-03
Payer: MEDICARE

## 2022-11-04 ENCOUNTER — HOSPITAL ENCOUNTER (OUTPATIENT)
Age: 52
Setting detail: OUTPATIENT SURGERY
Discharge: HOME | End: 2022-11-04
Attending: SURGERY | Admitting: SURGERY
Payer: MEDICARE

## 2022-11-04 ENCOUNTER — TELEPHONE (OUTPATIENT)
Dept: SURGERY | Age: 52
End: 2022-11-04

## 2022-11-04 ENCOUNTER — ANESTHESIA (OUTPATIENT)
Dept: OPERATING ROOM | Age: 52
End: 2022-11-04
Payer: MEDICARE

## 2022-11-04 VITALS
BODY MASS INDEX: 26.68 KG/M2 | HEART RATE: 81 BPM | OXYGEN SATURATION: 91 % | WEIGHT: 170 LBS | SYSTOLIC BLOOD PRESSURE: 109 MMHG | RESPIRATION RATE: 20 BRPM | HEIGHT: 67 IN | DIASTOLIC BLOOD PRESSURE: 72 MMHG | TEMPERATURE: 97.8 F

## 2022-11-04 DIAGNOSIS — K40.20 NON-RECURRENT BILATERAL INGUINAL HERNIA WITHOUT OBSTRUCTION OR GANGRENE: ICD-10-CM

## 2022-11-04 DIAGNOSIS — K40.20 NON-RECURRENT BILATERAL INGUINAL HERNIA WITHOUT OBSTRUCTION OR GANGRENE: Primary | ICD-10-CM

## 2022-11-04 LAB
EKG ATRIAL RATE: 70 BPM
EKG DIAGNOSIS: NORMAL
EKG P AXIS: 31 DEGREES
EKG P-R INTERVAL: 140 MS
EKG Q-T INTERVAL: 402 MS
EKG QRS DURATION: 102 MS
EKG QTC CALCULATION (BAZETT): 434 MS
EKG R AXIS: -47 DEGREES
EKG T AXIS: 6 DEGREES
EKG VENTRICULAR RATE: 70 BPM

## 2022-11-04 PROCEDURE — 6360000002 HC RX W HCPCS: Performed by: SURGERY

## 2022-11-04 PROCEDURE — 2580000003 HC RX 258: Performed by: NURSE ANESTHETIST, CERTIFIED REGISTERED

## 2022-11-04 PROCEDURE — 6360000002 HC RX W HCPCS

## 2022-11-04 PROCEDURE — 7100000010 HC PHASE II RECOVERY - FIRST 15 MIN: Performed by: SURGERY

## 2022-11-04 PROCEDURE — 49650 LAP ING HERNIA REPAIR INIT: CPT | Performed by: SURGERY

## 2022-11-04 PROCEDURE — 6360000002 HC RX W HCPCS: Performed by: NURSE ANESTHETIST, CERTIFIED REGISTERED

## 2022-11-04 PROCEDURE — C1781 MESH (IMPLANTABLE): HCPCS | Performed by: SURGERY

## 2022-11-04 PROCEDURE — 3700000000 HC ANESTHESIA ATTENDED CARE: Performed by: SURGERY

## 2022-11-04 PROCEDURE — 3600000009 HC SURGERY ROBOT BASE: Performed by: SURGERY

## 2022-11-04 PROCEDURE — 6370000000 HC RX 637 (ALT 250 FOR IP): Performed by: ANESTHESIOLOGY

## 2022-11-04 PROCEDURE — 2500000003 HC RX 250 WO HCPCS: Performed by: NURSE ANESTHETIST, CERTIFIED REGISTERED

## 2022-11-04 PROCEDURE — 2580000003 HC RX 258: Performed by: SURGERY

## 2022-11-04 PROCEDURE — 6360000002 HC RX W HCPCS: Performed by: ANESTHESIOLOGY

## 2022-11-04 PROCEDURE — 93010 ELECTROCARDIOGRAM REPORT: CPT | Performed by: INTERNAL MEDICINE

## 2022-11-04 PROCEDURE — 7100000000 HC PACU RECOVERY - FIRST 15 MIN: Performed by: SURGERY

## 2022-11-04 PROCEDURE — 7100000001 HC PACU RECOVERY - ADDTL 15 MIN: Performed by: SURGERY

## 2022-11-04 PROCEDURE — 2500000003 HC RX 250 WO HCPCS: Performed by: SURGERY

## 2022-11-04 PROCEDURE — 93005 ELECTROCARDIOGRAM TRACING: CPT | Performed by: ANESTHESIOLOGY

## 2022-11-04 PROCEDURE — 2709999900 HC NON-CHARGEABLE SUPPLY: Performed by: SURGERY

## 2022-11-04 PROCEDURE — 3600000019 HC SURGERY ROBOT ADDTL 15MIN: Performed by: SURGERY

## 2022-11-04 PROCEDURE — S2900 ROBOTIC SURGICAL SYSTEM: HCPCS | Performed by: SURGERY

## 2022-11-04 PROCEDURE — 3700000001 HC ADD 15 MINUTES (ANESTHESIA): Performed by: SURGERY

## 2022-11-04 PROCEDURE — 7100000011 HC PHASE II RECOVERY - ADDTL 15 MIN: Performed by: SURGERY

## 2022-11-04 DEVICE — MESH HERN L W10.8XL16CM L INGUINAL WHT POLYPR MFIL: Type: IMPLANTABLE DEVICE | Site: ABDOMEN | Status: FUNCTIONAL

## 2022-11-04 DEVICE — MESH HERN L W10.8XL16CM R INGUINAL WHT POLYPR MFIL: Type: IMPLANTABLE DEVICE | Site: ABDOMEN | Status: FUNCTIONAL

## 2022-11-04 RX ORDER — OXYCODONE HYDROCHLORIDE 5 MG/1
10 TABLET ORAL PRN
Status: COMPLETED | OUTPATIENT
Start: 2022-11-04 | End: 2022-11-04

## 2022-11-04 RX ORDER — SODIUM CHLORIDE 0.9 % (FLUSH) 0.9 %
5-40 SYRINGE (ML) INJECTION PRN
Status: DISCONTINUED | OUTPATIENT
Start: 2022-11-04 | End: 2022-11-04 | Stop reason: HOSPADM

## 2022-11-04 RX ORDER — DEXAMETHASONE SODIUM PHOSPHATE 4 MG/ML
INJECTION, SOLUTION INTRA-ARTICULAR; INTRALESIONAL; INTRAMUSCULAR; INTRAVENOUS; SOFT TISSUE PRN
Status: DISCONTINUED | OUTPATIENT
Start: 2022-11-04 | End: 2022-11-04 | Stop reason: SDUPTHER

## 2022-11-04 RX ORDER — OXYCODONE HYDROCHLORIDE AND ACETAMINOPHEN 5; 325 MG/1; MG/1
1 TABLET ORAL EVERY 6 HOURS PRN
Qty: 28 TABLET | Refills: 0 | Status: SHIPPED | OUTPATIENT
Start: 2022-11-04 | End: 2022-11-04

## 2022-11-04 RX ORDER — MIDAZOLAM HYDROCHLORIDE 1 MG/ML
1 INJECTION INTRAMUSCULAR; INTRAVENOUS ONCE
Status: COMPLETED | OUTPATIENT
Start: 2022-11-04 | End: 2022-11-04

## 2022-11-04 RX ORDER — SODIUM CHLORIDE, SODIUM LACTATE, POTASSIUM CHLORIDE, CALCIUM CHLORIDE 600; 310; 30; 20 MG/100ML; MG/100ML; MG/100ML; MG/100ML
INJECTION, SOLUTION INTRAVENOUS CONTINUOUS PRN
Status: DISCONTINUED | OUTPATIENT
Start: 2022-11-04 | End: 2022-11-04 | Stop reason: SDUPTHER

## 2022-11-04 RX ORDER — SODIUM CHLORIDE 0.9 % (FLUSH) 0.9 %
5-40 SYRINGE (ML) INJECTION EVERY 12 HOURS SCHEDULED
Status: DISCONTINUED | OUTPATIENT
Start: 2022-11-04 | End: 2022-11-04 | Stop reason: HOSPADM

## 2022-11-04 RX ORDER — ROCURONIUM BROMIDE 10 MG/ML
INJECTION, SOLUTION INTRAVENOUS PRN
Status: DISCONTINUED | OUTPATIENT
Start: 2022-11-04 | End: 2022-11-04 | Stop reason: SDUPTHER

## 2022-11-04 RX ORDER — OXYCODONE HYDROCHLORIDE 5 MG/1
5 TABLET ORAL PRN
Status: COMPLETED | OUTPATIENT
Start: 2022-11-04 | End: 2022-11-04

## 2022-11-04 RX ORDER — OXYCODONE HYDROCHLORIDE AND ACETAMINOPHEN 5; 325 MG/1; MG/1
1 TABLET ORAL EVERY 6 HOURS PRN
Qty: 28 TABLET | Refills: 0 | Status: SHIPPED | OUTPATIENT
Start: 2022-11-04 | End: 2022-11-11

## 2022-11-04 RX ORDER — LIDOCAINE HYDROCHLORIDE 20 MG/ML
INJECTION, SOLUTION INFILTRATION; PERINEURAL PRN
Status: DISCONTINUED | OUTPATIENT
Start: 2022-11-04 | End: 2022-11-04 | Stop reason: SDUPTHER

## 2022-11-04 RX ORDER — MIDAZOLAM HYDROCHLORIDE 1 MG/ML
INJECTION INTRAMUSCULAR; INTRAVENOUS PRN
Status: DISCONTINUED | OUTPATIENT
Start: 2022-11-04 | End: 2022-11-04 | Stop reason: SDUPTHER

## 2022-11-04 RX ORDER — SODIUM CHLORIDE 9 MG/ML
INJECTION, SOLUTION INTRAVENOUS PRN
Status: DISCONTINUED | OUTPATIENT
Start: 2022-11-04 | End: 2022-11-04 | Stop reason: HOSPADM

## 2022-11-04 RX ORDER — FENTANYL CITRATE 50 UG/ML
INJECTION, SOLUTION INTRAMUSCULAR; INTRAVENOUS PRN
Status: DISCONTINUED | OUTPATIENT
Start: 2022-11-04 | End: 2022-11-04 | Stop reason: SDUPTHER

## 2022-11-04 RX ORDER — ONDANSETRON 2 MG/ML
INJECTION INTRAMUSCULAR; INTRAVENOUS PRN
Status: DISCONTINUED | OUTPATIENT
Start: 2022-11-04 | End: 2022-11-04 | Stop reason: SDUPTHER

## 2022-11-04 RX ORDER — PROPOFOL 10 MG/ML
INJECTION, EMULSION INTRAVENOUS PRN
Status: DISCONTINUED | OUTPATIENT
Start: 2022-11-04 | End: 2022-11-04 | Stop reason: SDUPTHER

## 2022-11-04 RX ORDER — BUPIVACAINE HYDROCHLORIDE 5 MG/ML
INJECTION, SOLUTION EPIDURAL; INTRACAUDAL PRN
Status: DISCONTINUED | OUTPATIENT
Start: 2022-11-04 | End: 2022-11-04 | Stop reason: ALTCHOICE

## 2022-11-04 RX ORDER — IPRATROPIUM BROMIDE AND ALBUTEROL SULFATE 2.5; .5 MG/3ML; MG/3ML
1 SOLUTION RESPIRATORY (INHALATION) ONCE
Status: COMPLETED | OUTPATIENT
Start: 2022-11-04 | End: 2022-11-04

## 2022-11-04 RX ORDER — IPRATROPIUM BROMIDE AND ALBUTEROL SULFATE 2.5; .5 MG/3ML; MG/3ML
SOLUTION RESPIRATORY (INHALATION)
Status: DISCONTINUED
Start: 2022-11-04 | End: 2022-11-04 | Stop reason: HOSPADM

## 2022-11-04 RX ORDER — DIPHENHYDRAMINE HYDROCHLORIDE 50 MG/ML
12.5 INJECTION INTRAMUSCULAR; INTRAVENOUS
Status: DISCONTINUED | OUTPATIENT
Start: 2022-11-04 | End: 2022-11-04 | Stop reason: HOSPADM

## 2022-11-04 RX ORDER — LABETALOL HYDROCHLORIDE 5 MG/ML
INJECTION, SOLUTION INTRAVENOUS PRN
Status: DISCONTINUED | OUTPATIENT
Start: 2022-11-04 | End: 2022-11-04 | Stop reason: SDUPTHER

## 2022-11-04 RX ORDER — MIDAZOLAM HYDROCHLORIDE 1 MG/ML
INJECTION INTRAMUSCULAR; INTRAVENOUS
Status: COMPLETED
Start: 2022-11-04 | End: 2022-11-04

## 2022-11-04 RX ORDER — LABETALOL HYDROCHLORIDE 5 MG/ML
5 INJECTION, SOLUTION INTRAVENOUS EVERY 10 MIN PRN
Status: DISCONTINUED | OUTPATIENT
Start: 2022-11-04 | End: 2022-11-04 | Stop reason: HOSPADM

## 2022-11-04 RX ORDER — MEPERIDINE HYDROCHLORIDE 25 MG/ML
12.5 INJECTION INTRAMUSCULAR; INTRAVENOUS; SUBCUTANEOUS EVERY 5 MIN PRN
Status: DISCONTINUED | OUTPATIENT
Start: 2022-11-04 | End: 2022-11-04 | Stop reason: HOSPADM

## 2022-11-04 RX ORDER — ONDANSETRON 2 MG/ML
4 INJECTION INTRAMUSCULAR; INTRAVENOUS
Status: DISCONTINUED | OUTPATIENT
Start: 2022-11-04 | End: 2022-11-04 | Stop reason: HOSPADM

## 2022-11-04 RX ORDER — HEPARIN SODIUM 5000 [USP'U]/ML
5000 INJECTION, SOLUTION INTRAVENOUS; SUBCUTANEOUS ONCE
Status: COMPLETED | OUTPATIENT
Start: 2022-11-04 | End: 2022-11-04

## 2022-11-04 RX ADMIN — ONDANSETRON HYDROCHLORIDE 4 MG: 2 INJECTION, SOLUTION INTRAMUSCULAR; INTRAVENOUS at 07:40

## 2022-11-04 RX ADMIN — SUGAMMADEX 50 MG: 100 INJECTION, SOLUTION INTRAVENOUS at 08:51

## 2022-11-04 RX ADMIN — FENTANYL CITRATE 50 MCG: 50 INJECTION INTRAMUSCULAR; INTRAVENOUS at 07:34

## 2022-11-04 RX ADMIN — MEPERIDINE HYDROCHLORIDE 12.5 MG: 25 INJECTION, SOLUTION INTRAMUSCULAR; INTRAVENOUS; SUBCUTANEOUS at 09:36

## 2022-11-04 RX ADMIN — FENTANYL CITRATE 50 MCG: 50 INJECTION INTRAMUSCULAR; INTRAVENOUS at 07:49

## 2022-11-04 RX ADMIN — HYDROMORPHONE HYDROCHLORIDE 0.5 MG: 1 INJECTION, SOLUTION INTRAMUSCULAR; INTRAVENOUS; SUBCUTANEOUS at 10:17

## 2022-11-04 RX ADMIN — CEFAZOLIN 2000 MG: 2 INJECTION, POWDER, FOR SOLUTION INTRAMUSCULAR; INTRAVENOUS at 07:24

## 2022-11-04 RX ADMIN — ROCURONIUM BROMIDE 40 MG: 10 INJECTION, SOLUTION INTRAVENOUS at 07:35

## 2022-11-04 RX ADMIN — PROPOFOL 25 MG: 10 INJECTION, EMULSION INTRAVENOUS at 08:49

## 2022-11-04 RX ADMIN — PROPOFOL 25 MG: 10 INJECTION, EMULSION INTRAVENOUS at 08:45

## 2022-11-04 RX ADMIN — PROPOFOL 25 MG: 10 INJECTION, EMULSION INTRAVENOUS at 08:53

## 2022-11-04 RX ADMIN — HYDROMORPHONE HYDROCHLORIDE 0.5 MG: 1 INJECTION, SOLUTION INTRAMUSCULAR; INTRAVENOUS; SUBCUTANEOUS at 09:26

## 2022-11-04 RX ADMIN — IPRATROPIUM BROMIDE AND ALBUTEROL SULFATE 1 AMPULE: 2.5; .5 SOLUTION RESPIRATORY (INHALATION) at 11:03

## 2022-11-04 RX ADMIN — ROCURONIUM BROMIDE 10 MG: 10 INJECTION, SOLUTION INTRAVENOUS at 08:13

## 2022-11-04 RX ADMIN — LABETALOL HYDROCHLORIDE 5 MG: 5 INJECTION, SOLUTION INTRAVENOUS at 07:50

## 2022-11-04 RX ADMIN — SUGAMMADEX 50 MG: 100 INJECTION, SOLUTION INTRAVENOUS at 08:45

## 2022-11-04 RX ADMIN — SUGAMMADEX 50 MG: 100 INJECTION, SOLUTION INTRAVENOUS at 08:48

## 2022-11-04 RX ADMIN — SODIUM CHLORIDE, POTASSIUM CHLORIDE, SODIUM LACTATE AND CALCIUM CHLORIDE: 600; 310; 30; 20 INJECTION, SOLUTION INTRAVENOUS at 07:30

## 2022-11-04 RX ADMIN — HYDROMORPHONE HYDROCHLORIDE 0.5 MG: 1 INJECTION, SOLUTION INTRAMUSCULAR; INTRAVENOUS; SUBCUTANEOUS at 10:28

## 2022-11-04 RX ADMIN — PROPOFOL 200 MG: 10 INJECTION, EMULSION INTRAVENOUS at 07:35

## 2022-11-04 RX ADMIN — HYDROMORPHONE HYDROCHLORIDE 0.5 MG: 1 INJECTION, SOLUTION INTRAMUSCULAR; INTRAVENOUS; SUBCUTANEOUS at 10:07

## 2022-11-04 RX ADMIN — MIDAZOLAM 2 MG: 1 INJECTION INTRAMUSCULAR; INTRAVENOUS at 07:29

## 2022-11-04 RX ADMIN — HEPARIN SODIUM 5000 UNITS: 5000 INJECTION INTRAVENOUS; SUBCUTANEOUS at 06:57

## 2022-11-04 RX ADMIN — MIDAZOLAM HYDROCHLORIDE 1 MG: 1 INJECTION INTRAMUSCULAR; INTRAVENOUS at 09:33

## 2022-11-04 RX ADMIN — MIDAZOLAM HYDROCHLORIDE 1 MG: 1 INJECTION, SOLUTION INTRAMUSCULAR; INTRAVENOUS at 09:33

## 2022-11-04 RX ADMIN — DEXAMETHASONE SODIUM PHOSPHATE 8 MG: 4 INJECTION, SOLUTION INTRAMUSCULAR; INTRAVENOUS at 07:40

## 2022-11-04 RX ADMIN — OXYCODONE 10 MG: 5 TABLET ORAL at 10:36

## 2022-11-04 RX ADMIN — SUGAMMADEX 50 MG: 100 INJECTION, SOLUTION INTRAVENOUS at 08:54

## 2022-11-04 RX ADMIN — LIDOCAINE HYDROCHLORIDE 80 MG: 20 INJECTION, SOLUTION INFILTRATION; PERINEURAL at 07:34

## 2022-11-04 ASSESSMENT — PAIN DESCRIPTION - DESCRIPTORS
DESCRIPTORS: DISCOMFORT

## 2022-11-04 ASSESSMENT — PAIN DESCRIPTION - LOCATION
LOCATION: ABDOMEN

## 2022-11-04 ASSESSMENT — PAIN SCALES - GENERAL
PAINLEVEL_OUTOF10: 9
PAINLEVEL_OUTOF10: 10
PAINLEVEL_OUTOF10: 8
PAINLEVEL_OUTOF10: 10
PAINLEVEL_OUTOF10: 9

## 2022-11-04 NOTE — H&P
[address], [place]     [address], [address] [place] [address]  [place] [facility]  [phone]     [facility] YOB: 1970     Date of Visit:  11/4/2022     MICHELLE Zeng CNP     Chief Complaint: Left groin pain and bulge     HPI: Patient presents for evaluation of pain and a bulge in his left groin. He states he has had it for a few months. He does tire work for living, and has a lot of discomfort if he lifts or strains. He denies nausea or vomiting. His bowels have been working normally. The hernia always goes back and when he lays down. The pain is a dull ache that radiates down into his scrotum           Allergies   Allergen Reactions    Codeine Itching    Mobic [Meloxicam]         Irritates stomach    Tramadol         Patient states he doesn't feel right on tramadol. Active Medications          Outpatient Medications Marked as Taking for the 9/27/22 encounter (Initial consult) with Skyler Xiong MD   Medication Sig Dispense Refill    busPIRone (BUSPAR) 5 MG tablet Take 1 tablet by mouth 2 times daily 60 tablet 5    montelukast (SINGULAIR) 10 MG tablet Take 1 tablet by mouth nightly 30 tablet 11    amLODIPine (NORVASC) 2.5 MG tablet TAKE ONE TABLET BY MOUTH EVERY DAY FOR CHEST PAIN 90 tablet 11    gabapentin (NEURONTIN) 600 MG tablet Take 1 tablet by mouth 3 times daily for 30 days. 90 tablet 11    ibuprofen (ADVIL;MOTRIN) 800 MG tablet Take 1 tablet by mouth 4 times daily as needed for Pain 360 tablet 11    tiZANidine (ZANAFLEX) 2 MG tablet Take 2 tablets by mouth every 8 hours as needed (muscle spasm) 90 tablet 11    LORazepam (ATIVAN) 1 MG tablet Take 1 tablet by mouth daily for 30 days.  30 tablet 2    omeprazole (PRILOSEC) 20 MG delayed release capsule Take 1 capsule by mouth every morning (before breakfast) 30 capsule 11    albuterol sulfate HFA (PROAIR HFA) 108 (90 Base) MCG/ACT inhaler Inhale 2 puffs into the lungs every 6 hours as needed for Wheezing or Shortness of Breath 18 g 5    tiotropium (SPIRIVA RESPIMAT) 2.5 MCG/ACT AERS inhaler INHALE TWO (2) PUFFS INTO THE LUNGS DAILY 4 g 5    budesonide-formoterol (SYMBICORT) 160-4.5 MCG/ACT AERO Inhale 2 puffs into the lungs 2 times daily 10.2 g 5    guaiFENesin (MUCINEX) 600 MG extended release tablet Take 1 tablet by mouth 2 times daily 60 tablet 5    fluticasone (FLONASE) 50 MCG/ACT nasal spray 1 spray by Each Nostril route daily 16 g 5    DULoxetine (CYMBALTA) 60 MG extended release capsule Take 1 capsule by mouth daily 90 capsule 1    dicyclomine (BENTYL) 10 MG capsule Take 1 capsule by mouth 4 times daily as needed (abdominal cramps.) 90 capsule 1            Past Medical History        Past Medical History:   Diagnosis Date    Arthritis      Chest pain       [facility] 2012 sent for records; Colitis      COPD (chronic obstructive pulmonary disease) ([facility] [address]      COVID 2021    Histoplasmosis      History of blood transfusion      Hx of blood clots      Metabolic syndrome X     Other emphysema ([facility] [address] 2018    Pneumonia      Pulmonary nodule           Past Surgical History         Past Surgical History:   Procedure Laterality Date    BRONCHOSCOPY   2013    COLONOSCOPY   2018    COLONOSCOPY N/A 2018     COLORECTAL CANCER SCREENING, NOT HIGH RISK performed by Ivy Zhu DO at [address] [facility] CATH  [facility] ARTHROSCOPY Right      KNEE ARTHROSCOPY Right 2019     RIGHT KNEE VIDEO ARTHROSCOPY, MEDIAL MENISCECTOMY performed by Yrn Mattson MD at [address] [facility]    VASECTOMY             Family History         Family History   Problem Relation Age of Onset    Cancer Mother           ovarian    Heart Failure Mother      Heart Attack Mother 62    Heart Disease Mother      Other Father            1948;ashd;per neurop;     Other Brother pt knows very little    Cancer Paternal Uncle           leukemia    Cancer Paternal Grandfather           leukemia    Heart Attack Maternal Uncle 60         x4     No Known Problems Son      No Known Problems Son      Asthma Neg Hx      Diabetes Neg Hx      Emphysema Neg Hx      Hypertension Neg Hx           Social History               Socioeconomic History    Marital status:        Spouse name: Not on file    Number of children: Not on file    Years of education: Not on file    Highest education level: Not on file   Occupational History    Not on file   Tobacco Use    Smoking status: Former       Packs/day: 2.00       Years: 20.00       Pack years: 40.00       Types: Cigarettes       Quit date: 2018       Years since quittin.7    Smokeless tobacco: Never   Vaping Use    Vaping Use: Never used   Substance and Sexual Activity    Alcohol use:  Yes       Alcohol/week: 1.0 standard drink       Types: 1 Cans of beer per week       Comment: rarely    Drug use: No    Sexual activity: Yes       Partners: Female   Other Topics Concern    Not on file   Social History Narrative     2018lives by self with 2 dogs;works in [employer]      Social Determinants of Health      Financial Resource Strain: Not on file   Food Insecurity: Not on file   Transportation Needs: Not on file   Physical Activity: Not on file   Stress: Not on file   Social Connections: Not on file   Intimate Partner Violence: Not on file   Housing Stability: Not on file              /85   Pulse 68   Temp 97.7 °F (36.5 °C) (Temporal)   Resp 16   Ht 5' 7\" (1.702 m)   Wt 170 lb (77.1 kg)   SpO2 99%   BMI 26.63 kg/m²        REVIEW OF SYSTEMS:  CONSTITUTIONAL:  negative  HEENT:  Negative  RESPIRATORY:  negative  CARDIOVASCULAR:  negative  GASTROINTESTINAL:  positive for left groin pain and bulge  GENITOURINARY:  negative  HEMATOLOGIC/LYMPHATIC:  negative  ENDOCRINE:  Negative  NEUROLOGICAL:  Negative  * All other ROS reviewed and negative. PE:  Constitutional:  Well developed, well nourished, no acute distress, non-toxic appearance   Eyes:  PERRL, conjunctiva normal   HENT:  Atraumatic, external ears normal, nose normal. Neck- normal range of motion, no tenderness, supple   Respiratory:  No respiratory distress, normal breath sounds, no rales, no wheezing   Cardiovascular:  Normal rate, normal rhythm  GI: Sounds positive, soft, mildly tender in the left groin where there is a reducible hernia. He also has a mildly tender reducible hernia on the right side as well  :  No costovertebral angle tenderness   Integument:  Well hydrated, no rash   Lymphatic:  No lymphadenopathy noted   Neurologic:  Alert & oriented x 3, no focal deficits noted   Psychiatric:  Speech and behavior appropriate         DATA:  N/A        Assessment:  1. Non-recurrent bilateral inguinal hernia without obstruction or gangrene          Plan: Robotic bilateral inguinal hernia repair with mesh. I explained the procedure including risks, benefits, and alternatives. Questions were answered and the patient agrees to proceed.

## 2022-11-04 NOTE — ANESTHESIA PRE PROCEDURE
Department of Anesthesiology  Preprocedure Note       Name:  Abhinav Meléndez.   Age:  47 y.o.  :  1970                                          MRN:  [id number]         Date:  2022      Surgeon: Rudolph Fragoso):  Cosme Jimenez MD    Procedure: Procedure(s):  ROBOTIC BILATERAL INGUINAL HERNIA REPAIR WITH MESH    Medications prior to admission:   Prior to Admission medications    Medication Sig Start Date End Date Taking? Authorizing Provider   LORazepam (ATIVAN) 1 MG tablet Take 1 tablet by mouth daily for 30 days. 10/24/22 11/23/22  MICHELLE Morris CNP   busPIRone (BUSPAR) 5 MG tablet Take 1 tablet by mouth 2 times daily 22   MICHELLE Morris CNP   montelukast (SINGULAIR) 10 MG tablet Take 1 tablet by mouth nightly 22   MICHELLE Morris CNP   amLODIPine (NORVASC) 2.5 MG tablet TAKE ONE TABLET BY MOUTH EVERY DAY FOR CHEST PAIN 22   MICHELLE Morris CNP   gabapentin (NEURONTIN) 600 MG tablet Take 1 tablet by mouth 3 times daily for 30 days.  22  MICHELLE Morris CNP   ibuprofen (ADVIL;MOTRIN) 800 MG tablet Take 1 tablet by mouth 4 times daily as needed for Pain 22   MICHELLE Morris CNP   tiZANidine (ZANAFLEX) 2 MG tablet Take 2 tablets by mouth every 8 hours as needed (muscle spasm) 22   MICHELLE Morris CNP   omeprazole (PRILOSEC) 20 MG delayed release capsule Take 1 capsule by mouth every morning (before breakfast) 22  MICHELLE Morris CNP   albuterol sulfate HFA (PROAIR HFA) 108 (90 Base) MCG/ACT inhaler Inhale 2 puffs into the lungs every 6 hours as needed for Wheezing or Shortness of Breath 22   MICHELLE Morris CNP   metoprolol succinate (TOPROL XL) 25 MG extended release tablet Take 1 tablet by mouth daily 22  MICHELLE Morris CNP   tiotropium (SPIRIVA RESPIMAT) 2.5 MCG/ACT AERS inhaler INHALE TWO (2) PUFFS INTO THE LUNGS DAILY 3/23/22   Aletha Escobedo MD budesonide-formoterol (SYMBICORT) 160-4.5 MCG/ACT AERO Inhale 2 puffs into the lungs 2 times daily 3/23/22   Renee Blount MD   guaiFENesin (MUCINEX) 600 MG extended release tablet Take 1 tablet by mouth 2 times daily 3/7/22   Renee Blount MD   fluticasone [facility]) 50 MCG/ACT nasal spray 1 spray by Each Nostril route daily 3/7/22   MICHELLE Olson CNP   DULoxetine (CYMBALTA) 60 MG extended release capsule Take 1 capsule by mouth daily 3/7/22   MICHELLE Olson CNP   dicyclomine (BENTYL) 10 MG capsule Take 1 capsule by mouth 4 times daily as needed (abdominal cramps.) 5/20/21   MICHELLE Olson CNP       Current medications:    Current Facility-Administered Medications   Medication Dose Route Frequency Provider Last Rate Last Admin    sodium chloride flush 0.9 % injection 5-40 mL  5-40 mL IntraVENous 2 times per day Trav Smith MD        sodium chloride flush 0.9 % injection 5-40 mL  5-40 mL IntraVENous PRN Trav Smith MD        0.9 % sodium chloride infusion   IntraVENous PRN Trav Smith MD        ceFAZolin (ANCEF) 2,000 mg in sodium chloride 0.9 % 50 mL IVPB (mini-bag)  2,000 mg IntraVENous On Call to Iqra Guerra MD           Allergies: Allergies   Allergen Reactions    Codeine Itching    Mobic [Meloxicam]      Irritates stomach    Tramadol      Patient states he doesn't feel right on tramadol.        Problem List:    Patient Active Problem List   Diagnosis Code    Arthritis M19.90    Pulmonary nodule R91.1    Polyneuropathy G62.9    Numbness and tingling of both legs below knees R20.0, R20.2    Smoking F17.200    Irritable bowel syndrome with diarrhea K58.0    Falls frequently R29.6    Low back pain M54.50    Pain in joint, multiple sites M25.50    Raynaud's phenomenon without gangrene I73.00    Chronic pain of right knee M25.561, G89.29    Other emphysema (HCC) J43.8    Chronic anxiety F41.9    Moderate single current episode of major depressive disorder ([facility] F32.1    Gastroesophageal reflux disease with esophagitis without hemorrhage K21.00       Past Medical History:        Diagnosis Date    Arthritis     Chest pain     [facility] 2012 sent for records;    Colitis     COPD (chronic obstructive pulmonary disease) ([facility]     COVID 2021    Histoplasmosis     History of blood transfusion     Hx of blood clots     Metabolic syndrome X     Other emphysema ([facility] 2018    Pneumonia     Pulmonary nodule        Past Surgical History:        Procedure Laterality Date    BRONCHOSCOPY  2013    COLONOSCOPY  2018    COLONOSCOPY N/A 2018    COLORECTAL CANCER SCREENING, NOT HIGH RISK performed by Jhonatan Seth DO at [facility] C[facility]  [facility] ARTHROSCOPY Right     KNEE ARTHROSCOPY Right 2019    RIGHT KNEE VIDEO ARTHROSCOPY, MEDIAL MENISCECTOMY performed by Dana Mendoza MD at [facility]      neck    VASECTOMY         Social History:    Social History     Tobacco Use    Smoking status: Former     Packs/day: 2.00     Years: 20.00     Pack years: 40.00     Types: Cigarettes     Quit date: 2018     Years since quittin.8    Smokeless tobacco: Never   Substance Use Topics    Alcohol use:  Yes     Alcohol/week: 1.0 standard drink     Types: 1 Cans of beer per week     Comment: rarely                                Counseling given: Not Answered      Vital Signs (Current):   Vitals:    22 1629 22 0652   BP:  120/85   Pulse:  68   Resp:  16   Temp:  97.7 °F (36.5 °C)   TempSrc:  Temporal   SpO2:  99%   Weight: 169 lb (76.7 kg) 170 lb (77.1 kg)   Height: 5' 7\" (1.702 m) 5' 7\" (1.702 m)                                              BP Readings from Last 3 Encounters:   22 120/85   10/31/22 119/70   10/08/22 (!) 130/93       NPO Status: Time of last liquid consumption: 2345                        Time of last solid consumption: 1300                        Date of last liquid consumption: 11/03/22                        Date of last solid food consumption: 11/03/22    BMI:   Wt Readings from Last 3 Encounters:   11/04/22 170 lb (77.1 kg)   10/31/22 169 lb 12.8 oz (77 kg)   10/08/22 165 lb (74.8 kg)     Body mass index is 26.63 kg/m². CBC:   Lab Results   Component Value Date/Time    WBC 13.0 10/08/2022 01:42 AM    RBC 4.98 10/08/2022 01:42 AM    HGB 14.6 10/08/2022 01:42 AM    HCT 42.0 10/08/2022 01:42 AM    MCV 84.4 10/08/2022 01:42 AM    RDW 13.4 10/08/2022 01:42 AM     10/08/2022 01:42 AM       CMP:   Lab Results   Component Value Date/Time     10/08/2022 01:42 AM    K 4.1 10/08/2022 01:42 AM     10/08/2022 01:42 AM    CO2 29 10/08/2022 01:42 AM    BUN 18 10/08/2022 01:42 AM    CREATININE 1.1 10/08/2022 01:42 AM    GFRAA >60 10/08/2022 01:42 AM    GFRAA >60 05/18/2013 06:51 AM    AGRATIO 1.7 10/08/2022 01:42 AM    LABGLOM >60 10/08/2022 01:42 AM    GLUCOSE 102 10/08/2022 01:42 AM    PROT 7.3 10/08/2022 01:42 AM    PROT 6.4 08/17/2012 03:10 AM    CALCIUM 9.6 10/08/2022 01:42 AM    BILITOT 0.3 10/08/2022 01:42 AM    ALKPHOS 91 10/08/2022 01:42 AM    AST 25 10/08/2022 01:42 AM    ALT 33 10/08/2022 01:42 AM       POC Tests: No results for input(s): POCGLU, POCNA, POCK, POCCL, POCBUN, POCHEMO, POCHCT in the last 72 hours.     Coags:   Lab Results   Component Value Date/Time    PROTIME 11.7 11/13/2018 02:33 PM    INR 1.03 11/13/2018 02:33 PM    APTT 35.2 06/24/2016 03:55 PM       HCG (If Applicable): No results found for: PREGTESTUR, PREGSERUM, HCG, HCGQUANT     ABGs: No results found for: PHART, PO2ART, JPS9GIG, CPL7MDS, BEART, E7TLVHWR     Type & Screen (If Applicable):  No results found for: LABABO, LABRH    Drug/Infectious Status (If Applicable):  No results found for: HIV, HEPCAB    COVID-19 Screening (If Applicable):   Lab Results   Component Value Date/Time    COVID19 POSITIVE 12/27/2021 11:29 AM           Anesthesia Evaluation  Patient summary reviewed and Nursing notes reviewed no history of anesthetic complications:   Airway: Mallampati: III     Neck ROM: full     Dental:          Pulmonary:Negative Pulmonary ROS and normal exam    (+) pneumonia:  COPD:                             Cardiovascular:Negative CV ROS                      Neuro/Psych:   Negative Neuro/Psych ROS  (+) neuromuscular disease (neuropathy):, psychiatric history:            GI/Hepatic/Renal: Neg GI/Hepatic/Renal ROS       (-) hiatal hernia and GERD       Endo/Other: Negative Endo/Other ROS   (+) : arthritis:., .                 Abdominal:             Vascular:   + DVT, . Other Findings:           Anesthesia Plan      general     ASA 2     (I discussed with the patient the risks and benefits of PIV, general anesthesia, IV Narcotics, PACU. All questions were answered the patient agrees with the plan and wishes to proceed.  )  Induction: intravenous. Pre-Operative Diagnosis: Bilateral inguinal hernia without obstruction or gangrene, recurrence not specified [K40.20]    47 y.o.   BMI:  Body mass index is 26.63 kg/m². Vitals:    22 1629 22 0652   BP:  120/85   Pulse:  68   Resp:  16   Temp:  97.7 °F (36.5 °C)   TempSrc:  Temporal   SpO2:  99%   Weight: 169 lb (76.7 kg) 170 lb (77.1 kg)   Height: 5' 7\" (1.702 m) 5' 7\" (1.702 m)       Allergies   Allergen Reactions    Codeine Itching    Mobic [Meloxicam]      Irritates stomach    Tramadol      Patient states he doesn't feel right on tramadol. Social History     Tobacco Use    Smoking status: Former     Packs/day: 2.00     Years: 20.00     Pack years: 40.00     Types: Cigarettes     Quit date: 2018     Years since quittin.8    Smokeless tobacco: Never   Substance Use Topics    Alcohol use:  Yes     Alcohol/week: 1.0 standard drink     Types: 1 Cans of beer per week     Comment: rarely       LABS:    CBC  Lab Results Component Value Date/Time    WBC 13.0 (H) 10/08/2022 01:42 AM    HGB 14.6 10/08/2022 01:42 AM    HCT 42.0 10/08/2022 01:42 AM     10/08/2022 01:42 AM     RENAL  Lab Results   Component Value Date/Time     10/08/2022 01:42 AM    K 4.1 10/08/2022 01:42 AM     10/08/2022 01:42 AM    CO2 29 10/08/2022 01:42 AM    BUN 18 10/08/2022 01:42 AM    CREATININE 1.1 10/08/2022 01:42 AM    GLUCOSE 102 (H) 10/08/2022 01:42 AM     COAGS  Lab Results   Component Value Date/Time    PROTIME 11.7 11/13/2018 02:33 PM    INR 1.03 11/13/2018 02:33 PM    APTT 35.2 06/24/2016 03:55 PM         Gurmeet Ambrocio MD   11/4/2022

## 2022-11-04 NOTE — PROGRESS NOTES
Patient was given 1mg of versed and 12.5mg of Demerol and tried to waste the excess and only one looked like it was completed in the omnicell.

## 2022-11-04 NOTE — PROGRESS NOTES
Discharge instructions explained to pt and significant other drake. Pt and drake received copy of discharge instructions and  [facility] Pt  and drake deny having any questions about discharge. IV  removed per discharge hemostasis achieved, dressing applied, no complications noted. Patient denies further needs. Pt transported to private car via wheel chair. Vitals per doc flow, drake assumes responsibility.

## 2022-11-04 NOTE — ANESTHESIA POSTPROCEDURE EVALUATION
Department of Anesthesiology  Postprocedure Note    Patient: Di Alpers. MRN: [id number]  YOB: 1970  Date of evaluation: 11/4/2022      Procedure Summary     Date: 11/04/22 Room / Location: [address] [place] [address] / [facility]    Anesthesia Start: 0730 Anesthesia Stop: 7988    Procedure: ROBOTIC BILATERAL INGUINAL HERNIA REPAIR WITH MESH (Bilateral: Abdomen) Diagnosis:       Bilateral inguinal hernia without obstruction or gangrene, recurrence not specified      (Bilateral inguinal hernia without obstruction or gangrene, recurrence not specified [K40.20])    Surgeons: Alba Cai MD Responsible Provider: Lila Stevens MD    Anesthesia Type: general ASA Status: 2          Anesthesia Type: No value filed. Daija Phase I: Daija Score: 10    Daija Phase II: Daija Score: 10      Anesthesia Post Evaluation    Comments: Postoperative Anesthesia Note    Name:    Di Alpers.   MRN:      [id number]    Patient Vitals in the past 12 hrs:  11/04/22 1115, BP:109/72, Pulse:81, Resp:20, SpO2:91 %  11/04/22 1100, BP:110/72, Pulse:78, Resp:18, SpO2:93 %  11/04/22 1050, Pulse:86  11/04/22 1045, BP:113/78, Pulse:73, Resp:10, SpO2:99 %  11/04/22 1034, Pulse:84  11/04/22 1030, BP:118/78, Pulse:89, Resp:13, SpO2:98 %  11/04/22 1001, Pulse:77  11/04/22 1000, BP:107/72, Pulse:74, Resp:12, SpO2:100 %  11/04/22 0955, BP:109/74, Pulse:73, Resp:12, SpO2:100 %  11/04/22 0950, BP:109/74, Pulse:73, Resp:13, SpO2:100 %  11/04/22 0945, BP:109/74, Pulse:72, Resp:14, SpO2:100 %  11/04/22 0940, BP:(!) 151/103, Pulse:71, Resp:13, SpO2:97 %  11/04/22 0935, BP:(!) 151/103, Pulse:90, Resp:14, SpO2:(!) 79 %  11/04/22 0930, BP:(!) 119/92, Pulse:82, Resp:25, SpO2:100 %  11/04/22 0925, BP:(!) 119/92, Pulse:76, Resp:14  11/04/22 0920, Pulse:81, Resp:10, SpO2:(!) 78 %  11/04/22 0915, BP:100/74, Pulse:85, Resp:17  11/04/22 0910, BP:100/74, Pulse:82, Resp:18, SpO2:93 %  11/04/22 0907, BP:110/76, Temp:97.8 °F (36.6 °C), Temp src:Infrared, Pulse:74, Resp:20, SpO2:93 %  11/04/22 0906, Pulse:68, Resp:15, SpO2:90 %  11/04/22 0905, Pulse:71, Resp:13, SpO2:91 %  11/04/22 0904, SpO2:(!) 86 %  11/04/22 0903, Pulse:84  11/04/22 0652, BP:120/85, Temp:97.7 °F (36.5 °C), Temp src:Temporal, Pulse:68, Resp:16, SpO2:99 %, Height:5' 7\" (1.702 m), Weight:170 lb (77.1 kg)     LABS:    CBC  Lab Results       Component                Value               Date/Time                  WBC                      13.0 (H)            10/08/2022 01:42 AM        HGB                      14.6                10/08/2022 01:42 AM        HCT                      42.0                10/08/2022 01:42 AM        PLT                      341                 10/08/2022 01:42 AM   RENAL  Lab Results       Component                Value               Date/Time                  NA                       138                 10/08/2022 01:42 AM        K                        4.1                 10/08/2022 01:42 AM        CL                       100                 10/08/2022 01:42 AM        CO2                      29                  10/08/2022 01:42 AM        BUN                      18                  10/08/2022 01:42 AM        CREATININE               1.1                 10/08/2022 01:42 AM        GLUCOSE                  102 (H)             10/08/2022 01:42 AM   COAGS  Lab Results       Component                Value               Date/Time                  PROTIME                  11.7                11/13/2018 02:33 PM        INR                      1.03                11/13/2018 02:33 PM        APTT                     35.2                06/24/2016 03:55 PM     Intake & Output:  @47NQOJ@    Nausea & Vomiting:  No    Level of Consciousness:  Awake    Pain Assessment:  Adequate analgesia    Anesthesia Complications:  No apparent anesthetic complications    SUMMARY      Vital signs stable  OK to discharge from Stage I post anesthesia care.   Care transferred from Anesthesiology department on discharge from perioperative area

## 2022-11-04 NOTE — BRIEF OP NOTE
Brief Postoperative Note      Patient: Jazmín Dillard YOB: 1970  MRN: [id number]    Date of Procedure: 11/4/2022    Pre-Op Diagnosis: Bilateral inguinal hernia without obstruction or gangrene, recurrence not specified [K40.20]    Post-Op Diagnosis: Same       Procedure(s):  ROBOTIC BILATERAL INGUINAL HERNIA REPAIR WITH MESH    Surgeon(s):  Jessica Sauceda MD    Assistant:  Surgical Assistant: Abraham Villalpando    Anesthesia: General    Estimated Blood Loss (mL): Minimal    Complications: None    Specimens:   * No specimens in log *    Implants:  Implant Name Type Inv.  Item Serial No.  Lot No. LRB No. Used Action   MESH DIONISIO L [id number] L INGUINAL WHT POLYPR MFIL - [id number]  MESH DIONISIO L [id number] L INGUINAL WHT POLYPR MFIL  B[employer] DAVOL-WD [id number] Left 1 Implanted   MESH DIONISIO L [id number] R INGUINAL WHT POLYPR MFIL - [id number]  MESH DIONISIO L [id number] R INGUINAL WHT POLYPR MFIL  B[employer] DAV[employer]-WD [id number] Right 1 Implanted         Drains: * No LDAs found *    Findings: as above    Electronically signed by Deirdre Keen MD on 11/4/2022 at 8:49 AM

## 2022-11-04 NOTE — PROGRESS NOTES
Oral airway out SPO2 95% on 3L O2 via NC. Pt rass +2, on all fours yelling \"I want my mom in heaven! \" Emotion support provided. Dr. Kevin Bahena called and updated MD to place order.

## 2022-11-04 NOTE — DISCHARGE INSTRUCTIONS
[facility] AND [facility] Fanny Sousa M.D. [facility] [id number] [place] [address] [place]                [address] M.D. [address] [facility], [address]         [place], [address] [facility] Lucy Darling M.D                         [phone]        [facility] Jesus Banegas M.D. [address] [place] [address]       POST-OPERATIVE INSTRUCTIONS FOR ROBOTIC HERNIA SURGERY    Call the office to schedule your post-operative appointment with your surgeon for two (2) weeks. You will have either white steri-strips and a water occlusive dressing or surgical glue closing your incisions. If you have clear bandages over your incisions, you may remove them in 3-4 days. Leave the steri-stips in place. These will peel away in 7-10 days. You may shower. Wash incisions gently, and pat them dry. Do not rub your incisions. General guidelines for activity:   Avoid strenuous activity or lifting anything heavier than 20 pounds for 6 weeks. It is OK to be up  walking around, and walking up and down stairs. Do what is comfortable: stop and rest when you feel tired. Drink plenty of fluids and stay on a bland diet for 2-3 days after surgery. Do NOT drive while taking your narcotic pain medicine. Watch for signs of infection:  Excessive warmth or bright redness around your incisions  Leakage cloudy fluid from you incisions  Fever over 101.5  During the laparoscopic procedure that you had, gas is pumped into the abdominal cavity. You may feel abdominal, shoulder, or rib pain for a few days due to this gas. You will have pain medicine ordered.  Take as directed    If you experience constipation:  Increase your water intake  Increase your activity, walking is best.  A stool softener or mild laxative may be necessary if you still have not had a bowel movement ; call the office for further instructions.

## 2022-11-04 NOTE — PROGRESS NOTES
Pt to PACU placed on bedside monitor, NSR 83 SPO2 93% on 3L o2 via NC w. Oral airway in place. Resp e/e unlabored. ABD soft dressings x 3 CDI. RN @ bedside. Phase I (PACU)  1. Patient is identified using name and the date of birth. 2.  The patient is free from signs and symptoms of chemical, electrical, laser, radiation, positioning, or transfer/transport injury. 3.  The patient receives appropriate medication(s), safely administered during the Perioperative period. 4.  The patient has wound/tissue perfusion consistent with or improved from baseline levels established preoperatively. 5.  The patient is at or returning to normothermia at the conclusion of the immediate postoperative period. 6.  The patient's fluid, electrolyte, and acid base balances are consistent with or improved from baseline levels established preoperatively. 7.  The patient's pulmonary function is consistent with or improved from baseline levels established preoperatively. 8.  The patient's cardiovascular status is consistent with or improved from baseline levels established preoperatively. 9.  The patient/caregiver participates in decisions affecting his or her Perioperative plan of care. 10. The patient's care is consistent with the individualized Perioperative plan of care. 11. The patient's right to privacy is maintained. 12. The patient is the recipient of competent and ethical care within legal standards of practice. 13.  The patient's value system, lifestyle, ethnicity, and culture are considered, respected, and incorporated in the Perioperative plan of care. 14.  The patient demonstrates and/or reports adequate pain control throughout the the Perioperative period. 15. The patient's neurological status is consistent with or improved from baseline levels established preoperatively. 16.  The patient/caregiver demonstrates knowledge of the expected responses to the operative or invasive procedure.   17.  Patient/Caregiver has reduced anxiety. Interventions- Familiarize with environment and equipment.   18.  Other:  19.Other:

## 2022-11-04 NOTE — TELEPHONE ENCOUNTER
Pt girlfriend Wilda Barnett called stating the pharmacy patients pain medication was sent to are having computer difficulties, they would like it sent to [facility] in [place] instead

## 2022-11-04 NOTE — OP NOTE
[address]                 [address] [address]                                OPERATIVE REPORT    PATIENT NAME: Saskia Mccallum                   :        1970  MED REC NO:   [id number]                          ROOM:  ACCOUNT NO:   [de-identified]                           ADMIT DATE: 2022  PROVIDER:     Angelica Louis MD    DATE OF PROCEDURE:  2022    PREOPERATIVE DIAGNOSIS:  Bilateral inguinal hernia. POSTOPERATIVE DIAGNOSIS:  Bilateral inguinal hernia. PROCEDURE:  Robotic bilateral inguinal hernia repair with mesh. SURGEON:  Jelani Louis MD    ANESTHESIA:  General.    ESTIMATED BLOOD LOSS:  Minimal.    INDICATIONS:  The patient is a 69-year-old gentleman who presented with  left groin pain and a bulge. He was found to have actually bilateral  inguinal hernias. He was brought in for repair. OPERATIVE SUMMARY:  After preoperative evaluation, the patient was  brought in the operating suite and placed in a comfortable supine  position on the operating room table. Monitoring equipment was attached  and general anesthesia was induced. His abdomen was sterilely prepped  and draped and a small incision was made in his upper midline. This was  dissected down to the fascia, and a suture of 0 Ethibond was placed on  either side of the midline. The midline fascia was opened and the  peritoneal cavity was entered directly. A Olivia trocar was inserted,  and the abdomen was insufflated to a pressure of 15 mmHg. The remaining  ports were placed under direct internal visualization. He was placed in  Trendelenburg and a robot was docked. He had obvious bilateral inguinal  hernias and I started by incising the peritoneum on the right. This was  a transverse incision superior to the hernia defect from the median  umbilical ligament to a point several centimeters lateral to the defect.   I started dissecting down the inferior medial space down to Zackery's  ligament and across the pubic tubercle. The midline was dissected free  and then I dissected out the superior peritoneal flap to mobilize this. The lateral and lateral inferior spaces were dissected out and finally I  dissected the hernia sac and peritoneum off the cord structures. He  actually had a direct defect on this side and this was reduced as well. I examined the cord. There was a small lipoma that was dissected free  of the surrounding structures and set aside. Subsequently, I went to  the left side and made a similar peritoneal incision and dissected out  the medial inferior space first, communicating both sides. The superior  peritoneal flap was then mobilized and the lateral and lateral inferior  space were dissected out. He had a prominent indirect hernia in this  side and the sac was dissected free of the cord structures along with  the lipoma. The central inferior space was dissected out to free up the  peritoneum from the cord structures. Once the dissections were  complete, two large 3DMax meshes were obtained and placed on each side. They overlapped in the middle. The meshes were secured on each side to  Zackery's ligament inferiorly. They were secured to the rectus muscle  superiorly, securing both meshes on each side in order to doubly secure  them and create one large mesh across the entire floor of the inguinal  canal.  The meshes laid nice and flat and covered the defects with good  overlap all around. The peritoneal defects were then closed with  running sutures of 2-0 Stratafix. We had good peritoneal closures that  were complete. There was no evidence of bleeding or bowel injury. The  needles were removed, the trocars withdrawn, and the robot undocked. The umbilical fascial defect was closed with a figure-of-eight suture of  0 Ethibond.   Wounds were injected with Marcaine, and the skin incisions  closed with subcuticular sutures of 4-0 Vicryl followed by Benzoin,  Steri-Strips, and dry sterile dressings. All sponge, needle, and  instrument counts were correct at the end of the case. The patient  tolerated the procedure well and was taken to recovery area in stable  condition.         Nisha Carrillo MD    D: 11/04/2022 9:12:31       T: 11/04/2022 9:16:36     [email or web address]  Job#: [id number]     Doc#: [id number]    CC:

## 2022-11-22 ENCOUNTER — OFFICE VISIT (OUTPATIENT)
Dept: SURGERY | Age: 52
End: 2022-11-22

## 2022-11-22 VITALS
WEIGHT: 168 LBS | SYSTOLIC BLOOD PRESSURE: 118 MMHG | DIASTOLIC BLOOD PRESSURE: 74 MMHG | HEIGHT: 67 IN | BODY MASS INDEX: 26.37 KG/M2

## 2022-11-22 DIAGNOSIS — Z98.890 POST-OPERATIVE STATE: Primary | ICD-10-CM

## 2022-11-22 PROCEDURE — 99024 POSTOP FOLLOW-UP VISIT: CPT | Performed by: SURGERY

## 2022-11-22 NOTE — PROGRESS NOTES
[facility] SURGERY      S:   Patient presents s/p robotic bilateral inguinal hernia repair with mesh. He reports doing well except for occasional discomfort. O:   Comfortable         Incision sites healing well. No sign of recurrence with cough or straining              A:   S/P robotic bilateral inguinal hernia repair with mesh    P:   Follow up as needed.

## 2023-03-08 DIAGNOSIS — J43.8 OTHER EMPHYSEMA (HCC): ICD-10-CM

## 2023-03-09 RX ORDER — ALBUTEROL SULFATE 90 UG/1
2 AEROSOL, METERED RESPIRATORY (INHALATION) EVERY 6 HOURS PRN
Qty: 18 G | Refills: 6 | Status: SHIPPED | OUTPATIENT
Start: 2023-03-09

## 2023-03-20 NOTE — FLOWSHEET NOTE
[facility] ([facility])    Physical Therapy  Cancellation/No-show Note  Patient Name:  Loy Simmonds  :  1970   Date:  2022    Cancelled visits to date: 2  No-shows to date: 0    For today's appointment patient:  [x]  Cancelled  []  Rescheduled appointment  []  No-show     Reason given by patient:  []  Patient ill  []  Conflicting appointment  []  No transportation    [x]  Conflict with work  []  No reason given  []  Other:     Comments:      Phone call information:   [x]  Phone call made today to patient. [x]  Patient answered, conversation as follows: Physical therapist is ill   []  Patient did not answer. []  Phone call not needed - pt contacted us to cancel and provided reason for cancellation.      Electronically signed by:  Earnest Peterson PTA 96

## 2023-03-27 ENCOUNTER — TELEPHONE (OUTPATIENT)
Dept: PULMONOLOGY | Age: 53
End: 2023-03-27

## 2023-03-27 RX ORDER — DOXYCYCLINE HYCLATE 100 MG/1
100 CAPSULE ORAL 2 TIMES DAILY
Qty: 10 CAPSULE | Refills: 0 | Status: SHIPPED | OUTPATIENT
Start: 2023-03-27 | End: 2023-04-01

## 2023-03-27 RX ORDER — PREDNISONE 10 MG/1
TABLET ORAL
Qty: 30 TABLET | Refills: 0 | Status: SHIPPED | OUTPATIENT
Start: 2023-03-27 | End: 2023-04-08

## 2023-03-27 NOTE — TELEPHONE ENCOUNTER
Do you have the following symptoms? Shortness of Breath  yes  Wheezing  yes  Cough  yes  Cough Characteristics:  Productive    yes  Sputum Color    Yellow/ Green sometimes Clear  Hemoptysis   No  Consistency of sputum   sticky     Fever:    no    Temp:na  Chills/Sweats:  no  What other symptoms are you having?:  none    How long have you had these symptoms? 3-4 days     Pharmacy: [address] [place] [address] [address]    Have you been vaccinated for covid? No  Have you received a booster vaccine? No          Review medications and allergies: Allergies? Allergies   Allergen Reactions    Codeine Itching    Mobic [Meloxicam]      Irritates stomach    Tramadol      Patient states he doesn't feel right on tramadol. Currently on Antibiotics? (Drug/Dose/Frequency and how long on?) no        Systemic Steroids? (Drug/Dose/Frequency and how long on?) no      Last sick call taken on na. Meds prescribed were na, by na. Last OV 10/31/23 with Dr. Isaebl Poon:       COPD/pulmonary emphysema  Preoperative clearance for inguinal hernia   Snoring, apnea and hypersomnia   Bilateral pulmonary nodules with mediastinal and hilar adenopathy 5/2013. Decreased in size in RLL PNs and 4R LN with enlargement of GOPAL PN on repeated CT chest 8/16/23013. Stable on CT 05/27/2014. Positive histoplasma M bands and Histoplasma Ab yeast CF (1-64). Itraconazole 0/3/1305-1/1/3993  Hypermetabolic right hilar lymph nodes. 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 Improved on repeated CT. Arthritis. 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    60 pack year smoking - quit 1/2018   Covid 19 12/26/2021 received infusion   Failed Symbicort and Spiriva        Plan:       Continue Symbicort, Spiriva and albuterol 2 puffs Q4-6 hrs PRN  Acapella and Mucinex PRN   Patient is up to date with Pneumococcal vaccine  Advised to get influenza vaccine this year   Refusing Covid vaccine   Advised to schedule CT

## 2023-03-27 NOTE — TELEPHONE ENCOUNTER
Pt informed on medication to be sent. Med pending, please sign. Patient would like a CB once this sent in

## 2023-05-01 ENCOUNTER — OFFICE VISIT (OUTPATIENT)
Dept: PULMONOLOGY | Age: 53
End: 2023-05-01
Payer: MEDICARE

## 2023-05-01 VITALS
HEART RATE: 78 BPM | BODY MASS INDEX: 26.68 KG/M2 | DIASTOLIC BLOOD PRESSURE: 76 MMHG | WEIGHT: 170 LBS | HEIGHT: 67 IN | SYSTOLIC BLOOD PRESSURE: 104 MMHG | OXYGEN SATURATION: 97 %

## 2023-05-01 DIAGNOSIS — J43.9 PULMONARY EMPHYSEMA, UNSPECIFIED EMPHYSEMA TYPE (HCC): ICD-10-CM

## 2023-05-01 DIAGNOSIS — J40 TRACHEOBRONCHITIS: ICD-10-CM

## 2023-05-01 DIAGNOSIS — J44.1 CHRONIC OBSTRUCTIVE PULMONARY DISEASE WITH ACUTE EXACERBATION (HCC): Primary | ICD-10-CM

## 2023-05-01 PROCEDURE — 1036F TOBACCO NON-USER: CPT | Performed by: INTERNAL MEDICINE

## 2023-05-01 PROCEDURE — 3023F SPIROM DOC REV: CPT | Performed by: INTERNAL MEDICINE

## 2023-05-01 PROCEDURE — G8419 CALC BMI OUT NRM PARAM NOF/U: HCPCS | Performed by: INTERNAL MEDICINE

## 2023-05-01 PROCEDURE — G8427 DOCREV CUR MEDS BY ELIG CLIN: HCPCS | Performed by: INTERNAL MEDICINE

## 2023-05-01 PROCEDURE — 99214 OFFICE O/P EST MOD 30 MIN: CPT | Performed by: INTERNAL MEDICINE

## 2023-05-01 PROCEDURE — 3017F COLORECTAL CA SCREEN DOC REV: CPT | Performed by: INTERNAL MEDICINE

## 2023-05-01 RX ORDER — DOXYCYCLINE HYCLATE 100 MG/1
100 CAPSULE ORAL 2 TIMES DAILY
Qty: 20 CAPSULE | Refills: 0 | Status: SHIPPED | OUTPATIENT
Start: 2023-05-01 | End: 2023-05-11

## 2023-05-01 RX ORDER — PREDNISONE 10 MG/1
TABLET ORAL
Qty: 30 TABLET | Refills: 0 | Status: SHIPPED | OUTPATIENT
Start: 2023-05-01 | End: 2023-05-11

## 2023-05-01 RX ORDER — FLUTICASONE FUROATE, UMECLIDINIUM BROMIDE AND VILANTEROL TRIFENATATE 100; 62.5; 25 UG/1; UG/1; UG/1
1 POWDER RESPIRATORY (INHALATION) DAILY
Qty: 1 EACH | Refills: 5 | Status: SHIPPED | OUTPATIENT
Start: 2023-05-01

## 2023-05-01 ASSESSMENT — SLEEP AND FATIGUE QUESTIONNAIRES
HOW LIKELY ARE YOU TO NOD OFF OR FALL ASLEEP WHILE WATCHING TV: 3
HOW LIKELY ARE YOU TO NOD OFF OR FALL ASLEEP WHILE SITTING AND READING: 3
ESS TOTAL SCORE: 20
HOW LIKELY ARE YOU TO NOD OFF OR FALL ASLEEP WHILE SITTING INACTIVE IN A PUBLIC PLACE: 3
HOW LIKELY ARE YOU TO NOD OFF OR FALL ASLEEP IN A CAR, WHILE STOPPED FOR A FEW MINUTES IN TRAFFIC: 1
HOW LIKELY ARE YOU TO NOD OFF OR FALL ASLEEP WHILE LYING DOWN TO REST IN THE AFTERNOON WHEN CIRCUMSTANCES PERMIT: 3
HOW LIKELY ARE YOU TO NOD OFF OR FALL ASLEEP WHILE SITTING AND TALKING TO SOMEONE: 2
NECK CIRCUMFERENCE (INCHES): 15.25
HOW LIKELY ARE YOU TO NOD OFF OR FALL ASLEEP WHILE SITTING QUIETLY AFTER LUNCH WITHOUT ALCOHOL: 2
HOW LIKELY ARE YOU TO NOD OFF OR FALL ASLEEP WHEN YOU ARE A PASSENGER IN A CAR FOR AN HOUR WITHOUT A BREAK: 3

## 2023-05-01 NOTE — PROGRESS NOTES
[facility]                                                               CHIEF COMPLAINT: follow up COPD        HPI:   Patient completed Abx and steroids end of March  Feels better  Still with cough with green sputum  No hemoptysis   Uses Albuterol 5-6 times/day   No smoking   Has not had his sleep study yet    From prior visit:   Per girlfriend  witnessed apnea, + snoring and hypersomnia. Goes to bed 10-11 pm and gets up 8 am   Patient is getting bilateral hernia repair   ESS 23      Past Medical History:   Diagnosis Date    Arthritis     Chest pain     [facility] 9/2012 sent for records; Colitis     COPD (chronic obstructive pulmonary disease) ([facility] [address]     COVID 12/25/2021    Histoplasmosis     History of blood transfusion     Hx of blood clots     Metabolic syndrome X 25/27/7734    Other emphysema ([facility] [address] 8/19/2018    Pneumonia     Pulmonary nodule        Past Surgical History:        Procedure Laterality Date    BRONCHOSCOPY  06/05/2013    COLONOSCOPY  12/17/2018    COLONOSCOPY N/A 12/17/2018    COLORECTAL CANCER SCREENING, NOT HIGH RISK performed by Rossy Gifford DO at [address] [facility] CATH [address] [facility] Bilateral 11/4/2022    ROBOTIC BILATERAL INGUINAL HERNIA REPAIR WITH MESH performed by Lizzie Red MD at [address] [facility] ARTHROSCOPY Right     KNEE ARTHROSCOPY Right 12/13/2019    RIGHT KNEE VIDEO ARTHROSCOPY, MEDIAL MENISCECTOMY performed by Luis Betts MD at [address] [place] [address]    OTHER SURGICAL HISTORY      ROBOTIC BILATERAL INGUINAL HERNIA REPAIR WITH MESH (Bilateral: Abdomen)    VASECTOMY         Allergies:  is allergic to codeine, mobic [meloxicam], and tramadol. Social History:    TOBACCO:   reports that he quit smoking about 5 years ago. His smoking use included cigarettes. He has a 40.00 pack-year smoking history.  He has never used

## 2023-06-24 DIAGNOSIS — J43.8 OTHER EMPHYSEMA (HCC): ICD-10-CM

## 2023-06-24 DIAGNOSIS — Z86.79 HISTORY OF CORONARY VASOSPASM: ICD-10-CM

## 2023-06-24 DIAGNOSIS — R20.2 NUMBNESS AND TINGLING OF BOTH LEGS BELOW KNEES: ICD-10-CM

## 2023-06-24 DIAGNOSIS — M54.41 CHRONIC BILATERAL LOW BACK PAIN WITH BILATERAL SCIATICA: ICD-10-CM

## 2023-06-24 DIAGNOSIS — M54.42 CHRONIC BILATERAL LOW BACK PAIN WITH BILATERAL SCIATICA: ICD-10-CM

## 2023-06-24 DIAGNOSIS — G89.29 CHRONIC BILATERAL LOW BACK PAIN WITH BILATERAL SCIATICA: ICD-10-CM

## 2023-06-24 DIAGNOSIS — R20.0 NUMBNESS AND TINGLING OF BOTH LEGS BELOW KNEES: ICD-10-CM

## 2023-06-24 DIAGNOSIS — K21.00 GASTROESOPHAGEAL REFLUX DISEASE WITH ESOPHAGITIS WITHOUT HEMORRHAGE: ICD-10-CM

## 2023-06-26 RX ORDER — BUSPIRONE HYDROCHLORIDE 5 MG/1
TABLET ORAL
Qty: 60 TABLET | Refills: 0 | Status: SHIPPED | OUTPATIENT
Start: 2023-06-26

## 2023-06-26 RX ORDER — GABAPENTIN 600 MG/1
600 TABLET ORAL 3 TIMES DAILY
Qty: 90 TABLET | Refills: 0 | Status: SHIPPED | OUTPATIENT
Start: 2023-06-26 | End: 2023-07-26

## 2023-06-26 RX ORDER — OMEPRAZOLE 20 MG/1
20 CAPSULE, DELAYED RELEASE ORAL
Qty: 30 CAPSULE | Refills: 0 | Status: SHIPPED | OUTPATIENT
Start: 2023-06-26 | End: 2023-07-26

## 2023-06-26 RX ORDER — TIZANIDINE 2 MG/1
4 TABLET ORAL EVERY 8 HOURS PRN
Qty: 90 TABLET | Refills: 0 | Status: SHIPPED | OUTPATIENT
Start: 2023-06-26

## 2023-06-26 RX ORDER — IBUPROFEN 800 MG/1
800 TABLET ORAL 4 TIMES DAILY PRN
Qty: 120 TABLET | Refills: 0 | Status: SHIPPED | OUTPATIENT
Start: 2023-06-26

## 2023-06-26 RX ORDER — METOPROLOL SUCCINATE 25 MG/1
25 TABLET, EXTENDED RELEASE ORAL DAILY
Qty: 30 TABLET | Refills: 0 | Status: SHIPPED | OUTPATIENT
Start: 2023-06-26

## 2023-06-26 RX ORDER — MONTELUKAST SODIUM 10 MG/1
10 TABLET ORAL NIGHTLY
Qty: 30 TABLET | Refills: 0 | Status: SHIPPED | OUTPATIENT
Start: 2023-06-26

## 2023-07-03 ENCOUNTER — OFFICE VISIT (OUTPATIENT)
Dept: FAMILY MEDICINE CLINIC | Age: 53
End: 2023-07-03

## 2023-07-03 VITALS
WEIGHT: 168 LBS | DIASTOLIC BLOOD PRESSURE: 80 MMHG | BODY MASS INDEX: 26.31 KG/M2 | OXYGEN SATURATION: 95 % | SYSTOLIC BLOOD PRESSURE: 118 MMHG | HEART RATE: 73 BPM

## 2023-07-03 DIAGNOSIS — G89.29 CHRONIC BILATERAL LOW BACK PAIN WITH BILATERAL SCIATICA: ICD-10-CM

## 2023-07-03 DIAGNOSIS — R20.2 NUMBNESS AND TINGLING OF BOTH LEGS BELOW KNEES: ICD-10-CM

## 2023-07-03 DIAGNOSIS — M54.42 CHRONIC BILATERAL LOW BACK PAIN WITH BILATERAL SCIATICA: ICD-10-CM

## 2023-07-03 DIAGNOSIS — G89.29 CHRONIC FOOT PAIN, RIGHT: Primary | ICD-10-CM

## 2023-07-03 DIAGNOSIS — N40.1 NOCTURIA ASSOCIATED WITH BENIGN PROSTATIC HYPERPLASIA: ICD-10-CM

## 2023-07-03 DIAGNOSIS — Z80.42 FAMILY HISTORY OF PROSTATE CANCER: ICD-10-CM

## 2023-07-03 DIAGNOSIS — M79.671 CHRONIC FOOT PAIN, RIGHT: Primary | ICD-10-CM

## 2023-07-03 DIAGNOSIS — Z51.81 MEDICATION MONITORING ENCOUNTER: ICD-10-CM

## 2023-07-03 DIAGNOSIS — Z86.79 HISTORY OF CORONARY VASOSPASM: ICD-10-CM

## 2023-07-03 DIAGNOSIS — R35.1 NOCTURIA ASSOCIATED WITH BENIGN PROSTATIC HYPERPLASIA: ICD-10-CM

## 2023-07-03 DIAGNOSIS — F41.9 CHRONIC ANXIETY: ICD-10-CM

## 2023-07-03 DIAGNOSIS — M54.41 CHRONIC BILATERAL LOW BACK PAIN WITH BILATERAL SCIATICA: ICD-10-CM

## 2023-07-03 DIAGNOSIS — K21.00 GASTROESOPHAGEAL REFLUX DISEASE WITH ESOPHAGITIS WITHOUT HEMORRHAGE: ICD-10-CM

## 2023-07-03 DIAGNOSIS — J43.8 OTHER EMPHYSEMA (HCC): ICD-10-CM

## 2023-07-03 DIAGNOSIS — R20.0 NUMBNESS AND TINGLING OF BOTH LEGS BELOW KNEES: ICD-10-CM

## 2023-07-03 DIAGNOSIS — F32.1 MODERATE SINGLE CURRENT EPISODE OF MAJOR DEPRESSIVE DISORDER (HCC): ICD-10-CM

## 2023-07-03 PROBLEM — K40.20 NON-RECURRENT BILATERAL INGUINAL HERNIA WITHOUT OBSTRUCTION OR GANGRENE: Status: RESOLVED | Noted: 2022-11-04 | Resolved: 2023-07-03

## 2023-07-03 LAB
ALBUMIN SERPL-MCNC: 4.2 G/DL (ref 3.4–5)
ALBUMIN/GLOB SERPL: 1.9 {RATIO} (ref 1.1–2.2)
ALP SERPL-CCNC: 84 U/L (ref 40–129)
ALT SERPL-CCNC: 12 U/L (ref 10–40)
ANION GAP SERPL CALCULATED.3IONS-SCNC: 6 MMOL/L (ref 3–16)
AST SERPL-CCNC: 14 U/L (ref 15–37)
BASOPHILS # BLD: 0.1 K/UL (ref 0–0.2)
BASOPHILS NFR BLD: 1.2 %
BILIRUB SERPL-MCNC: 0.5 MG/DL (ref 0–1)
BUN SERPL-MCNC: 11 MG/DL (ref 7–20)
CALCIUM SERPL-MCNC: 9.4 MG/DL (ref 8.3–10.6)
CHLORIDE SERPL-SCNC: 106 MMOL/L (ref 99–110)
CHOLEST SERPL-MCNC: 157 MG/DL (ref 0–199)
CO2 SERPL-SCNC: 29 MMOL/L (ref 21–32)
CREAT SERPL-MCNC: 1.2 MG/DL (ref 0.9–1.3)
DEPRECATED RDW RBC AUTO: 13.4 % (ref 12.4–15.4)
EOSINOPHIL # BLD: 0.6 K/UL (ref 0–0.6)
EOSINOPHIL NFR BLD: 7.5 %
GFR SERPLBLD CREATININE-BSD FMLA CKD-EPI: >60 ML/MIN/{1.73_M2}
GLUCOSE SERPL-MCNC: 92 MG/DL (ref 70–99)
HCT VFR BLD AUTO: 44.2 % (ref 40.5–52.5)
HDLC SERPL-MCNC: 39 MG/DL (ref 40–60)
HGB BLD-MCNC: 15.3 G/DL (ref 13.5–17.5)
LDLC SERPL CALC-MCNC: 101 MG/DL
LYMPHOCYTES # BLD: 2.6 K/UL (ref 1–5.1)
LYMPHOCYTES NFR BLD: 32.9 %
MCH RBC QN AUTO: 30.3 PG (ref 26–34)
MCHC RBC AUTO-ENTMCNC: 34.6 G/DL (ref 31–36)
MCV RBC AUTO: 87.5 FL (ref 80–100)
MONOCYTES # BLD: 0.7 K/UL (ref 0–1.3)
MONOCYTES NFR BLD: 9.2 %
NEUTROPHILS # BLD: 3.8 K/UL (ref 1.7–7.7)
NEUTROPHILS NFR BLD: 49.2 %
PLATELET # BLD AUTO: 291 K/UL (ref 135–450)
PMV BLD AUTO: 8.6 FL (ref 5–10.5)
POTASSIUM SERPL-SCNC: 4.1 MMOL/L (ref 3.5–5.1)
PROT SERPL-MCNC: 6.4 G/DL (ref 6.4–8.2)
PSA SERPL DL<=0.01 NG/ML-MCNC: 2.08 NG/ML (ref 0–4)
RBC # BLD AUTO: 5.05 M/UL (ref 4.2–5.9)
SODIUM SERPL-SCNC: 141 MMOL/L (ref 136–145)
TRIGL SERPL-MCNC: 86 MG/DL (ref 0–150)
VLDLC SERPL CALC-MCNC: 17 MG/DL
WBC # BLD AUTO: 7.7 K/UL (ref 4–11)

## 2023-07-03 RX ORDER — OMEPRAZOLE 20 MG/1
20 CAPSULE, DELAYED RELEASE ORAL
Qty: 30 CAPSULE | Refills: 11 | Status: SHIPPED | OUTPATIENT
Start: 2023-07-03 | End: 2024-06-27

## 2023-07-03 RX ORDER — AMLODIPINE BESYLATE 2.5 MG/1
2.5 TABLET ORAL DAILY
Qty: 30 TABLET | Refills: 11 | Status: SHIPPED | OUTPATIENT
Start: 2023-07-03

## 2023-07-03 RX ORDER — BUSPIRONE HYDROCHLORIDE 10 MG/1
10 TABLET ORAL 3 TIMES DAILY
Qty: 90 TABLET | Refills: 1 | Status: SHIPPED | OUTPATIENT
Start: 2023-07-03

## 2023-07-03 RX ORDER — DICYCLOMINE HYDROCHLORIDE 10 MG/1
10 CAPSULE ORAL 4 TIMES DAILY PRN
Qty: 90 CAPSULE | Refills: 11 | Status: SHIPPED | OUTPATIENT
Start: 2023-07-03

## 2023-07-03 RX ORDER — GABAPENTIN 300 MG/1
300 CAPSULE ORAL 2 TIMES DAILY
Qty: 180 CAPSULE | Refills: 1 | Status: SHIPPED | OUTPATIENT
Start: 2023-07-03 | End: 2023-12-30

## 2023-07-03 RX ORDER — TIZANIDINE 2 MG/1
4 TABLET ORAL EVERY 8 HOURS PRN
Qty: 90 TABLET | Refills: 11 | Status: SHIPPED | OUTPATIENT
Start: 2023-07-03

## 2023-07-03 RX ORDER — METOPROLOL SUCCINATE 25 MG/1
25 TABLET, EXTENDED RELEASE ORAL DAILY
Qty: 30 TABLET | Refills: 11 | Status: SHIPPED | OUTPATIENT
Start: 2023-07-03

## 2023-07-03 RX ORDER — DULOXETIN HYDROCHLORIDE 60 MG/1
60 CAPSULE, DELAYED RELEASE ORAL DAILY
Qty: 30 CAPSULE | Refills: 11 | Status: SHIPPED | OUTPATIENT
Start: 2023-07-03

## 2023-07-03 RX ORDER — LORAZEPAM 1 MG/1
1 TABLET ORAL DAILY
Qty: 30 TABLET | Refills: 2 | Status: SHIPPED | OUTPATIENT
Start: 2023-07-03 | End: 2023-10-31

## 2023-07-03 RX ORDER — MONTELUKAST SODIUM 10 MG/1
10 TABLET ORAL NIGHTLY
Qty: 30 TABLET | Refills: 11 | Status: SHIPPED | OUTPATIENT
Start: 2023-07-03

## 2023-07-03 SDOH — ECONOMIC STABILITY: FOOD INSECURITY: WITHIN THE PAST 12 MONTHS, THE FOOD YOU BOUGHT JUST DIDN'T LAST AND YOU DIDN'T HAVE MONEY TO GET MORE.: NEVER TRUE

## 2023-07-03 SDOH — ECONOMIC STABILITY: INCOME INSECURITY: HOW HARD IS IT FOR YOU TO PAY FOR THE VERY BASICS LIKE FOOD, HOUSING, MEDICAL CARE, AND HEATING?: NOT HARD AT ALL

## 2023-07-03 SDOH — ECONOMIC STABILITY: HOUSING INSECURITY
IN THE LAST 12 MONTHS, WAS THERE A TIME WHEN YOU DID NOT HAVE A STEADY PLACE TO SLEEP OR SLEPT IN A SHELTER (INCLUDING NOW)?: NO

## 2023-07-03 SDOH — ECONOMIC STABILITY: FOOD INSECURITY: WITHIN THE PAST 12 MONTHS, YOU WORRIED THAT YOUR FOOD WOULD RUN OUT BEFORE YOU GOT MONEY TO BUY MORE.: NEVER TRUE

## 2023-07-03 ASSESSMENT — PATIENT HEALTH QUESTIONNAIRE - PHQ9
8. MOVING OR SPEAKING SO SLOWLY THAT OTHER PEOPLE COULD HAVE NOTICED. OR THE OPPOSITE, BEING SO FIGETY OR RESTLESS THAT YOU HAVE BEEN MOVING AROUND A LOT MORE THAN USUAL: 0
1. LITTLE INTEREST OR PLEASURE IN DOING THINGS: 1
SUM OF ALL RESPONSES TO PHQ QUESTIONS 1-9: 9
9. THOUGHTS THAT YOU WOULD BE BETTER OFF DEAD, OR OF HURTING YOURSELF: 0
SUM OF ALL RESPONSES TO PHQ QUESTIONS 1-9: 9
3. TROUBLE FALLING OR STAYING ASLEEP: 3
SUM OF ALL RESPONSES TO PHQ9 QUESTIONS 1 & 2: 4
4. FEELING TIRED OR HAVING LITTLE ENERGY: 2
2. FEELING DOWN, DEPRESSED OR HOPELESS: 3
SUM OF ALL RESPONSES TO PHQ QUESTIONS 1-9: 9
10. IF YOU CHECKED OFF ANY PROBLEMS, HOW DIFFICULT HAVE THESE PROBLEMS MADE IT FOR YOU TO DO YOUR WORK, TAKE CARE OF THINGS AT HOME, OR GET ALONG WITH OTHER PEOPLE: 1
SUM OF ALL RESPONSES TO PHQ QUESTIONS 1-9: 9
5. POOR APPETITE OR OVEREATING: 0
7. TROUBLE CONCENTRATING ON THINGS, SUCH AS READING THE NEWSPAPER OR WATCHING TELEVISION: 0
6. FEELING BAD ABOUT YOURSELF - OR THAT YOU ARE A FAILURE OR HAVE LET YOURSELF OR YOUR FAMILY DOWN: 0

## 2023-07-03 ASSESSMENT — ENCOUNTER SYMPTOMS
ABDOMINAL PAIN: 0
BACK PAIN: 1

## 2023-07-03 NOTE — PROGRESS NOTES
Blood drawn per order. Needle size: 23 g butterfly   Site: L Antecubital.  First attempt successful Yes    Second attempt na    Pressure applied until bleeding stopped. Cotton ball and band aid  applied. Patient informed to call office or return if bleeding reoccurs and unable to stop.     Tubes drawn: 1 purple     1 red

## 2023-07-03 NOTE — PROGRESS NOTES
7/3/2023    Chief Complaint   Patient presents with    Foot Pain     Ongoing for months and it is getting worse     Anxiety     Buspar not working he would like to get the ativan back     Numbness     Uses the gabapentin for this but he would like it decreased to 300mg does not like how the 600 mg makes him feel     COPD     See Dr Roman Grimaldo for this        [address] [facility]. is a 47 y.o. male, presents today for:      ASSESSMENT/PLAN:    1. Chronic foot pain, right  Unclear cause for foot pain, unlikely to be gout from exam but will do Uric acid to labs today. Per pt family history positive for foot cancer? More likely some issues with issues with vascular disease from smoking. Pt has good pulses today so unlikely to be vascular related today. - [facility] - Roman Grimaldo, [facility], Podiatry, [facility]    2. Chronic anxiety  Increase Buspirone today due to uncontrolled anxiety. Discussed with patient his anxiety will be difficult to control with Buspirone only because it works similarly to the Lorazepam but it is not as strong. With his hx of the coronary vasospasm and the emphysema if we decrease his Lorazepam he may feel like he is having a heart attack or COPD flare when he his having a panic attack instead. He continues to be hesitant to participate in counseling with Psychology. - LORazepam (ATIVAN) 1 MG tablet; Take 1 tablet by mouth daily for 120 days. Max Daily Amount: 1 mg  Dispense: 30 tablet; Refill: 2  - DULoxetine (CYMBALTA) 60 MG extended release capsule; Take 1 capsule by mouth daily  Dispense: 30 capsule; Refill: 11  - busPIRone (BUSPAR) 10 MG tablet; Take 1 tablet by mouth 3 times daily Anxiety  Dispense: 90 tablet; Refill: 1  - Drug Panel-PM-HI Res-UR Interp-A    3. History of coronary vasospasm  Stable today  Continue Metoprolol 25 and Amlodipine 2.5     - metoprolol succinate (TOPROL XL) 25 MG extended release tablet; Take 1 tablet by mouth daily  Dispense: 30 tablet;  Refill: 11  -

## 2023-07-04 LAB — URATE SERPL-MCNC: 4.6 MG/DL (ref 3.5–7.2)

## 2023-07-07 LAB
6MAM UR QL: NOT DETECTED
7AMINOCLONAZEPAM UR QL: NOT DETECTED
A-OH ALPRAZ UR QL: NOT DETECTED
ALPHA-OH-MIDAZOLAM, URINE: NOT DETECTED
ALPRAZ UR QL: NOT DETECTED
AMPHET UR QL SCN: NOT DETECTED
ANNOTATION COMMENT IMP: NORMAL
ANNOTATION COMMENT IMP: NORMAL
BARBITURATES UR QL: NOT DETECTED
BUPRENORPHINE UR QL: NOT DETECTED
BZE UR QL: NOT DETECTED
CARBOXYTHC UR QL: NOT DETECTED
CARISOPRODOL UR QL: NOT DETECTED
CLONAZEPAM UR QL: NOT DETECTED
CODEINE UR QL: NOT DETECTED
CREAT UR-MCNC: 156.3 MG/DL (ref 20–400)
DIAZEPAM UR QL: NOT DETECTED
ETHYL GLUCURONIDE UR QL: NOT DETECTED
FENTANYL UR QL: NOT DETECTED
GABAPENTIN: NOT DETECTED
HYDROCODONE UR QL: NOT DETECTED
HYDROMORPHONE UR QL: NOT DETECTED
LORAZEPAM UR QL: NOT DETECTED
MDA UR QL: NOT DETECTED
MDEA UR QL: NOT DETECTED
MDMA UR QL: NOT DETECTED
MEPERIDINE UR QL: NOT DETECTED
METHADONE UR QL: NOT DETECTED
METHAMPHET UR QL: NOT DETECTED
MIDAZOLAM UR QL SCN: NOT DETECTED
MORPHINE UR QL: NOT DETECTED
NALOXONE: NOT DETECTED
NORBUPRENORPHINE UR QL CFM: NOT DETECTED
NORDIAZEPAM UR QL: NOT DETECTED
NORFENTANYL UR QL: NOT DETECTED
NORHYDROCODONE UR QL CFM: NOT DETECTED
NOROXYCODONE UR QL CFM: NOT DETECTED
NOROXYMORPHONE, URINE: NOT DETECTED
OXAZEPAM UR QL: NOT DETECTED
OXYCODONE UR QL: NOT DETECTED
OXYMORPHONE UR QL: NOT DETECTED
PATHOLOGY STUDY: NORMAL
PCP UR QL: NOT DETECTED
PHENTERMINE UR QL: NOT DETECTED
PPAA UR QL: NOT DETECTED
PREGABALIN: NOT DETECTED
SERVICE CMNT-IMP: NORMAL
TAPENTADOL UR QL SCN: NOT DETECTED
TAPENTADOL-O-SULFATE, URINE: NOT DETECTED
TEMAZEPAM UR QL: NOT DETECTED
TRAMADOL UR QL: NOT DETECTED
ZOLPIDEM UR QL: NOT DETECTED

## 2023-08-16 DIAGNOSIS — F41.9 CHRONIC ANXIETY: ICD-10-CM

## 2023-08-16 RX ORDER — BUSPIRONE HYDROCHLORIDE 10 MG/1
TABLET ORAL
Qty: 90 TABLET | Refills: 1 | Status: SHIPPED | OUTPATIENT
Start: 2023-08-16

## 2023-08-16 NOTE — TELEPHONE ENCOUNTER
Future appt scheduled 10/02/2023                  Last appt 07/03/2023      Last Written 07/03/2023    busPIRone (BUSPAR) 10 MG table  #90  1 RF

## 2023-09-01 DIAGNOSIS — G89.29 CHRONIC BILATERAL LOW BACK PAIN WITH BILATERAL SCIATICA: ICD-10-CM

## 2023-09-01 DIAGNOSIS — M54.42 CHRONIC BILATERAL LOW BACK PAIN WITH BILATERAL SCIATICA: ICD-10-CM

## 2023-09-01 DIAGNOSIS — M54.41 CHRONIC BILATERAL LOW BACK PAIN WITH BILATERAL SCIATICA: ICD-10-CM

## 2023-09-01 RX ORDER — IBUPROFEN 800 MG/1
TABLET ORAL
Qty: 120 TABLET | Refills: 2 | Status: SHIPPED | OUTPATIENT
Start: 2023-09-01

## 2023-10-02 ENCOUNTER — OFFICE VISIT (OUTPATIENT)
Dept: FAMILY MEDICINE CLINIC | Age: 53
End: 2023-10-02
Payer: COMMERCIAL

## 2023-10-02 VITALS
SYSTOLIC BLOOD PRESSURE: 114 MMHG | TEMPERATURE: 98.3 F | HEART RATE: 80 BPM | WEIGHT: 167 LBS | DIASTOLIC BLOOD PRESSURE: 70 MMHG | BODY MASS INDEX: 26.16 KG/M2 | OXYGEN SATURATION: 97 %

## 2023-10-02 DIAGNOSIS — F41.9 CHRONIC ANXIETY: ICD-10-CM

## 2023-10-02 DIAGNOSIS — M54.42 CHRONIC BILATERAL LOW BACK PAIN WITH BILATERAL SCIATICA: ICD-10-CM

## 2023-10-02 DIAGNOSIS — R20.2 NUMBNESS AND TINGLING OF BOTH LEGS BELOW KNEES: ICD-10-CM

## 2023-10-02 DIAGNOSIS — K58.0 IRRITABLE BOWEL SYNDROME WITH DIARRHEA: ICD-10-CM

## 2023-10-02 DIAGNOSIS — M54.41 CHRONIC BILATERAL LOW BACK PAIN WITH BILATERAL SCIATICA: ICD-10-CM

## 2023-10-02 DIAGNOSIS — R20.0 NUMBNESS AND TINGLING OF BOTH LEGS BELOW KNEES: ICD-10-CM

## 2023-10-02 DIAGNOSIS — G89.29 CHRONIC BILATERAL LOW BACK PAIN WITH BILATERAL SCIATICA: ICD-10-CM

## 2023-10-02 DIAGNOSIS — K21.00 GASTROESOPHAGEAL REFLUX DISEASE WITH ESOPHAGITIS WITHOUT HEMORRHAGE: ICD-10-CM

## 2023-10-02 DIAGNOSIS — Z51.81 MEDICATION MONITORING ENCOUNTER: Primary | ICD-10-CM

## 2023-10-02 DIAGNOSIS — J43.8 OTHER EMPHYSEMA (HCC): ICD-10-CM

## 2023-10-02 PROCEDURE — G8419 CALC BMI OUT NRM PARAM NOF/U: HCPCS | Performed by: NURSE PRACTITIONER

## 2023-10-02 PROCEDURE — 3017F COLORECTAL CA SCREEN DOC REV: CPT | Performed by: NURSE PRACTITIONER

## 2023-10-02 PROCEDURE — 3023F SPIROM DOC REV: CPT | Performed by: NURSE PRACTITIONER

## 2023-10-02 PROCEDURE — G8484 FLU IMMUNIZE NO ADMIN: HCPCS | Performed by: NURSE PRACTITIONER

## 2023-10-02 PROCEDURE — 1036F TOBACCO NON-USER: CPT | Performed by: NURSE PRACTITIONER

## 2023-10-02 PROCEDURE — 99213 OFFICE O/P EST LOW 20 MIN: CPT | Performed by: NURSE PRACTITIONER

## 2023-10-02 PROCEDURE — G8427 DOCREV CUR MEDS BY ELIG CLIN: HCPCS | Performed by: NURSE PRACTITIONER

## 2023-10-02 RX ORDER — LORAZEPAM 1 MG/1
1 TABLET ORAL DAILY
Qty: 30 TABLET | Refills: 2 | Status: SHIPPED | OUTPATIENT
Start: 2023-10-02 | End: 2023-12-31

## 2023-10-02 RX ORDER — GABAPENTIN 300 MG/1
300 CAPSULE ORAL 2 TIMES DAILY
Qty: 180 CAPSULE | Refills: 1 | Status: SHIPPED | OUTPATIENT
Start: 2023-10-02 | End: 2024-03-30

## 2023-10-02 RX ORDER — BUSPIRONE HYDROCHLORIDE 10 MG/1
TABLET ORAL
Qty: 90 TABLET | Refills: 5 | Status: SHIPPED | OUTPATIENT
Start: 2023-10-02

## 2023-10-02 RX ORDER — IBUPROFEN 800 MG/1
TABLET ORAL
Qty: 120 TABLET | Refills: 5 | Status: SHIPPED | OUTPATIENT
Start: 2023-10-02

## 2023-10-02 ASSESSMENT — ENCOUNTER SYMPTOMS
ABDOMINAL PAIN: 0
BACK PAIN: 1

## 2023-10-02 NOTE — PROGRESS NOTES
10/2/2023    Chief Complaint   Patient presents with    Anxiety     Fasting     Gastroesophageal Reflux    Back Pain       Dru Arthur . is a 47 y.o. male, presents today for:      ASSESSMENT/PLAN:  1. Chronic anxiety  Overall stable today  Continue Buspirone TID, Lorazepam 1 mg daily. Past meds: Seroquel ( no help), (Cymbalta- helps but not for anxiety). Continues to declined Psychotherapy   Discussed with patient 7/2023 his anxiety will be difficult to control with Buspirone only because it works similarly to the Lorazepam but it is not as strong. With his hx of the coronary vasospasm and the emphysema if we decrease his Lorazepam he may feel like he is having a heart attack or COPD flare when he his having a panic attack instead. He continues to be hesitant to participate in counseling with Psychology. - busPIRone (BUSPAR) 10 MG tablet; TAKE ONE TABLET BY MOUTH THREE (3) TIMES A DAY FOR ANXIETY  Dispense: 90 tablet; Refill: 5    2. Chronic bilateral low back pain with bilateral sciatica  Stable today  Xray Hip/ Lumbar Spine ordered 3/2022, pt did not obtain  ContinueTizandine  Declined PT last OV  - gabapentin (NEURONTIN) 300 MG capsule; Take 1 capsule by mouth 2 times daily for 180 days. Intended supply: 90 days  Dispense: 180 capsule; Refill: 1  - ibuprofen (ADVIL;MOTRIN) 800 MG tablet; TAKE ONE (1) TABLET BY MOUTH FOUR (4) TIMES DAILY AS NEEDED FOR PAIN  Dispense: 120 tablet; Refill: 5    3. Numbness and tingling of both legs below knees  See above  - gabapentin (NEURONTIN) 300 MG capsule; Take 1 capsule by mouth 2 times daily for 180 days. Intended supply: 90 days  Dispense: 180 capsule; Refill: 1    4. Medication monitoring encounter  UDS obtained today for Lorazepam/ Gabapentin  - Drug Panel-PM-HI Res-UR Interp-A; Future      Return in about 3 months (around 1/2/2024). Presenting today for chronic disease follow up. No new questions/ concerns.       Right foot: Now following with

## 2023-10-06 LAB
6MAM UR QL: NOT DETECTED
7AMINOCLONAZEPAM UR QL: NOT DETECTED
A-OH ALPRAZ UR QL: NOT DETECTED
ALPHA-OH-MIDAZOLAM, URINE: NOT DETECTED
ALPRAZ UR QL: NOT DETECTED
AMPHET UR QL SCN: NOT DETECTED
ANNOTATION COMMENT IMP: NORMAL
ANNOTATION COMMENT IMP: NORMAL
BARBITURATES UR QL: NOT DETECTED
BUPRENORPHINE UR QL: NOT DETECTED
BZE UR QL: NOT DETECTED
CARBOXYTHC UR QL: NOT DETECTED
CARISOPRODOL UR QL: NOT DETECTED
CLONAZEPAM UR QL: NOT DETECTED
CODEINE UR QL: NOT DETECTED
CREAT UR-MCNC: 167 MG/DL (ref 20–400)
DIAZEPAM UR QL: NOT DETECTED
ETHYL GLUCURONIDE UR QL: NOT DETECTED
FENTANYL UR QL: NOT DETECTED
GABAPENTIN: NOT DETECTED
HYDROCODONE UR QL: NOT DETECTED
HYDROMORPHONE UR QL: NOT DETECTED
LORAZEPAM UR QL: NOT DETECTED
MDA UR QL: NOT DETECTED
MDEA UR QL: NOT DETECTED
MDMA UR QL: NOT DETECTED
MEPERIDINE UR QL: NOT DETECTED
METHADONE UR QL: NOT DETECTED
METHAMPHET UR QL: NOT DETECTED
MIDAZOLAM UR QL SCN: NOT DETECTED
MORPHINE UR QL: NOT DETECTED
NALOXONE: NOT DETECTED
NORBUPRENORPHINE UR QL CFM: NOT DETECTED
NORDIAZEPAM UR QL: NOT DETECTED
NORFENTANYL UR QL: NOT DETECTED
NORHYDROCODONE UR QL CFM: NOT DETECTED
NOROXYCODONE UR QL CFM: NOT DETECTED
NOROXYMORPHONE, URINE: NOT DETECTED
OXAZEPAM UR QL: NOT DETECTED
OXYCODONE UR QL: NOT DETECTED
OXYMORPHONE UR QL: NOT DETECTED
PATHOLOGY STUDY: NORMAL
PCP UR QL: NOT DETECTED
PHENTERMINE UR QL: NOT DETECTED
PPAA UR QL: NOT DETECTED
PREGABALIN: NOT DETECTED
SERVICE CMNT-IMP: NORMAL
TAPENTADOL UR QL SCN: NOT DETECTED
TAPENTADOL-O-SULFATE, URINE: NOT DETECTED
TEMAZEPAM UR QL: NOT DETECTED
TRAMADOL UR QL: NOT DETECTED
ZOLPIDEM UR QL: NOT DETECTED

## 2023-10-17 RX ORDER — PREDNISONE 10 MG/1
TABLET ORAL
Qty: 30 TABLET | Refills: 0 | OUTPATIENT
Start: 2023-10-17

## 2023-11-06 ENCOUNTER — HOSPITAL ENCOUNTER (OUTPATIENT)
Dept: PULMONOLOGY | Age: 53
Discharge: HOME | End: 2023-11-06
Payer: COMMERCIAL

## 2023-11-06 ENCOUNTER — HOSPITAL ENCOUNTER (OUTPATIENT)
Dept: CT IMAGING | Age: 53
Discharge: HOME | End: 2023-11-06
Payer: COMMERCIAL

## 2023-11-06 ENCOUNTER — HOSPITAL ENCOUNTER (OUTPATIENT)
Dept: GENERAL RADIOLOGY | Age: 53
End: 2023-11-06
Payer: COMMERCIAL

## 2023-11-06 ENCOUNTER — HOSPITAL ENCOUNTER (OUTPATIENT)
Dept: GENERAL RADIOLOGY | Age: 53
Discharge: HOME | End: 2023-11-06
Payer: COMMERCIAL

## 2023-11-06 DIAGNOSIS — J44.1 CHRONIC OBSTRUCTIVE PULMONARY DISEASE WITH ACUTE EXACERBATION (HCC): ICD-10-CM

## 2023-11-06 DIAGNOSIS — Z87.891 PERSONAL HISTORY OF TOBACCO USE: ICD-10-CM

## 2023-11-06 DIAGNOSIS — J43.9 PULMONARY EMPHYSEMA, UNSPECIFIED EMPHYSEMA TYPE (HCC): ICD-10-CM

## 2023-11-06 LAB
DLCO %PRED: 74 %
DLCO PRED: NORMAL
DLCO/VA %PRED: NORMAL
DLCO/VA PRED: NORMAL
DLCO/VA: NORMAL
DLCO: NORMAL
EXPIRATORY TIME-POST: NORMAL
EXPIRATORY TIME: NORMAL
FEF 25-75% %CHNG: NORMAL
FEF 25-75% %PRED-POST: NORMAL
FEF 25-75% %PRED-PRE: NORMAL
FEF 25-75% PRED: NORMAL
FEF 25-75%-POST: NORMAL
FEF 25-75%-PRE: NORMAL
FEV1 %PRED-POST: 99 %
FEV1 %PRED-PRE: 94 %
FEV1 PRED: NORMAL
FEV1-POST: NORMAL
FEV1-PRE: NORMAL
FEV1/FVC %PRED-POST: NORMAL
FEV1/FVC %PRED-PRE: NORMAL
FEV1/FVC PRED: NORMAL
FEV1/FVC-POST: 80 %
FEV1/FVC-PRE: 80 %
FVC %PRED-POST: NORMAL
FVC %PRED-PRE: NORMAL
FVC PRED: NORMAL
FVC-POST: NORMAL
FVC-PRE: NORMAL
GAW %PRED: NORMAL
GAW PRED: NORMAL
GAW: NORMAL
IC %PRED: NORMAL
IC PRED: NORMAL
IC: NORMAL
MEP: NORMAL
MIP: NORMAL
MVV %PRED-PRE: NORMAL
MVV PRED: NORMAL
MVV-PRE: NORMAL
PEF %PRED-POST: NORMAL
PEF %PRED-PRE: NORMAL
PEF PRED: NORMAL
PEF%CHNG: NORMAL
PEF-POST: NORMAL
PEF-PRE: NORMAL
RAW %PRED: NORMAL
RAW PRED: NORMAL
RAW: NORMAL
RV %PRED: NORMAL
RV PRED: NORMAL
RV: NORMAL
SVC %PRED: NORMAL
SVC PRED: NORMAL
SVC: NORMAL
TLC %PRED: 84 %
TLC PRED: NORMAL
TLC: NORMAL
VA %PRED: NORMAL
VA PRED: NORMAL
VA: NORMAL
VTG %PRED: NORMAL
VTG PRED: NORMAL
VTG: NORMAL

## 2023-11-06 PROCEDURE — 94640 AIRWAY INHALATION TREATMENT: CPT

## 2023-11-06 PROCEDURE — 6370000000 HC RX 637 (ALT 250 FOR IP): Performed by: INTERNAL MEDICINE

## 2023-11-06 PROCEDURE — 94726 PLETHYSMOGRAPHY LUNG VOLUMES: CPT

## 2023-11-06 PROCEDURE — 71271 CT THORAX LUNG CANCER SCR C-: CPT

## 2023-11-06 PROCEDURE — 94760 N-INVAS EAR/PLS OXIMETRY 1: CPT

## 2023-11-06 PROCEDURE — 94729 DIFFUSING CAPACITY: CPT

## 2023-11-06 PROCEDURE — 94060 EVALUATION OF WHEEZING: CPT

## 2023-11-06 RX ORDER — ALBUTEROL SULFATE 90 UG/1
4 AEROSOL, METERED RESPIRATORY (INHALATION) ONCE
Status: COMPLETED | OUTPATIENT
Start: 2023-11-06 | End: 2023-11-06

## 2023-11-06 RX ADMIN — Medication 4 PUFF: at 11:14

## 2023-11-06 ASSESSMENT — PULMONARY FUNCTION TESTS
FEV1/FVC_POST: 80
FEV1_PERCENT_PREDICTED_POST: 99
FEV1_PERCENT_PREDICTED_PRE: 94
FEV1/FVC_PRE: 80

## 2023-11-08 ENCOUNTER — OFFICE VISIT (OUTPATIENT)
Dept: PULMONOLOGY | Age: 53
End: 2023-11-08
Payer: COMMERCIAL

## 2023-11-08 VITALS
WEIGHT: 171 LBS | RESPIRATION RATE: 18 BRPM | DIASTOLIC BLOOD PRESSURE: 82 MMHG | SYSTOLIC BLOOD PRESSURE: 128 MMHG | BODY MASS INDEX: 26.78 KG/M2 | OXYGEN SATURATION: 97 % | HEART RATE: 74 BPM

## 2023-11-08 DIAGNOSIS — Z23 NEED FOR INFLUENZA VACCINATION: Primary | ICD-10-CM

## 2023-11-08 DIAGNOSIS — G47.10 HYPERSOMNIA: ICD-10-CM

## 2023-11-08 DIAGNOSIS — J44.9 CHRONIC OBSTRUCTIVE PULMONARY DISEASE, UNSPECIFIED COPD TYPE (HCC): ICD-10-CM

## 2023-11-08 DIAGNOSIS — J43.8 OTHER EMPHYSEMA (HCC): ICD-10-CM

## 2023-11-08 PROCEDURE — G8427 DOCREV CUR MEDS BY ELIG CLIN: HCPCS | Performed by: INTERNAL MEDICINE

## 2023-11-08 PROCEDURE — 90674 CCIIV4 VAC NO PRSV 0.5 ML IM: CPT | Performed by: INTERNAL MEDICINE

## 2023-11-08 PROCEDURE — 1036F TOBACCO NON-USER: CPT | Performed by: INTERNAL MEDICINE

## 2023-11-08 PROCEDURE — 3023F SPIROM DOC REV: CPT | Performed by: INTERNAL MEDICINE

## 2023-11-08 PROCEDURE — 99214 OFFICE O/P EST MOD 30 MIN: CPT | Performed by: INTERNAL MEDICINE

## 2023-11-08 PROCEDURE — 3017F COLORECTAL CA SCREEN DOC REV: CPT | Performed by: INTERNAL MEDICINE

## 2023-11-08 PROCEDURE — G8419 CALC BMI OUT NRM PARAM NOF/U: HCPCS | Performed by: INTERNAL MEDICINE

## 2023-11-08 PROCEDURE — 90471 IMMUNIZATION ADMIN: CPT | Performed by: INTERNAL MEDICINE

## 2023-11-08 PROCEDURE — G8482 FLU IMMUNIZE ORDER/ADMIN: HCPCS | Performed by: INTERNAL MEDICINE

## 2023-11-08 RX ORDER — FLUTICASONE PROPIONATE 50 MCG
1 SPRAY, SUSPENSION (ML) NASAL DAILY
Qty: 16 G | Refills: 5 | Status: SHIPPED | OUTPATIENT
Start: 2023-11-08

## 2023-11-08 RX ORDER — ALBUTEROL SULFATE 90 UG/1
2 AEROSOL, METERED RESPIRATORY (INHALATION) EVERY 6 HOURS PRN
Qty: 18 G | Refills: 5 | Status: SHIPPED | OUTPATIENT
Start: 2023-11-08

## 2023-11-08 RX ORDER — GUAIFENESIN 600 MG/1
600 TABLET, EXTENDED RELEASE ORAL 2 TIMES DAILY
Qty: 60 TABLET | Refills: 5 | Status: SHIPPED | OUTPATIENT
Start: 2023-11-08

## 2023-11-08 RX ORDER — FLUTICASONE FUROATE, UMECLIDINIUM BROMIDE AND VILANTEROL TRIFENATATE 100; 62.5; 25 UG/1; UG/1; UG/1
1 POWDER RESPIRATORY (INHALATION) DAILY
Qty: 1 EACH | Refills: 5 | Status: SHIPPED | OUTPATIENT
Start: 2023-11-08

## 2023-11-08 NOTE — PROGRESS NOTES
[facility] Pulmonary, Critical Care and Sleep Specialists                                                               CHIEF COMPLAINT: follow up COPD        HPI:   Some SOB   Trelegy working well   Out of Albuterol for 2-3 days   No smoking   Has not had his sleep study yet- has been busy     From prior visit:   Per girlfriend  witnessed apnea, + snoring and hypersomnia. Goes to bed 10-11 pm and gets up 8 am   Patient is getting bilateral hernia repair   ESS 23      Past Medical History:   Diagnosis Date    Arthritis     Chest pain     [facility] 9/2012 sent for records; Colitis     COPD (chronic obstructive pulmonary disease) ([address])     COVID 12/25/2021    Histoplasmosis     History of blood transfusion     Hx of blood clots     Metabolic syndrome X 64/39/3922    Other emphysema ([address]) 8/19/2018    Pneumonia     Pulmonary nodule        Past Surgical History:        Procedure Laterality Date    BRONCHOSCOPY  06/05/2013    COLONOSCOPY  12/17/2018    COLONOSCOPY N/A 12/17/2018    COLORECTAL CANCER SCREENING, NOT HIGH RISK performed by Sunshine Alaniz DO at [address] 11/4/2022    ROBOTIC BILATERAL INGUINAL HERNIA REPAIR WITH MESH performed by Pop Gardner MD at [address] ARTHROSCOPY Right     KNEE ARTHROSCOPY Right 12/13/2019    RIGHT KNEE VIDEO ARTHROSCOPY, MEDIAL MENISCECTOMY performed by Linette Cisse MD at [address]    OTHER SURGICAL HISTORY      ROBOTIC BILATERAL INGUINAL HERNIA REPAIR WITH MESH (Bilateral: Abdomen)    VASECTOMY         Allergies:  is allergic to codeine, mobic [meloxicam], and tramadol. Social History:    TOBACCO:   reports that he quit smoking about 5 years ago. His smoking use included cigarettes. He has a 40.00 pack-year smoking history.  He has never used smokeless tobacco.  ETOH:   reports current alcohol use of about 1.0

## 2023-11-09 NOTE — PROCEDURES
[address] [facility] [address], [address]                               PULMONARY FUNCTION    PATIENT NAME: Albino Nolasco                   :        1970  MED REC NO:   [id number]                          ROOM:  ACCOUNT NO:   [de-identified]                           ADMIT DATE: 2023  PROVIDER:     Diana Collins MD    DATE OF PROCEDURE:  2023    INDICATION:  COPD. FINDINGS:  1. Spirometry: The FEV1 is 3.3 liters, which is 94% of predicted. The  FEV1/FVC ratio is normal.  Inhaled bronchodilators are given. There is  no significant improvement. 2.  Lung volumes: Total lung capacity is 5.59 liters or 84% of  predicted. 3.  Diffusion capacity:  DLCO is 22.86 mL/min/mmHg, which is 74%  predicted. 4.  Flow volume loop does not show an obstructive pattern. IMPRESSION:  1. No obstructive lung defect. 2.  No restrictive lung defect. 3.  Mild reduction in diffusion capacity.         Diana Collins MD    D: 2023 18:52:51       T: 2023 20:40:48     [email or web address]  Job#: [id number]     Doc#: [id number]    CC:

## 2024-01-04 ENCOUNTER — OFFICE VISIT (OUTPATIENT)
Dept: FAMILY MEDICINE CLINIC | Age: 54
End: 2024-01-04
Payer: COMMERCIAL

## 2024-01-04 VITALS
BODY MASS INDEX: 27.1 KG/M2 | OXYGEN SATURATION: 98 % | DIASTOLIC BLOOD PRESSURE: 90 MMHG | SYSTOLIC BLOOD PRESSURE: 131 MMHG | HEART RATE: 70 BPM | WEIGHT: 173 LBS

## 2024-01-04 DIAGNOSIS — G62.9 POLYNEUROPATHY: ICD-10-CM

## 2024-01-04 DIAGNOSIS — J43.8 OTHER EMPHYSEMA (HCC): ICD-10-CM

## 2024-01-04 DIAGNOSIS — F32.1 MODERATE SINGLE CURRENT EPISODE OF MAJOR DEPRESSIVE DISORDER (HCC): ICD-10-CM

## 2024-01-04 DIAGNOSIS — Z51.81 MEDICATION MONITORING ENCOUNTER: ICD-10-CM

## 2024-01-04 DIAGNOSIS — F41.9 CHRONIC ANXIETY: Primary | ICD-10-CM

## 2024-01-04 DIAGNOSIS — Z13.220 LIPID SCREENING: ICD-10-CM

## 2024-01-04 DIAGNOSIS — K21.00 GASTROESOPHAGEAL REFLUX DISEASE WITH ESOPHAGITIS WITHOUT HEMORRHAGE: ICD-10-CM

## 2024-01-04 DIAGNOSIS — G89.29 CHRONIC MIDLINE LOW BACK PAIN WITH RIGHT-SIDED SCIATICA: ICD-10-CM

## 2024-01-04 DIAGNOSIS — K58.0 IRRITABLE BOWEL SYNDROME WITH DIARRHEA: ICD-10-CM

## 2024-01-04 DIAGNOSIS — M54.41 CHRONIC MIDLINE LOW BACK PAIN WITH RIGHT-SIDED SCIATICA: ICD-10-CM

## 2024-01-04 LAB
ALBUMIN SERPL-MCNC: 4.4 G/DL (ref 3.4–5)
ALBUMIN/GLOB SERPL: 1.9 {RATIO} (ref 1.1–2.2)
ALP SERPL-CCNC: 99 U/L (ref 40–129)
ALT SERPL-CCNC: 19 U/L (ref 10–40)
ANION GAP SERPL CALCULATED.3IONS-SCNC: 7 MMOL/L (ref 3–16)
AST SERPL-CCNC: 18 U/L (ref 15–37)
BASOPHILS # BLD: 0.1 K/UL (ref 0–0.2)
BASOPHILS NFR BLD: 1.2 %
BILIRUB SERPL-MCNC: 0.5 MG/DL (ref 0–1)
BUN SERPL-MCNC: 15 MG/DL (ref 7–20)
CALCIUM SERPL-MCNC: 9.4 MG/DL (ref 8.3–10.6)
CHLORIDE SERPL-SCNC: 103 MMOL/L (ref 99–110)
CHOLEST SERPL-MCNC: 190 MG/DL (ref 0–199)
CO2 SERPL-SCNC: 31 MMOL/L (ref 21–32)
CREAT SERPL-MCNC: 1.1 MG/DL (ref 0.9–1.3)
DEPRECATED RDW RBC AUTO: 13 % (ref 12.4–15.4)
EOSINOPHIL # BLD: 0.6 K/UL (ref 0–0.6)
EOSINOPHIL NFR BLD: 6.8 %
GFR SERPLBLD CREATININE-BSD FMLA CKD-EPI: >60 ML/MIN/{1.73_M2}
GLUCOSE SERPL-MCNC: 81 MG/DL (ref 70–99)
HCT VFR BLD AUTO: 47.5 % (ref 40.5–52.5)
HDLC SERPL-MCNC: 41 MG/DL (ref 40–60)
HGB BLD-MCNC: 16.2 G/DL (ref 13.5–17.5)
LDLC SERPL CALC-MCNC: 128 MG/DL
LYMPHOCYTES # BLD: 2.2 K/UL (ref 1–5.1)
LYMPHOCYTES NFR BLD: 23.5 %
MCH RBC QN AUTO: 29.3 PG (ref 26–34)
MCHC RBC AUTO-ENTMCNC: 34.1 G/DL (ref 31–36)
MCV RBC AUTO: 86.1 FL (ref 80–100)
MONOCYTES # BLD: 0.8 K/UL (ref 0–1.3)
MONOCYTES NFR BLD: 8.2 %
NEUTROPHILS # BLD: 5.5 K/UL (ref 1.7–7.7)
NEUTROPHILS NFR BLD: 60.3 %
PLATELET # BLD AUTO: 338 K/UL (ref 135–450)
PMV BLD AUTO: 8.6 FL (ref 5–10.5)
POTASSIUM SERPL-SCNC: 4.5 MMOL/L (ref 3.5–5.1)
PROT SERPL-MCNC: 6.7 G/DL (ref 6.4–8.2)
RBC # BLD AUTO: 5.51 M/UL (ref 4.2–5.9)
SODIUM SERPL-SCNC: 141 MMOL/L (ref 136–145)
TRIGL SERPL-MCNC: 104 MG/DL (ref 0–150)
VLDLC SERPL CALC-MCNC: 21 MG/DL
WBC # BLD AUTO: 9.2 K/UL (ref 4–11)

## 2024-01-04 PROCEDURE — 36415 COLL VENOUS BLD VENIPUNCTURE: CPT | Performed by: NURSE PRACTITIONER

## 2024-01-04 PROCEDURE — G8427 DOCREV CUR MEDS BY ELIG CLIN: HCPCS | Performed by: NURSE PRACTITIONER

## 2024-01-04 PROCEDURE — 3017F COLORECTAL CA SCREEN DOC REV: CPT | Performed by: NURSE PRACTITIONER

## 2024-01-04 PROCEDURE — G8419 CALC BMI OUT NRM PARAM NOF/U: HCPCS | Performed by: NURSE PRACTITIONER

## 2024-01-04 PROCEDURE — 99214 OFFICE O/P EST MOD 30 MIN: CPT | Performed by: NURSE PRACTITIONER

## 2024-01-04 PROCEDURE — G8482 FLU IMMUNIZE ORDER/ADMIN: HCPCS | Performed by: NURSE PRACTITIONER

## 2024-01-04 PROCEDURE — 1036F TOBACCO NON-USER: CPT | Performed by: NURSE PRACTITIONER

## 2024-01-04 PROCEDURE — 3023F SPIROM DOC REV: CPT | Performed by: NURSE PRACTITIONER

## 2024-01-04 RX ORDER — LORAZEPAM 1 MG/1
1 TABLET ORAL DAILY
Qty: 30 TABLET | Refills: 2 | Status: SHIPPED | OUTPATIENT
Start: 2024-01-04 | End: 2024-04-03

## 2024-01-04 ASSESSMENT — PATIENT HEALTH QUESTIONNAIRE - PHQ9
SUM OF ALL RESPONSES TO PHQ QUESTIONS 1-9: 0
8. MOVING OR SPEAKING SO SLOWLY THAT OTHER PEOPLE COULD HAVE NOTICED. OR THE OPPOSITE, BEING SO FIGETY OR RESTLESS THAT YOU HAVE BEEN MOVING AROUND A LOT MORE THAN USUAL: 0
9. THOUGHTS THAT YOU WOULD BE BETTER OFF DEAD, OR OF HURTING YOURSELF: 0
SUM OF ALL RESPONSES TO PHQ QUESTIONS 1-9: 0
4. FEELING TIRED OR HAVING LITTLE ENERGY: 0
5. POOR APPETITE OR OVEREATING: 0
6. FEELING BAD ABOUT YOURSELF - OR THAT YOU ARE A FAILURE OR HAVE LET YOURSELF OR YOUR FAMILY DOWN: 0
SUM OF ALL RESPONSES TO PHQ9 QUESTIONS 1 & 2: 0
7. TROUBLE CONCENTRATING ON THINGS, SUCH AS READING THE NEWSPAPER OR WATCHING TELEVISION: 0
3. TROUBLE FALLING OR STAYING ASLEEP: 0
SUM OF ALL RESPONSES TO PHQ QUESTIONS 1-9: 0
SUM OF ALL RESPONSES TO PHQ QUESTIONS 1-9: 0
1. LITTLE INTEREST OR PLEASURE IN DOING THINGS: 0
2. FEELING DOWN, DEPRESSED OR HOPELESS: 0
10. IF YOU CHECKED OFF ANY PROBLEMS, HOW DIFFICULT HAVE THESE PROBLEMS MADE IT FOR YOU TO DO YOUR WORK, TAKE CARE OF THINGS AT HOME, OR GET ALONG WITH OTHER PEOPLE: 0

## 2024-01-04 ASSESSMENT — PULMONARY FUNCTION TESTS
PIF_VALUE: 0
POST BRONCHODILATOR FEV1/FVC: 80
FEV1 (%PREDICTED): 3.3

## 2024-01-04 ASSESSMENT — COPD QUESTIONNAIRES
CAT_TOTALSCORE: 21
QUESTION8_ENERGYLEVEL: 3
QUESTION4_WALKINCLINE: 3
QUESTION7_SLEEPQUALITY: 5
QUESTION3_CHESTTIGHTNESS: 2
QUESTION6_LEAVINGHOUSE: 0
QUESTION1_COUGHFREQUENCY: 2
TOTAL_EXACERBATIONS_PASTYEAR: 4
QUESTION5_HOMEACTIVITIES: 3
QUESTION2_CHESTPHLEGM: 3

## 2024-01-04 ASSESSMENT — ENCOUNTER SYMPTOMS
BACK PAIN: 1
ABDOMINAL PAIN: 0

## 2024-01-04 NOTE — PROGRESS NOTES
at brother's home (Ativan/ Gabapentin)        1/4/2024     7:00 AM 7/3/2023     7:10 AM 11/17/2022    10:19 AM 3/7/2022     2:58 PM 3/8/2019     1:40 PM 12/30/2016    10:53 AM   PHQ Scores   PHQ2 Score 0 4 6 2 0 2   PHQ9 Score 0 9 25 2 0 2     Interpretation of Total Score Depression Severity: 1-4 = Minimal depression, 5-9 = Mild depression, 10-14 = Moderate depression, 15-19 = Moderately severe depression, 20-27 = Severe depression    Lab Results   Component Value Date     07/03/2023    K 4.1 07/03/2023     07/03/2023    CO2 29 07/03/2023    BUN 11 07/03/2023    CREATININE 1.2 07/03/2023    GLUCOSE 92 07/03/2023    CALCIUM 9.4 07/03/2023    PROT 6.4 07/03/2023    LABALBU 4.2 07/03/2023    BILITOT 0.5 07/03/2023    ALKPHOS 84 07/03/2023    AST 14 (L) 07/03/2023    ALT 12 07/03/2023    LABGLOM >60 07/03/2023    GFRAA >60 10/08/2022    AGRATIO 1.9 07/03/2023    GLOB 2.1 07/12/2021         Review of Systems   Constitutional:  Negative for fever.   Cardiovascular:  Negative for chest pain.   Gastrointestinal:  Negative for abdominal pain.   Genitourinary:  Negative for dysuria.   Musculoskeletal:  Positive for back pain.   Neurological:  Positive for weakness and numbness. Negative for headaches.       Current Outpatient Medications on File Prior to Visit   Medication Sig Dispense Refill    fluticasone-umeclidin-vilant (TRELEGY ELLIPTA) 100-62.5-25 MCG/ACT AEPB inhaler Inhale 1 puff into the lungs daily (Patient not taking: Reported on 1/4/2024) 1 each 5    albuterol sulfate HFA (PROVENTIL;VENTOLIN;PROAIR) 108 (90 Base) MCG/ACT inhaler Inhale 2 puffs into the lungs every 6 hours as needed for Wheezing or Shortness of Breath (Patient not taking: Reported on 1/4/2024) 18 g 5    guaiFENesin (MUCINEX) 600 MG extended release tablet Take 1 tablet by mouth 2 times daily (Patient not taking: Reported on 1/4/2024) 60 tablet 5    fluticasone (FLONASE) 50 MCG/ACT nasal spray 1 spray by Each Nostril route daily

## 2024-01-04 NOTE — PATIENT INSTRUCTIONS
Can try Metanx for the Neuropathy (other foot doctors have recommended it, may be expensive, may or may not help)

## 2024-01-05 NOTE — RESULT ENCOUNTER NOTE
No anemia/ infection.  Normal kidney/ liver function.  Cholesterol is more elevated than prior.  Recommend watching diet intake of fried, fatty foods and this number should go down.

## 2024-01-08 ENCOUNTER — HOSPITAL ENCOUNTER (EMERGENCY)
Age: 54
Discharge: HOME | End: 2024-01-08
Attending: STUDENT IN AN ORGANIZED HEALTH CARE EDUCATION/TRAINING PROGRAM
Payer: COMMERCIAL

## 2024-01-08 ENCOUNTER — APPOINTMENT (OUTPATIENT)
Dept: CT IMAGING | Age: 54
End: 2024-01-08
Payer: COMMERCIAL

## 2024-01-08 VITALS
OXYGEN SATURATION: 98 % | HEART RATE: 85 BPM | DIASTOLIC BLOOD PRESSURE: 92 MMHG | HEIGHT: 67 IN | RESPIRATION RATE: 16 BRPM | SYSTOLIC BLOOD PRESSURE: 128 MMHG | BODY MASS INDEX: 27.15 KG/M2 | TEMPERATURE: 97.8 F | WEIGHT: 173 LBS

## 2024-01-08 DIAGNOSIS — R10.9 LEFT FLANK PAIN: Primary | ICD-10-CM

## 2024-01-08 DIAGNOSIS — K59.00 CONSTIPATION, UNSPECIFIED CONSTIPATION TYPE: ICD-10-CM

## 2024-01-08 DIAGNOSIS — M62.838 SPASM OF MUSCLE: ICD-10-CM

## 2024-01-08 LAB
ALBUMIN SERPL-MCNC: 4.1 G/DL (ref 3.4–5)
ALBUMIN/GLOB SERPL: 1.5 {RATIO} (ref 1.1–2.2)
ALP SERPL-CCNC: 96 U/L (ref 40–129)
ALT SERPL-CCNC: 19 U/L (ref 10–40)
ANION GAP SERPL CALCULATED.3IONS-SCNC: 9 MMOL/L (ref 3–16)
AST SERPL-CCNC: 16 U/L (ref 15–37)
BASOPHILS # BLD: 0.1 K/UL (ref 0–0.2)
BASOPHILS NFR BLD: 0.9 %
BILIRUB SERPL-MCNC: 0.3 MG/DL (ref 0–1)
BILIRUB UR QL STRIP.AUTO: NEGATIVE
BUN SERPL-MCNC: 16 MG/DL (ref 7–20)
CALCIUM SERPL-MCNC: 9.9 MG/DL (ref 8.3–10.6)
CHLORIDE SERPL-SCNC: 106 MMOL/L (ref 99–110)
CLARITY UR: CLEAR
CO2 SERPL-SCNC: 27 MMOL/L (ref 21–32)
COLOR UR: YELLOW
CREAT SERPL-MCNC: 1.3 MG/DL (ref 0.9–1.3)
DEPRECATED RDW RBC AUTO: 12.8 % (ref 12.4–15.4)
EOSINOPHIL # BLD: 0.7 K/UL (ref 0–0.6)
EOSINOPHIL NFR BLD: 6.2 %
GFR SERPLBLD CREATININE-BSD FMLA CKD-EPI: >60 ML/MIN/{1.73_M2}
GLUCOSE SERPL-MCNC: 127 MG/DL (ref 70–99)
GLUCOSE UR STRIP.AUTO-MCNC: NEGATIVE MG/DL
HCT VFR BLD AUTO: 43.3 % (ref 40.5–52.5)
HGB BLD-MCNC: 14.9 G/DL (ref 13.5–17.5)
HGB UR QL STRIP.AUTO: NEGATIVE
KETONES UR STRIP.AUTO-MCNC: NEGATIVE MG/DL
LEUKOCYTE ESTERASE UR QL STRIP.AUTO: NEGATIVE
LIPASE SERPL-CCNC: 46 U/L (ref 13–60)
LYMPHOCYTES # BLD: 2.8 K/UL (ref 1–5.1)
LYMPHOCYTES NFR BLD: 24.5 %
MCH RBC QN AUTO: 29.2 PG (ref 26–34)
MCHC RBC AUTO-ENTMCNC: 34.4 G/DL (ref 31–36)
MCV RBC AUTO: 85 FL (ref 80–100)
MONOCYTES # BLD: 0.9 K/UL (ref 0–1.3)
MONOCYTES NFR BLD: 7.6 %
NEUTROPHILS # BLD: 6.9 K/UL (ref 1.7–7.7)
NEUTROPHILS NFR BLD: 60.8 %
NITRITE UR QL STRIP.AUTO: NEGATIVE
PH UR STRIP.AUTO: 6.5 [PH] (ref 5–8)
PLATELET # BLD AUTO: 313 K/UL (ref 135–450)
PMV BLD AUTO: 8.3 FL (ref 5–10.5)
POTASSIUM SERPL-SCNC: 3.7 MMOL/L (ref 3.5–5.1)
PROT SERPL-MCNC: 6.8 G/DL (ref 6.4–8.2)
PROT UR STRIP.AUTO-MCNC: NEGATIVE MG/DL
RBC # BLD AUTO: 5.09 M/UL (ref 4.2–5.9)
SODIUM SERPL-SCNC: 142 MMOL/L (ref 136–145)
SP GR UR STRIP.AUTO: <=1.005 (ref 1–1.03)
UA COMPLETE W REFLEX CULTURE PNL UR: NORMAL
UA DIPSTICK W REFLEX MICRO PNL UR: NORMAL
URN SPEC COLLECT METH UR: NORMAL
UROBILINOGEN UR STRIP-ACNC: 0.2 E.U./DL
WBC # BLD AUTO: 11.4 K/UL (ref 4–11)

## 2024-01-08 PROCEDURE — 83690 ASSAY OF LIPASE: CPT

## 2024-01-08 PROCEDURE — 96374 THER/PROPH/DIAG INJ IV PUSH: CPT

## 2024-01-08 PROCEDURE — 85025 COMPLETE CBC W/AUTO DIFF WBC: CPT

## 2024-01-08 PROCEDURE — 6360000002 HC RX W HCPCS: Performed by: STUDENT IN AN ORGANIZED HEALTH CARE EDUCATION/TRAINING PROGRAM

## 2024-01-08 PROCEDURE — 6360000004 HC RX CONTRAST MEDICATION: Performed by: STUDENT IN AN ORGANIZED HEALTH CARE EDUCATION/TRAINING PROGRAM

## 2024-01-08 PROCEDURE — 96376 TX/PRO/DX INJ SAME DRUG ADON: CPT

## 2024-01-08 PROCEDURE — 80053 COMPREHEN METABOLIC PANEL: CPT

## 2024-01-08 PROCEDURE — 99285 EMERGENCY DEPT VISIT HI MDM: CPT

## 2024-01-08 PROCEDURE — 96375 TX/PRO/DX INJ NEW DRUG ADDON: CPT

## 2024-01-08 PROCEDURE — 36415 COLL VENOUS BLD VENIPUNCTURE: CPT

## 2024-01-08 PROCEDURE — 74177 CT ABD & PELVIS W/CONTRAST: CPT

## 2024-01-08 PROCEDURE — 81003 URINALYSIS AUTO W/O SCOPE: CPT

## 2024-01-08 RX ORDER — KETOROLAC TROMETHAMINE 15 MG/ML
15 INJECTION, SOLUTION INTRAMUSCULAR; INTRAVENOUS ONCE
Status: DISCONTINUED | OUTPATIENT
Start: 2024-01-08 | End: 2024-01-08

## 2024-01-08 RX ORDER — POLYETHYLENE GLYCOL 3350 17 G/17G
17 POWDER, FOR SOLUTION ORAL DAILY
Qty: 510 G | Refills: 0 | Status: SHIPPED | OUTPATIENT
Start: 2024-01-08 | End: 2024-02-07

## 2024-01-08 RX ORDER — ONDANSETRON 2 MG/ML
4 INJECTION INTRAMUSCULAR; INTRAVENOUS ONCE
Status: COMPLETED | OUTPATIENT
Start: 2024-01-08 | End: 2024-01-08

## 2024-01-08 RX ORDER — METHOCARBAMOL 750 MG/1
750 TABLET, FILM COATED ORAL 4 TIMES DAILY
Qty: 40 TABLET | Refills: 0 | Status: SHIPPED | OUTPATIENT
Start: 2024-01-08 | End: 2024-01-18

## 2024-01-08 RX ORDER — DOCUSATE SODIUM 100 MG/1
100 CAPSULE, LIQUID FILLED ORAL 2 TIMES DAILY
Qty: 60 CAPSULE | Refills: 0 | Status: SHIPPED | OUTPATIENT
Start: 2024-01-08 | End: 2024-02-07

## 2024-01-08 RX ORDER — LIDOCAINE 4 G/G
1 PATCH TOPICAL DAILY
Qty: 30 PATCH | Refills: 0 | Status: SHIPPED | OUTPATIENT
Start: 2024-01-08 | End: 2024-02-07

## 2024-01-08 RX ADMIN — HYDROMORPHONE HYDROCHLORIDE 1 MG: 1 INJECTION, SOLUTION INTRAMUSCULAR; INTRAVENOUS; SUBCUTANEOUS at 00:39

## 2024-01-08 RX ADMIN — IOMEPROL INJECTION 75 ML: 714 INJECTION, SOLUTION INTRAVASCULAR at 01:36

## 2024-01-08 RX ADMIN — HYDROMORPHONE HYDROCHLORIDE 1 MG: 1 INJECTION, SOLUTION INTRAMUSCULAR; INTRAVENOUS; SUBCUTANEOUS at 02:09

## 2024-01-08 RX ADMIN — ONDANSETRON 4 MG: 2 INJECTION INTRAMUSCULAR; INTRAVENOUS at 00:39

## 2024-01-08 ASSESSMENT — PAIN - FUNCTIONAL ASSESSMENT
PAIN_FUNCTIONAL_ASSESSMENT: NONE - DENIES PAIN
PAIN_FUNCTIONAL_ASSESSMENT: 0-10

## 2024-01-08 ASSESSMENT — PAIN DESCRIPTION - LOCATION: LOCATION: FLANK

## 2024-01-08 ASSESSMENT — PAIN DESCRIPTION - DESCRIPTORS: DESCRIPTORS: STABBING

## 2024-01-08 ASSESSMENT — PAIN SCALES - GENERAL
PAINLEVEL_OUTOF10: 8
PAINLEVEL_OUTOF10: 8
PAINLEVEL_OUTOF10: 6

## 2024-01-08 ASSESSMENT — PAIN DESCRIPTION - PAIN TYPE: TYPE: ACUTE PAIN

## 2024-01-08 ASSESSMENT — PAIN DESCRIPTION - ORIENTATION: ORIENTATION: LEFT

## 2024-01-08 NOTE — ED PROVIDER NOTES
POLYETHYLENE GLYCOL (GLYCOLAX) 17 GM/SCOOP POWDER    Take 17 g by mouth daily       Disposition referral (if applicable):  Rosmery Matt, APRN - CNP  [address] [facility] [address] [phone]              Total critical care time is 0 minutes, which excludes separately billable procedures and updating family. Time spent is specifically for management of the presenting complaint and symptoms initially, direct bedside care, reevaluation, review of records, and consultation.  There was a high probability of clinically significant life-threatening deterioration in the patient's condition, which required my urgent intervention.     This chart was generated in part by using Dragon Dictation system and may contain errors related to that system including errors in grammar, punctuation, and spelling, as well as words and phrases that may be inappropriate. If there are any questions or concerns please feel free to contact the dictating provider for clarification.     Jeannie Woody MD   Acute [facility]        Jeannie Woody MD  01/08/24 [id number]

## 2024-01-09 LAB

## 2024-01-09 NOTE — RESULT ENCOUNTER NOTE
Patient had not had Ativan for several days per report due to girlfriend illness over [place].  Will obtain UDS next OV.

## 2024-02-15 ENCOUNTER — TELEPHONE (OUTPATIENT)
Dept: PULMONOLOGY | Age: 54
End: 2024-02-15

## 2024-02-15 RX ORDER — DOXYCYCLINE HYCLATE 100 MG
100 TABLET ORAL 2 TIMES DAILY
Qty: 10 TABLET | Refills: 0 | Status: SHIPPED | OUTPATIENT
Start: 2024-02-15 | End: 2024-02-20

## 2024-02-15 RX ORDER — PREDNISONE 10 MG/1
TABLET ORAL
Qty: 30 TABLET | Refills: 0 | Status: SHIPPED | OUTPATIENT
Start: 2024-02-15 | End: 2024-02-27

## 2024-02-15 NOTE — TELEPHONE ENCOUNTER
Do you have the following symptoms?  Shortness of Breath  Yes  Wheezing  Yes- \"some\"  Cough  Yes  Cough Characteristics:  Productive    \"Very little\"  Sputum Color    yellow/green  Hemoptysis   No  Consistency of sputum  Thick     Fever:    No    Temp: Normal  Chills/Sweats:  No  What other symptoms are you having?:  No    How long have you had these symptoms?   3 weeks     Pharmacy: [facility]          Review medications and allergies:        Allergies?      Severity Reactions Comments    Codeine Not Specified Itching    Mobic [meloxicam] Not Specified  Irritates stomach   Tramadol Not Specified  Patient states he doesn't feel right on tramadol.           Currently on Antibiotics?  (Drug/Dose/Frequency and how long on?) No        Systemic Steroids?  (Drug/Dose/Frequency and how long on?) No      Last sick call taken on 3/27/23.  Meds prescribed were Prednisone taper and Doxy, by Dr. Casas.    Last OV 11/8/23 with Dr. Casas     Assessment:       COPD/pulmonary emphysema  Snoring, apnea and hypersomnia  Bilateral pulmonary nodules with mediastinal and hilar adenopathy 5/2013. Decreased in size in RLL PNs and 4R LN with enlargement of GOPAL PN on repeated CT chest 8/16/23013. Stable on CT 05/27/2014. Positive histoplasma M bands and Histoplasma Ab yeast CF (1-64). Itraconazole 6/7/2013-9/7/2013  Hypermetabolic right hilar lymph nodes. 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 Improved on repeated CT.   Arthritis. 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   60 pack year smoking - quit 1/2018   Covid 19 12/26/2021 received infusion   Failed Symbicort and Spiriva        Plan:       Continue Trelegy and Albuterol 2 puffs Q4-6 hrs PRN  Medications refills today   Acapella and Mucinex PRN   Patient is up to date with Pneumococcal vaccine  Flu shot today   Refusing Covid vaccine   CT chest, low dose protocol, screening for lung cancer 11/2024. Risks, benefits and alternatives

## 2024-02-15 NOTE — TELEPHONE ENCOUNTER
Per protocol ok to refill Furosemide.   Medication refilled as requested.     Prednisone taper  Doxycycline 100 mg PO BID for 5 days  ER if no improvement

## 2024-03-02 DIAGNOSIS — J44.9 CHRONIC OBSTRUCTIVE PULMONARY DISEASE, UNSPECIFIED COPD TYPE (HCC): Primary | ICD-10-CM

## 2024-03-04 RX ORDER — FLUTICASONE FUROATE, UMECLIDINIUM BROMIDE AND VILANTEROL TRIFENATATE 100; 62.5; 25 UG/1; UG/1; UG/1
POWDER RESPIRATORY (INHALATION)
Qty: 1 EACH | Refills: 5 | Status: SHIPPED | OUTPATIENT
Start: 2024-03-04

## 2024-03-20 ENCOUNTER — TELEMEDICINE (OUTPATIENT)
Dept: PULMONOLOGY | Age: 54
End: 2024-03-20
Payer: COMMERCIAL

## 2024-03-20 ENCOUNTER — TELEPHONE (OUTPATIENT)
Dept: PULMONOLOGY | Age: 54
End: 2024-03-20

## 2024-03-20 ENCOUNTER — APPOINTMENT (OUTPATIENT)
Dept: GENERAL RADIOLOGY | Age: 54
End: 2024-03-20
Payer: COMMERCIAL

## 2024-03-20 ENCOUNTER — HOSPITAL ENCOUNTER (OUTPATIENT)
Age: 54
Setting detail: OBSERVATION
Discharge: HOME | End: 2024-03-22
Attending: EMERGENCY MEDICINE | Admitting: STUDENT IN AN ORGANIZED HEALTH CARE EDUCATION/TRAINING PROGRAM
Payer: COMMERCIAL

## 2024-03-20 DIAGNOSIS — J44.1 COPD EXACERBATION (HCC): Primary | ICD-10-CM

## 2024-03-20 DIAGNOSIS — J40 TRACHEOBRONCHITIS: ICD-10-CM

## 2024-03-20 DIAGNOSIS — R07.9 CHEST PAIN, UNSPECIFIED TYPE: Primary | ICD-10-CM

## 2024-03-20 DIAGNOSIS — G43.E09 CHRONIC MIGRAINE WITH AURA WITHOUT STATUS MIGRAINOSUS, NOT INTRACTABLE: ICD-10-CM

## 2024-03-20 LAB
ALBUMIN SERPL-MCNC: 4.2 G/DL (ref 3.4–5)
ALBUMIN/GLOB SERPL: 1.7 {RATIO} (ref 1.1–2.2)
ALP SERPL-CCNC: 90 U/L (ref 40–129)
ALT SERPL-CCNC: 17 U/L (ref 10–40)
ANION GAP SERPL CALCULATED.3IONS-SCNC: 11 MMOL/L (ref 3–16)
AST SERPL-CCNC: 17 U/L (ref 15–37)
BASOPHILS # BLD: 0.1 K/UL (ref 0–0.2)
BASOPHILS NFR BLD: 1.2 %
BILIRUB SERPL-MCNC: 0.4 MG/DL (ref 0–1)
BUN SERPL-MCNC: 19 MG/DL (ref 7–20)
CALCIUM SERPL-MCNC: 9.1 MG/DL (ref 8.3–10.6)
CHLORIDE SERPL-SCNC: 101 MMOL/L (ref 99–110)
CO2 SERPL-SCNC: 24 MMOL/L (ref 21–32)
CREAT SERPL-MCNC: 1.1 MG/DL (ref 0.9–1.3)
DEPRECATED RDW RBC AUTO: 13.3 % (ref 12.4–15.4)
EKG ATRIAL RATE: 85 BPM
EKG DIAGNOSIS: NORMAL
EKG P AXIS: 31 DEGREES
EKG P-R INTERVAL: 144 MS
EKG Q-T INTERVAL: 368 MS
EKG QRS DURATION: 100 MS
EKG QTC CALCULATION (BAZETT): 437 MS
EKG R AXIS: -51 DEGREES
EKG T AXIS: 14 DEGREES
EKG VENTRICULAR RATE: 85 BPM
EOSINOPHIL # BLD: 0.1 K/UL (ref 0–0.6)
EOSINOPHIL NFR BLD: 0.8 %
FLUAV RNA RESP QL NAA+PROBE: NOT DETECTED
FLUBV RNA RESP QL NAA+PROBE: NOT DETECTED
GFR SERPLBLD CREATININE-BSD FMLA CKD-EPI: >60 ML/MIN/{1.73_M2}
GLUCOSE SERPL-MCNC: 112 MG/DL (ref 70–99)
HCT VFR BLD AUTO: 45.3 % (ref 40.5–52.5)
HGB BLD-MCNC: 15.9 G/DL (ref 13.5–17.5)
LYMPHOCYTES # BLD: 0.8 K/UL (ref 1–5.1)
LYMPHOCYTES NFR BLD: 9.4 %
MCH RBC QN AUTO: 29.3 PG (ref 26–34)
MCHC RBC AUTO-ENTMCNC: 35 G/DL (ref 31–36)
MCV RBC AUTO: 83.5 FL (ref 80–100)
MONOCYTES # BLD: 0.3 K/UL (ref 0–1.3)
MONOCYTES NFR BLD: 3.2 %
NEUTROPHILS # BLD: 7.4 K/UL (ref 1.7–7.7)
NEUTROPHILS NFR BLD: 85.4 %
PLATELET # BLD AUTO: 306 K/UL (ref 135–450)
PMV BLD AUTO: 8.2 FL (ref 5–10.5)
POTASSIUM SERPL-SCNC: 4.2 MMOL/L (ref 3.5–5.1)
PROT SERPL-MCNC: 6.7 G/DL (ref 6.4–8.2)
RBC # BLD AUTO: 5.42 M/UL (ref 4.2–5.9)
SARS-COV-2 RNA RESP QL NAA+PROBE: DETECTED
SODIUM SERPL-SCNC: 136 MMOL/L (ref 136–145)
TROPONIN, HIGH SENSITIVITY: <6 NG/L (ref 0–22)
TROPONIN, HIGH SENSITIVITY: <6 NG/L (ref 0–22)
WBC # BLD AUTO: 8.7 K/UL (ref 4–11)

## 2024-03-20 PROCEDURE — G8427 DOCREV CUR MEDS BY ELIG CLIN: HCPCS | Performed by: INTERNAL MEDICINE

## 2024-03-20 PROCEDURE — 3023F SPIROM DOC REV: CPT | Performed by: INTERNAL MEDICINE

## 2024-03-20 PROCEDURE — 93005 ELECTROCARDIOGRAM TRACING: CPT | Performed by: EMERGENCY MEDICINE

## 2024-03-20 PROCEDURE — 3017F COLORECTAL CA SCREEN DOC REV: CPT | Performed by: INTERNAL MEDICINE

## 2024-03-20 PROCEDURE — 93010 ELECTROCARDIOGRAM REPORT: CPT | Performed by: STUDENT IN AN ORGANIZED HEALTH CARE EDUCATION/TRAINING PROGRAM

## 2024-03-20 PROCEDURE — G8419 CALC BMI OUT NRM PARAM NOF/U: HCPCS | Performed by: INTERNAL MEDICINE

## 2024-03-20 PROCEDURE — 71045 X-RAY EXAM CHEST 1 VIEW: CPT

## 2024-03-20 PROCEDURE — 84484 ASSAY OF TROPONIN QUANT: CPT

## 2024-03-20 PROCEDURE — 87636 SARSCOV2 & INF A&B AMP PRB: CPT

## 2024-03-20 PROCEDURE — 1036F TOBACCO NON-USER: CPT | Performed by: INTERNAL MEDICINE

## 2024-03-20 PROCEDURE — 99214 OFFICE O/P EST MOD 30 MIN: CPT | Performed by: INTERNAL MEDICINE

## 2024-03-20 PROCEDURE — 36415 COLL VENOUS BLD VENIPUNCTURE: CPT

## 2024-03-20 PROCEDURE — 80053 COMPREHEN METABOLIC PANEL: CPT

## 2024-03-20 PROCEDURE — G8482 FLU IMMUNIZE ORDER/ADMIN: HCPCS | Performed by: INTERNAL MEDICINE

## 2024-03-20 PROCEDURE — 99285 EMERGENCY DEPT VISIT HI MDM: CPT

## 2024-03-20 PROCEDURE — 6370000000 HC RX 637 (ALT 250 FOR IP): Performed by: EMERGENCY MEDICINE

## 2024-03-20 PROCEDURE — 85025 COMPLETE CBC W/AUTO DIFF WBC: CPT

## 2024-03-20 RX ORDER — PREDNISONE 10 MG/1
TABLET ORAL
Qty: 30 TABLET | Refills: 0 | Status: SHIPPED | OUTPATIENT
Start: 2024-03-20

## 2024-03-20 RX ORDER — IBUPROFEN 800 MG/1
800 TABLET ORAL ONCE
Status: COMPLETED | OUTPATIENT
Start: 2024-03-20 | End: 2024-03-20

## 2024-03-20 RX ORDER — DOXYCYCLINE HYCLATE 100 MG
100 TABLET ORAL 2 TIMES DAILY
Qty: 14 TABLET | Refills: 0 | Status: SHIPPED | OUTPATIENT
Start: 2024-03-20 | End: 2024-03-27

## 2024-03-20 RX ORDER — ASPIRIN 325 MG
325 TABLET ORAL ONCE
Status: COMPLETED | OUTPATIENT
Start: 2024-03-20 | End: 2024-03-20

## 2024-03-20 RX ADMIN — IBUPROFEN 800 MG: 800 TABLET, FILM COATED ORAL at 23:49

## 2024-03-20 RX ADMIN — NITROGLYCERIN 0.5 INCH: 20 OINTMENT TOPICAL at 20:16

## 2024-03-20 RX ADMIN — ASPIRIN 325 MG: 325 TABLET ORAL at 20:16

## 2024-03-20 ASSESSMENT — PAIN SCALES - GENERAL
PAINLEVEL_OUTOF10: 9
PAINLEVEL_OUTOF10: 8

## 2024-03-20 ASSESSMENT — PAIN DESCRIPTION - DESCRIPTORS: DESCRIPTORS: TIGHTNESS;HEAVINESS

## 2024-03-20 ASSESSMENT — PAIN DESCRIPTION - LOCATION: LOCATION: CHEST

## 2024-03-20 ASSESSMENT — PAIN - FUNCTIONAL ASSESSMENT: PAIN_FUNCTIONAL_ASSESSMENT: 0-10

## 2024-03-20 NOTE — TELEPHONE ENCOUNTER
Pt called stating that he has had a couple episodes of his oxygen levels dropping.  Advised pt that since Dr. Casas advised him that he should go to ER with worsening symptoms he should proceed to ED.  Pt states that he just got his Prednisone and Doxy filled and is going to take it.  Advised pt that once he takes the Prednisone if he still has episodes that he should proceed to ED. Pt verbalized understanding.

## 2024-03-20 NOTE — PROGRESS NOTES
[facility]                                                 TELEHEALTH EVALUATION: Service performed was Audio/Visual (During COVID-19 public health emergency) and not a face-to-face visit                 CHIEF COMPLAINT: follow up illness         HPI:   SOB cough and wheezes since Sunday   Cough with little yellow sputum  No hemoptysis    Trelegy working well   Albuterol for 4-5 days   No smoking       From prior visit:   Per girlfriend  witnessed apnea, + snoring and hypersomnia. Goes to bed 10-11 pm and gets up 8 am   Patient is getting bilateral hernia repair   ESS 23      Past Medical History:   Diagnosis Date    Arthritis     Chest pain     [facility] 9/2012 sent for records;    Colitis     COPD (chronic obstructive pulmonary disease) (HCC)     COVID 12/25/2021    Histoplasmosis     History of blood transfusion     Hx of blood clots     Metabolic syndrome X 11/13/2018    Other emphysema (HCC) 08/19/2018    Other emphysema (HCC) 08/19/2018    Pneumonia     Pulmonary nodule        Past Surgical History:        Procedure Laterality Date    BRONCHOSCOPY  06/05/2013    COLONOSCOPY  12/17/2018    COLONOSCOPY N/A 12/17/2018    COLORECTAL CANCER SCREENING, NOT HIGH RISK performed [place] Holliday [place] [facility]    DIAGNOSTIC CARDIAC CATH LAB PROCEDURE  1998    HERNIA REPAIR Bilateral 11/4/2022    ROBOTIC BILATERAL INGUINAL HERNIA REPAIR WITH MESH performed [place] Diego Louis [place] [facility] [place]    KNEE ARTHROSCOPY Right     KNEE ARTHROSCOPY Right 12/13/2019    RIGHT KNEE VIDEO ARTHROSCOPY, MEDIAL MENISCECTOMY performed by Austin Tong MD at [facility] OR    LYMPH NODE DISSECTION      neck    OTHER SURGICAL HISTORY      ROBOTIC BILATERAL INGUINAL HERNIA REPAIR WITH MESH (Bilateral: Abdomen)    VASECTOMY         Allergies:  is allergic to acetaminophen, codeine, mobic [meloxicam], and tramadol.  Social History:    TOBACCO:

## 2024-03-20 NOTE — TELEPHONE ENCOUNTER
Do you have the following symptoms?  Shortness of Breath  Yes  Wheezing  \"I don't know\"  Cough  Yes  Cough Characteristics:  Productive    \"Sometimes\"  Sputum Color    Green/Yellow  Hemoptysis   No  Consistency of sputum  Thick     Fever:   No    Temp: Normal  Chills/Sweats:  Chills  What other symptoms are you having?:  No    How long have you had these symptoms?   3/17/24     Pharmacy: [facility]          Review medications and allergies:        Allergies?    Codeine Not Specified Itching    Mobic [meloxicam] Not Specified  Irritates stomach   Tramadol Not Specified  Patient states he doesn't feel right on tramadol.           Currently on Antibiotics?  (Drug/Dose/Frequency and how long on?) No        Systemic Steroids?  (Drug/Dose/Frequency and how long on?) No      Last sick call taken on 2/15/24.  Meds prescribed were Pred Taper and Doxy, by Dr. Caass.    Last OV 11/8/23 with Dr. Casas     Assessment:       COPD/pulmonary emphysema  Snoring, apnea and hypersomnia  Bilateral pulmonary nodules with mediastinal and hilar adenopathy 5/2013. Decreased in size in RLL PNs and 4R LN with enlargement of GOPAL PN on repeated CT chest 8/16/23013. Stable on CT 05/27/2014. Positive histoplasma M bands and Histoplasma Ab yeast CF (1-64). Itraconazole 6/7/2013-9/7/2013  Hypermetabolic right hilar lymph nodes. 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 Improved on repeated CT.   Arthritis. 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   60 pack year smoking - quit 1/2018   Covid 19 12/26/2021 received infusion   Failed Symbicort and Spiriva        Plan:       Continue Trelegy and Albuterol 2 puffs Q4-6 hrs PRN  Medications refills today   Acapella and Mucinex PRN   Patient is up to date with Pneumococcal vaccine  Flu shot today   Refusing Covid vaccine   CT chest, low dose protocol, screening for lung cancer 11/2024. Risks, benefits and alternatives including doing nothing were discussed

## 2024-03-20 NOTE — ED PROVIDER NOTES
Emergency Department Provider Note  Location: [facility] ED  3/20/2024     Patient Identification  Néstor Camacho Sr. is a 53 y.o. male    Chief Complaint  Shortness of Breath (Lungs are burning, told by Dr Casas to come to the ER) and Chest Pain (Complains of chest tightness)          HPI  (History provided by patient)  Patient is a 53-year-old male with a history of histoplasmosis followed by pulmonology here at [facility], COPD, presents with chest pain associated with diaphoresis and increased shortness of breath that started today.  Describes a \"\"cold sensation on his anterior chest and states it feels like an elephant is sitting on it.  Does not radiate. No pleurisy. Was at work and felt more short of breath than normal.  Saw his pulmonologist earlier today was not having the symptoms and was placed on doxycycline course of steroids and increased breathing treatments.      Nursing Notes were all reviewed and agreed with, or any disagreements were addressed in the HPI:  Allergies:   Allergies   Allergen Reactions    Acetaminophen Other (See Comments)    Codeine Itching    Mobic [Meloxicam]      Irritates stomach    Tramadol      Patient states he doesn't feel right on tramadol.       Past medical history:  has a past medical history of Arthritis, Chest pain, Colitis, COPD (chronic obstructive pulmonary disease) (HCC), COVID (12/25/2021), Histoplasmosis, History of blood transfusion, blood clots, Metabolic syndrome X (11/13/2018), Other emphysema (HCC) (08/19/2018), Other emphysema (HCC) (08/19/2018), Pneumonia, and Pulmonary nodule.    Past surgical history:  has a past surgical history that includes Vasectomy; lymph node dissection; Diagnostic Cardiac Cath Lab Procedure (1998); bronchoscopy (06/05/2013); Colonoscopy (12/17/2018); Colonoscopy (N/A, 12/17/2018); Knee arthroscopy (Right); Knee arthroscopy (Right, 12/13/2019); other surgical history; and hernia repair (Bilateral,  MG tablet Take 1 tablet by mouth nightly 7/3/23   Rosmery Matt APRN - CNP   metoprolol succinate (TOPROL XL) 25 MG extended release tablet Take 1 tablet by mouth daily 7/3/23   Rosmery Matt APRN - CNP   DULoxetine (CYMBALTA) 60 MG extended release capsule Take 1 capsule by mouth daily 7/3/23   Rosmery Matt APRN - CNP   amLODIPine (NORVASC) 2.5 MG tablet Take 1 tablet by mouth daily 7/3/23   Rosmery Matt APRN - CNP   dicyclomine (BENTYL) 10 MG capsule Take 1 capsule by mouth 4 times daily as needed (abdominal cramps.) 7/3/23   Rosmery Matt APRN - CNP       Social history:  reports that he quit smoking about 6 years ago. His smoking use included cigarettes. He started smoking about 26 years ago. He has a 40.0 pack-year smoking history. He has never used smokeless tobacco. He reports current alcohol use of about 1.0 standard drink of alcohol per week. He reports that he does not use drugs.    Family history:    Family History   Problem Relation Age of Onset    Cancer Mother         ovarian    Heart Failure Mother     Heart Attack Mother 57    Heart Disease Mother     Other Father          1948;ashd;per neurop;    Other Brother         pt knows very little    Cancer Paternal Uncle         leukemia    Cancer Paternal Grandfather         leukemia    Heart Attack Maternal Uncle 60        x4     No Known Problems Son     No Known Problems Son     Asthma Neg Hx     Diabetes Neg Hx     Emphysema Neg Hx     Hypertension Neg Hx              Exam  ED Triage Vitals   BP Temp Temp Source Pulse Respirations SpO2 Height Weight   24 -- --   (!) 143/102 97.6 °F (36.4 °C) Oral 78 14 99 %         Physical Exam  Vitals and nursing note reviewed.   Constitutional:       General: He is not in acute distress.     Appearance: He is well-developed.   HENT:      Head: Normocephalic and atraumatic.      Nose: Nose normal. No congestion.

## 2024-03-21 ENCOUNTER — APPOINTMENT (OUTPATIENT)
Dept: NUCLEAR MEDICINE | Age: 54
End: 2024-03-21
Attending: STUDENT IN AN ORGANIZED HEALTH CARE EDUCATION/TRAINING PROGRAM
Payer: COMMERCIAL

## 2024-03-21 PROBLEM — R07.9 CHEST PAIN IN ADULT: Status: ACTIVE | Noted: 2024-03-21

## 2024-03-21 PROBLEM — R07.9 CHEST PAIN: Status: ACTIVE | Noted: 2024-03-21

## 2024-03-21 PROBLEM — U07.1 COVID-19 VIRUS INFECTION: Status: ACTIVE | Noted: 2024-03-21

## 2024-03-21 PROBLEM — J44.1 COPD EXACERBATION (HCC): Status: ACTIVE | Noted: 2024-03-21

## 2024-03-21 LAB
D DIMER: 0.47 UG/ML FEU (ref 0–0.6)
GLUCOSE BLD-MCNC: 155 MG/DL (ref 70–99)
GLUCOSE BLD-MCNC: 155 MG/DL (ref 70–99)
PERFORMED ON: ABNORMAL
PERFORMED ON: ABNORMAL
TROPONIN, HIGH SENSITIVITY: 11 NG/L (ref 0–22)

## 2024-03-21 PROCEDURE — 3430000000 HC RX DIAGNOSTIC RADIOPHARMACEUTICAL: Performed by: STUDENT IN AN ORGANIZED HEALTH CARE EDUCATION/TRAINING PROGRAM

## 2024-03-21 PROCEDURE — A9502 TC99M TETROFOSMIN: HCPCS | Performed by: STUDENT IN AN ORGANIZED HEALTH CARE EDUCATION/TRAINING PROGRAM

## 2024-03-21 PROCEDURE — G0378 HOSPITAL OBSERVATION PER HR: HCPCS

## 2024-03-21 PROCEDURE — 96372 THER/PROPH/DIAG INJ SC/IM: CPT

## 2024-03-21 PROCEDURE — 94640 AIRWAY INHALATION TREATMENT: CPT

## 2024-03-21 PROCEDURE — 6360000002 HC RX W HCPCS: Performed by: STUDENT IN AN ORGANIZED HEALTH CARE EDUCATION/TRAINING PROGRAM

## 2024-03-21 PROCEDURE — 6370000000 HC RX 637 (ALT 250 FOR IP)

## 2024-03-21 PROCEDURE — 78452 HT MUSCLE IMAGE SPECT MULT: CPT

## 2024-03-21 PROCEDURE — 36415 COLL VENOUS BLD VENIPUNCTURE: CPT

## 2024-03-21 PROCEDURE — 6370000000 HC RX 637 (ALT 250 FOR IP): Performed by: INTERNAL MEDICINE

## 2024-03-21 PROCEDURE — 6370000000 HC RX 637 (ALT 250 FOR IP): Performed by: STUDENT IN AN ORGANIZED HEALTH CARE EDUCATION/TRAINING PROGRAM

## 2024-03-21 PROCEDURE — 94761 N-INVAS EAR/PLS OXIMETRY MLT: CPT

## 2024-03-21 PROCEDURE — 85379 FIBRIN DEGRADATION QUANT: CPT

## 2024-03-21 PROCEDURE — 93017 CV STRESS TEST TRACING ONLY: CPT

## 2024-03-21 PROCEDURE — 84484 ASSAY OF TROPONIN QUANT: CPT

## 2024-03-21 PROCEDURE — 99223 1ST HOSP IP/OBS HIGH 75: CPT | Performed by: INTERNAL MEDICINE

## 2024-03-21 PROCEDURE — 94010 BREATHING CAPACITY TEST: CPT

## 2024-03-21 PROCEDURE — 2580000003 HC RX 258: Performed by: STUDENT IN AN ORGANIZED HEALTH CARE EDUCATION/TRAINING PROGRAM

## 2024-03-21 RX ORDER — BUSPIRONE HYDROCHLORIDE 10 MG/1
10 TABLET ORAL 2 TIMES DAILY
Status: DISCONTINUED | OUTPATIENT
Start: 2024-03-21 | End: 2024-03-21

## 2024-03-21 RX ORDER — ALBUTEROL SULFATE 90 UG/1
2 AEROSOL, METERED RESPIRATORY (INHALATION) EVERY 4 HOURS PRN
Status: DISCONTINUED | OUTPATIENT
Start: 2024-03-21 | End: 2024-03-22 | Stop reason: HOSPADM

## 2024-03-21 RX ORDER — LORAZEPAM 1 MG/1
1 TABLET ORAL DAILY
Status: DISCONTINUED | OUTPATIENT
Start: 2024-03-21 | End: 2024-03-22 | Stop reason: HOSPADM

## 2024-03-21 RX ORDER — ALBUTEROL SULFATE 90 UG/1
2 AEROSOL, METERED RESPIRATORY (INHALATION)
Status: DISCONTINUED | OUTPATIENT
Start: 2024-03-21 | End: 2024-03-22 | Stop reason: HOSPADM

## 2024-03-21 RX ORDER — ASPIRIN 81 MG/1
81 TABLET, CHEWABLE ORAL DAILY
Status: DISCONTINUED | OUTPATIENT
Start: 2024-03-21 | End: 2024-03-22 | Stop reason: HOSPADM

## 2024-03-21 RX ORDER — PREDNISONE 20 MG/1
40 TABLET ORAL DAILY
Status: DISCONTINUED | OUTPATIENT
Start: 2024-03-21 | End: 2024-03-22 | Stop reason: HOSPADM

## 2024-03-21 RX ORDER — IBUPROFEN 400 MG/1
400 TABLET ORAL EVERY 6 HOURS PRN
Status: DISCONTINUED | OUTPATIENT
Start: 2024-03-21 | End: 2024-03-22 | Stop reason: HOSPADM

## 2024-03-21 RX ORDER — SODIUM CHLORIDE 0.9 % (FLUSH) 0.9 %
5-40 SYRINGE (ML) INJECTION EVERY 12 HOURS SCHEDULED
Status: DISCONTINUED | OUTPATIENT
Start: 2024-03-21 | End: 2024-03-22 | Stop reason: HOSPADM

## 2024-03-21 RX ORDER — PANTOPRAZOLE SODIUM 20 MG/1
20 TABLET, DELAYED RELEASE ORAL
Status: DISCONTINUED | OUTPATIENT
Start: 2024-03-21 | End: 2024-03-22 | Stop reason: HOSPADM

## 2024-03-21 RX ORDER — MONTELUKAST SODIUM 10 MG/1
10 TABLET ORAL NIGHTLY
Status: DISCONTINUED | OUTPATIENT
Start: 2024-03-21 | End: 2024-03-22 | Stop reason: HOSPADM

## 2024-03-21 RX ORDER — DULOXETIN HYDROCHLORIDE 60 MG/1
60 CAPSULE, DELAYED RELEASE ORAL DAILY
Status: DISCONTINUED | OUTPATIENT
Start: 2024-03-21 | End: 2024-03-22 | Stop reason: HOSPADM

## 2024-03-21 RX ORDER — ONDANSETRON 4 MG/1
4 TABLET, ORALLY DISINTEGRATING ORAL EVERY 8 HOURS PRN
Status: DISCONTINUED | OUTPATIENT
Start: 2024-03-21 | End: 2024-03-22 | Stop reason: HOSPADM

## 2024-03-21 RX ORDER — POTASSIUM CHLORIDE 7.45 MG/ML
10 INJECTION INTRAVENOUS PRN
Status: DISCONTINUED | OUTPATIENT
Start: 2024-03-21 | End: 2024-03-22 | Stop reason: HOSPADM

## 2024-03-21 RX ORDER — ONDANSETRON 2 MG/ML
4 INJECTION INTRAMUSCULAR; INTRAVENOUS EVERY 6 HOURS PRN
Status: DISCONTINUED | OUTPATIENT
Start: 2024-03-21 | End: 2024-03-22 | Stop reason: HOSPADM

## 2024-03-21 RX ORDER — AMLODIPINE BESYLATE 2.5 MG/1
2.5 TABLET ORAL DAILY
Status: DISCONTINUED | OUTPATIENT
Start: 2024-03-21 | End: 2024-03-22 | Stop reason: HOSPADM

## 2024-03-21 RX ORDER — ALBUTEROL SULFATE 90 UG/1
2 AEROSOL, METERED RESPIRATORY (INHALATION) EVERY 6 HOURS PRN
Status: DISCONTINUED | OUTPATIENT
Start: 2024-03-21 | End: 2024-03-21

## 2024-03-21 RX ORDER — SODIUM CHLORIDE 0.9 % (FLUSH) 0.9 %
5-40 SYRINGE (ML) INJECTION PRN
Status: DISCONTINUED | OUTPATIENT
Start: 2024-03-21 | End: 2024-03-22 | Stop reason: HOSPADM

## 2024-03-21 RX ORDER — BUTALBITAL, ACETAMINOPHEN AND CAFFEINE 50; 325; 40 MG/1; MG/1; MG/1
1 TABLET ORAL ONCE
Status: DISCONTINUED | OUTPATIENT
Start: 2024-03-21 | End: 2024-03-21

## 2024-03-21 RX ORDER — METOPROLOL SUCCINATE 25 MG/1
25 TABLET, EXTENDED RELEASE ORAL DAILY
Status: DISCONTINUED | OUTPATIENT
Start: 2024-03-21 | End: 2024-03-22 | Stop reason: HOSPADM

## 2024-03-21 RX ORDER — SODIUM CHLORIDE 9 MG/ML
INJECTION, SOLUTION INTRAVENOUS PRN
Status: DISCONTINUED | OUTPATIENT
Start: 2024-03-21 | End: 2024-03-22 | Stop reason: HOSPADM

## 2024-03-21 RX ORDER — DOXYCYCLINE HYCLATE 100 MG
100 TABLET ORAL 2 TIMES DAILY
Status: DISCONTINUED | OUTPATIENT
Start: 2024-03-21 | End: 2024-03-22 | Stop reason: HOSPADM

## 2024-03-21 RX ORDER — ATORVASTATIN CALCIUM 40 MG/1
40 TABLET, FILM COATED ORAL NIGHTLY
Status: DISCONTINUED | OUTPATIENT
Start: 2024-03-21 | End: 2024-03-22 | Stop reason: HOSPADM

## 2024-03-21 RX ORDER — TIZANIDINE 4 MG/1
4 TABLET ORAL EVERY 8 HOURS PRN
Status: DISCONTINUED | OUTPATIENT
Start: 2024-03-21 | End: 2024-03-22 | Stop reason: HOSPADM

## 2024-03-21 RX ORDER — BUTALBITAL, ACETAMINOPHEN AND CAFFEINE 50; 325; 40 MG/1; MG/1; MG/1
1 TABLET ORAL ONCE
Status: COMPLETED | OUTPATIENT
Start: 2024-03-21 | End: 2024-03-21

## 2024-03-21 RX ORDER — MAGNESIUM SULFATE IN WATER 40 MG/ML
2000 INJECTION, SOLUTION INTRAVENOUS PRN
Status: DISCONTINUED | OUTPATIENT
Start: 2024-03-21 | End: 2024-03-22 | Stop reason: HOSPADM

## 2024-03-21 RX ORDER — POTASSIUM CHLORIDE 20 MEQ/1
40 TABLET, EXTENDED RELEASE ORAL PRN
Status: DISCONTINUED | OUTPATIENT
Start: 2024-03-21 | End: 2024-03-22 | Stop reason: HOSPADM

## 2024-03-21 RX ORDER — POLYETHYLENE GLYCOL 3350 17 G/17G
17 POWDER, FOR SOLUTION ORAL DAILY PRN
Status: DISCONTINUED | OUTPATIENT
Start: 2024-03-21 | End: 2024-03-22 | Stop reason: HOSPADM

## 2024-03-21 RX ORDER — REGADENOSON 0.08 MG/ML
0.4 INJECTION, SOLUTION INTRAVENOUS
Status: COMPLETED | OUTPATIENT
Start: 2024-03-21 | End: 2024-03-21

## 2024-03-21 RX ORDER — GABAPENTIN 300 MG/1
300 CAPSULE ORAL 2 TIMES DAILY
Status: DISCONTINUED | OUTPATIENT
Start: 2024-03-21 | End: 2024-03-22 | Stop reason: HOSPADM

## 2024-03-21 RX ORDER — ENOXAPARIN SODIUM 100 MG/ML
40 INJECTION SUBCUTANEOUS DAILY
Status: DISCONTINUED | OUTPATIENT
Start: 2024-03-21 | End: 2024-03-22 | Stop reason: HOSPADM

## 2024-03-21 RX ADMIN — TIZANIDINE 4 MG: 4 TABLET ORAL at 21:04

## 2024-03-21 RX ADMIN — Medication 10 ML: at 21:08

## 2024-03-21 RX ADMIN — DESMOPRESSIN ACETATE 40 MG: 0.2 TABLET ORAL at 21:04

## 2024-03-21 RX ADMIN — BUTALBITAL, ACETAMINOPHEN AND CAFFEINE 1 TABLET: 325; 50; 40 TABLET ORAL at 17:46

## 2024-03-21 RX ADMIN — DOXYCYCLINE HYCLATE 100 MG: 100 TABLET, COATED ORAL at 08:53

## 2024-03-21 RX ADMIN — LORAZEPAM 1 MG: 1 TABLET ORAL at 08:53

## 2024-03-21 RX ADMIN — Medication 2 PUFF: at 20:05

## 2024-03-21 RX ADMIN — MONTELUKAST SODIUM 10 MG: 10 TABLET, COATED ORAL at 21:04

## 2024-03-21 RX ADMIN — ASPIRIN 81 MG: 81 TABLET, CHEWABLE ORAL at 08:53

## 2024-03-21 RX ADMIN — PREDNISONE 40 MG: 20 TABLET ORAL at 08:53

## 2024-03-21 RX ADMIN — ENOXAPARIN SODIUM 40 MG: 100 INJECTION SUBCUTANEOUS at 08:53

## 2024-03-21 RX ADMIN — DOXYCYCLINE HYCLATE 100 MG: 100 TABLET, COATED ORAL at 21:04

## 2024-03-21 RX ADMIN — GABAPENTIN 300 MG: 300 CAPSULE ORAL at 08:53

## 2024-03-21 RX ADMIN — REGADENOSON 0.4 MG: 0.08 INJECTION, SOLUTION INTRAVENOUS at 14:25

## 2024-03-21 RX ADMIN — Medication 2 PUFF: at 08:38

## 2024-03-21 RX ADMIN — GABAPENTIN 300 MG: 300 CAPSULE ORAL at 21:04

## 2024-03-21 RX ADMIN — TETROFOSMIN 11.4 MILLICURIE: 1.38 INJECTION, POWDER, LYOPHILIZED, FOR SOLUTION INTRAVENOUS at 13:10

## 2024-03-21 RX ADMIN — AMLODIPINE BESYLATE 2.5 MG: 2.5 TABLET ORAL at 08:53

## 2024-03-21 RX ADMIN — DULOXETINE HYDROCHLORIDE 60 MG: 60 CAPSULE, DELAYED RELEASE ORAL at 08:53

## 2024-03-21 RX ADMIN — TETROFOSMIN 33 MILLICURIE: 1.38 INJECTION, POWDER, LYOPHILIZED, FOR SOLUTION INTRAVENOUS at 14:25

## 2024-03-21 RX ADMIN — IBUPROFEN 400 MG: 400 TABLET, FILM COATED ORAL at 09:15

## 2024-03-21 ASSESSMENT — PAIN DESCRIPTION - LOCATION
LOCATION: HEAD
LOCATION: HEAD
LOCATION: BACK;NECK

## 2024-03-21 ASSESSMENT — COPD QUESTIONNAIRES
QUESTION5_HOMEACTIVITIES: 4
QUESTION8_ENERGYLEVEL: 5
QUESTION3_CHESTTIGHTNESS: 4
QUESTION2_CHESTPHLEGM: 3
QUESTION7_SLEEPQUALITY: 4
QUESTION1_COUGHFREQUENCY: 2
CAT_TOTALSCORE: 28
QUESTION4_WALKINCLINE: 5
QUESTION6_LEAVINGHOUSE: 1

## 2024-03-21 ASSESSMENT — PAIN DESCRIPTION - DESCRIPTORS
DESCRIPTORS: ACHING;POUNDING
DESCRIPTORS: ACHING
DESCRIPTORS: SPASM

## 2024-03-21 ASSESSMENT — PAIN SCALES - GENERAL
PAINLEVEL_OUTOF10: 8
PAINLEVEL_OUTOF10: 8

## 2024-03-21 ASSESSMENT — PAIN DESCRIPTION - PAIN TYPE: TYPE: ACUTE PAIN

## 2024-03-21 ASSESSMENT — PAIN DESCRIPTION - ONSET: ONSET: ON-GOING

## 2024-03-21 ASSESSMENT — PAIN DESCRIPTION - ORIENTATION: ORIENTATION: LOWER

## 2024-03-21 ASSESSMENT — LIFESTYLE VARIABLES
HOW MANY STANDARD DRINKS CONTAINING ALCOHOL DO YOU HAVE ON A TYPICAL DAY: PATIENT DOES NOT DRINK
HOW OFTEN DO YOU HAVE A DRINK CONTAINING ALCOHOL: NEVER

## 2024-03-21 ASSESSMENT — PAIN DESCRIPTION - FREQUENCY: FREQUENCY: CONTINUOUS

## 2024-03-21 NOTE — PROGRESS NOTES
Shift assessment complete. See flow sheet. Scheduled medications given, See MAR.   Head to toe complete. Vital signs logged and active bowel sounds in all 4 quadrants.    Pt awake in bed. Medications taken without difficulty, metoprolol held for stress test. PRN ibuprofen given, see MAR.       No further needs noted at this time. Call light and bedside table within reach. Bed in lowest position, wheels locked and side rails up x2.     Jacqui Stone RN

## 2024-03-21 NOTE — FLOWSHEET NOTE
Admission assessment complete. See doc flow. A/O x4. Came to ER c/o chest pain and SOB. Admitted for cardiac work-up. Covid positive. Room air. Pt denies pain at this time, states he currently feels chest pressure. NPO. Pt bathed self with CHG wipes and changed into into a clean gown and pants. No other needs noted at this time. Pt ambulates independently. Call light and bedside table within easy reach.    03/21/24 0600   Vital Signs   Temp 97.8 °F (36.6 °C)   Temp Source Oral   Pulse 69   Heart Rate Source Monitor   Respirations 16   /70   MAP (Calculated) 84   BP Location Left upper arm   BP Method Automatic   Patient Position Semi fowlers   Oxygen Therapy   SpO2 96 %   O2 Device None (Room air)   Height and Weight   Height 1.676 m (5' 6\")   Weight - Scale 73.6 kg (162 lb 4.8 oz)   Weight Method Actual;Bed scale   BSA (Calculated - sq m) 1.85 sq meters   BMI (Calculated) 26.3

## 2024-03-21 NOTE — PLAN OF CARE
Problem: Discharge Planning  Goal: Discharge to home or other facility with appropriate resources  Outcome: Progressing  Flowsheets (Taken 3/21/2024 0341)  Discharge to home or other facility with appropriate resources: Identify barriers to discharge with patient and caregiver     Problem: Pain  Goal: Verbalizes/displays adequate comfort level or baseline comfort level  Outcome: Progressing     Problem: Respiratory - Adult  Goal: Achieves optimal ventilation and oxygenation  Outcome: Progressing

## 2024-03-21 NOTE — PROGRESS NOTES
4 Eyes Skin Assessment     NAME:  Néstor Camacho Sr.  YOB: 1970  MEDICAL RECORD NUMBER:  [id number]    The patient is being assessed for  Admission    I agree that at least one RN has performed a thorough Head to Toe Skin Assessment on the patient. ALL assessment sites listed below have been assessed.      Areas assessed by both nurses:    Head, Face, Ears, Shoulders, Back, Chest, Arms, Elbows, Hands, Sacrum. Buttock, Coccyx, Ischium, Legs. Feet and Heels, and Under Medical Devices         Does the Patient have a Wound? No noted wound(s)       Tayo Prevention initiated by RN: No  Wound Care Orders initiated by RN: No    Pressure Injury (Stage 3,4, Unstageable, DTI, NWPT, and Complex wounds) if present, place Wound referral order by RN under : No    New Ostomies, if present place, Ostomy referral order under : No     Nurse 1 eSignature: Electronically signed by Osmel Dang RN on 3/21/24 at 6:59 AM EDT    **SHARE this note so that the co-signing nurse can place an eSignature**    Nurse 2 eSignature: Electronically signed by ALEXANDR WISE RN on 3/21/24 at 7:28 AM EDT

## 2024-03-21 NOTE — PROGRESS NOTES
Patient arrived to stress lab for stress test.  Patient was educated on procedure, all questions answered, and consent verified/obtained.

## 2024-03-21 NOTE — PROGRESS NOTES
A lexiscan stress test was completed on this patient as ordered. The patient complained of shortness of breath, 10/10 chest pressure, light-headedness, and pain to back of neack after lexiscan. All symptoms resolved in recovery. Awaiting stress imaging at this time.

## 2024-03-21 NOTE — PROGRESS NOTES
Patient provided a COPD Educational Folder that includes the following materials:     [x]  [employer] Booklet: Managing your COPD  [x]  ALA: Getting the Most Out of Medication Delivery Devices  [x]  ALA: My COPD Action Plan  [x]  Better Breathers Club: [facility]   [x]  Smoking Cessation Classes  [x]  Outpatient Spiritual Care Services  [x]  Magnet: Signs of COPD    PATIENT/CAREGIVER TEACHING:   Level of patient/caregiver understanding able to:   [x] Verbalize understanding   [] Demonstrate understanding       [] Teach back        [] Needs reinforcement     []  Other:     Electronically signed by Jacqui Stone RN on 3/21/2024 at 7:50 PM

## 2024-03-21 NOTE — H&P
Hospital Medicine History & Physical      PCP: Rosmery Matt, APRN - CNP    Date of Admission: 3/20/2024    Date of Service: Pt seen/examined on 03/21/24     Chief Complaint:    Chief Complaint   Patient presents with    Shortness of Breath     Lungs are burning, told by Dr Casas to come to the ER    Chest Pain     Complains of chest tightness         History Of Present Illness:      The patient is a 53 y.o. male with PMH of COPD, hx of blood clots, coronary vasospasm, anxiety, depression, polyneuropathy, chronic low back pain, and GERD who [place] [facility] [place] complaint of SOB and CP. Pt states that he has had worsening chest pressure since Sunday. He states this is always present, but it has felt worse the past few days. He states that it feels like \"something heavy is sitting on my chest.\" He states he saw Dr. Casas through telemedicine yesterday and was prescribed doxycycline and prednisone. He states he has also had diaphoresis and SOB with this as well. He denies any radiation of this feeling but states that he has had a headache / neck pain for the last 6-8 weeks. He states that he takes around 3 pills of ibuprofen daily to help with this pain. He denies any abd pain, n/v, fever, chills, congestion, dysuria, or dizziness.     Past Medical History:        Diagnosis Date    Arthritis     Chest pain     [facility] 9/2012 sent for records;    Colitis     COPD (chronic obstructive pulmonary disease) (HCC)     COVID 12/25/2021    Histoplasmosis     History of blood transfusion     Hx of blood clots     Metabolic syndrome X 11/13/2018    Other emphysema (HCC) 08/19/2018    Other emphysema (HCC) 08/19/2018    Pneumonia     Pulmonary nodule        Past Surgical History:        Procedure Laterality Date    BRONCHOSCOPY  06/05/2013    COLONOSCOPY  12/17/2018    COLONOSCOPY N/A 12/17/2018    COLORECTAL CANCER SCREENING, NOT HIGH RISK performed by Jim Holliday DO [place] [facility]    DIAGNOSTIC CARDIAC CATH  01/08/2024 02:00 AM    WBCUA None seen 10/08/2022 12:52 AM    RBCUA None seen 10/08/2022 12:52 AM    MUCUS Rare 10/08/2022 12:52 AM    BACTERIA NEGATIVE 06/26/2019 01:04 PM    CLARITYU Clear 01/08/2024 02:00 AM    SPECGRAV <=1.005 01/08/2024 02:00 AM    LEUKOCYTESUR Negative 01/08/2024 02:00 AM    BLOODU Negative 01/08/2024 02:00 AM    GLUCOSEU Negative 01/08/2024 02:00 AM    AMORPHOUS Rare 10/08/2022 12:52 AM       ABG  No results found for: \"KKK5JWS\", \"BEART\", \"Y8SGSIOS\", \"PHART\", \"THGBART\", \"ZZD4DNI\", \"PO2ART\", \"MBU5ZFF\"    No results for input(s): \"LACTA\" in the last 72 hours.    No results for input(s): \"PROCAL\" in the last 72 hours.    Recent Labs     03/20/24 2006 03/20/24  2115   TROPHS <6 <6       CULTURES  Results       Procedure Component Value Units Date/Time    COVID-19 & Influenza Combo [[id number]]  (Abnormal) Collected: 03/20/24 7725    Order Status: Completed Specimen: Nasopharyngeal Swab Updated: 03/20/24 9515     SARS-CoV-2 RNA, RT PCR DETECTED     Comment: Detected results are indicative of the presence of SARS-CoV-2,  clinical correlation with patient history and other diagnostic  information is necessary to determine patient infection status.  A Detected results do not rule out bacterial infection or  co-infection with other pathogens.    Testing was performed using [employer] SARS-CoV-2 and Influenza A/B  nucleic acid assay. This test is a multiplex Real-Time Reverse  Transcriptase Polymerase Chain Reaction (RT-PCR)-based in vitro  diagnostic test intended for the qualitative detection of nucleic  acids from SARS-CoV-2, influenza A, and influenza B in nasopharyngeal  and nasal swab specimens for use under the FDA’s Emergency Use  Authorization (EUA) only.    Patient Fact Sheet:  https://www.fda.gov/media/030038/download  Provider Fact Sheet: https://www.fda.gov/media/058883/download  EUA: https://www.fda.gov/media/243590/download  IFU: https://www.fda.gov/media/492467/download    Methodology:

## 2024-03-21 NOTE — PLAN OF CARE
Néstor CASTELAN Doug Yun. Is a 53 years old male with PMH of COPD presenting with chest pain and SOB despite starting a course of steroids and abx as OP.   HS trop X2 negative, EKG with old LBBB  - stress test ordered.

## 2024-03-21 NOTE — CARE COORDINATION
Review of chart for any potential discharge needs. Chest Pain. NM stress test today.   No needs or barriers identified for discharge intervention at this time.   MD and bedside RN  if needs arise please consult case management for discharge intervention.  CM will follow from periphery.

## 2024-03-21 NOTE — PROGRESS NOTES
RT Inhaler-Nebulizer Bronchodilator Protocol Note    There is a bronchodilator order in the chart from a provider indicating to follow the RT Bronchodilator Protocol and there is an “Initiate RT Inhaler-Nebulizer Bronchodilator Protocol” order as well (see protocol at bottom of note).    CXR Findings:  XR CHEST PORTABLE    Result Date: 3/20/2024  Stable chronic changes with no acute abnormality seen       The findings from the last RT Protocol Assessment were as follows:   History Pulmonary Disease: Chronic pulmonary disease  Respiratory Pattern: Regular pattern and RR 12-20 bpm  Breath Sounds: Slightly diminished and/or crackles  Cough: Strong, spontaneous, non-productive  Indication for Bronchodilator Therapy: Decreased or absent breath sounds  Bronchodilator Assessment Score: 4    Aerosolized bronchodilator medication orders have been revised according to the RT Inhaler-Nebulizer Bronchodilator Protocol below.    Respiratory Therapist to perform RT Therapy Protocol Assessment initially then follow the protocol.  Repeat RT Therapy Protocol Assessment PRN for score 0-3 or on second treatment, BID, and PRN for scores above 3.    No Indications - adjust the frequency to every 6 hours PRN wheezing or bronchospasm, if no treatments needed after 48 hours then discontinue using Per Protocol order mode.     If indication present, adjust the RT bronchodilator orders based on the Bronchodilator Assessment Score as indicated below.  Use Inhaler orders unless patient has one or more of the following: on home nebulizer, not able to hold breath for 10 seconds, is not alert and oriented, cannot activate and use MDI correctly, or respiratory rate 25 breaths per minute or more, then use the equivalent nebulizer order(s) with same Frequency and PRN reasons based on the score.  If a patient is on this medication at home then do not decrease Frequency below that used at home.    0-3 - enter or revise RT bronchodilator order(s) to  equivalent RT Bronchodilator order with Frequency of every 4 hours PRN for wheezing or increased work of breathing using Per Protocol order mode.        4-6 - enter or revise RT Bronchodilator order(s) to two equivalent RT bronchodilator orders with one order with BID Frequency and one order with Frequency of every 4 hours PRN wheezing or increased work of breathing using Per Protocol order mode.        7-10 - enter or revise RT Bronchodilator order(s) to two equivalent RT bronchodilator orders with one order with TID Frequency and one order with Frequency of every 4 hours PRN wheezing or increased work of breathing using Per Protocol order mode.       11-13 - enter or revise RT Bronchodilator order(s) to one equivalent RT bronchodilator order with QID Frequency and an Albuterol order with Frequency of every 4 hours PRN wheezing or increased work of breathing using Per Protocol order mode.      Greater than 13 - enter or revise RT Bronchodilator order(s) to one equivalent RT bronchodilator order with every 4 hours Frequency and an Albuterol order with Frequency of every 2 hours PRN wheezing or increased work of breathing using Per Protocol order mode.       Electronically signed by Jeannie Kramer RCP on 3/21/2024 at 8:31 AM

## 2024-03-22 VITALS
TEMPERATURE: 97.9 F | RESPIRATION RATE: 16 BRPM | DIASTOLIC BLOOD PRESSURE: 90 MMHG | WEIGHT: 162.3 LBS | BODY MASS INDEX: 26.08 KG/M2 | HEIGHT: 66 IN | OXYGEN SATURATION: 95 % | HEART RATE: 70 BPM | SYSTOLIC BLOOD PRESSURE: 134 MMHG

## 2024-03-22 LAB
ALBUMIN SERPL-MCNC: 3.7 G/DL (ref 3.4–5)
ALP SERPL-CCNC: 71 U/L (ref 40–129)
ALT SERPL-CCNC: 14 U/L (ref 10–40)
ANION GAP SERPL CALCULATED.3IONS-SCNC: 11 MMOL/L (ref 3–16)
AST SERPL-CCNC: 14 U/L (ref 15–37)
BASOPHILS # BLD: 0 K/UL (ref 0–0.2)
BASOPHILS NFR BLD: 0.3 %
BILIRUB DIRECT SERPL-MCNC: <0.2 MG/DL (ref 0–0.3)
BILIRUB INDIRECT SERPL-MCNC: ABNORMAL MG/DL (ref 0–1)
BILIRUB SERPL-MCNC: 0.4 MG/DL (ref 0–1)
BUN SERPL-MCNC: 17 MG/DL (ref 7–20)
CALCIUM SERPL-MCNC: 8.3 MG/DL (ref 8.3–10.6)
CHLORIDE SERPL-SCNC: 101 MMOL/L (ref 99–110)
CHOLEST SERPL-MCNC: 149 MG/DL (ref 0–199)
CO2 SERPL-SCNC: 25 MMOL/L (ref 21–32)
CREAT SERPL-MCNC: 0.9 MG/DL (ref 0.9–1.3)
DEPRECATED RDW RBC AUTO: 13.1 % (ref 12.4–15.4)
EOSINOPHIL # BLD: 0.1 K/UL (ref 0–0.6)
EOSINOPHIL NFR BLD: 0.6 %
GFR SERPLBLD CREATININE-BSD FMLA CKD-EPI: >60 ML/MIN/{1.73_M2}
GLUCOSE SERPL-MCNC: 88 MG/DL (ref 70–99)
HCT VFR BLD AUTO: 41.4 % (ref 40.5–52.5)
HDLC SERPL-MCNC: 34 MG/DL (ref 40–60)
HGB BLD-MCNC: 14.6 G/DL (ref 13.5–17.5)
LDLC SERPL CALC-MCNC: 102 MG/DL
LYMPHOCYTES # BLD: 2.3 K/UL (ref 1–5.1)
LYMPHOCYTES NFR BLD: 22.8 %
MCH RBC QN AUTO: 29.6 PG (ref 26–34)
MCHC RBC AUTO-ENTMCNC: 35.4 G/DL (ref 31–36)
MCV RBC AUTO: 83.6 FL (ref 80–100)
MONOCYTES # BLD: 0.9 K/UL (ref 0–1.3)
MONOCYTES NFR BLD: 8.6 %
NEUTROPHILS # BLD: 6.8 K/UL (ref 1.7–7.7)
NEUTROPHILS NFR BLD: 67.7 %
PLATELET # BLD AUTO: 306 K/UL (ref 135–450)
PMV BLD AUTO: 8.6 FL (ref 5–10.5)
POTASSIUM SERPL-SCNC: 3.7 MMOL/L (ref 3.5–5.1)
PROT SERPL-MCNC: 6.3 G/DL (ref 6.4–8.2)
RBC # BLD AUTO: 4.95 M/UL (ref 4.2–5.9)
SODIUM SERPL-SCNC: 137 MMOL/L (ref 136–145)
TRIGL SERPL-MCNC: 64 MG/DL (ref 0–150)
VLDLC SERPL CALC-MCNC: 13 MG/DL
WBC # BLD AUTO: 10 K/UL (ref 4–11)

## 2024-03-22 PROCEDURE — G0378 HOSPITAL OBSERVATION PER HR: HCPCS

## 2024-03-22 PROCEDURE — 6370000000 HC RX 637 (ALT 250 FOR IP)

## 2024-03-22 PROCEDURE — 94761 N-INVAS EAR/PLS OXIMETRY MLT: CPT

## 2024-03-22 PROCEDURE — 36415 COLL VENOUS BLD VENIPUNCTURE: CPT

## 2024-03-22 PROCEDURE — 99238 HOSP IP/OBS DSCHRG MGMT 30/<: CPT | Performed by: INTERNAL MEDICINE

## 2024-03-22 PROCEDURE — 80076 HEPATIC FUNCTION PANEL: CPT

## 2024-03-22 PROCEDURE — 6370000000 HC RX 637 (ALT 250 FOR IP): Performed by: INTERNAL MEDICINE

## 2024-03-22 PROCEDURE — 94640 AIRWAY INHALATION TREATMENT: CPT

## 2024-03-22 PROCEDURE — 80048 BASIC METABOLIC PNL TOTAL CA: CPT

## 2024-03-22 PROCEDURE — 85025 COMPLETE CBC W/AUTO DIFF WBC: CPT

## 2024-03-22 PROCEDURE — 6370000000 HC RX 637 (ALT 250 FOR IP): Performed by: STUDENT IN AN ORGANIZED HEALTH CARE EDUCATION/TRAINING PROGRAM

## 2024-03-22 PROCEDURE — 80061 LIPID PANEL: CPT

## 2024-03-22 RX ORDER — BUTALBITAL, ACETAMINOPHEN AND CAFFEINE 50; 325; 40 MG/1; MG/1; MG/1
1 TABLET ORAL 2 TIMES DAILY PRN
Qty: 20 TABLET | Refills: 0 | Status: SHIPPED | OUTPATIENT
Start: 2024-03-22

## 2024-03-22 RX ORDER — DIPHENHYDRAMINE HCL 25 MG
12.5 TABLET ORAL ONCE
Status: COMPLETED | OUTPATIENT
Start: 2024-03-22 | End: 2024-03-22

## 2024-03-22 RX ADMIN — AMLODIPINE BESYLATE 2.5 MG: 2.5 TABLET ORAL at 10:15

## 2024-03-22 RX ADMIN — PANTOPRAZOLE SODIUM 20 MG: 20 TABLET, DELAYED RELEASE ORAL at 08:15

## 2024-03-22 RX ADMIN — DULOXETINE HYDROCHLORIDE 60 MG: 60 CAPSULE, DELAYED RELEASE ORAL at 10:15

## 2024-03-22 RX ADMIN — METOPROLOL SUCCINATE 25 MG: 25 TABLET, FILM COATED, EXTENDED RELEASE ORAL at 10:15

## 2024-03-22 RX ADMIN — DIPHENHYDRAMINE HCL 12.5 MG: 25 TABLET ORAL at 00:51

## 2024-03-22 RX ADMIN — ASPIRIN 81 MG: 81 TABLET, CHEWABLE ORAL at 10:16

## 2024-03-22 RX ADMIN — IBUPROFEN 400 MG: 400 TABLET, FILM COATED ORAL at 00:51

## 2024-03-22 RX ADMIN — LORAZEPAM 1 MG: 1 TABLET ORAL at 10:16

## 2024-03-22 RX ADMIN — PREDNISONE 40 MG: 20 TABLET ORAL at 10:15

## 2024-03-22 RX ADMIN — Medication 2 PUFF: at 08:03

## 2024-03-22 RX ADMIN — GABAPENTIN 300 MG: 300 CAPSULE ORAL at 10:15

## 2024-03-22 RX ADMIN — DOXYCYCLINE HYCLATE 100 MG: 100 TABLET, COATED ORAL at 10:16

## 2024-03-22 ASSESSMENT — PAIN SCALES - GENERAL: PAINLEVEL_OUTOF10: 8

## 2024-03-22 ASSESSMENT — PAIN DESCRIPTION - DESCRIPTORS: DESCRIPTORS: ACHING

## 2024-03-22 ASSESSMENT — PAIN DESCRIPTION - LOCATION: LOCATION: HEAD

## 2024-03-22 NOTE — FLOWSHEET NOTE
Shift assessment complete. See doc flow. Nightly medications given see MAR. A/O x4. Covid + positive. Stress test completed today. Pt states he had a dizzy episode today, resolved at this time. Pt tolerating diet. PRN Zanaflex for back and neck spasms. Pt denies any SOB. Pt ambulates independently. Call light and bedside table within easy reach.    03/21/24 2058   Vital Signs   Temp 97.4 °F (36.3 °C)   Temp Source Oral   Pulse 80   Heart Rate Source Monitor   Respirations 16   BP (!) 130/94   MAP (Calculated) 106   BP Location Left upper arm   BP Method Automatic   Patient Position High fowlers   Oxygen Therapy   SpO2 95 %   O2 Device None (Room air)

## 2024-03-22 NOTE — CARE COORDINATION
DC order noted. IPTA. No DCP needs or barriers. Discharge timeout done with Sherley RN. All discharge needs and concerns addressed.

## 2024-03-22 NOTE — DISCHARGE SUMMARY
Name:  Néstor Camacho  Room:  0221/0221-02  MRN:    [id number]    IM Discharge Summary    Discharging Physician:  Edward castillo MD    Admit: 3/20/2024    Discharge:      PCP      Rosmery Matt APRN - CNP    Diagnoses and hospital course  this Admission        Chest pain- atypical - likely pleuritic      - EKG without any ischemic changes   - Chest x-ray: WNL    - d dimer neg   - Trend troponins: Negative x2, repeat ordered   - Stress test neg and pt reassured      COVID infection   - likely reason for above  - droplet plus precautions, pt tested positive 3/20/24  - supportive care   - no fevers, on Room air    Chronic headaches- likely migraines  >6 week duration, improved with fioricet  Refer to neurology      COPD AE  - pt seen by pulm outpt on 3/20 and sent with prednisone taper and doxy for 7 days, will resume here      Hx of coronary vasospasm  - on Toprol XL and Norvasc     Anxiety   Depression  - on Cymbalta, Buspar, and prn ativan      Polyneuropathy  - on gabapentin      Chronic low back pain  - on tizanidine     GERD  - on PPI and prn bentyl          HPI 53 y.o. male with PMH of COPD, hx of blood clots, coronary vasospasm, anxiety, depression, polyneuropathy, chronic low back pain, and GERD who [facility] with complaint of SOB and CP. Pt states that he has had worsening chest pressure since Sunday. He states this is always present, but it has felt worse the past few days. He states that it feels like \"something heavy is sitting on my chest.\" He states he saw Dr. Casas through telemedicine yesterday and was prescribed doxycycline and prednisone. He states he has also had diaphoresis and SOB with this as well. He denies any radiation of this feeling but states that he has had a headache / neck pain for the last 6-8 weeks. He states that he takes around 3 pills of ibuprofen daily to help with this pain. He denies any abd pain, n/v, fever, chills, congestion, dysuria, or dizziness. workup showed  Attending Only

## 2024-03-22 NOTE — PLAN OF CARE
Problem: Discharge Planning  Goal: Discharge to home or other facility with appropriate resources  3/21/2024 2109 by Osmel Dang RN  Outcome: Progressing  3/21/2024 1510 by Jacqui Stone RN  Outcome: Progressing  Flowsheets (Taken 3/21/2024 0850)  Discharge to home or other facility with appropriate resources: Identify barriers to discharge with patient and caregiver     Problem: Pain  Goal: Verbalizes/displays adequate comfort level or baseline comfort level  3/21/2024 2109 by Osmel Dang RN  Outcome: Progressing  3/21/2024 1510 by Jacqui Stone RN  Outcome: Progressing     Problem: Respiratory - Adult  Goal: Achieves optimal ventilation and oxygenation  3/21/2024 2109 by Osmel Dang RN  Outcome: Progressing  3/21/2024 1510 by Jacqui Stone RN  Outcome: Progressing     Problem: Skin/Tissue Integrity  Goal: Absence of new skin breakdown  Description: 1.  Monitor for areas of redness and/or skin breakdown  2.  Assess vascular access sites hourly  3.  Every 4-6 hours minimum:  Change oxygen saturation probe site  4.  Every 4-6 hours:  If on nasal continuous positive airway pressure, respiratory therapy assess nares and determine need for appliance change or resting period.  Outcome: Progressing     Problem: Safety - Adult  Goal: Free from fall injury  Outcome: Progressing

## 2024-03-22 NOTE — FLOWSHEET NOTE
03/22/24 1000   Vital Signs   Temp 97.9 °F (36.6 °C)   Temp Source Axillary   Pulse 70   Heart Rate Source Monitor   Respirations 16   BP (!) 134/90   MAP (Calculated) 105   Patient Position Semi fowlers   Pain Assessment   Pain Assessment None - Denies Pain   Oxygen Therapy   SpO2 95 %   O2 Device None (Room air)       Shift assessment complete. See flow sheet. Scheduled meds given. See MAR.  Patients head-toe complete, VS are logged, and active bowel sound noted in all four quadrants.    Pt sitting up in bed respirations easy and unlabored. No s/s of distress. Alert and oriented denies pain or SOB. Pt stable. O2 while ambulating on room air 96%     No further needs  noted at this time. Call light and bedside table are within reach. The bed is locked and is in the lowest position.        Nasir Barrera RN

## 2024-03-29 NOTE — TELEPHONE ENCOUNTER
Spoke with pt, informed with verbal understanding.  Scripts pending.    payable at time of . Instead of the fee, you can choose to have the paperwork filled out during a separate office visit that is for filling out the paperwork only.  Workman's Comp Claims: We do not handle workman's comp cases or claims. You will need to go to an urgent care to be seen or to whomever your employer uses.    General - Any abusive/rude behavior toward staff/providers may be cause for dismissal.     If you are sick or need an appointment that hasn't been planned, same day appointments are available every day the office is open: Monday, Tuesday, Wednesday, Thursday, and Friday.  Call during office hours to schedule, even if it may not be with your regular physician. You may also call the office after 8 am on office days if you need to be seen from an issue the night before.    During hours when the office is not normally open, your call will go to the messaging service which cannot provide any service other than paging the doctor. No prescriptions or other nonurgent matters will be handled and no voicemail is available, so please call back during office hours for these matters.

## 2024-04-03 DIAGNOSIS — G89.29 CHRONIC BILATERAL LOW BACK PAIN WITH BILATERAL SCIATICA: ICD-10-CM

## 2024-04-03 DIAGNOSIS — M54.41 CHRONIC BILATERAL LOW BACK PAIN WITH BILATERAL SCIATICA: ICD-10-CM

## 2024-04-03 DIAGNOSIS — M54.42 CHRONIC BILATERAL LOW BACK PAIN WITH BILATERAL SCIATICA: ICD-10-CM

## 2024-04-04 ENCOUNTER — OFFICE VISIT (OUTPATIENT)
Dept: FAMILY MEDICINE CLINIC | Age: 54
End: 2024-04-04
Payer: COMMERCIAL

## 2024-04-04 VITALS
TEMPERATURE: 97.2 F | WEIGHT: 170 LBS | SYSTOLIC BLOOD PRESSURE: 118 MMHG | BODY MASS INDEX: 27.44 KG/M2 | DIASTOLIC BLOOD PRESSURE: 80 MMHG | HEART RATE: 70 BPM | OXYGEN SATURATION: 98 %

## 2024-04-04 DIAGNOSIS — R20.2 NUMBNESS AND TINGLING OF BOTH LEGS BELOW KNEES: ICD-10-CM

## 2024-04-04 DIAGNOSIS — R20.0 NUMBNESS AND TINGLING OF BOTH LEGS BELOW KNEES: ICD-10-CM

## 2024-04-04 DIAGNOSIS — Z51.81 MEDICATION MONITORING ENCOUNTER: ICD-10-CM

## 2024-04-04 DIAGNOSIS — F41.9 CHRONIC ANXIETY: ICD-10-CM

## 2024-04-04 DIAGNOSIS — J43.8 OTHER EMPHYSEMA (HCC): ICD-10-CM

## 2024-04-04 DIAGNOSIS — Z09 HOSPITAL DISCHARGE FOLLOW-UP: Primary | ICD-10-CM

## 2024-04-04 DIAGNOSIS — J44.9 CHRONIC OBSTRUCTIVE PULMONARY DISEASE, UNSPECIFIED COPD TYPE (HCC): ICD-10-CM

## 2024-04-04 DIAGNOSIS — K21.00 GASTROESOPHAGEAL REFLUX DISEASE WITH ESOPHAGITIS WITHOUT HEMORRHAGE: ICD-10-CM

## 2024-04-04 DIAGNOSIS — M54.41 CHRONIC BILATERAL LOW BACK PAIN WITH BILATERAL SCIATICA: ICD-10-CM

## 2024-04-04 DIAGNOSIS — Z86.79 HISTORY OF CORONARY VASOSPASM: ICD-10-CM

## 2024-04-04 DIAGNOSIS — G89.29 CHRONIC BILATERAL LOW BACK PAIN WITH BILATERAL SCIATICA: ICD-10-CM

## 2024-04-04 DIAGNOSIS — K64.1 GRADE II HEMORRHOIDS: ICD-10-CM

## 2024-04-04 DIAGNOSIS — G89.29 CHRONIC INTRACTABLE HEADACHE, UNSPECIFIED HEADACHE TYPE: ICD-10-CM

## 2024-04-04 DIAGNOSIS — M54.42 CHRONIC BILATERAL LOW BACK PAIN WITH BILATERAL SCIATICA: ICD-10-CM

## 2024-04-04 DIAGNOSIS — R51.9 CHRONIC INTRACTABLE HEADACHE, UNSPECIFIED HEADACHE TYPE: ICD-10-CM

## 2024-04-04 PROCEDURE — 3017F COLORECTAL CA SCREEN DOC REV: CPT | Performed by: NURSE PRACTITIONER

## 2024-04-04 PROCEDURE — 1036F TOBACCO NON-USER: CPT | Performed by: NURSE PRACTITIONER

## 2024-04-04 PROCEDURE — G8419 CALC BMI OUT NRM PARAM NOF/U: HCPCS | Performed by: NURSE PRACTITIONER

## 2024-04-04 PROCEDURE — 1111F DSCHRG MED/CURRENT MED MERGE: CPT | Performed by: NURSE PRACTITIONER

## 2024-04-04 PROCEDURE — 99214 OFFICE O/P EST MOD 30 MIN: CPT | Performed by: NURSE PRACTITIONER

## 2024-04-04 PROCEDURE — G8427 DOCREV CUR MEDS BY ELIG CLIN: HCPCS | Performed by: NURSE PRACTITIONER

## 2024-04-04 RX ORDER — LIDOCAINE 30 MG/G
1 CREAM TOPICAL DAILY
Qty: 28 G | Refills: 0 | Status: SHIPPED | OUTPATIENT
Start: 2024-04-04

## 2024-04-04 RX ORDER — BENZOCAINE/MENTHOL 6 MG-10 MG
LOZENGE MUCOUS MEMBRANE
Qty: 30 G | Refills: 1 | Status: SHIPPED | OUTPATIENT
Start: 2024-04-04 | End: 2024-04-11

## 2024-04-04 RX ORDER — BUTALBITAL, ACETAMINOPHEN AND CAFFEINE 50; 325; 40 MG/1; MG/1; MG/1
1 TABLET ORAL 2 TIMES DAILY PRN
Qty: 30 TABLET | Refills: 1 | Status: SHIPPED | OUTPATIENT
Start: 2024-04-04

## 2024-04-04 RX ORDER — LORAZEPAM 1 MG/1
1 TABLET ORAL DAILY
Qty: 30 TABLET | Refills: 2 | Status: SHIPPED | OUTPATIENT
Start: 2024-04-04 | End: 2024-07-03

## 2024-04-04 RX ORDER — IBUPROFEN 800 MG/1
TABLET ORAL
Qty: 120 TABLET | Refills: 5 | Status: SHIPPED | OUTPATIENT
Start: 2024-04-04

## 2024-04-04 NOTE — PROGRESS NOTES
4/4/2024    Chief Complaint   Patient presents with    Follow-Up from Hospital     [facility] 3/20 -3/22    Anxiety     Fasting     Depression    Irritable bowel syndrome with diarrhea    Polyneuropathy    Chronic midline low back pain with right-sided sciatica     Med contract signed 1/4/24    Gastroesophageal Reflux       Néstor Camacho Sr. is a 53 y.o. male, presents today for:      ASSESSMENT/PLAN:  1. Hospital discharge follow-up  Discharge summary reviewed.  Medication list updated  - CA DISCHARGE MEDS RECONCILED W/ CURRENT OUTPATIENT MED LIST    2. Chronic intractable headache, unspecified headache type  Neurology referral previously placed  Continue Fioricet for now.  Discussed alternative headache treatment- pt would prefer to discuss with Neuro  - butalbital-acetaminophen-caffeine (FIORICET, ESGIC) -40 MG per tablet; Take 1 tablet by mouth 2 times daily as needed for Migraine  Dispense: 30 tablet; Refill: 1    3. Chronic anxiety  Overall stable today  Continue Buspirone TID, Lorazepam 1 mg daily.    Past meds: Seroquel ( no help), (Cymbalta- helps but not for anxiety).  Continues to declined Psychotherapy   Discussed with patient 7/2023 his anxiety will be difficult to control with Buspirone only because it works similarly to the Lorazepam but it is not as strong.  With his hx of the coronary vasospasm and the emphysema if we decrease his Lorazepam he may feel like he is having a heart attack or COPD flare when he his having a panic attack instead.  He continues to be hesitant to participate in counseling with Psychology.  PDMP checked today, OARRS checked today   - LORazepam (ATIVAN) 1 MG tablet; Take 1 tablet by mouth daily for 90 days. Max Daily Amount: 1 mg  Dispense: 30 tablet; Refill: 2    4. Grade II hemorrhoids  Worsening hemorrhoids due to hospitalization  Will send Hydrocortisone/ Lidocaine cream.  - hydrocortisone (PREPARATION H) 1 % cream; Apply topically 2 times daily.  Dispense: 30 g;

## 2024-04-07 LAB

## 2024-04-11 RX ORDER — GUAIFENESIN 600 MG/1
600 TABLET, EXTENDED RELEASE ORAL 2 TIMES DAILY
Qty: 60 TABLET | Refills: 5 | Status: SHIPPED | OUTPATIENT
Start: 2024-04-11

## 2024-04-11 RX ORDER — MONTELUKAST SODIUM 10 MG/1
10 TABLET ORAL NIGHTLY
Qty: 30 TABLET | Refills: 11 | Status: SHIPPED | OUTPATIENT
Start: 2024-04-11

## 2024-04-11 RX ORDER — DULOXETIN HYDROCHLORIDE 60 MG/1
60 CAPSULE, DELAYED RELEASE ORAL DAILY
Qty: 30 CAPSULE | Refills: 11 | Status: SHIPPED | OUTPATIENT
Start: 2024-04-11

## 2024-04-11 RX ORDER — OMEPRAZOLE 20 MG/1
20 CAPSULE, DELAYED RELEASE ORAL
Qty: 30 CAPSULE | Refills: 11 | Status: SHIPPED | OUTPATIENT
Start: 2024-04-11 | End: 2025-04-06

## 2024-04-11 RX ORDER — AMLODIPINE BESYLATE 2.5 MG/1
2.5 TABLET ORAL DAILY
Qty: 30 TABLET | Refills: 11 | Status: SHIPPED | OUTPATIENT
Start: 2024-04-11

## 2024-04-11 RX ORDER — METOPROLOL SUCCINATE 25 MG/1
25 TABLET, EXTENDED RELEASE ORAL DAILY
Qty: 30 TABLET | Refills: 11 | Status: SHIPPED | OUTPATIENT
Start: 2024-04-11

## 2024-04-11 RX ORDER — GABAPENTIN 300 MG/1
300 CAPSULE ORAL 2 TIMES DAILY
Qty: 180 CAPSULE | Refills: 1 | Status: SHIPPED | OUTPATIENT
Start: 2024-04-11 | End: 2024-10-08

## 2024-04-11 RX ORDER — BUSPIRONE HYDROCHLORIDE 10 MG/1
TABLET ORAL
Qty: 90 TABLET | Refills: 5 | Status: SHIPPED | OUTPATIENT
Start: 2024-04-11

## 2024-05-07 ENCOUNTER — OFFICE VISIT (OUTPATIENT)
Dept: FAMILY MEDICINE CLINIC | Age: 54
End: 2024-05-07
Payer: COMMERCIAL

## 2024-05-07 VITALS
SYSTOLIC BLOOD PRESSURE: 122 MMHG | DIASTOLIC BLOOD PRESSURE: 86 MMHG | OXYGEN SATURATION: 96 % | HEART RATE: 62 BPM | TEMPERATURE: 98.2 F | WEIGHT: 172 LBS | BODY MASS INDEX: 27.76 KG/M2

## 2024-05-07 DIAGNOSIS — F32.1 MODERATE SINGLE CURRENT EPISODE OF MAJOR DEPRESSIVE DISORDER (HCC): ICD-10-CM

## 2024-05-07 DIAGNOSIS — G89.29 CHRONIC INTRACTABLE HEADACHE, UNSPECIFIED HEADACHE TYPE: ICD-10-CM

## 2024-05-07 DIAGNOSIS — F41.9 CHRONIC ANXIETY: Primary | ICD-10-CM

## 2024-05-07 DIAGNOSIS — R51.9 CHRONIC INTRACTABLE HEADACHE, UNSPECIFIED HEADACHE TYPE: ICD-10-CM

## 2024-05-07 PROCEDURE — 1036F TOBACCO NON-USER: CPT | Performed by: NURSE PRACTITIONER

## 2024-05-07 PROCEDURE — G8419 CALC BMI OUT NRM PARAM NOF/U: HCPCS | Performed by: NURSE PRACTITIONER

## 2024-05-07 PROCEDURE — 99213 OFFICE O/P EST LOW 20 MIN: CPT | Performed by: NURSE PRACTITIONER

## 2024-05-07 PROCEDURE — G8427 DOCREV CUR MEDS BY ELIG CLIN: HCPCS | Performed by: NURSE PRACTITIONER

## 2024-05-07 PROCEDURE — 3017F COLORECTAL CA SCREEN DOC REV: CPT | Performed by: NURSE PRACTITIONER

## 2024-05-07 RX ORDER — ARIPIPRAZOLE 2 MG/1
2 TABLET ORAL DAILY
Qty: 30 TABLET | Refills: 2 | Status: SHIPPED | OUTPATIENT
Start: 2024-05-07

## 2024-05-07 ASSESSMENT — PATIENT HEALTH QUESTIONNAIRE - PHQ9
3. TROUBLE FALLING OR STAYING ASLEEP: NEARLY EVERY DAY
7. TROUBLE CONCENTRATING ON THINGS, SUCH AS READING THE NEWSPAPER OR WATCHING TELEVISION: NEARLY EVERY DAY
SUM OF ALL RESPONSES TO PHQ QUESTIONS 1-9: 15
8. MOVING OR SPEAKING SO SLOWLY THAT OTHER PEOPLE COULD HAVE NOTICED. OR THE OPPOSITE, BEING SO FIGETY OR RESTLESS THAT YOU HAVE BEEN MOVING AROUND A LOT MORE THAN USUAL: SEVERAL DAYS
10. IF YOU CHECKED OFF ANY PROBLEMS, HOW DIFFICULT HAVE THESE PROBLEMS MADE IT FOR YOU TO DO YOUR WORK, TAKE CARE OF THINGS AT HOME, OR GET ALONG WITH OTHER PEOPLE: VERY DIFFICULT
9. THOUGHTS THAT YOU WOULD BE BETTER OFF DEAD, OR OF HURTING YOURSELF: NOT AT ALL
4. FEELING TIRED OR HAVING LITTLE ENERGY: MORE THAN HALF THE DAYS
5. POOR APPETITE OR OVEREATING: SEVERAL DAYS
6. FEELING BAD ABOUT YOURSELF - OR THAT YOU ARE A FAILURE OR HAVE LET YOURSELF OR YOUR FAMILY DOWN: NOT AT ALL
SUM OF ALL RESPONSES TO PHQ QUESTIONS 1-9: 15
SUM OF ALL RESPONSES TO PHQ QUESTIONS 1-9: 15
SUM OF ALL RESPONSES TO PHQ9 QUESTIONS 1 & 2: 5
1. LITTLE INTEREST OR PLEASURE IN DOING THINGS: NEARLY EVERY DAY
SUM OF ALL RESPONSES TO PHQ QUESTIONS 1-9: 15
2. FEELING DOWN, DEPRESSED OR HOPELESS: MORE THAN HALF THE DAYS

## 2024-05-07 ASSESSMENT — ENCOUNTER SYMPTOMS
SHORTNESS OF BREATH: 1
ABDOMINAL PAIN: 0
BACK PAIN: 1

## 2024-05-07 ASSESSMENT — ANXIETY QUESTIONNAIRES
2. NOT BEING ABLE TO STOP OR CONTROL WORRYING: NEARLY EVERY DAY
IF YOU CHECKED OFF ANY PROBLEMS ON THIS QUESTIONNAIRE, HOW DIFFICULT HAVE THESE PROBLEMS MADE IT FOR YOU TO DO YOUR WORK, TAKE CARE OF THINGS AT HOME, OR GET ALONG WITH OTHER PEOPLE: EXTREMELY DIFFICULT
4. TROUBLE RELAXING: NEARLY EVERY DAY
3. WORRYING TOO MUCH ABOUT DIFFERENT THINGS: NEARLY EVERY DAY
GAD7 TOTAL SCORE: 18
5. BEING SO RESTLESS THAT IT IS HARD TO SIT STILL: NEARLY EVERY DAY
1. FEELING NERVOUS, ANXIOUS, OR ON EDGE: NEARLY EVERY DAY
7. FEELING AFRAID AS IF SOMETHING AWFUL MIGHT HAPPEN: NOT AT ALL
6. BECOMING EASILY ANNOYED OR IRRITABLE: NEARLY EVERY DAY

## 2024-05-07 NOTE — PROGRESS NOTES
tablet 5    butalbital-acetaminophen-caffeine (FIORICET, ESGIC) -40 MG per tablet Take 1 tablet by mouth 2 times daily as needed for Migraine 30 tablet 1    LORazepam (ATIVAN) 1 MG tablet Take 1 tablet by mouth daily for 90 days. Max Daily Amount: 1 mg 30 tablet 2    Lidocaine 3 % CREA Apply 1 each topically daily HEMORRHOID 28 g 0    fluticasone-umeclidin-vilant (TRELEGY ELLIPTA) 100-62.5-25 MCG/ACT AEPB inhaler INHALE ONE (1) PUFF INTO THE LUNGS DAILY 1 each 5    albuterol sulfate HFA (PROVENTIL;VENTOLIN;PROAIR) 108 (90 Base) MCG/ACT inhaler Inhale 2 puffs into the lungs every 6 hours as needed for Wheezing or Shortness of Breath 18 g 5    tiZANidine (ZANAFLEX) 2 MG tablet Take 2 tablets by mouth every 8 hours as needed (muscle spasm) 90 tablet 11    dicyclomine (BENTYL) 10 MG capsule Take 1 capsule by mouth 4 times daily as needed (abdominal cramps.) 90 capsule 11     No current facility-administered medications on file prior to visit.     Allergies   Allergen Reactions    Acetaminophen Other (See Comments)    Codeine Itching    Mobic [Meloxicam]      Irritates stomach    Tramadol      Patient states he doesn't feel right on tramadol.     Past Medical History:   Diagnosis Date    Arthritis     Chest pain     [facility] 9/2012 sent for records;    Colitis     COPD (chronic obstructive pulmonary disease) (HCC)     COVID 12/25/2021    Histoplasmosis     History of blood transfusion     Hx of blood clots     Metabolic syndrome X 11/13/2018    Other emphysema (HCC) 08/19/2018    Other emphysema (HCC) 08/19/2018    Pneumonia     Pulmonary nodule      Past Surgical History:   Procedure Laterality Date    BRONCHOSCOPY  06/05/2013    COLONOSCOPY  12/17/2018    COLONOSCOPY N/A 12/17/2018    COLORECTAL CANCER SCREENING, NOT HIGH RISK performed by Jim Holliday DO at [facility] ENDOSCOPY    DIAGNOSTIC CARDIAC CATH LAB PROCEDURE  1998    ENDOSCOPY, COLON, DIAGNOSTIC      HERNIA REPAIR Bilateral 11/04/2022    ROBOTIC

## 2024-05-09 RX ORDER — BUTALBITAL, ACETAMINOPHEN AND CAFFEINE 50; 325; 40 MG/1; MG/1; MG/1
TABLET ORAL
Qty: 30 TABLET | Refills: 2 | Status: SHIPPED | OUTPATIENT
Start: 2024-05-09

## 2024-05-13 ENCOUNTER — TELEPHONE (OUTPATIENT)
Dept: PULMONOLOGY | Age: 54
End: 2024-05-13

## 2024-05-13 ENCOUNTER — OFFICE VISIT (OUTPATIENT)
Dept: PULMONOLOGY | Age: 54
End: 2024-05-13
Payer: COMMERCIAL

## 2024-05-13 VITALS
RESPIRATION RATE: 16 BRPM | BODY MASS INDEX: 27.48 KG/M2 | WEIGHT: 171 LBS | HEART RATE: 74 BPM | DIASTOLIC BLOOD PRESSURE: 74 MMHG | SYSTOLIC BLOOD PRESSURE: 128 MMHG | OXYGEN SATURATION: 98 % | HEIGHT: 66 IN

## 2024-05-13 DIAGNOSIS — J44.9 CHRONIC OBSTRUCTIVE PULMONARY DISEASE, UNSPECIFIED COPD TYPE (HCC): ICD-10-CM

## 2024-05-13 DIAGNOSIS — G47.10 HYPERSOMNIA: ICD-10-CM

## 2024-05-13 DIAGNOSIS — Z87.891 PERSONAL HISTORY OF TOBACCO USE: Primary | ICD-10-CM

## 2024-05-13 PROCEDURE — G8427 DOCREV CUR MEDS BY ELIG CLIN: HCPCS | Performed by: INTERNAL MEDICINE

## 2024-05-13 PROCEDURE — G0296 VISIT TO DETERM LDCT ELIG: HCPCS | Performed by: INTERNAL MEDICINE

## 2024-05-13 PROCEDURE — 1036F TOBACCO NON-USER: CPT | Performed by: INTERNAL MEDICINE

## 2024-05-13 PROCEDURE — 3023F SPIROM DOC REV: CPT | Performed by: INTERNAL MEDICINE

## 2024-05-13 PROCEDURE — 3017F COLORECTAL CA SCREEN DOC REV: CPT | Performed by: INTERNAL MEDICINE

## 2024-05-13 PROCEDURE — G8419 CALC BMI OUT NRM PARAM NOF/U: HCPCS | Performed by: INTERNAL MEDICINE

## 2024-05-13 PROCEDURE — 99214 OFFICE O/P EST MOD 30 MIN: CPT | Performed by: INTERNAL MEDICINE

## 2024-05-13 ASSESSMENT — SLEEP AND FATIGUE QUESTIONNAIRES
HOW LIKELY ARE YOU TO NOD OFF OR FALL ASLEEP WHILE SITTING QUIETLY AFTER LUNCH WITHOUT ALCOHOL: HIGH CHANCE OF DOZING
HOW LIKELY ARE YOU TO NOD OFF OR FALL ASLEEP WHILE SITTING AND READING: HIGH CHANCE OF DOZING
NECK CIRCUMFERENCE (INCHES): 14.25
HOW LIKELY ARE YOU TO NOD OFF OR FALL ASLEEP WHEN YOU ARE A PASSENGER IN A CAR FOR AN HOUR WITHOUT A BREAK: MODERATE CHANCE OF DOZING
ESS TOTAL SCORE: 19
HOW LIKELY ARE YOU TO NOD OFF OR FALL ASLEEP WHILE LYING DOWN TO REST IN THE AFTERNOON WHEN CIRCUMSTANCES PERMIT: HIGH CHANCE OF DOZING
HOW LIKELY ARE YOU TO NOD OFF OR FALL ASLEEP WHILE WATCHING TV: HIGH CHANCE OF DOZING
HOW LIKELY ARE YOU TO NOD OFF OR FALL ASLEEP WHILE SITTING AND TALKING TO SOMEONE: MODERATE CHANCE OF DOZING
HOW LIKELY ARE YOU TO NOD OFF OR FALL ASLEEP WHILE SITTING INACTIVE IN A PUBLIC PLACE: HIGH CHANCE OF DOZING
HOW LIKELY ARE YOU TO NOD OFF OR FALL ASLEEP IN A CAR, WHILE STOPPED FOR A FEW MINUTES IN TRAFFIC: WOULD NEVER DOZE

## 2024-05-13 NOTE — PATIENT INSTRUCTIONS
[facility]                   [facility]                     [address], [address], [place] [address]                  [facility]                   [address], [address], [place] [address]                      Phone: [phone] Fax: [phone]                Dear Sleep Center Patient,     For in-lab testing please, bring with you:  Appropriate nightclothes (pajamas, sweats, etc.), slippers and robe  All medications you will need during you stay, including any breathing medications, nebulizers and metered dose inhalers  Your own toiletries and hairdryer if you wish to shower before you leave  Current photo I.D. and Insurance card  We do not allow any pillows or bed linens from home due to health regulations  -We recommend that you not bring valuables with you to the Sleep Center, as we cannot be responsible for any lost or damaged personal items.  Please be aware that [facility] is a non-smoking facility. There is no smoking allowed anywhere on [facility] property at any time.  Each patient room is a private room with a private bathroom.  The in-lab test itself consist of electrodes and sensors attached to specific areas of your scalp, face, chest and legs. We will also monitor respiratory effort, nasal and oral airflow and oxygen levels. The test will not hurt- it is painless and not invasive in any way.      At home testing when you come to  the rental unit, please bring:   -I.D. and Insurance card  **Please note the Home Sleep Test Units are limited. It is a 1 night rental and must be dropped off the following day to ensure you are not billed a late or replacement fee**    Please refrain from or reduce the use of caffeine and/or alcohol prior to your sleep study and avoid napping the day of your study, as these will affect the accuracy of your test results.  If you are ill the day of your test

## 2024-05-13 NOTE — PROGRESS NOTES
26 years ago. He has a 40.0 pack-year smoking history. He has been exposed to tobacco smoke. He has never used smokeless tobacco.  ETOH:   reports current alcohol use of about 1.0 standard drink of alcohol per week.      Family History:       Problem Relation Age of Onset    Cancer Mother         ovarian    Heart Failure Mother     Heart Attack Mother 57    Heart Disease Mother     Other Father          1948;ashd;per neurop;    Other Brother         pt knows very little    Cancer Paternal Uncle         leukemia    Cancer Paternal Grandfather         leukemia    Heart Attack Maternal Uncle 60        x4     No Known Problems Son     No Known Problems Son     Asthma Neg Hx     Diabetes Neg Hx     Emphysema Neg Hx     Hypertension Neg Hx        Current Medications:    Current Outpatient Medications:     butalbital-acetaminophen-caffeine (FIORICET, ESGIC) -40 MG per tablet, TAKE ONE (1) TABLET BY MOUTH TWO (2) TIMES DAILY AS NEEDED FOR MIGRAINE, Disp: 30 tablet, Rfl: 2    ARIPiprazole (ABILIFY) 2 MG tablet, Take 1 tablet by mouth daily Anxiety and depression, Disp: 30 tablet, Rfl: 2    omeprazole (PRILOSEC) 20 MG delayed release capsule, Take 1 capsule by mouth every morning (before breakfast), Disp: 30 capsule, Rfl: 11    montelukast (SINGULAIR) 10 MG tablet, Take 1 tablet by mouth nightly, Disp: 30 tablet, Rfl: 11    metoprolol succinate (TOPROL XL) 25 MG extended release tablet, Take 1 tablet by mouth daily, Disp: 30 tablet, Rfl: 11    guaiFENesin (MUCINEX) 600 MG extended release tablet, Take 1 tablet by mouth 2 times daily, Disp: 60 tablet, Rfl: 5    gabapentin (NEURONTIN) 300 MG capsule, Take 1 capsule by mouth 2 times daily for 180 days. Intended supply: 90 days, Disp: 180 capsule, Rfl: 1    DULoxetine (CYMBALTA) 60 MG extended release capsule, Take 1 capsule by mouth daily, Disp: 30 capsule, Rfl: 11    busPIRone (BUSPAR) 10 MG tablet, TAKE ONE TABLET BY MOUTH THREE (3) TIMES A DAY FOR ANXIETY, Disp: 90

## 2024-06-04 ENCOUNTER — TELEPHONE (OUTPATIENT)
Dept: FAMILY MEDICINE CLINIC | Age: 54
End: 2024-06-04

## 2024-06-04 ENCOUNTER — OFFICE VISIT (OUTPATIENT)
Dept: FAMILY MEDICINE CLINIC | Age: 54
End: 2024-06-04
Payer: COMMERCIAL

## 2024-06-04 VITALS
HEART RATE: 73 BPM | BODY MASS INDEX: 27.44 KG/M2 | SYSTOLIC BLOOD PRESSURE: 118 MMHG | TEMPERATURE: 97.6 F | DIASTOLIC BLOOD PRESSURE: 88 MMHG | OXYGEN SATURATION: 94 % | WEIGHT: 170 LBS

## 2024-06-04 DIAGNOSIS — M79.671 CHRONIC FOOT PAIN, RIGHT: Primary | ICD-10-CM

## 2024-06-04 DIAGNOSIS — G89.29 CHRONIC FOOT PAIN, RIGHT: Primary | ICD-10-CM

## 2024-06-04 DIAGNOSIS — F41.9 CHRONIC ANXIETY: ICD-10-CM

## 2024-06-04 PROCEDURE — 3017F COLORECTAL CA SCREEN DOC REV: CPT | Performed by: NURSE PRACTITIONER

## 2024-06-04 PROCEDURE — 99214 OFFICE O/P EST MOD 30 MIN: CPT | Performed by: NURSE PRACTITIONER

## 2024-06-04 PROCEDURE — G8419 CALC BMI OUT NRM PARAM NOF/U: HCPCS | Performed by: NURSE PRACTITIONER

## 2024-06-04 PROCEDURE — 1036F TOBACCO NON-USER: CPT | Performed by: NURSE PRACTITIONER

## 2024-06-04 PROCEDURE — G8428 CUR MEDS NOT DOCUMENT: HCPCS | Performed by: NURSE PRACTITIONER

## 2024-06-04 NOTE — PROGRESS NOTES
Néstor Camacho .  YOB: 1970    Date of Service:  6/4/2024      Wt Readings from Last 2 Encounters:   06/04/24 77.1 kg (170 lb)   05/13/24 77.6 kg (171 lb)       BP Readings from Last 3 Encounters:   06/04/24 118/88   05/13/24 128/74   05/07/24 122/86        Chief Complaint   Patient presents with    Foot Pain     He went to Dr Jurado and he told him that he has CRPS. Patient stated he told him he probably should have his foot cut off     Medication Problem     Patient states he is not getting all of his ibuprofen to use prn he is only getting the it in the pill pack bid        Allergies   Allergen Reactions    Acetaminophen Other (See Comments)    Codeine Itching    Mobic [Meloxicam]      Irritates stomach    Tramadol      Patient states he doesn't feel right on tramadol.       No outpatient medications have been marked as taking for the 6/4/24 encounter (Office Visit) with Rosmery Matt APRN - CNP.       This patient presents to the office todayfor a preoperative consultation at the request of surgeon,  ***, who plans on performing *** on {Time; month:57256} {Numbers; 0-31:74236} at {[facility]:19898}.  The current problem began{NUMBERS 1-12:10} {DAY, DAYS, WEEK, WEEKS, MONTH, MONTHS, YEAR, YEARS:61647} ago, and symptoms have been {Desc; course:67781} with time.  Conservative therapy: {desc; effective/ineffective:27222}.    Functional Assessment:  Exercise tolerance: *** METS using the DASI calculator   *** Denies any current chest pain or discomfort, sob or WATTS, orthopnea, PND or leg swelling.     Medications: reviewed, Over the counter (NSAIDs) use: ***    Cardiac Risk:  High-risk Surgery: *** / Hx of ischemic heart disease: *** / Hx of CHF:*** / Hx of CVA:*** / Pre-op insulin: *** / Pre-op creatinine >2.0: *** (last cr *** )    WILLIAM Screen:  Snore loudly? *** /  Feel tired/fatigued during the day? ***  /  Stop breathing while sleeping? *** / High blood pressure? *** / 
Rosmery Matt, APRN - CNP on 6/10/2024 at 8:37 PM

## 2024-06-04 NOTE — TELEPHONE ENCOUNTER
Called [facility] because patient wants to get the additional ibuprofen that is not put in the pill pack because there are some days it does it more then bid which is in the pill pack but it is prescribed as qid if he needs it but he does not have the additional to take as needed.  Spoke to Lynne at the pharmacy and she said Aziza was gone for the day and she is the one that does the packs so they will have her call me tomorrow to discuss this

## 2024-06-05 NOTE — TELEPHONE ENCOUNTER
Aziza from the pharmacy called and they will put the extra ones in a bottle for him to take prn he can pick it up it is ready for him.  Tried to call patient but unable to leave vm

## 2024-06-11 ENCOUNTER — NURSE ONLY (OUTPATIENT)
Dept: FAMILY MEDICINE CLINIC | Age: 54
End: 2024-06-11

## 2024-06-17 DIAGNOSIS — F41.9 CHRONIC ANXIETY: Primary | ICD-10-CM

## 2024-06-20 NOTE — RESULT ENCOUNTER NOTE
Genesight testing showing that many of the medications you have used in the past have an increased likelihood to cause you side effects.  Lets try Vraylar and see if it helps your mood.  I sent it today

## 2024-07-01 DIAGNOSIS — F41.9 CHRONIC ANXIETY: ICD-10-CM

## 2024-07-01 RX ORDER — LORAZEPAM 1 MG/1
TABLET ORAL
Qty: 28 TABLET | Refills: 0 | Status: SHIPPED | OUTPATIENT
Start: 2024-07-01 | End: 2024-07-31

## 2024-07-08 ENCOUNTER — PATIENT MESSAGE (OUTPATIENT)
Dept: FAMILY MEDICINE CLINIC | Age: 54
End: 2024-07-08

## 2024-07-08 SDOH — ECONOMIC STABILITY: FOOD INSECURITY: WITHIN THE PAST 12 MONTHS, YOU WORRIED THAT YOUR FOOD WOULD RUN OUT BEFORE YOU GOT MONEY TO BUY MORE.: OFTEN TRUE

## 2024-07-08 SDOH — ECONOMIC STABILITY: TRANSPORTATION INSECURITY
IN THE PAST 12 MONTHS, HAS LACK OF TRANSPORTATION KEPT YOU FROM MEETINGS, WORK, OR FROM GETTING THINGS NEEDED FOR DAILY LIVING?: NO

## 2024-07-08 SDOH — ECONOMIC STABILITY: FOOD INSECURITY: WITHIN THE PAST 12 MONTHS, THE FOOD YOU BOUGHT JUST DIDN'T LAST AND YOU DIDN'T HAVE MONEY TO GET MORE.: SOMETIMES TRUE

## 2024-07-08 SDOH — ECONOMIC STABILITY: INCOME INSECURITY: HOW HARD IS IT FOR YOU TO PAY FOR THE VERY BASICS LIKE FOOD, HOUSING, MEDICAL CARE, AND HEATING?: HARD

## 2024-07-09 NOTE — TELEPHONE ENCOUNTER
From: Néstor Camacho Sr.  To: Rosmery Matt  Sent: 7/8/2024 6:20 PM EDT  Subject: Blood    Brodie blood type am I sorry to bother you

## 2024-07-11 ENCOUNTER — OFFICE VISIT (OUTPATIENT)
Dept: FAMILY MEDICINE CLINIC | Age: 54
End: 2024-07-11
Payer: COMMERCIAL

## 2024-07-11 VITALS
DIASTOLIC BLOOD PRESSURE: 84 MMHG | HEART RATE: 67 BPM | HEIGHT: 66 IN | OXYGEN SATURATION: 95 % | WEIGHT: 170.4 LBS | SYSTOLIC BLOOD PRESSURE: 123 MMHG | RESPIRATION RATE: 16 BRPM | BODY MASS INDEX: 27.38 KG/M2

## 2024-07-11 DIAGNOSIS — G89.29 CHRONIC FOOT PAIN, RIGHT: ICD-10-CM

## 2024-07-11 DIAGNOSIS — R20.0 NUMBNESS AND TINGLING OF BOTH LEGS BELOW KNEES: ICD-10-CM

## 2024-07-11 DIAGNOSIS — G43.709 CHRONIC MIGRAINE W/O AURA W/O STATUS MIGRAINOSUS, NOT INTRACTABLE: Primary | ICD-10-CM

## 2024-07-11 DIAGNOSIS — M54.42 CHRONIC BILATERAL LOW BACK PAIN WITH BILATERAL SCIATICA: ICD-10-CM

## 2024-07-11 DIAGNOSIS — R20.2 NUMBNESS AND TINGLING OF BOTH LEGS BELOW KNEES: ICD-10-CM

## 2024-07-11 DIAGNOSIS — Z51.81 MEDICATION MONITORING ENCOUNTER: ICD-10-CM

## 2024-07-11 DIAGNOSIS — M79.671 CHRONIC FOOT PAIN, RIGHT: ICD-10-CM

## 2024-07-11 DIAGNOSIS — F41.9 CHRONIC ANXIETY: ICD-10-CM

## 2024-07-11 DIAGNOSIS — G89.29 CHRONIC BILATERAL LOW BACK PAIN WITH BILATERAL SCIATICA: ICD-10-CM

## 2024-07-11 DIAGNOSIS — F32.1 MODERATE SINGLE CURRENT EPISODE OF MAJOR DEPRESSIVE DISORDER (HCC): ICD-10-CM

## 2024-07-11 DIAGNOSIS — M54.41 CHRONIC BILATERAL LOW BACK PAIN WITH BILATERAL SCIATICA: ICD-10-CM

## 2024-07-11 PROBLEM — R07.9 CHEST PAIN IN ADULT: Status: RESOLVED | Noted: 2024-03-21 | Resolved: 2024-07-11

## 2024-07-11 PROBLEM — R07.9 CHEST PAIN: Status: RESOLVED | Noted: 2024-03-21 | Resolved: 2024-07-11

## 2024-07-11 PROBLEM — J44.1 COPD EXACERBATION (HCC): Status: RESOLVED | Noted: 2024-03-21 | Resolved: 2024-07-11

## 2024-07-11 LAB — ANNOTATION COMMENT IMP: NORMAL

## 2024-07-11 PROCEDURE — 99214 OFFICE O/P EST MOD 30 MIN: CPT | Performed by: NURSE PRACTITIONER

## 2024-07-11 PROCEDURE — G8427 DOCREV CUR MEDS BY ELIG CLIN: HCPCS | Performed by: NURSE PRACTITIONER

## 2024-07-11 PROCEDURE — 3017F COLORECTAL CA SCREEN DOC REV: CPT | Performed by: NURSE PRACTITIONER

## 2024-07-11 PROCEDURE — 1036F TOBACCO NON-USER: CPT | Performed by: NURSE PRACTITIONER

## 2024-07-11 PROCEDURE — G8419 CALC BMI OUT NRM PARAM NOF/U: HCPCS | Performed by: NURSE PRACTITIONER

## 2024-07-11 RX ORDER — LORAZEPAM 1 MG/1
TABLET ORAL
Qty: 30 TABLET | Refills: 2 | Status: SHIPPED | OUTPATIENT
Start: 2024-07-11 | End: 2024-07-11

## 2024-07-11 RX ORDER — GABAPENTIN 100 MG/1
100 CAPSULE ORAL 2 TIMES DAILY
Qty: 60 CAPSULE | Refills: 1 | Status: SHIPPED | OUTPATIENT
Start: 2024-07-11 | End: 2024-09-09

## 2024-07-11 RX ORDER — BUTALBITAL, ACETAMINOPHEN AND CAFFEINE 50; 325; 40 MG/1; MG/1; MG/1
TABLET ORAL
Qty: 30 TABLET | Refills: 2 | Status: SHIPPED | OUTPATIENT
Start: 2024-07-11

## 2024-07-11 RX ORDER — ARIPIPRAZOLE 2 MG/1
TABLET ORAL
COMMUNITY
Start: 2024-06-26 | End: 2024-07-11 | Stop reason: SINTOL

## 2024-07-11 ASSESSMENT — ENCOUNTER SYMPTOMS
BACK PAIN: 1
SHORTNESS OF BREATH: 1
ABDOMINAL PAIN: 0

## 2024-07-11 NOTE — PROGRESS NOTES
Neg Hx          Vitals:    07/11/24 0646   BP: 123/84   Site: Left Upper Arm   Position: Sitting   Cuff Size: Medium Adult   Pulse: 67   Resp: 16   SpO2: 95%   Weight: 77.3 kg (170 lb 6.4 oz)   Height: 1.676 m (5' 5.98\")     Estimated body mass index is 27.52 kg/m² as calculated from the following:    Height as of this encounter: 1.676 m (5' 5.98\").    Weight as of this encounter: 77.3 kg (170 lb 6.4 oz).    Physical Exam  Vitals reviewed.   Constitutional:       Appearance: Normal appearance.   HENT:      Head: Normocephalic.   Neck:      Vascular: No carotid bruit.   Cardiovascular:      Rate and Rhythm: Normal rate and regular rhythm.      Pulses: Normal pulses.           Carotid pulses are 2+ on the right side and 2+ on the left side.       Dorsalis pedis pulses are 2+ on the right side and 2+ on the left side.        Posterior tibial pulses are 2+ on the right side and 2+ on the left side.      Heart sounds: Normal heart sounds. No murmur heard.     No gallop.   Pulmonary:      Effort: Pulmonary effort is normal.      Breath sounds: Normal breath sounds.   Abdominal:      General: Abdomen is flat.      Palpations: Abdomen is soft.   Musculoskeletal:         General: Normal range of motion.      Cervical back: Normal range of motion.      Right lower leg: No edema.      Left lower leg: No edema.   Lymphadenopathy:      Cervical: No cervical adenopathy.   Skin:     General: Skin is warm and dry.      Capillary Refill: Capillary refill takes less than 2 seconds.   Neurological:      General: No focal deficit present.      Mental Status: He is alert and oriented to person, place, and time.   Psychiatric:         Mood and Affect: Mood normal.         Behavior: Behavior normal.         Thought Content: Thought content normal.         Judgment: Judgment normal.           Patient's questions answered and concerns addressed. Patient agrees to plan of care.        Electronically signed by MICHELLE Jessica CNP on

## 2024-07-16 LAB
6MAM UR QL: NOT DETECTED
7AMINOCLONAZEPAM UR QL: NOT DETECTED
A-OH ALPRAZ UR QL: NOT DETECTED
ALPHA-OH-MIDAZOLAM, URINE: NOT DETECTED
ALPRAZ UR QL: NOT DETECTED
AMPHET UR QL SCN: NOT DETECTED
ANNOTATION COMMENT IMP: NORMAL
ANNOTATION COMMENT IMP: NORMAL
BARBITURATES UR QL: NORMAL
BUPRENORPHINE UR QL: NOT DETECTED
BZE UR QL: NEGATIVE
CARBOXYTHC UR QL: NEGATIVE
CARISOPRODOL UR QL: NEGATIVE
CLONAZEPAM UR QL: NOT DETECTED
CODEINE UR QL: NOT DETECTED
CREAT UR-MCNC: 108.8 MG/DL (ref 20–400)
DIAZEPAM UR QL: NOT DETECTED
ETHYL GLUCURONIDE UR QL: NEGATIVE
FENTANYL UR QL: NOT DETECTED
GABAPENTIN: NOT DETECTED
HYDROCODONE UR QL: NOT DETECTED
HYDROMORPHONE UR QL: NOT DETECTED
LORAZEPAM UR QL: PRESENT
MDA UR QL: NOT DETECTED
MDEA UR QL: NOT DETECTED
MDMA UR QL: NOT DETECTED
MEPERIDINE UR QL: NOT DETECTED
METHADONE UR QL: NEGATIVE
METHAMPHET UR QL: NOT DETECTED
MIDAZOLAM UR QL SCN: NOT DETECTED
MORPHINE UR QL: NOT DETECTED
NALOXONE: NOT DETECTED
NORBUPRENORPHINE UR QL CFM: NOT DETECTED
NORDIAZEPAM UR QL: NOT DETECTED
NORFENTANYL UR QL: NOT DETECTED
NORHYDROCODONE UR QL CFM: NOT DETECTED
NOROXYCODONE UR QL CFM: NOT DETECTED
NOROXYMORPHONE, URINE: NOT DETECTED
OXAZEPAM UR QL: NOT DETECTED
OXYCODONE UR QL: NOT DETECTED
OXYMORPHONE UR QL: NOT DETECTED
PATHOLOGY STUDY: NORMAL
PCP UR QL: NEGATIVE
PHENTERMINE UR QL: NOT DETECTED
PPAA UR QL: NOT DETECTED
PREGABALIN: NOT DETECTED
SERVICE CMNT-IMP: NORMAL
TAPENTADOL UR QL SCN: NOT DETECTED
TAPENTADOL-O-SULFATE, URINE: NOT DETECTED
TEMAZEPAM UR QL: NOT DETECTED
TRAMADOL UR QL: NEGATIVE
ZOLPIDEM UR QL: NOT DETECTED

## 2024-07-18 RX ORDER — ARIPIPRAZOLE 2 MG/1
TABLET ORAL
Qty: 30 TABLET | Refills: 2 | Status: SHIPPED | OUTPATIENT
Start: 2024-07-18

## 2024-07-30 ENCOUNTER — HOSPITAL ENCOUNTER (OUTPATIENT)
Age: 54
Discharge: HOME | End: 2024-07-30
Payer: COMMERCIAL

## 2024-07-30 ENCOUNTER — OFFICE VISIT (OUTPATIENT)
Age: 54
End: 2024-07-30
Payer: COMMERCIAL

## 2024-07-30 ENCOUNTER — TELEPHONE (OUTPATIENT)
Age: 54
End: 2024-07-30

## 2024-07-30 VITALS
SYSTOLIC BLOOD PRESSURE: 114 MMHG | HEIGHT: 66 IN | HEART RATE: 77 BPM | BODY MASS INDEX: 27.32 KG/M2 | DIASTOLIC BLOOD PRESSURE: 82 MMHG | WEIGHT: 170 LBS | OXYGEN SATURATION: 97 %

## 2024-07-30 DIAGNOSIS — M54.81 BILATERAL OCCIPITAL NEURALGIA: ICD-10-CM

## 2024-07-30 DIAGNOSIS — G89.29 CHRONIC INTRACTABLE HEADACHE, UNSPECIFIED HEADACHE TYPE: Primary | ICD-10-CM

## 2024-07-30 DIAGNOSIS — R51.9 CHRONIC INTRACTABLE HEADACHE, UNSPECIFIED HEADACHE TYPE: ICD-10-CM

## 2024-07-30 DIAGNOSIS — G89.29 CHRONIC INTRACTABLE HEADACHE, UNSPECIFIED HEADACHE TYPE: ICD-10-CM

## 2024-07-30 DIAGNOSIS — M26.609 TMJ (TEMPOROMANDIBULAR JOINT DISORDER): ICD-10-CM

## 2024-07-30 DIAGNOSIS — R51.9 CHRONIC INTRACTABLE HEADACHE, UNSPECIFIED HEADACHE TYPE: Primary | ICD-10-CM

## 2024-07-30 LAB
CRP SERPL-MCNC: <3 MG/L (ref 0–5.1)
D-DIMER QUANTITATIVE: 0.62 UG/ML FEU (ref 0–0.6)
ERYTHROCYTE [SEDIMENTATION RATE] IN BLOOD BY WESTERGREN METHOD: 1 MM/HR (ref 0–20)

## 2024-07-30 PROCEDURE — 99204 OFFICE O/P NEW MOD 45 MIN: CPT | Performed by: PSYCHIATRY & NEUROLOGY

## 2024-07-30 PROCEDURE — 85379 FIBRIN DEGRADATION QUANT: CPT

## 2024-07-30 PROCEDURE — 86140 C-REACTIVE PROTEIN: CPT

## 2024-07-30 PROCEDURE — 36415 COLL VENOUS BLD VENIPUNCTURE: CPT

## 2024-07-30 PROCEDURE — 3017F COLORECTAL CA SCREEN DOC REV: CPT | Performed by: PSYCHIATRY & NEUROLOGY

## 2024-07-30 PROCEDURE — G8427 DOCREV CUR MEDS BY ELIG CLIN: HCPCS | Performed by: PSYCHIATRY & NEUROLOGY

## 2024-07-30 PROCEDURE — 85652 RBC SED RATE AUTOMATED: CPT

## 2024-07-30 PROCEDURE — G8419 CALC BMI OUT NRM PARAM NOF/U: HCPCS | Performed by: PSYCHIATRY & NEUROLOGY

## 2024-07-30 PROCEDURE — 1036F TOBACCO NON-USER: CPT | Performed by: PSYCHIATRY & NEUROLOGY

## 2024-07-30 NOTE — PROGRESS NOTES
speech/vision or new onset of weakness/numbness. Remaining as per HPI.      /82 (Site: Left Upper Arm)   Pulse 77   Ht 1.674 m (5' 5.9\")   Wt 77.1 kg (170 lb)   SpO2 97% Comment: RA  BMI 27.52 kg/m²     Neurological examination:  MENTAL STATUS: AAOx3  LANG/SPEECH: Fluent, intact naming, repetition & comprehension  CRANIAL NERVES:    II: Pupils equal and reactive, no RAPD, normal visual field and fundus    III, IV, VI: EOM intact, no gaze preference or deviation    V: normal    VII: no facial asymmetry    VIII: normal hearing to speech  MOTOR: 5/5 in both upper and lower extremities  REFLEXES: 2/4 throughout, bilateral flexor planters  SENSORY: Normal to touch, temperature & pin prick in all extremiteis  COORD: Normal finger to nose and heel to shin, no tremor, no dysmetria    Significant tenderness over the bilateral TMJ.  Moderate tenderness over the bilateral greater occipital nerve.    Imaging, procedure, and laboratory data:   There is no relevant study to review at this visit.    Impression:      ICD-10-CM    1. Chronic intractable headache, unspecified headache type  R51.9 C-Reactive Protein    G89.29 Sedimentation Rate     D-Dimer, Quantitative      2. TMJ (temporomandibular joint disorder)  M26.609       3. Bilateral occipital neuralgia  M54.81         At his age, neurology agrees for further workup to exclude secondary headache with MRI brain.  Neurology also recommends to do ESR, CRP and D-dimer.  Based on the history and examination, the source of the headache is likely be combination from occipital neuralgia and TMJ.    Plan:    1.  ESR, CRP and D-dimer.  2.  Agree with MRI brain.  3.  May try a short course of prednisolone in the future after the test results.         Plan discussed with patient who voiced understanding and agreed with the plan.   Portions of this note were created using voice recognition software, please excuse any typos.      Kris Martínez MD

## 2024-07-30 NOTE — PATIENT INSTRUCTIONS
YOU MUST CONFIRM YOUR APPOINTMENT 1 DAY PRIOR OR IT WILL BE CANCELLED!!   Our office will call you 3 times the day prior to your appointment in an attempt to confirm.  Please return our call ASAP or confirm your appt through [employer] no later than 3 pm the day before your appointment.  If we do not hear back from you by 3 pm to confirm, your appointment will be cancelled & someone will be added into that slot from our wait list.

## 2024-08-12 ENCOUNTER — TELEPHONE (OUTPATIENT)
Dept: PULMONOLOGY | Age: 54
End: 2024-08-12

## 2024-08-12 ENCOUNTER — TELEPHONE (OUTPATIENT)
Dept: FAMILY MEDICINE CLINIC | Age: 54
End: 2024-08-12

## 2024-08-12 NOTE — TELEPHONE ENCOUNTER
Patient called and said that he woke up through the night with cold sweets and that his heart rate was in the rang of 27-32 and his oxygen was between 80-85. He said he called his pcp and let them know what he was going through and they told him to call Dr. Casas and let him know what is going on. Patient is now at work and said he is filling light headed still off and on. He said that he need a sleep study done as soon as he can.Please Advisewhat patient need to do.

## 2024-08-12 NOTE — TELEPHONE ENCOUNTER
Is this is really low heart rate, should be seen in ED given his lightheadedness  Okay to schedule sleep study per my last note recommendation as soon as possible

## 2024-08-12 NOTE — TELEPHONE ENCOUNTER
Patient called and requested help with getting sleep study scheduled. I called sleep center and got him scheduled for 9/11 he is to arrive at 8:30pm, park in lot by the helicopter pad go in the entrance that is listed with [address]. Patient has been notified.,

## 2024-08-12 NOTE — TELEPHONE ENCOUNTER
Called patient and told him that you recommenced for him to go to the ER if his heart rate is that low with lightheadedness.  Patient said he is feeling better now but will the next time it happens and he is schedule for a sleep study for 9/11/2024.

## 2024-08-15 DIAGNOSIS — M54.41 CHRONIC BILATERAL LOW BACK PAIN WITH BILATERAL SCIATICA: ICD-10-CM

## 2024-08-15 DIAGNOSIS — R20.2 NUMBNESS AND TINGLING OF BOTH LEGS BELOW KNEES: ICD-10-CM

## 2024-08-15 DIAGNOSIS — G89.29 CHRONIC BILATERAL LOW BACK PAIN WITH BILATERAL SCIATICA: ICD-10-CM

## 2024-08-15 DIAGNOSIS — M54.42 CHRONIC BILATERAL LOW BACK PAIN WITH BILATERAL SCIATICA: ICD-10-CM

## 2024-08-15 DIAGNOSIS — R20.0 NUMBNESS AND TINGLING OF BOTH LEGS BELOW KNEES: ICD-10-CM

## 2024-08-16 RX ORDER — GABAPENTIN 100 MG/1
100 CAPSULE ORAL 2 TIMES DAILY
Qty: 60 CAPSULE | Refills: 1 | Status: SHIPPED | OUTPATIENT
Start: 2024-08-16 | End: 2024-10-15

## 2024-08-21 ENCOUNTER — TELEPHONE (OUTPATIENT)
Dept: FAMILY MEDICINE CLINIC | Age: 54
End: 2024-08-21

## 2024-08-21 ENCOUNTER — HOSPITAL ENCOUNTER (OUTPATIENT)
Dept: MRI IMAGING | Age: 54
Discharge: HOME | End: 2024-08-21
Payer: COMMERCIAL

## 2024-08-21 DIAGNOSIS — G43.709 CHRONIC MIGRAINE W/O AURA W/O STATUS MIGRAINOSUS, NOT INTRACTABLE: ICD-10-CM

## 2024-08-21 PROBLEM — U07.1 COVID-19 VIRUS INFECTION: Status: RESOLVED | Noted: 2024-03-21 | Resolved: 2024-08-21

## 2024-08-21 PROCEDURE — A9579 GAD-BASE MR CONTRAST NOS,1ML: HCPCS | Performed by: NURSE PRACTITIONER

## 2024-08-21 PROCEDURE — 70553 MRI BRAIN STEM W/O & W/DYE: CPT

## 2024-08-21 PROCEDURE — 6360000004 HC RX CONTRAST MEDICATION: Performed by: NURSE PRACTITIONER

## 2024-08-21 RX ADMIN — GADOTERIDOL 15 ML: 279.3 INJECTION, SOLUTION INTRAVENOUS at 16:07

## 2024-08-21 ASSESSMENT — ENCOUNTER SYMPTOMS
BACK PAIN: 1
ABDOMINAL PAIN: 0
SHORTNESS OF BREATH: 1

## 2024-08-21 NOTE — TELEPHONE ENCOUNTER
Please send updated note to PA/ insurance company with Neurology note from 7/30/24   [FreeTextEntry1] : ONLY MODEST RELIEF WITH [facility] LAST TIME\par START ORTHOVISC SERIES, GOOD RELIEF IN THE PAST

## 2024-08-21 NOTE — TELEPHONE ENCOUNTER
Steve with [facility] dept, calling to report that patient insurance denied approval for MRI of Brain. States that they need a peer to peer evaluation.    Phone [phone]    Ref. Number: [id number]

## 2024-08-26 ENCOUNTER — TELEMEDICINE (OUTPATIENT)
Age: 54
End: 2024-08-26
Payer: COMMERCIAL

## 2024-08-26 DIAGNOSIS — G89.29 CHRONIC BILATERAL LOW BACK PAIN WITH BILATERAL SCIATICA: ICD-10-CM

## 2024-08-26 DIAGNOSIS — M54.42 CHRONIC BILATERAL LOW BACK PAIN WITH BILATERAL SCIATICA: ICD-10-CM

## 2024-08-26 DIAGNOSIS — M26.609 TMJ (TEMPOROMANDIBULAR JOINT DISORDER): Primary | ICD-10-CM

## 2024-08-26 DIAGNOSIS — G89.29 CHRONIC INTRACTABLE HEADACHE, UNSPECIFIED HEADACHE TYPE: ICD-10-CM

## 2024-08-26 DIAGNOSIS — M54.41 CHRONIC BILATERAL LOW BACK PAIN WITH BILATERAL SCIATICA: ICD-10-CM

## 2024-08-26 DIAGNOSIS — R20.0 NUMBNESS AND TINGLING OF BOTH LEGS BELOW KNEES: ICD-10-CM

## 2024-08-26 DIAGNOSIS — R51.9 CHRONIC INTRACTABLE HEADACHE, UNSPECIFIED HEADACHE TYPE: ICD-10-CM

## 2024-08-26 DIAGNOSIS — R20.2 NUMBNESS AND TINGLING OF BOTH LEGS BELOW KNEES: ICD-10-CM

## 2024-08-26 PROCEDURE — G8427 DOCREV CUR MEDS BY ELIG CLIN: HCPCS | Performed by: PSYCHIATRY & NEUROLOGY

## 2024-08-26 PROCEDURE — 1036F TOBACCO NON-USER: CPT | Performed by: PSYCHIATRY & NEUROLOGY

## 2024-08-26 PROCEDURE — G8419 CALC BMI OUT NRM PARAM NOF/U: HCPCS | Performed by: PSYCHIATRY & NEUROLOGY

## 2024-08-26 PROCEDURE — 3017F COLORECTAL CA SCREEN DOC REV: CPT | Performed by: PSYCHIATRY & NEUROLOGY

## 2024-08-26 PROCEDURE — 99214 OFFICE O/P EST MOD 30 MIN: CPT | Performed by: PSYCHIATRY & NEUROLOGY

## 2024-08-26 RX ORDER — GABAPENTIN 300 MG/1
300 CAPSULE ORAL 2 TIMES DAILY
Qty: 60 CAPSULE | Refills: 1 | Status: SHIPPED | OUTPATIENT
Start: 2024-08-26 | End: 2024-10-25

## 2024-08-26 RX ORDER — PREDNISONE 20 MG/1
20 TABLET ORAL DAILY
Qty: 30 TABLET | Refills: 0 | Status: SHIPPED | OUTPATIENT
Start: 2024-08-26 | End: 2024-09-25

## 2024-08-26 RX ORDER — TIZANIDINE 2 MG/1
2 TABLET ORAL 2 TIMES DAILY
Qty: 60 TABLET | Refills: 2 | Status: SHIPPED | OUTPATIENT
Start: 2024-08-26

## 2024-08-26 NOTE — PROGRESS NOTES
Neurology outpatient F/U visit    Patient name: Néstor Camacho Sr.      Chief Complaint:  Refractory headache.    History of present illness:  This is a 53 years old right-handed male.  The patient is here for evaluation of refractory headache.  The onset was about few years ago.  The patient also reveals history of migraine headache during teenager.  But the patient had been headache free for more than 10 years prior to this episode.  The patient describes his typical headache as bilateral dull aching/throbbing pain with moderate to severe intensity.  The headache can last all day long.  The patient also reports minimal nauseous, phonophobia and photophobia with a headache.  The headache occurs at least 3-4 times a week.  The patient is currently taking Fioricet as needed for severe headache.  The patient also is taking gabapentin and duloxetine for back pain.  The patient had a scheduled for MRI brain prior to this visit.    Interval History:  08/26/24: The patient is here for follow-up after the MRI brain and labs.  The patient had normal ESR, CRP and slightly elevated D-dimer.  The patient also had normal MRI brain.  The patient remains to have significant headache.  The headache occurs still daily.  The patient remains on duloxetine, gabapentin, and tizanidine 2 mg at nighttime.  The patient is also taking Fioricet as needed for severe headache.  Per the patient, he is taking Fioricet almost every day.    Past medical history:    Past Medical History:   Diagnosis Date    Arthritis     Chest pain     [facility] 9/2012 sent for records;    Colitis     COPD (chronic obstructive pulmonary disease) ([facility])     COVID 12/25/2021    COVID-19 virus infection 03/21/2024    Histoplasmosis     History of blood transfusion     Hx of blood clots     Metabolic syndrome X 11/13/2018    Other emphysema ([facility]) 08/19/2018    Other emphysema ([facility]) 08/19/2018    Pneumonia     Pulmonary nodule        Past surgical history:    Past Surgical  release tablet Take 1 tablet by mouth daily 30 tablet 11    guaiFENesin (MUCINEX) 600 MG extended release tablet Take 1 tablet by mouth 2 times daily 60 tablet 5    DULoxetine (CYMBALTA) 60 MG extended release capsule Take 1 capsule by mouth daily 30 capsule 11    amLODIPine (NORVASC) 2.5 MG tablet Take 1 tablet by mouth daily 30 tablet 11    ibuprofen (ADVIL;MOTRIN) 800 MG tablet TAKE ONE (1) TABLET BY MOUTH FOUR (4) TIMES DAILY AS NEEDED FOR PAIN 120 tablet 5    Lidocaine 3 % CREA Apply 1 each topically daily HEMORRHOID 28 g 0    fluticasone-umeclidin-vilant (TRELEGY ELLIPTA) 100-62.5-25 MCG/ACT AEPB inhaler INHALE ONE (1) PUFF INTO THE LUNGS DAILY 1 each 5    albuterol sulfate HFA (PROVENTIL;VENTOLIN;PROAIR) 108 (90 Base) MCG/ACT inhaler Inhale 2 puffs into the lungs every 6 hours as needed for Wheezing or Shortness of Breath 18 g 5    dicyclomine (BENTYL) 10 MG capsule Take 1 capsule by mouth 4 times daily as needed (abdominal cramps.) 90 capsule 11     No current facility-administered medications for this visit.       Allergies:    Allergies as of 2024 - Fully Reviewed 2024   Allergen Reaction Noted    Acetaminophen Other (See Comments) 2011    Codeine Itching 2011    Mobic [meloxicam]  03/10/2020    Tramadol  2013        Social history:     reports that he quit smoking about 6 years ago. His smoking use included cigarettes. He started smoking about 26 years ago. He has a 40 pack-year smoking history. He has been exposed to tobacco smoke. He has never used smokeless tobacco. He reports current alcohol use of about 1.0 standard drink of alcohol per week. He reports that he does not use drugs.     Family history:    Family History   Problem Relation Age of Onset    Cancer Mother         ovarian    Heart Failure Mother     Heart Attack Mother 57    Heart Disease Mother     Other Father          1948;ashd;per neurop;    Other Brother         pt knows very little    Cancer Paternal

## 2024-09-11 ENCOUNTER — HOSPITAL ENCOUNTER (OUTPATIENT)
Dept: SLEEP CENTER | Age: 54
Discharge: HOME | End: 2024-09-13
Payer: COMMERCIAL

## 2024-09-11 DIAGNOSIS — G47.10 HYPERSOMNIA: ICD-10-CM

## 2024-09-11 PROCEDURE — 95810 POLYSOM 6/> YRS 4/> PARAM: CPT

## 2024-09-12 PROBLEM — G47.10 HYPERSOMNIA: Status: ACTIVE | Noted: 2024-09-12

## 2024-09-13 ENCOUNTER — TELEPHONE (OUTPATIENT)
Dept: PULMONOLOGY | Age: 54
End: 2024-09-13

## 2024-09-13 DIAGNOSIS — G47.33 OSA (OBSTRUCTIVE SLEEP APNEA): Primary | ICD-10-CM

## 2024-09-16 ENCOUNTER — TELEPHONE (OUTPATIENT)
Dept: SLEEP CENTER | Age: 54
End: 2024-09-16

## 2024-10-09 DIAGNOSIS — F41.9 CHRONIC ANXIETY: ICD-10-CM

## 2024-10-10 RX ORDER — LORAZEPAM 1 MG/1
TABLET ORAL
Qty: 28 TABLET | Refills: 5 | Status: SHIPPED | OUTPATIENT
Start: 2024-10-10 | End: 2024-11-10

## 2024-10-10 RX ORDER — GABAPENTIN 100 MG/1
100 CAPSULE ORAL 2 TIMES DAILY
Qty: 56 CAPSULE | Refills: 5 | OUTPATIENT
Start: 2024-10-10

## 2024-10-10 RX ORDER — ARIPIPRAZOLE 2 MG/1
TABLET ORAL
Qty: 28 TABLET | Refills: 5 | Status: SHIPPED | OUTPATIENT
Start: 2024-10-10

## 2024-10-15 DIAGNOSIS — R20.0 NUMBNESS AND TINGLING OF BOTH LEGS BELOW KNEES: ICD-10-CM

## 2024-10-15 DIAGNOSIS — G89.29 CHRONIC BILATERAL LOW BACK PAIN WITH BILATERAL SCIATICA: ICD-10-CM

## 2024-10-15 DIAGNOSIS — M54.42 CHRONIC BILATERAL LOW BACK PAIN WITH BILATERAL SCIATICA: ICD-10-CM

## 2024-10-15 DIAGNOSIS — R20.2 NUMBNESS AND TINGLING OF BOTH LEGS BELOW KNEES: ICD-10-CM

## 2024-10-15 DIAGNOSIS — M54.41 CHRONIC BILATERAL LOW BACK PAIN WITH BILATERAL SCIATICA: ICD-10-CM

## 2024-10-15 NOTE — TELEPHONE ENCOUNTER
Refill Request     CONFIRM preferrred pharmacy with the patient.    If Mail Order Rx - Pend for 90 day refill.      Last Seen: Last Seen Department: 7/11/2024  Last Seen by PCP: 7/11/2024    Last Written: 08/26/2024    If no future appointment scheduled, route STAFF MESSAGE with patient name to the  Pool for scheduling.      Next Appointment:   Future Appointments   Date Time Provider Department Center   10/17/2024  7:40 AM Rosmery Matt, APRN - CNP [facility] DEP   10/21/2024  8:30 PM SCHEDULE, [facility] SLEEP ROOM 1 [facility]   10/24/2024  2:45 PM Kris Martínez MD [facility]URO Neurology -   11/7/2024  4:00 PM MMO CT RM 1 [facility]   11/12/2024  8:30 AM Nathaniel Casas MD [facility] PULM [facility]       Message sent to  to schedule appt with patient?  NO      Requested Prescriptions     Pending Prescriptions Disp Refills    gabapentin (NEURONTIN) 100 MG capsule [Pharmacy Med Name: GABAPENTIN 100MG CAPSULE] 56 capsule 1     Sig: Take 1 capsule by mouth 2 times daily.

## 2024-10-16 RX ORDER — GABAPENTIN 100 MG/1
100 CAPSULE ORAL 2 TIMES DAILY
Qty: 60 CAPSULE | Refills: 2 | Status: SHIPPED | OUTPATIENT
Start: 2024-10-16 | End: 2025-01-14

## 2024-10-17 ENCOUNTER — TELEPHONE (OUTPATIENT)
Dept: FAMILY MEDICINE CLINIC | Age: 54
End: 2024-10-17

## 2024-10-17 ENCOUNTER — OFFICE VISIT (OUTPATIENT)
Dept: FAMILY MEDICINE CLINIC | Age: 54
End: 2024-10-17

## 2024-10-17 VITALS
SYSTOLIC BLOOD PRESSURE: 118 MMHG | WEIGHT: 171 LBS | HEART RATE: 76 BPM | TEMPERATURE: 98 F | DIASTOLIC BLOOD PRESSURE: 82 MMHG | OXYGEN SATURATION: 94 % | BODY MASS INDEX: 27.68 KG/M2

## 2024-10-17 DIAGNOSIS — J44.9 CHRONIC OBSTRUCTIVE PULMONARY DISEASE, UNSPECIFIED COPD TYPE (HCC): ICD-10-CM

## 2024-10-17 DIAGNOSIS — M54.41 CHRONIC BILATERAL LOW BACK PAIN WITH BILATERAL SCIATICA: ICD-10-CM

## 2024-10-17 DIAGNOSIS — M54.42 CHRONIC BILATERAL LOW BACK PAIN WITH BILATERAL SCIATICA: ICD-10-CM

## 2024-10-17 DIAGNOSIS — G43.709 CHRONIC MIGRAINE W/O AURA W/O STATUS MIGRAINOSUS, NOT INTRACTABLE: Primary | ICD-10-CM

## 2024-10-17 DIAGNOSIS — G89.29 CHRONIC BILATERAL LOW BACK PAIN WITH BILATERAL SCIATICA: ICD-10-CM

## 2024-10-17 DIAGNOSIS — F41.9 CHRONIC ANXIETY: ICD-10-CM

## 2024-10-17 DIAGNOSIS — J44.9 CHRONIC OBSTRUCTIVE PULMONARY DISEASE, UNSPECIFIED COPD TYPE (HCC): Primary | ICD-10-CM

## 2024-10-17 DIAGNOSIS — Z23 INFLUENZA VACCINATION ADMINISTERED AT CURRENT VISIT: ICD-10-CM

## 2024-10-17 RX ORDER — IBUPROFEN 800 MG/1
TABLET, FILM COATED ORAL
Qty: 120 TABLET | Refills: 5 | Status: SHIPPED | OUTPATIENT
Start: 2024-10-17

## 2024-10-17 RX ORDER — FLUTICASONE FUROATE, UMECLIDINIUM BROMIDE AND VILANTEROL TRIFENATATE 100; 62.5; 25 UG/1; UG/1; UG/1
1 POWDER RESPIRATORY (INHALATION) DAILY
Qty: 1 EACH | Refills: 5 | Status: SHIPPED | OUTPATIENT
Start: 2024-10-17

## 2024-10-17 RX ORDER — FLUTICASONE PROPIONATE 50 MCG
2 SPRAY, SUSPENSION (ML) NASAL DAILY
Qty: 16 G | Refills: 5 | Status: SHIPPED | OUTPATIENT
Start: 2024-10-17

## 2024-10-17 ASSESSMENT — ENCOUNTER SYMPTOMS
SHORTNESS OF BREATH: 1
BACK PAIN: 1
ABDOMINAL PAIN: 0

## 2024-10-17 NOTE — PROGRESS NOTES
10/17/2024    Chief Complaint   Patient presents with    Anxiety    Depression    Numbness and tingling of both legs below knees    Chronic bilateral low back pain with bilateral sciatica    Migraine       Néstor Camacho Sr. is a 53 y.o. male, presents today for:      ASSESSMENT/PLAN:  1. Chronic migraine w/o aura w/o status migrainosus, not intractable  Suboptimal control, suspect recurrent flares of migraines  Continue Fioricet for now but discussed starting preventative.  Pt will consider  MRI Brain W WO Contrast 7/2024 showing no acute abnormality, minimal global parynchymal volume loss of minimal chronic microvascular ischemic changes  Eye exam in past 6 months- received new glasses.      2. Chronic anxiety  Improved today with use of pill packs, continues to be symptomatic  Unclear if improvement with Vraylar as pt only took for 30 days.   Continue Buspirone TID, Lorazepam 1 mg daily, Cymbalta 60, Abilfy 2.  Notes some oversedation with evening medications- possibly Abilify?  Stop Singulair for now but consider changing Abilify if no improvement  Past meds: Seroquel ( no help), (Cymbalta- helps but not for anxiety).  Continues to declined Psychotherapy    3. Chronic obstructive pulmonary disease, unspecified COPD type (HCC)  Suboptimal control today with increased SOB/ cough due to running out of Trelegy  Continue care with Dr. Casas  Currently pursuing w/u for WILL[facility]  Failed Spiriva/ Symbicort [id number].    Last PFT 11/2023 showing no obstructive/ restrictive defect, mild reduction in diffusion capacity.    Declining COVID.  Influenza vaccination given today.   - fluticasone-umeclidin-vilant (TRELEGY ELLIPTA) 100-62.5-25 MCG/ACT AEPB inhaler; Inhale 1 puff into the lungs daily  Dispense: 1 each; Refill: 5  - fluticasone (FLONASE) 50 MCG/ACT nasal spray; 2 sprays by Each Nostril route daily  Dispense: 16 g; Refill: 5    4. Chronic bilateral low back pain with bilateral sciatica  Controlled today now with

## 2024-10-17 NOTE — PATIENT INSTRUCTIONS
Headache Calendars  Please keep track of you headaches/migraines on the provided headache calendar including what medication you treated your headaches with. Please remember to bring this back to your next follow-up appointment with you.     Acute Headache Treatment Plan:   Take naproxen (Aleve): 750 mg at the onset of your headache with 32 oz of sports drink.  If not headache free in 3-4 hours repeat naproxen (Aleve): 750 mg with another sports drink  If the headache continues past the first two doses in one 24 hour period please call our office.    These two doses = 1 treatment. You can do a total of 3 treatments in a week.  Please separate treatments by 24 hours.     Non-medication Options:   Here are some non-medication options. If you have specific questions about any of these devices please ask your healthcare provider for more information    Nerivio  [email or web address]  Use within 60 minutes of increase in your headache. Set at a comfortable intensity level first few minutes and maintain that level for 45 minutes up to 12 times per month.     Allay lamp (Green light therpaFlatora)  https://[employer]  Can use the discount code \"[facility]\" for 10% off (limited time discount)  Allay is covered by some FSA, HSA providers but not all  Cost is around $130.00    CEFALY  https://www.cefaly.us/en    eNeura  http://www.eneura.com/    Https://[email or web address]/about/    Review of Healthy Habits:  The best way to prevent headaches is to practice good \"headache hygiene.\" It is called the \"SMART\" approach, and it is listed below for you to refer to at home.   S - Sleep   Maintain regular sleep patterns. Go to sleep and wake up at the same time each day.  A good rule of thumb as to how many hours a child/adolescent should have is remembering that at 10 years of age, each child should have 10 hours of sleep. Any age less than that need more than 10 hours (i.e. 10-12 hours), and any age greater than that could

## 2024-10-17 NOTE — TELEPHONE ENCOUNTER
Refill Request     CONFIRM preferrred pharmacy with the patient.    If Mail Order Rx - Pend for 90 day refill.      Last Seen: Last Seen Department: 10/17/2024  Last Seen by PCP: 10/17/2024    Last Written: 04/04/2024    If no future appointment scheduled, route STAFF MESSAGE with patient name to the  Pool for scheduling.      Next Appointment:   Future Appointments   Date Time Provider Department Center   10/21/2024  8:30 PM SCHEDULE, [facility] SLEEP ROOM 1 [facility] SLEEP [facility]   10/24/2024  2:45 PM Kris Martínez MD [facility] NEURO Neurology -   11/7/2024  4:00 PM MMO CT RM 1 [facility]   11/12/2024  8:30 AM Nathaniel Casas MD CLERM PULM [facility]   1/30/2025  7:20 AM Rosmery Matt APRN - CNP [facility] [facility] DEP       Message sent to  to schedule appt with patient?  NO      Requested Prescriptions     Pending Prescriptions Disp Refills    ibuprofen (ADVIL;MOTRIN) 800 MG tablet [Pharmacy Med Name: IBUPROFEN 800MG TABLET] 120 tablet 5     Sig: TAKE ONE (1) TABLET BY MOUTH FOUR (4) TIMES DAILY AS NEEDED FOR PAIN  **PT TAKES ROUTINELY AM/HS, AND AS NEEDED DURING DAY**

## 2024-10-17 NOTE — TELEPHONE ENCOUNTER
PA needed for Trelegy or do you want to change it to the one of the preferred; Arnuity Ellipta, Dulera, Spiriva, Stiolto Respimat

## 2024-10-18 RX ORDER — UMECLIDINIUM BROMIDE AND VILANTEROL TRIFENATATE 62.5; 25 UG/1; UG/1
1 POWDER RESPIRATORY (INHALATION) DAILY
Qty: 1 EACH | Refills: 0 | Status: SHIPPED | OUTPATIENT
Start: 2024-10-18

## 2024-10-18 NOTE — TELEPHONE ENCOUNTER
Failed Symbicort and Spiriva in [phone]-- note updated.      Lets try Anoro and see if covered.  Order sent to pharmacy

## 2024-10-21 ENCOUNTER — HOSPITAL ENCOUNTER (OUTPATIENT)
Dept: SLEEP CENTER | Age: 54
Discharge: HOME | End: 2024-10-23
Payer: COMMERCIAL

## 2024-10-21 DIAGNOSIS — G47.33 OSA (OBSTRUCTIVE SLEEP APNEA): ICD-10-CM

## 2024-10-21 PROCEDURE — 95811 POLYSOM 6/>YRS CPAP 4/> PARM: CPT

## 2024-10-22 PROBLEM — G47.33 OSA (OBSTRUCTIVE SLEEP APNEA): Status: ACTIVE | Noted: 2024-10-22

## 2024-10-23 ENCOUNTER — TELEPHONE (OUTPATIENT)
Dept: PULMONOLOGY | Age: 54
End: 2024-10-23

## 2024-10-23 DIAGNOSIS — G47.33 OSA (OBSTRUCTIVE SLEEP APNEA): Primary | ICD-10-CM

## 2024-10-24 ENCOUNTER — TELEPHONE (OUTPATIENT)
Age: 54
End: 2024-10-24

## 2024-10-24 ENCOUNTER — OFFICE VISIT (OUTPATIENT)
Age: 54
End: 2024-10-24
Payer: COMMERCIAL

## 2024-10-24 VITALS
RESPIRATION RATE: 14 BRPM | DIASTOLIC BLOOD PRESSURE: 74 MMHG | BODY MASS INDEX: 25.92 KG/M2 | OXYGEN SATURATION: 98 % | WEIGHT: 175 LBS | HEART RATE: 66 BPM | HEIGHT: 69 IN | SYSTOLIC BLOOD PRESSURE: 118 MMHG

## 2024-10-24 DIAGNOSIS — M26.609 TMJ (TEMPOROMANDIBULAR JOINT DISORDER): ICD-10-CM

## 2024-10-24 DIAGNOSIS — G44.40 MEDICATION OVERUSE HEADACHE: Primary | ICD-10-CM

## 2024-10-24 DIAGNOSIS — M54.81 BILATERAL OCCIPITAL NEURALGIA: ICD-10-CM

## 2024-10-24 DIAGNOSIS — G43.719 INTRACTABLE CHRONIC MIGRAINE WITHOUT AURA AND WITHOUT STATUS MIGRAINOSUS: ICD-10-CM

## 2024-10-24 DIAGNOSIS — G43.719 INTRACTABLE CHRONIC MIGRAINE WITHOUT AURA AND WITHOUT STATUS MIGRAINOSUS: Primary | ICD-10-CM

## 2024-10-24 PROCEDURE — G8484 FLU IMMUNIZE NO ADMIN: HCPCS | Performed by: PSYCHIATRY & NEUROLOGY

## 2024-10-24 PROCEDURE — G8419 CALC BMI OUT NRM PARAM NOF/U: HCPCS | Performed by: PSYCHIATRY & NEUROLOGY

## 2024-10-24 PROCEDURE — 99214 OFFICE O/P EST MOD 30 MIN: CPT | Performed by: PSYCHIATRY & NEUROLOGY

## 2024-10-24 PROCEDURE — 1036F TOBACCO NON-USER: CPT | Performed by: PSYCHIATRY & NEUROLOGY

## 2024-10-24 PROCEDURE — 3017F COLORECTAL CA SCREEN DOC REV: CPT | Performed by: PSYCHIATRY & NEUROLOGY

## 2024-10-24 PROCEDURE — G8427 DOCREV CUR MEDS BY ELIG CLIN: HCPCS | Performed by: PSYCHIATRY & NEUROLOGY

## 2024-10-24 RX ORDER — PREGABALIN 50 MG/1
50 CAPSULE ORAL 2 TIMES DAILY
Qty: 60 CAPSULE | Refills: 2 | Status: SHIPPED | OUTPATIENT
Start: 2024-10-24 | End: 2025-01-22

## 2024-10-24 NOTE — PROGRESS NOTES
Neurology outpatient F/U visit    Patient name: Néstor Camacho Sr.      Chief Complaint:  Refractory headache.    History of present illness:  This is a 53 years old right-handed male.  The patient is here for evaluation of refractory headache.  The onset was about few years ago.  The patient also reveals history of migraine headache during teenager.  But the patient had been headache free for more than 10 years prior to this episode.  The patient describes his typical headache as bilateral dull aching/throbbing pain with moderate to severe intensity.  The headache can last all day long.  The patient also reports minimal nauseous, phonophobia and photophobia with a headache.  The headache occurs at least 3-4 times a week.  The patient is currently taking Fioricet as needed for severe headache.  The patient also is taking gabapentin and duloxetine for back pain.  The patient had a scheduled for MRI brain prior to this visit.    Interval History:  08/26/24: The patient is here for follow-up after the MRI brain and labs.  The patient had normal ESR, CRP and slightly elevated D-dimer.  The patient also had normal MRI brain.  The patient remains to have significant headache.  The headache occurs still daily.  The patient remains on duloxetine, gabapentin, and tizanidine 2 mg at nighttime.  The patient is also taking Fioricet as needed for severe headache.  Per the patient, he is taking Fioricet almost every day.    10/24/24: The patient is here for follow-up after the short-term of steroid treatment.  The patient reports significant benefit from prednisolone.  However, the patient remains to takes daily ibuprofen and 3-4 times a week of Fioricet.  The patient remains to have headache at least 3-4 times a week.  Overall, Fioricet is working well but the patient has been on Fioricet almost every other day.  The patient also reports intermittent neck pain.  The patient could not tolerate gabapentin.    Failed medications:

## 2024-10-24 NOTE — TELEPHONE ENCOUNTER
Pt had 2 mo f/u appt today with Dr. Sun and he ordered Botox for migraines.  Pt informed that we'll begin PA and update him on status.  He was given [facility]'s phone #.  Pt states he plan to keep the same insurance for 2025

## 2024-10-29 NOTE — TELEPHONE ENCOUNTER
Test claim ran patient can get medication for $0 copay. Patient aware to give consent to [facility] to ship med. Will watch for med

## 2024-10-29 NOTE — TELEPHONE ENCOUNTER
Outcome  Approved on October 25 by [place] [employer] 2017  Your PA request for [id number] was approved for 365 days. The PA# assigned is [id number]. This medication has been approved for processing and dispensing by a pharmacy provider as defined in Rule 5160-26-13. All other provider types claims for pharmaceuticals are billed through an MCO, or in accordance with rule 5160-59-03 of the Administrative Code for individuals enrolled in the [place] plan  Authorization Expiration Date: 10/23/2025

## 2024-11-05 ENCOUNTER — TELEPHONE (OUTPATIENT)
Dept: NEUROLOGY | Age: 54
End: 2024-11-05

## 2024-11-05 NOTE — TELEPHONE ENCOUNTER
Office is aware - we spoke with Gen earlier this week.  It's supposed to be delivered today but has not arrived yet.

## 2024-11-06 DIAGNOSIS — M54.42 CHRONIC BILATERAL LOW BACK PAIN WITH BILATERAL SCIATICA: ICD-10-CM

## 2024-11-06 DIAGNOSIS — G89.29 CHRONIC BILATERAL LOW BACK PAIN WITH BILATERAL SCIATICA: ICD-10-CM

## 2024-11-06 DIAGNOSIS — M54.41 CHRONIC BILATERAL LOW BACK PAIN WITH BILATERAL SCIATICA: ICD-10-CM

## 2024-11-06 RX ORDER — TIZANIDINE 2 MG/1
TABLET ORAL
Qty: 60 TABLET | Refills: 3 | Status: SHIPPED | OUTPATIENT
Start: 2024-11-06

## 2024-11-06 NOTE — TELEPHONE ENCOUNTER
LOV: 10/24/24  Next appt: not yet scheduled; awaiting botox shipment  Last refill: 8/24/24 60 tabs 2 refills

## 2024-11-07 ENCOUNTER — OFFICE VISIT (OUTPATIENT)
Age: 54
End: 2024-11-07

## 2024-11-07 ENCOUNTER — HOSPITAL ENCOUNTER (OUTPATIENT)
Dept: CT IMAGING | Age: 54
Discharge: HOME | End: 2024-11-07
Payer: COMMERCIAL

## 2024-11-07 VITALS
OXYGEN SATURATION: 96 % | HEART RATE: 73 BPM | DIASTOLIC BLOOD PRESSURE: 87 MMHG | SYSTOLIC BLOOD PRESSURE: 123 MMHG | BODY MASS INDEX: 25.92 KG/M2 | HEIGHT: 69 IN | WEIGHT: 175 LBS

## 2024-11-07 DIAGNOSIS — Z87.891 PERSONAL HISTORY OF TOBACCO USE: ICD-10-CM

## 2024-11-07 DIAGNOSIS — G43.719 INTRACTABLE CHRONIC MIGRAINE WITHOUT AURA AND WITHOUT STATUS MIGRAINOSUS: Primary | ICD-10-CM

## 2024-11-07 DIAGNOSIS — G44.40 MEDICATION OVERUSE HEADACHE: ICD-10-CM

## 2024-11-07 PROCEDURE — 71271 CT THORAX LUNG CANCER SCR C-: CPT

## 2024-11-07 NOTE — TELEPHONE ENCOUNTER
Tried calling pt to offer 10:45 appt today in [place] but his phone # isn't working.  Sent [employer] message.

## 2024-11-07 NOTE — PATIENT INSTRUCTIONS

## 2024-11-07 NOTE — PROGRESS NOTES
Botox for migraine    Indication:    Chronic refractory migraine headache.    Technique:    31 sites are injected with 5 unites each. Total dose of 155 units each session.  Injections should be done at least 12 weeks after the prior injection to avoid antibody formation.    Preparation:    While the patient is setting in a chair, examine the patient to identify the landmarks (listed below).  Withdraw 4 ml of normal saline and smoothly inject it into the Botox vial.  Using a 1.5\" 25G needle, withdraw the reconstituted Botox into a 1ml syringe then switch the needle to 30G 0.5\". This way, each 10 units of the syringe (0.1ml) will contain 5 units of Botox.  Adjust the needle so the bevel faces the same direction as the syringe marks.    Injection:    Make sure the bevel is facing up, aspirate before injection, try to inject in the superficial layer of the muscle (once you feel the loss of resistance, when needle cross the dermis).

## 2024-11-18 DIAGNOSIS — J44.9 CHRONIC OBSTRUCTIVE PULMONARY DISEASE, UNSPECIFIED COPD TYPE (HCC): ICD-10-CM

## 2024-11-18 RX ORDER — UMECLIDINIUM BROMIDE AND VILANTEROL TRIFENATATE 62.5; 25 UG/1; UG/1
POWDER RESPIRATORY (INHALATION)
Qty: 60 G | Refills: 0 | Status: SHIPPED | OUTPATIENT
Start: 2024-11-18

## 2024-11-18 NOTE — TELEPHONE ENCOUNTER
Refill Request     CONFIRM preferrred pharmacy with the patient.    If Mail Order Rx - Pend for 90 day refill.      Last Seen: Last Seen Department: 10/17/2024  Last Seen by PCP: 10/17/2024    Last Written: 10/18/2024    If no future appointment scheduled, route STAFF MESSAGE with patient name to the  Pool for scheduling.      Next Appointment:   Future Appointments   Date Time Provider Department Center   1/27/2025  9:00 AM Nathaniel Casas MD [facility] PULM [facility]   1/30/2025  7:20 AM Rosmery Matt APRN - CNP [facility] DEP   1/30/2025  9:10 AM Kris Martínez MD [facility] NEURO Neurology -       Message sent to  to schedule appt with patient?  NO      Requested Prescriptions     Pending Prescriptions Disp Refills    ANORO ELLIPTA 62.5-25 MCG/ACT inhaler [Pharmacy Med Name: ANORO ELLIPTA 62.5-25 AERO POW BR ACT] 60 g 0     Sig: INHALE ONE (1) PUFF INTO THE LUNGS DAILY

## 2024-12-02 ENCOUNTER — TELEPHONE (OUTPATIENT)
Dept: FAMILY MEDICINE CLINIC | Age: 54
End: 2024-12-02

## 2024-12-02 DIAGNOSIS — R42 DIZZINESS: Primary | ICD-10-CM

## 2024-12-02 NOTE — TELEPHONE ENCOUNTER
Patient said he had called the office about a month ago about getting something prescribed for his dizziness. But I did not see anything in the chart regarding this.  Do you recall discussing this with him?

## 2024-12-03 RX ORDER — MECLIZINE HCL 12.5 MG 12.5 MG/1
12.5 TABLET ORAL 3 TIMES DAILY PRN
Qty: 15 TABLET | Refills: 0 | Status: SHIPPED | OUTPATIENT
Start: 2024-12-03 | End: 2024-12-13

## 2024-12-18 DIAGNOSIS — J44.9 CHRONIC OBSTRUCTIVE PULMONARY DISEASE, UNSPECIFIED COPD TYPE (HCC): ICD-10-CM

## 2024-12-18 RX ORDER — UMECLIDINIUM BROMIDE AND VILANTEROL TRIFENATATE 62.5; 25 UG/1; UG/1
POWDER RESPIRATORY (INHALATION)
Qty: 60 G | Refills: 5 | Status: SHIPPED | OUTPATIENT
Start: 2024-12-18

## 2025-01-03 DIAGNOSIS — R42 DIZZINESS: ICD-10-CM

## 2025-01-03 RX ORDER — MECLIZINE HCL 12.5 MG 12.5 MG/1
12.5 TABLET ORAL 3 TIMES DAILY PRN
Qty: 15 TABLET | Refills: 0 | Status: SHIPPED | OUTPATIENT
Start: 2025-01-03 | End: 2025-01-13

## (undated) DEVICE — GLOVE SURG SZ 85 L12IN FNGR THK94MIL STD WHT ISOLEX LTX

## (undated) DEVICE — SUTURE VCRL SZ 4-0 L18IN ABSRB UD L19MM PS-2 3/8 CIR PRIM J496H

## (undated) DEVICE — ENDO CARRY-ON PROCEDURE KIT INCLUDES SUCTION TUBING, LUBRICANT, GAUZE, BIOHAZARD STICKER, TRANSPORT PAD AND INTERCEPT BEDSIDE KIT.: Brand: ENDO CARRY-ON PROCEDURE KIT

## (undated) DEVICE — SUTURE ETHLN SZ 4-0 L18IN NONABSORBABLE BLK L19MM PS-2 3/8 1667H

## (undated) DEVICE — 3M™ STERI-STRIP™ REINFORCED ADHESIVE SKIN CLOSURES, R1540, 1/8 IN X 3 IN (3 MM X 75 MM), 5 STRIPS/ENVELOPE: Brand: 3M™ STERI-STRIP™

## (undated) DEVICE — GAUZE SPONGES,8 PLY: Brand: CURITY

## (undated) DEVICE — CANNULA SEAL

## (undated) DEVICE — GOWN,SIRUS,NONRNF,3XL,18/CS: Brand: MEDLINE

## (undated) DEVICE — MANIFOLD SURG NEPTUNE WST MGMT

## (undated) DEVICE — BLADELESS OBTURATOR: Brand: WECK VISTA

## (undated) DEVICE — SUTURE VCRL + SZ 2-0 L27IN ABSRB WHT SH 1/2 CIR TAPERCUT VCP417H

## (undated) DEVICE — STERILE POLYISOPRENE POWDER-FREE SURGICAL GLOVES: Brand: PROTEXIS

## (undated) DEVICE — BLADE SHV L13CM DIA4MM EXCALIBUR AGG COOLCUT

## (undated) DEVICE — SEAL

## (undated) DEVICE — SUTURE STRATAFIX SPRL SZ 2 0 L5IN ABSRB VLT SH L26MM 1 2 CIR SXPD2B414

## (undated) DEVICE — 3M™ STERI-STRIP™ COMPOUND BENZOIN TINCTURE 40 BAGS/CARTON 4 CARTONS/CASE C1544: Brand: 3M™ STERI-STRIP™

## (undated) DEVICE — SUTURE ABSORBABLE MONOFILAMENT 2-0 SH 6 IN STRATAFIX SPRL SXPP1B415

## (undated) DEVICE — TIP COVER ACCESSORY

## (undated) DEVICE — REDUCER: Brand: ENDOWRIST

## (undated) DEVICE — ARM DRAPE

## (undated) DEVICE — SUTURE ETHBND EXCEL SZ 0 L18IN NONABSORBABLE GRN L22MM MO-7 CX41D

## (undated) DEVICE — PACK PROCEDURE SURG SURGERYARTHROSCOPY KNEE

## (undated) DEVICE — Z INACTIVE NO SUPPLIER SOLUTIONIRRIG 3000ML 0.9% SOD CHL FLX CONT [79720808] [HOSPIRA WORLDWIDE INC]

## (undated) DEVICE — Device

## (undated) DEVICE — SOLUTION IV IRRIG POUR BRL 0.9% SODIUM CHL 2F7124

## (undated) DEVICE — 3M™ TEGADERM™ TRANSPARENT FILM DRESSING FRAME STYLE, 1624W, 2-3/8 IN X 2-3/4 IN (6 CM X 7 CM), 100/CT 4CT/CASE: Brand: 3M™ TEGADERM™